# Patient Record
Sex: MALE | Race: WHITE | NOT HISPANIC OR LATINO | Employment: OTHER | URBAN - METROPOLITAN AREA
[De-identification: names, ages, dates, MRNs, and addresses within clinical notes are randomized per-mention and may not be internally consistent; named-entity substitution may affect disease eponyms.]

---

## 2024-09-16 ENCOUNTER — APPOINTMENT (EMERGENCY)
Dept: CT IMAGING | Facility: HOSPITAL | Age: 84
DRG: 308 | End: 2024-09-16
Payer: COMMERCIAL

## 2024-09-16 ENCOUNTER — HOSPITAL ENCOUNTER (INPATIENT)
Facility: HOSPITAL | Age: 84
LOS: 4 days | Discharge: NURSING, REHAB OR LONG-TERM CARE | DRG: 308 | End: 2024-09-21
Attending: EMERGENCY MEDICINE | Admitting: INTERNAL MEDICINE
Payer: COMMERCIAL

## 2024-09-16 DIAGNOSIS — J18.9 PNEUMONIA: ICD-10-CM

## 2024-09-16 DIAGNOSIS — K59.00 CONSTIPATION: Primary | ICD-10-CM

## 2024-09-16 DIAGNOSIS — R77.8 ELEVATED TOTAL PROTEIN: ICD-10-CM

## 2024-09-16 DIAGNOSIS — I50.9 CONGESTIVE HEART FAILURE, UNSPECIFIED HF CHRONICITY, UNSPECIFIED HEART FAILURE TYPE (HCC): ICD-10-CM

## 2024-09-16 DIAGNOSIS — I48.91 ATRIAL FIBRILLATION WITH RVR (HCC): ICD-10-CM

## 2024-09-16 PROBLEM — U07.1 COVID-19: Status: ACTIVE | Noted: 2024-09-16

## 2024-09-16 PROBLEM — I10 HYPERTENSION: Status: ACTIVE | Noted: 2024-09-16

## 2024-09-16 LAB
2HR DELTA HS TROPONIN: 1 NG/L
4HR DELTA HS TROPONIN: 1 NG/L
ALBUMIN SERPL BCG-MCNC: 3 G/DL (ref 3.5–5)
ALP SERPL-CCNC: 97 U/L (ref 34–104)
ALT SERPL W P-5'-P-CCNC: 32 U/L (ref 7–52)
ANION GAP SERPL CALCULATED.3IONS-SCNC: 5 MMOL/L (ref 4–13)
ANISOCYTOSIS BLD QL SMEAR: PRESENT
APAP SERPL-MCNC: <10 UG/ML (ref 10–20)
AST SERPL W P-5'-P-CCNC: 34 U/L (ref 13–39)
BASOPHILS # BLD MANUAL: 0.05 THOUSAND/UL (ref 0–0.1)
BASOPHILS NFR MAR MANUAL: 1 % (ref 0–1)
BILIRUB SERPL-MCNC: 0.82 MG/DL (ref 0.2–1)
BNP SERPL-MCNC: 797 PG/ML (ref 0–100)
BUN SERPL-MCNC: 24 MG/DL (ref 5–25)
CALCIUM ALBUM COR SERPL-MCNC: 9.2 MG/DL (ref 8.3–10.1)
CALCIUM SERPL-MCNC: 8.4 MG/DL (ref 8.4–10.2)
CARDIAC TROPONIN I PNL SERPL HS: 24 NG/L
CARDIAC TROPONIN I PNL SERPL HS: 25 NG/L
CARDIAC TROPONIN I PNL SERPL HS: 25 NG/L
CHLORIDE SERPL-SCNC: 96 MMOL/L (ref 96–108)
CK SERPL-CCNC: 40 U/L (ref 39–308)
CO2 SERPL-SCNC: 30 MMOL/L (ref 21–32)
CREAT SERPL-MCNC: 1.02 MG/DL (ref 0.6–1.3)
D DIMER PPP FEU-MCNC: 2.09 UG/ML FEU
EOSINOPHIL # BLD MANUAL: 0.25 THOUSAND/UL (ref 0–0.4)
EOSINOPHIL NFR BLD MANUAL: 5 % (ref 0–6)
ERYTHROCYTE [DISTWIDTH] IN BLOOD BY AUTOMATED COUNT: 18.4 % (ref 11.6–15.1)
ETHANOL SERPL-MCNC: <10 MG/DL
FLUAV RNA RESP QL NAA+PROBE: NEGATIVE
FLUBV RNA RESP QL NAA+PROBE: NEGATIVE
GFR SERPL CREATININE-BSD FRML MDRD: 67 ML/MIN/1.73SQ M
GLUCOSE SERPL-MCNC: 106 MG/DL (ref 65–140)
HCT VFR BLD AUTO: 36.7 % (ref 36.5–49.3)
HGB BLD-MCNC: 11.9 G/DL (ref 12–17)
LACTATE SERPL-SCNC: 1.8 MMOL/L (ref 0.5–2)
LIPASE SERPL-CCNC: 66 U/L (ref 11–82)
LYMPHOCYTES # BLD AUTO: 0.66 THOUSAND/UL (ref 0.6–4.47)
LYMPHOCYTES # BLD AUTO: 13 % (ref 14–44)
MACROCYTES BLD QL AUTO: PRESENT
MAGNESIUM SERPL-MCNC: 2.3 MG/DL (ref 1.9–2.7)
MCH RBC QN AUTO: 33.7 PG (ref 26.8–34.3)
MCHC RBC AUTO-ENTMCNC: 32.4 G/DL (ref 31.4–37.4)
MCV RBC AUTO: 104 FL (ref 82–98)
MONOCYTES # BLD AUTO: 0.51 THOUSAND/UL (ref 0–1.22)
MONOCYTES NFR BLD: 10 % (ref 4–12)
MYELOCYTE ABSOLUTE CT: 0.05 THOUSAND/UL (ref 0–0.1)
MYELOCYTES NFR BLD MANUAL: 1 % (ref 0–1)
NEUTROPHILS # BLD MANUAL: 3.56 THOUSAND/UL (ref 1.85–7.62)
NEUTS BAND NFR BLD MANUAL: 1 % (ref 0–8)
NEUTS SEG NFR BLD AUTO: 69 % (ref 43–75)
PLATELET # BLD AUTO: 160 THOUSANDS/UL (ref 149–390)
PLATELET BLD QL SMEAR: ADEQUATE
PMV BLD AUTO: 11.1 FL (ref 8.9–12.7)
POLYCHROMASIA BLD QL SMEAR: PRESENT
POTASSIUM SERPL-SCNC: 3.7 MMOL/L (ref 3.5–5.3)
PROCALCITONIN SERPL-MCNC: 0.09 NG/ML
PROT SERPL-MCNC: 9.8 G/DL (ref 6.4–8.4)
RBC # BLD AUTO: 3.53 MILLION/UL (ref 3.88–5.62)
RBC MORPH BLD: PRESENT
RSV RNA RESP QL NAA+PROBE: NEGATIVE
SALICYLATES SERPL-MCNC: <5 MG/DL (ref 3–20)
SARS-COV-2 RNA RESP QL NAA+PROBE: POSITIVE
SODIUM SERPL-SCNC: 131 MMOL/L (ref 135–147)
T4 FREE SERPL-MCNC: 1.17 NG/DL (ref 0.61–1.12)
TSH SERPL DL<=0.05 MIU/L-ACNC: 12.13 UIU/ML (ref 0.45–4.5)
WBC # BLD AUTO: 5.08 THOUSAND/UL (ref 4.31–10.16)

## 2024-09-16 PROCEDURE — 0241U HB NFCT DS VIR RESP RNA 4 TRGT: CPT | Performed by: INTERNAL MEDICINE

## 2024-09-16 PROCEDURE — 83605 ASSAY OF LACTIC ACID: CPT

## 2024-09-16 PROCEDURE — 74177 CT ABD & PELVIS W/CONTRAST: CPT

## 2024-09-16 PROCEDURE — 85027 COMPLETE CBC AUTOMATED: CPT

## 2024-09-16 PROCEDURE — 80053 COMPREHEN METABOLIC PANEL: CPT

## 2024-09-16 PROCEDURE — 93005 ELECTROCARDIOGRAM TRACING: CPT

## 2024-09-16 PROCEDURE — 80179 DRUG ASSAY SALICYLATE: CPT

## 2024-09-16 PROCEDURE — 82077 ASSAY SPEC XCP UR&BREATH IA: CPT

## 2024-09-16 PROCEDURE — 85379 FIBRIN DEGRADATION QUANT: CPT

## 2024-09-16 PROCEDURE — 82272 OCCULT BLD FECES 1-3 TESTS: CPT

## 2024-09-16 PROCEDURE — 84439 ASSAY OF FREE THYROXINE: CPT

## 2024-09-16 PROCEDURE — 71275 CT ANGIOGRAPHY CHEST: CPT

## 2024-09-16 PROCEDURE — 96367 TX/PROPH/DG ADDL SEQ IV INF: CPT

## 2024-09-16 PROCEDURE — 84484 ASSAY OF TROPONIN QUANT: CPT

## 2024-09-16 PROCEDURE — 84443 ASSAY THYROID STIM HORMONE: CPT

## 2024-09-16 PROCEDURE — 96365 THER/PROPH/DIAG IV INF INIT: CPT

## 2024-09-16 PROCEDURE — 83690 ASSAY OF LIPASE: CPT

## 2024-09-16 PROCEDURE — 96376 TX/PRO/DX INJ SAME DRUG ADON: CPT

## 2024-09-16 PROCEDURE — 84145 PROCALCITONIN (PCT): CPT | Performed by: INTERNAL MEDICINE

## 2024-09-16 PROCEDURE — 99285 EMERGENCY DEPT VISIT HI MDM: CPT

## 2024-09-16 PROCEDURE — 85007 BL SMEAR W/DIFF WBC COUNT: CPT

## 2024-09-16 PROCEDURE — 80143 DRUG ASSAY ACETAMINOPHEN: CPT

## 2024-09-16 PROCEDURE — 36415 COLL VENOUS BLD VENIPUNCTURE: CPT

## 2024-09-16 PROCEDURE — 82550 ASSAY OF CK (CPK): CPT

## 2024-09-16 PROCEDURE — 99284 EMERGENCY DEPT VISIT MOD MDM: CPT

## 2024-09-16 PROCEDURE — 83880 ASSAY OF NATRIURETIC PEPTIDE: CPT

## 2024-09-16 PROCEDURE — 83735 ASSAY OF MAGNESIUM: CPT

## 2024-09-16 RX ORDER — QUETIAPINE FUMARATE 100 MG/1
100 TABLET, FILM COATED ORAL
Status: DISCONTINUED | OUTPATIENT
Start: 2024-09-16 | End: 2024-09-17

## 2024-09-16 RX ORDER — DOXYCYCLINE 100 MG/1
1 CAPSULE ORAL EVERY 12 HOURS
COMMUNITY
Start: 2024-09-10 | End: 2024-09-21

## 2024-09-16 RX ORDER — POTASSIUM CHLORIDE 1500 MG/1
20 TABLET, EXTENDED RELEASE ORAL ONCE
Status: DISCONTINUED | OUTPATIENT
Start: 2024-09-16 | End: 2024-09-21 | Stop reason: HOSPADM

## 2024-09-16 RX ORDER — DOXEPIN HYDROCHLORIDE 25 MG/1
50 CAPSULE ORAL
Status: DISCONTINUED | OUTPATIENT
Start: 2024-09-16 | End: 2024-09-21 | Stop reason: HOSPADM

## 2024-09-16 RX ORDER — DOXYCYCLINE 100 MG/1
100 CAPSULE ORAL EVERY 12 HOURS SCHEDULED
Status: COMPLETED | OUTPATIENT
Start: 2024-09-16 | End: 2024-09-17

## 2024-09-16 RX ORDER — SPIRONOLACTONE 25 MG/1
25 TABLET ORAL DAILY
Status: DISCONTINUED | OUTPATIENT
Start: 2024-09-17 | End: 2024-09-19

## 2024-09-16 RX ORDER — LORAZEPAM 0.5 MG/1
0.5 TABLET ORAL 2 TIMES DAILY PRN
Status: ON HOLD | COMMUNITY
Start: 2024-08-24 | End: 2024-09-16 | Stop reason: ALTCHOICE

## 2024-09-16 RX ORDER — MULTIVITAMIN
1 TABLET ORAL DAILY
COMMUNITY

## 2024-09-16 RX ORDER — FUROSEMIDE 40 MG
40 TABLET ORAL 2 TIMES DAILY
Status: ON HOLD | COMMUNITY
Start: 2024-08-19 | End: 2024-09-19

## 2024-09-16 RX ORDER — DOXEPIN HYDROCHLORIDE 50 MG/1
50 CAPSULE ORAL
COMMUNITY
Start: 2024-09-10 | End: 2024-09-21

## 2024-09-16 RX ORDER — ASPIRIN 81 MG/1
81 TABLET, CHEWABLE ORAL DAILY
Status: DISCONTINUED | OUTPATIENT
Start: 2024-09-17 | End: 2024-09-21 | Stop reason: HOSPADM

## 2024-09-16 RX ORDER — MAGNESIUM OXIDE/MAG AA CHELATE 300 MG
CAPSULE ORAL
COMMUNITY

## 2024-09-16 RX ORDER — ACETAMINOPHEN 10 MG/ML
1000 INJECTION, SOLUTION INTRAVENOUS ONCE
Status: COMPLETED | OUTPATIENT
Start: 2024-09-16 | End: 2024-09-16

## 2024-09-16 RX ORDER — FUROSEMIDE 40 MG
40 TABLET ORAL
Status: DISCONTINUED | OUTPATIENT
Start: 2024-09-17 | End: 2024-09-18

## 2024-09-16 RX ORDER — QUETIAPINE FUMARATE 50 MG/1
100 TABLET, FILM COATED ORAL
COMMUNITY
Start: 2024-09-10

## 2024-09-16 RX ORDER — LANOLIN ALCOHOL/MO/W.PET/CERES
6 CREAM (GRAM) TOPICAL
Status: DISCONTINUED | OUTPATIENT
Start: 2024-09-16 | End: 2024-09-17

## 2024-09-16 RX ORDER — METOPROLOL SUCCINATE 25 MG/1
25 TABLET, EXTENDED RELEASE ORAL EVERY 24 HOURS
Status: ON HOLD | COMMUNITY
Start: 2024-09-10 | End: 2024-09-19

## 2024-09-16 RX ORDER — SPIRONOLACTONE 25 MG/1
25 TABLET ORAL DAILY
COMMUNITY
End: 2024-09-21

## 2024-09-16 RX ORDER — POLYETHYLENE GLYCOL 3350 17 G/17G
17 POWDER, FOR SOLUTION ORAL DAILY
Status: DISCONTINUED | OUTPATIENT
Start: 2024-09-17 | End: 2024-09-21 | Stop reason: HOSPADM

## 2024-09-16 RX ORDER — METOPROLOL SUCCINATE 25 MG/1
25 TABLET, EXTENDED RELEASE ORAL DAILY
Status: DISCONTINUED | OUTPATIENT
Start: 2024-09-17 | End: 2024-09-17

## 2024-09-16 RX ORDER — POLYETHYLENE GLYCOL 3350 17 G/17G
17 POWDER, FOR SOLUTION ORAL DAILY
COMMUNITY

## 2024-09-16 RX ORDER — CEFTRIAXONE 1 G/50ML
1000 INJECTION, SOLUTION INTRAVENOUS ONCE
Status: COMPLETED | OUTPATIENT
Start: 2024-09-16 | End: 2024-09-16

## 2024-09-16 RX ORDER — HEPARIN SODIUM 5000 [USP'U]/ML
5000 INJECTION, SOLUTION INTRAVENOUS; SUBCUTANEOUS EVERY 8 HOURS SCHEDULED
Status: DISCONTINUED | OUTPATIENT
Start: 2024-09-17 | End: 2024-09-17

## 2024-09-16 RX ORDER — ASPIRIN 81 MG/1
81 TABLET, CHEWABLE ORAL DAILY
COMMUNITY

## 2024-09-16 RX ORDER — METOPROLOL TARTRATE 1 MG/ML
5 INJECTION, SOLUTION INTRAVENOUS ONCE
Status: DISCONTINUED | OUTPATIENT
Start: 2024-09-16 | End: 2024-09-16

## 2024-09-16 RX ORDER — LANOLIN ALCOHOL/MO/W.PET/CERES
400 CREAM (GRAM) TOPICAL DAILY
Status: DISCONTINUED | OUTPATIENT
Start: 2024-09-17 | End: 2024-09-21 | Stop reason: HOSPADM

## 2024-09-16 RX ORDER — ACETAMINOPHEN 325 MG/1
650 TABLET ORAL EVERY 6 HOURS PRN
Status: DISCONTINUED | OUTPATIENT
Start: 2024-09-16 | End: 2024-09-21 | Stop reason: HOSPADM

## 2024-09-16 RX ORDER — DILTIAZEM HYDROCHLORIDE 5 MG/ML
15 INJECTION INTRAVENOUS ONCE
Status: COMPLETED | OUTPATIENT
Start: 2024-09-16 | End: 2024-09-16

## 2024-09-16 RX ADMIN — DILTIAZEM HYDROCHLORIDE 5 MG/HR: 5 INJECTION INTRAVENOUS at 20:49

## 2024-09-16 RX ADMIN — SODIUM CHLORIDE 1000 ML: 0.9 INJECTION, SOLUTION INTRAVENOUS at 17:11

## 2024-09-16 RX ADMIN — ACETAMINOPHEN 1000 MG: 10 INJECTION INTRAVENOUS at 17:43

## 2024-09-16 RX ADMIN — CEFTRIAXONE 1000 MG: 1 INJECTION, SOLUTION INTRAVENOUS at 20:11

## 2024-09-16 RX ADMIN — Medication 6 MG: at 23:55

## 2024-09-16 RX ADMIN — DOXEPIN HYDROCHLORIDE 50 MG: 50 CAPSULE ORAL at 23:55

## 2024-09-16 RX ADMIN — DOXYCYCLINE 100 MG: 100 CAPSULE ORAL at 23:55

## 2024-09-16 RX ADMIN — SODIUM CHLORIDE 250 ML: 0.9 INJECTION, SOLUTION INTRAVENOUS at 17:17

## 2024-09-16 RX ADMIN — IOHEXOL 100 ML: 350 INJECTION, SOLUTION INTRAVENOUS at 17:52

## 2024-09-16 RX ADMIN — DILTIAZEM HYDROCHLORIDE 15 MG: 5 INJECTION, SOLUTION INTRAVENOUS at 20:44

## 2024-09-16 RX ADMIN — QUETIAPINE FUMARATE 100 MG: 25 TABLET ORAL at 23:55

## 2024-09-16 NOTE — ED PROVIDER NOTES
1. Constipation    2. Pneumonia    3. Atrial fibrillation with RVR (HCC)      ED Disposition       ED Disposition   Admit    Condition   Stable    Date/Time   Mon Sep 16, 2024 10:12 PM    Comment   Case was discussed with Jaki Harding and the patient's admission status was agreed to be Admission Status: observation status to the service of Dr. Araujo .               Assessment & Plan       Medical Decision Making  Patient is awake, alert and oriented to person, place, time and situation. No lateralizing motor or sensory deficits. Patient presented to the hospital due to his constipation and rectal pain. Last BM 9/12. CT abd demonstrating Fecal distention of the rectum suggestive of fecal impaction, diffuse fecal retention (per impression). Soap suds enema performed in the ER with small hard BM. No blood noted. Disimpaction attempted by provider without success. Patient also with known history of afib, and while in the ER went into Afib with RVR. Unable to be given IV lopressor due to soft Bps. Cardizem bolus + drip with improvement. Ddimer elevated. PE negative. All other findings as noted below. Patient to be admitted to [facility] service. Patient agreeable.        Amount and/or Complexity of Data Reviewed  Labs: ordered.  Radiology: ordered.    Risk  Prescription drug management.  Decision regarding hospitalization.                     Medications   potassium chloride (Klor-Con M20) CR tablet 20 mEq (20 mEq Oral Not Given 9/16/24 2304)   sodium chloride 0.9 % bolus 250 mL (0 mL Intravenous Stopped 9/16/24 1806)   acetaminophen (Ofirmev) injection 1,000 mg (0 mg Intravenous Stopped 9/16/24 1806)   iohexol (OMNIPAQUE) 350 MG/ML injection (MULTI-DOSE) 100 mL (100 mL Intravenous Given 9/16/24 1752)   cefTRIAXone (ROCEPHIN) IVPB (premix in dextrose) 1,000 mg 50 mL (0 mg Intravenous Stopped 9/16/24 2044)   diltiazem (CARDIZEM) injection 15 mg (15 mg Intravenous Given 9/16/24 2044)       History of Present Illness       Patient  is a 83-year-old male who was brought in by ambulance from [facility], with past medical history significant for A-fib, CHF, hypertension, depression and COVID, presenting to the emergency department for complaint of constipation and rectal pain that has been ongoing since 9/12.  History was gathered by speaking to RN at [facility], Chantale Winn, as well as transfer report and patient. Patient reports that he has history of constipation and according to the EMS transfer of care form, patient has not had a bowel movement since 9/12/2024.  Patient was given MOM, enema and suppository with no results according to nursing staff. Patient denies abdominal pain, chest pain, back pain, nausea, vomiting.  Does report streaks of blood in his stool a week ago, which were dark red, however states he is unsure because he flushed too fast.      Constipation  Associated symptoms: no abdominal pain, no back pain, no diarrhea, no dysuria, no fever, no nausea and no vomiting        Review of Systems   Constitutional:  Negative for chills and fever.   HENT:  Negative for sore throat.    Eyes:  Negative for pain and visual disturbance.   Respiratory:  Negative for cough and shortness of breath.    Cardiovascular:  Negative for chest pain and palpitations.   Gastrointestinal:  Positive for blood in stool and constipation. Negative for abdominal distention, abdominal pain, diarrhea, nausea and vomiting.   Genitourinary:  Negative for difficulty urinating, dysuria, hematuria and urgency.   Musculoskeletal:  Negative for arthralgias, back pain, myalgias, neck pain and neck stiffness.   Skin:  Negative for color change and rash.   Neurological:  Negative for dizziness, seizures, syncope, weakness and headaches.   All other systems reviewed and are negative.          Objective     ED Triage Vitals   Temperature Pulse Blood Pressure Respirations SpO2 Patient Position - Orthostatic VS   09/16/24 1638 09/16/24 1636 09/16/24 1636  09/16/24 1636 09/16/24 1638 09/16/24 1726   97.8 °F (36.6 °C) 70 103/76 18 100 % Lying      Temp Source Heart Rate Source BP Location FiO2 (%) Pain Score    09/16/24 1638 09/16/24 1636 09/16/24 1726 -- 09/16/24 1725    Oral Monitor Left arm  7        Physical Exam  Vitals and nursing note reviewed. Exam conducted with a chaperone present.   Constitutional:       General: He is in acute distress (painful distress 2/2 rectal pain).      Appearance: Normal appearance. He is well-developed.   HENT:      Head: Normocephalic and atraumatic.   Eyes:      Conjunctiva/sclera: Conjunctivae normal.   Cardiovascular:      Rate and Rhythm: Tachycardia present. Rhythm irregular.   Pulmonary:      Effort: Pulmonary effort is normal. No respiratory distress.      Breath sounds: Normal breath sounds. No wheezing.   Abdominal:      General: Abdomen is flat. There is no distension.      Palpations: Abdomen is soft.      Tenderness: There is no abdominal tenderness.   Genitourinary:     Rectum: Guaiac result negative. External hemorrhoid present.      Comments: Small external hemorrhoid visualized at the anal verge. No signs of thrombosis, bleeding or excoriation. No fissures or masses palpated. Normal sphincter tone. GUAIAC RESULT NEGATIVE. Chaperone present, RN  Musculoskeletal:         General: No swelling.      Cervical back: Neck supple.      Right lower leg: No edema.      Left lower leg: No edema.   Skin:     General: Skin is warm and dry.      Capillary Refill: Capillary refill takes less than 2 seconds.   Neurological:      Mental Status: He is alert and oriented to person, place, and time. Mental status is at baseline.      GCS: GCS eye subscore is 4. GCS verbal subscore is 5. GCS motor subscore is 6.      Sensory: Sensation is intact.      Motor: Motor function is intact.   Psychiatric:         Mood and Affect: Mood normal.         Labs Reviewed   COVID19, INFLUENZA A/B, RSV PCR, [facility] - Abnormal       Result Value     SARS-CoV-2 Positive (*)     INFLUENZA A PCR Negative      INFLUENZA B PCR Negative      RSV PCR Negative      Narrative:     This test has been performed using the CoV-2/Flu/RSV plus assay on the [employer] platform. This test has been validated by the  and verified by the performing laboratory.     This test is designed to amplify and detect the following: nucleocapsid (N), envelope (E), and RNA-dependent RNA polymerase (RdRP) genes of the SARS-CoV-2 genome; matrix (M), basic polymerase (PB2), and acidic protein (PA) segments of the influenza A genome; matrix (M) and non-structural protein (NS) segments of the influenza B genome, and the nucleocapsid genes of RSV A and RSV B.     Positive results are indicative of the presence of Flu A, Flu B, RSV, and/or SARS-CoV-2 RNA. Positive results for SARS-CoV-2 or suspected novel influenza should be reported to state, local, or federal health departments according to local reporting requirements.      All results should be assessed in conjunction with clinical presentation and other laboratory markers for clinical management.     FOR PEDIATRIC PATIENTS - copy/paste COVID Guidelines URL to browser: [email or web address]      CBC AND DIFFERENTIAL - Abnormal    WBC 5.08      RBC 3.53 (*)     Hemoglobin 11.9 (*)     Hematocrit 36.7       (*)     MCH 33.7      MCHC 32.4      RDW 18.4 (*)     MPV 11.1      Platelets 160      Narrative:     This is an appended report.  These results have been appended to a previously verified report.   COMPREHENSIVE METABOLIC PANEL - Abnormal    Sodium 131 (*)     Potassium 3.7      Chloride 96      CO2 30      ANION GAP 5      BUN 24      Creatinine 1.02      Glucose 106      Calcium 8.4      Corrected Calcium 9.2      AST 34      ALT 32      Alkaline Phosphatase 97      Total Protein 9.8 (*)     Albumin 3.0 (*)     Total Bilirubin 0.82      eGFR 67      Narrative:      "National Kidney Disease Foundation guidelines for Chronic Kidney Disease (CKD):     Stage 1 with normal or high GFR (GFR > 90 mL/min/1.73 square meters)    Stage 2 Mild CKD (GFR = 60-89 mL/min/1.73 square meters)    Stage 3A Moderate CKD (GFR = 45-59 mL/min/1.73 square meters)    Stage 3B Moderate CKD (GFR = 30-44 mL/min/1.73 square meters)    Stage 4 Severe CKD (GFR = 15-29 mL/min/1.73 square meters)    Stage 5 End Stage CKD (GFR <15 mL/min/1.73 square meters)  Note: GFR calculation is accurate only with a steady state creatinine   TSH, 3RD GENERATION WITH FREE T4 REFLEX - Abnormal    TSH 3RD GENERATON 12.135 (*)    B-TYPE NATRIURETIC PEPTIDE (BNP) - Abnormal     (*)    D-DIMER, QUANTITATIVE - Abnormal    D-Dimer, Quant 2.09 (*)     Narrative:     In the evaluation for possible pulmonary embolism, in the appropriate (Well's Score of 4 or less) patient, the age adjusted d-dimer cutoff for this patient can be calculated as:    Age x 0.01 (in ug/mL) for Age-adjusted D-dimer exclusion threshold for a patient over 50 years.   UA W REFLEX TO MICROSCOPIC WITH REFLEX TO CULTURE - Abnormal    Color, UA Yellow      Clarity, UA Clear      Specific Gravity, UA 1.015      pH, UA 6.5      Leukocytes, UA Negative      Nitrite, UA Negative      Protein, UA 30 (1+) (*)     Glucose, UA 1000 (1%) (*)     Ketones, UA Negative      Urobilinogen, UA <2.0      Bilirubin, UA Negative      Occult Blood, UA Small (*)    ACETAMINOPHEN LEVEL - Abnormal    Acetaminophen Level <10 (*)     Narrative:     verified by repeat analysis.   T4, FREE - Abnormal    Free T4 1.17 (*)     Narrative:     \"Therapeutic range for patients medicated with thyroid disorders: 0.61-1.24 ng/dL.\"   CBC AND DIFFERENTIAL - Abnormal    WBC 5.81      RBC 3.19 (*)     Hemoglobin 10.9 (*)     Hematocrit 32.8 (*)      (*)     MCH 34.2      MCHC 33.2      RDW 18.2 (*)     MPV 11.0      Platelets 129 (*)     nRBC 0      Segmented % 75      Immature Grans % 1   "    Lymphocytes % 13 (*)     Monocytes % 10      Eosinophils Relative 1      Basophils Relative 0      Absolute Neutrophils 4.32      Absolute Immature Grans 0.07      Absolute Lymphocytes 0.74      Absolute Monocytes 0.59      Eosinophils Absolute 0.07      Basophils Absolute 0.02     COMPREHENSIVE METABOLIC PANEL - Abnormal    Sodium 131 (*)     Potassium 3.5      Chloride 96      CO2 29      ANION GAP 6      BUN 22      Creatinine 0.92      Glucose 97      Calcium 7.6 (*)     Corrected Calcium 8.8      AST 27      ALT 27      Alkaline Phosphatase 83      Total Protein 8.7 (*)     Albumin 2.5 (*)     Total Bilirubin 0.84      eGFR 76      Narrative:     National Kidney Disease Foundation guidelines for Chronic Kidney Disease (CKD):     Stage 1 with normal or high GFR (GFR > 90 mL/min/1.73 square meters)    Stage 2 Mild CKD (GFR = 60-89 mL/min/1.73 square meters)    Stage 3A Moderate CKD (GFR = 45-59 mL/min/1.73 square meters)    Stage 3B Moderate CKD (GFR = 30-44 mL/min/1.73 square meters)    Stage 4 Severe CKD (GFR = 15-29 mL/min/1.73 square meters)    Stage 5 End Stage CKD (GFR <15 mL/min/1.73 square meters)  Note: GFR calculation is accurate only with a steady state creatinine   URINE MICROSCOPIC - Abnormal    RBC, UA 1-2      WBC, UA None Seen      Epithelial Cells None Seen      Bacteria, UA None Seen      Hyaline Casts, UA 0-1 (*)    MANUAL DIFFERENTIAL(PHLEBS DO NOT ORDER) - Abnormal    Segmented % 69      Bands % 1      Lymphocytes % 13 (*)     Monocytes % 10      Eosinophils % 5      Basophils % 1      Myelocytes % 1      Absolute Neutrophils 3.56      Absolute Lymphocytes 0.66      Absolute Monocytes 0.51      Absolute Eosinophils 0.25      Absolute Basophils 0.05      Absolute Myelocytes 0.05      Total Counted        RBC Morphology Present      Platelet Estimate Adequate      Anisocytosis Present      Macrocytes Present      Polychromasia Present     LIPASE - Normal    Lipase 66     MAGNESIUM - Normal     Magnesium 2.3     LACTIC ACID, PLASMA (W/REFLEX IF RESULT > 2.0) - Normal    LACTIC ACID 1.8      Narrative:     Result may be elevated if tourniquet was used during collection.   HS TROPONIN I 0HR - Normal    hs TnI 0hr 24     CK - Normal    Total CK 40     MEDICAL ALCOHOL - Normal    Ethanol Lvl <10     SALICYLATE LEVEL - Normal    Salicylate Lvl <5      Narrative:     verified by repeat analysis.   HS TROPONIN I 2HR - Normal    hs TnI 2hr 25      Delta 2hr hsTnI 1     HS TROPONIN I 4HR - Normal    hs TnI 4hr 25      Delta 4hr hsTnI 1     PROCALCITONIN TEST - Normal    Procalcitonin 0.09     MAGNESIUM - Normal    Magnesium 2.2     MRSA CULTURE   RBC MORPHOLOGY REFLEX TEST   PROTEIN ELECTROPHORESIS, URINE   COMA PANEL    Narrative:     The following orders were created for panel order Coma panel.  Procedure                               Abnormality         Status                     ---------                               -----------         ------                     Ethanol[[id number]]                      Normal              Final result               Salicylate level[[id number]]             Normal              Final result               Acetaminophen level-If c...[[id number]]  Abnormal            Final result                 Please view results for these tests on the individual orders.      VAS VENOUS DUPLEX - LOWER LIMB BILATERAL   Final Interpretation by Reyna Garcia MD (09/17 1351)      PE Study with CT Abdomen and Pelvis with contrast   Final Interpretation by Lucila Story MD (09/16 1925)   No pulmonary embolism identified.   Emphysema.   Superimposed bilateral densities, as above, possibly related to reported COVID but nonspecific. Of note, radiographic findings may lag behind clinical improvement.   Fecal distention of the rectum suggestive of fecal impaction. Diffuse fecal retention.   Age indeterminate mild T7 compression deformity. Correlate to exclude focal tenderness.   Chronic findings, as per  the body of the report.                        Workstation performed: [id number]             ECG 12 Lead Documentation Only    Date/Time: 9/16/2024 5:08 PM    Performed by: Lori Lopez PA-C  Authorized by: Lori Lopez PA-C    Indications / Diagnosis:  Tachycardia  ECG reviewed by me, the ED Provider: yes (leeanne)    Interpretation:     Interpretation: abnormal    Rate:     ECG rate:  131    ECG rate assessment: tachycardic    Rhythm:     Rhythm: atrial fibrillation      Rhythm comment:  Afib w rvr         Lori Lopez PA-C  09/17/24 2047       Lori Lopez PA-C  09/17/24 2049

## 2024-09-17 ENCOUNTER — APPOINTMENT (OUTPATIENT)
Dept: NON INVASIVE DIAGNOSTICS | Facility: HOSPITAL | Age: 84
DRG: 308 | End: 2024-09-17
Payer: COMMERCIAL

## 2024-09-17 LAB
ALBUMIN SERPL BCG-MCNC: 2.5 G/DL (ref 3.5–5)
ALP SERPL-CCNC: 83 U/L (ref 34–104)
ALT SERPL W P-5'-P-CCNC: 27 U/L (ref 7–52)
ANION GAP SERPL CALCULATED.3IONS-SCNC: 6 MMOL/L (ref 4–13)
AST SERPL W P-5'-P-CCNC: 27 U/L (ref 13–39)
ATRIAL RATE: 136 BPM
ATRIAL RATE: 153 BPM
BACTERIA UR QL AUTO: ABNORMAL /HPF
BASOPHILS # BLD AUTO: 0.02 THOUSANDS/ΜL (ref 0–0.1)
BASOPHILS NFR BLD AUTO: 0 % (ref 0–1)
BILIRUB SERPL-MCNC: 0.84 MG/DL (ref 0.2–1)
BILIRUB UR QL STRIP: NEGATIVE
BUN SERPL-MCNC: 22 MG/DL (ref 5–25)
CALCIUM ALBUM COR SERPL-MCNC: 8.8 MG/DL (ref 8.3–10.1)
CALCIUM SERPL-MCNC: 7.6 MG/DL (ref 8.4–10.2)
CHLORIDE SERPL-SCNC: 96 MMOL/L (ref 96–108)
CLARITY UR: CLEAR
CO2 SERPL-SCNC: 29 MMOL/L (ref 21–32)
COLOR UR: YELLOW
CREAT SERPL-MCNC: 0.92 MG/DL (ref 0.6–1.3)
EOSINOPHIL # BLD AUTO: 0.07 THOUSAND/ΜL (ref 0–0.61)
EOSINOPHIL NFR BLD AUTO: 1 % (ref 0–6)
ERYTHROCYTE [DISTWIDTH] IN BLOOD BY AUTOMATED COUNT: 18.2 % (ref 11.6–15.1)
GFR SERPL CREATININE-BSD FRML MDRD: 76 ML/MIN/1.73SQ M
GLUCOSE SERPL-MCNC: 97 MG/DL (ref 65–140)
GLUCOSE UR STRIP-MCNC: ABNORMAL MG/DL
HCT VFR BLD AUTO: 32.8 % (ref 36.5–49.3)
HGB BLD-MCNC: 10.9 G/DL (ref 12–17)
HGB UR QL STRIP.AUTO: ABNORMAL
HYALINE CASTS #/AREA URNS LPF: ABNORMAL /LPF
IMM GRANULOCYTES # BLD AUTO: 0.07 THOUSAND/UL (ref 0–0.2)
IMM GRANULOCYTES NFR BLD AUTO: 1 % (ref 0–2)
KETONES UR STRIP-MCNC: NEGATIVE MG/DL
LEUKOCYTE ESTERASE UR QL STRIP: NEGATIVE
LYMPHOCYTES # BLD AUTO: 0.74 THOUSANDS/ΜL (ref 0.6–4.47)
LYMPHOCYTES NFR BLD AUTO: 13 % (ref 14–44)
MAGNESIUM SERPL-MCNC: 2.2 MG/DL (ref 1.9–2.7)
MCH RBC QN AUTO: 34.2 PG (ref 26.8–34.3)
MCHC RBC AUTO-ENTMCNC: 33.2 G/DL (ref 31.4–37.4)
MCV RBC AUTO: 103 FL (ref 82–98)
MONOCYTES # BLD AUTO: 0.59 THOUSAND/ΜL (ref 0.17–1.22)
MONOCYTES NFR BLD AUTO: 10 % (ref 4–12)
NEUTROPHILS # BLD AUTO: 4.32 THOUSANDS/ΜL (ref 1.85–7.62)
NEUTS SEG NFR BLD AUTO: 75 % (ref 43–75)
NITRITE UR QL STRIP: NEGATIVE
NON-SQ EPI CELLS URNS QL MICRO: ABNORMAL /HPF
NRBC BLD AUTO-RTO: 0 /100 WBCS
PH UR STRIP.AUTO: 6.5 [PH]
PLATELET # BLD AUTO: 129 THOUSANDS/UL (ref 149–390)
PMV BLD AUTO: 11 FL (ref 8.9–12.7)
POTASSIUM SERPL-SCNC: 3.5 MMOL/L (ref 3.5–5.3)
PROT SERPL-MCNC: 8.7 G/DL (ref 6.4–8.4)
PROT UR STRIP-MCNC: ABNORMAL MG/DL
QRS AXIS: 100 DEGREES
QRS AXIS: 98 DEGREES
QRSD INTERVAL: 108 MS
QRSD INTERVAL: 110 MS
QT INTERVAL: 358 MS
QT INTERVAL: 394 MS
QTC INTERVAL: 492 MS
QTC INTERVAL: 528 MS
RBC # BLD AUTO: 3.19 MILLION/UL (ref 3.88–5.62)
RBC #/AREA URNS AUTO: ABNORMAL /HPF
SODIUM SERPL-SCNC: 131 MMOL/L (ref 135–147)
SP GR UR STRIP.AUTO: 1.01 (ref 1–1.03)
T WAVE AXIS: 253 DEGREES
T WAVE AXIS: 255 DEGREES
UROBILINOGEN UR STRIP-ACNC: <2 MG/DL
VENTRICULAR RATE: 131 BPM
VENTRICULAR RATE: 94 BPM
WBC # BLD AUTO: 5.81 THOUSAND/UL (ref 4.31–10.16)
WBC #/AREA URNS AUTO: ABNORMAL /HPF

## 2024-09-17 PROCEDURE — 93010 ELECTROCARDIOGRAM REPORT: CPT | Performed by: INTERNAL MEDICINE

## 2024-09-17 PROCEDURE — NC001 PR NO CHARGE: Performed by: INTERNAL MEDICINE

## 2024-09-17 PROCEDURE — 86335 IMMUNFIX E-PHORSIS/URINE/CSF: CPT | Performed by: INTERNAL MEDICINE

## 2024-09-17 PROCEDURE — 83735 ASSAY OF MAGNESIUM: CPT | Performed by: INTERNAL MEDICINE

## 2024-09-17 PROCEDURE — 87081 CULTURE SCREEN ONLY: CPT | Performed by: INTERNAL MEDICINE

## 2024-09-17 PROCEDURE — 99222 1ST HOSP IP/OBS MODERATE 55: CPT | Performed by: INTERNAL MEDICINE

## 2024-09-17 PROCEDURE — 81001 URINALYSIS AUTO W/SCOPE: CPT | Performed by: INTERNAL MEDICINE

## 2024-09-17 PROCEDURE — 93970 EXTREMITY STUDY: CPT | Performed by: INTERNAL MEDICINE

## 2024-09-17 PROCEDURE — 99223 1ST HOSP IP/OBS HIGH 75: CPT | Performed by: INTERNAL MEDICINE

## 2024-09-17 PROCEDURE — 80053 COMPREHEN METABOLIC PANEL: CPT | Performed by: INTERNAL MEDICINE

## 2024-09-17 PROCEDURE — 84166 PROTEIN E-PHORESIS/URINE/CSF: CPT | Performed by: INTERNAL MEDICINE

## 2024-09-17 PROCEDURE — 93970 EXTREMITY STUDY: CPT

## 2024-09-17 PROCEDURE — 85025 COMPLETE CBC W/AUTO DIFF WBC: CPT | Performed by: INTERNAL MEDICINE

## 2024-09-17 RX ORDER — SENNOSIDES 8.6 MG
2 TABLET ORAL
Status: DISCONTINUED | OUTPATIENT
Start: 2024-09-17 | End: 2024-09-21 | Stop reason: HOSPADM

## 2024-09-17 RX ORDER — FLUTICASONE PROPIONATE 50 MCG
1 SPRAY, SUSPENSION (ML) NASAL DAILY
Status: DISCONTINUED | OUTPATIENT
Start: 2024-09-17 | End: 2024-09-21 | Stop reason: HOSPADM

## 2024-09-17 RX ORDER — LIDOCAINE HYDROCHLORIDE 20 MG/ML
1 JELLY TOPICAL ONCE
Status: COMPLETED | OUTPATIENT
Start: 2024-09-17 | End: 2024-09-17

## 2024-09-17 RX ORDER — DOCUSATE SODIUM 100 MG/1
100 CAPSULE, LIQUID FILLED ORAL 2 TIMES DAILY
Status: DISCONTINUED | OUTPATIENT
Start: 2024-09-17 | End: 2024-09-21 | Stop reason: HOSPADM

## 2024-09-17 RX ORDER — METOPROLOL SUCCINATE 25 MG/1
25 TABLET, EXTENDED RELEASE ORAL ONCE
Status: DISCONTINUED | OUTPATIENT
Start: 2024-09-17 | End: 2024-09-17

## 2024-09-17 RX ORDER — ENOXAPARIN SODIUM 100 MG/ML
1 INJECTION SUBCUTANEOUS ONCE
Status: COMPLETED | OUTPATIENT
Start: 2024-09-17 | End: 2024-09-17

## 2024-09-17 RX ORDER — QUETIAPINE FUMARATE 100 MG/1
100 TABLET, FILM COATED ORAL
Status: DISCONTINUED | OUTPATIENT
Start: 2024-09-17 | End: 2024-09-21 | Stop reason: HOSPADM

## 2024-09-17 RX ORDER — METOPROLOL SUCCINATE 25 MG/1
25 TABLET, EXTENDED RELEASE ORAL DAILY
Status: DISCONTINUED | OUTPATIENT
Start: 2024-09-17 | End: 2024-09-17

## 2024-09-17 RX ORDER — LANOLIN ALCOHOL/MO/W.PET/CERES
6 CREAM (GRAM) TOPICAL
Status: DISCONTINUED | OUTPATIENT
Start: 2024-09-17 | End: 2024-09-21 | Stop reason: HOSPADM

## 2024-09-17 RX ORDER — METOPROLOL SUCCINATE 25 MG/1
25 TABLET, EXTENDED RELEASE ORAL ONCE
Status: COMPLETED | OUTPATIENT
Start: 2024-09-17 | End: 2024-09-17

## 2024-09-17 RX ORDER — METOPROLOL SUCCINATE 25 MG/1
25 TABLET, EXTENDED RELEASE ORAL 2 TIMES DAILY
Status: DISCONTINUED | OUTPATIENT
Start: 2024-09-17 | End: 2024-09-18

## 2024-09-17 RX ORDER — METOPROLOL TARTRATE 1 MG/ML
2.5 INJECTION, SOLUTION INTRAVENOUS EVERY 6 HOURS PRN
Status: DISCONTINUED | OUTPATIENT
Start: 2024-09-17 | End: 2024-09-19

## 2024-09-17 RX ORDER — MAGNESIUM CARB/ALUMINUM HYDROX 105-160MG
296 TABLET,CHEWABLE ORAL ONCE
Status: COMPLETED | OUTPATIENT
Start: 2024-09-17 | End: 2024-09-17

## 2024-09-17 RX ADMIN — METOPROLOL SUCCINATE 25 MG: 25 TABLET, EXTENDED RELEASE ORAL at 09:52

## 2024-09-17 RX ADMIN — MELATONIN 6 MG: at 20:38

## 2024-09-17 RX ADMIN — DOXEPIN HYDROCHLORIDE 50 MG: 50 CAPSULE ORAL at 20:38

## 2024-09-17 RX ADMIN — EMPAGLIFLOZIN 10 MG: 10 TABLET, FILM COATED ORAL at 08:19

## 2024-09-17 RX ADMIN — MAGNESIUM CITRATE 296 ML: 1.75 LIQUID ORAL at 12:39

## 2024-09-17 RX ADMIN — FUROSEMIDE 40 MG: 40 TABLET ORAL at 16:00

## 2024-09-17 RX ADMIN — APIXABAN 5 MG: 5 TABLET, FILM COATED ORAL at 20:38

## 2024-09-17 RX ADMIN — QUETIAPINE FUMARATE 100 MG: 100 TABLET ORAL at 20:38

## 2024-09-17 RX ADMIN — ACETAMINOPHEN 650 MG: 325 TABLET ORAL at 11:15

## 2024-09-17 RX ADMIN — POLYETHYLENE GLYCOL 3350 17 G: 17 POWDER, FOR SOLUTION ORAL at 08:21

## 2024-09-17 RX ADMIN — LIDOCAINE HYDROCHLORIDE 1 APPLICATION: 20 JELLY TOPICAL at 14:33

## 2024-09-17 RX ADMIN — Medication 400 MG: at 08:21

## 2024-09-17 RX ADMIN — DOXYCYCLINE 100 MG: 100 CAPSULE ORAL at 20:38

## 2024-09-17 RX ADMIN — METOPROLOL SUCCINATE 25 MG: 25 TABLET, EXTENDED RELEASE ORAL at 17:06

## 2024-09-17 RX ADMIN — METOPROLOL TARTRATE 2.5 MG: 5 INJECTION INTRAVENOUS at 16:00

## 2024-09-17 RX ADMIN — ASPIRIN 81 MG CHEWABLE TABLET 81 MG: 81 TABLET CHEWABLE at 08:20

## 2024-09-17 RX ADMIN — METOPROLOL SUCCINATE 25 MG: 25 TABLET, EXTENDED RELEASE ORAL at 11:15

## 2024-09-17 RX ADMIN — FLUTICASONE PROPIONATE 1 SPRAY: 50 SPRAY, METERED NASAL at 08:45

## 2024-09-17 RX ADMIN — SENNOSIDES 17.2 MG: 8.6 TABLET, FILM COATED ORAL at 20:38

## 2024-09-17 RX ADMIN — DOXYCYCLINE 100 MG: 100 CAPSULE ORAL at 08:21

## 2024-09-17 RX ADMIN — DOCUSATE SODIUM 100 MG: 100 CAPSULE, LIQUID FILLED ORAL at 17:06

## 2024-09-17 RX ADMIN — DOCUSATE SODIUM 100 MG: 100 CAPSULE, LIQUID FILLED ORAL at 08:19

## 2024-09-17 RX ADMIN — ENOXAPARIN SODIUM 70 MG: 100 INJECTION SUBCUTANEOUS at 00:49

## 2024-09-17 RX ADMIN — MULTIPLE VITAMINS W/ MINERALS TAB 1 TABLET: TAB ORAL at 08:21

## 2024-09-17 NOTE — ASSESSMENT & PLAN NOTE
Wt Readings from Last 3 Encounters:   09/16/24 71.4 kg (157 lb 6.5 oz)     Appears euvolemic on exam     Recent covid infection. Suspect elevation due to covid and not due to fluid overload. No signs of fluid overload on CT chest.   Home regimen:   Lasix   I/O

## 2024-09-17 NOTE — ED NOTES
Pt feels relief after soap suds enema. Very small amount of stool noted     Karen Hernandez RN  09/16/24 2047

## 2024-09-17 NOTE — PROGRESS NOTES
Progress Note - Hospitalist   Name: Noah Mendez 83 y.o. male I MRN: [id number]  Unit/Bed#: -01 SDU I Date of Admission: 9/16/2024   Date of Service: 9/17/2024 I Hospital Day: 0    Assessment & Plan  Atrial fibrillation with RVR (HCC)  Known history of afib. While in the ER went into Afib with RVR.  S/p cardizem drip  Started on toprol XL 25 mg BID  Cardiology consulted  Chads vasc- 4, not on AC  COVID-19    Diagnosed late August/early September.   Treated with IV remdesivir at [place] early September.   CT Chest:   No pulmonary embolism identified.  Emphysema.  Superimposed bilateral densities, as above, possibly related to reported COVID but nonspecific. Of note, radiographic findings may lag behind clinical improvement.  Labs   BNP: 797  Ddimer: 2.09  On RA. Not meeting SIRS. Denies SOB.   Procal pending.   ER ordered one time dose IV ceftriaxone. Hold further dose of IV abx. Do not suspect acute infection at this time.   Had been on doxycyline at facility. Continue abx till completion.   Constipation  Patient presented to the hospital due to his constipation. Last BM 9/12   Soap suds enema performed in the ER with small hard BM. No blood noted. Disimpaction attempted by provider without success.   GI consulted    CHF (congestive heart failure) ([facility])  Wt Readings from Last 3 Encounters:   09/16/24 71.4 kg (157 lb 6.5 oz)     Appears euvolemic on exam     Recent covid infection. Suspect elevation due to covid and not due to fluid overload. No signs of fluid overload on CT chest.   Home regimen:   Lasix   I/O    VTE Pharmacologic Prophylaxis: VTE Score: 3 Moderate Risk (Score 3-4) - Pharmacological DVT Prophylaxis Ordered: Will place anticoagulation for A-fib with RVR.    Mobility:   Basic Mobility Inpatient Raw Score: 18  JH-HLM Goal: 6: Walk 10 steps or more  JH-HLM Achieved: 6: Walk 10 steps or more  JH-HLM Goal achieved. Continue to encourage appropriate mobility.    Patient Centered Rounds: I  performed bedside rounds with nursing staff today.   Discussions with Specialists or Other Care Team Provider: Cardiology, GI    Education and Discussions with Family / Patient:  will call daughter.     Current Length of Stay: 0 day(s)  Current Patient Status: Inpatient   Certification Statement: The patient will continue to require additional inpatient hospital stay due to constipation  Discharge Plan: Anticipate discharge in 24-48 hrs to rehab facility.    Code Status: Level 3 - DNAR and DNI    Subjective   Complains of abdominal discomfort.  Denies nausea or vomiting.  No blood in the stool    Objective     Vitals:   Temp (24hrs), Av.7 °F (36.5 °C), Min:97.5 °F (36.4 °C), Max:98.1 °F (36.7 °C)    Temp:  [97.5 °F (36.4 °C)-98.1 °F (36.7 °C)] 98.1 °F (36.7 °C)  HR:  [] 103  Resp:  [13-20] 18  BP: ()/(54-87) 109/75  SpO2:  [95 %-100 %] 99 %  Body mass index is 21.95 kg/m².     Input and Output Summary (last 24 hours):     Intake/Output Summary (Last 24 hours) at 2024 1413  Last data filed at 2024 1300  Gross per 24 hour   Intake 659.42 ml   Output 350 ml   Net 309.42 ml       Physical Exam  Vitals and nursing note reviewed.   Constitutional:       Appearance: Normal appearance.   HENT:      Head: Normocephalic and atraumatic.      Nose: Nose normal.      Mouth/Throat:      Mouth: Mucous membranes are moist.      Pharynx: Oropharynx is clear.   Eyes:      Extraocular Movements: Extraocular movements intact.      Conjunctiva/sclera: Conjunctivae normal.      Pupils: Pupils are equal, round, and reactive to light.   Cardiovascular:      Rate and Rhythm: Tachycardia present. Rhythm irregular.      Pulses: Normal pulses.      Heart sounds: Normal heart sounds.   Pulmonary:      Effort: Pulmonary effort is normal.      Breath sounds: Normal breath sounds.   Abdominal:      General: Bowel sounds are normal.      Palpations: Abdomen is soft.      Tenderness: There is abdominal tenderness.    Musculoskeletal:         General: Normal range of motion.      Cervical back: Normal range of motion and neck supple.   Skin:     General: Skin is warm and dry.      Capillary Refill: Capillary refill takes less than 2 seconds.   Neurological:      General: No focal deficit present.      Mental Status: He is alert and oriented to person, place, and time.          Lines/Drains:  Lines/Drains/Airways       Active Status       None                            Lab Results: I have reviewed the following results: CBC/BMP:   .     09/16/24  1708 09/17/24  0402   WBC 5.08 5.81   HGB 11.9* 10.9*   HCT 36.7 32.8*    129*   BANDSPCT 1  --    SODIUM 131* 131*   K 3.7 3.5   CL 96 96   CO2 30 29   BUN 24 22   CREATININE 1.02 0.92   GLUC 106 97   MG 2.3 2.2       Results from last 7 days   Lab Units 09/17/24  0402 09/16/24  1708   WBC Thousand/uL 5.81 5.08   HEMOGLOBIN g/dL 10.9* 11.9*   HEMATOCRIT % 32.8* 36.7   PLATELETS Thousands/uL 129* 160   BANDS PCT %  --  1   SEGS PCT % 75  --    LYMPHO PCT % 13* 13*   MONO PCT % 10 10   EOS PCT % 1 5     Results from last 7 days   Lab Units 09/17/24  0402   SODIUM mmol/L 131*   POTASSIUM mmol/L 3.5   CHLORIDE mmol/L 96   CO2 mmol/L 29   BUN mg/dL 22   CREATININE mg/dL 0.92   ANION GAP mmol/L 6   CALCIUM mg/dL 7.6*   ALBUMIN g/dL 2.5*   TOTAL BILIRUBIN mg/dL 0.84   ALK PHOS U/L 83   ALT U/L 27   AST U/L 27   GLUCOSE RANDOM mg/dL 97                 Results from last 7 days   Lab Units 09/16/24  1719 09/16/24  1708   LACTIC ACID mmol/L  --  1.8   PROCALCITONIN ng/ml 0.09  --        Recent Cultures (last 7 days):         Imaging Review: Reviewed radiology reports from this admission including: CT abdomen/pelvis.  Other Studies: No additional pertinent studies reviewed.    Last 24 Hours Medication List:     Current Facility-Administered Medications:     acetaminophen (TYLENOL) tablet 650 mg, Q6H PRN    aspirin chewable tablet 81 mg, Daily    diltiazem (CARDIZEM) 125 mg in sodium  chloride 0.9 % 125 mL infusion, Titrated, Last Rate: Stopped (09/17/24 0141)    docusate sodium (COLACE) capsule 100 mg, BID    doxepin (SINEquan) capsule 50 mg, HS    doxycycline hyclate (VIBRAMYCIN) capsule 100 mg, Q12H ANGEL    Empagliflozin (JARDIANCE) tablet 10 mg, QAM    fluticasone (FLONASE) 50 mcg/act nasal spray 1 spray, Daily    furosemide (LASIX) tablet 40 mg, BID (diuretic)    lidocaine (URO-JET) 2 % urethral/mucosal gel 1 Application, Once    magnesium Oxide (MAG-OX) tablet 400 mg, Daily    melatonin tablet 6 mg, HS    metoprolol succinate (TOPROL-XL) 24 hr tablet 25 mg, BID    multivitamin-minerals (CENTRUM) tablet 1 tablet, Daily    polyethylene glycol (MIRALAX) packet 17 g, Daily    potassium chloride (Klor-Con M20) CR tablet 20 mEq, Once    QUEtiapine (SEROquel) tablet 100 mg, HS    senna (SENOKOT) tablet 17.2 mg, HS    spironolactone (ALDACTONE) tablet 25 mg, Daily    Administrative Statements   Today, Patient Was Seen By: Cande Richards MD  I have spent a total time of 45 minutes in caring for this patient on the day of the visit/encounter including Diagnostic results, Patient and family education, Reviewing / ordering tests, medicine, procedures  , and Communicating with other healthcare professionals .    **Please Note: This note may have been constructed using a voice recognition system.**

## 2024-09-17 NOTE — H&P
H&P - Hospitalist   Name: Noah Mendez 83 y.o. male I MRN: [id number]  Unit/Bed#: -01 SDU I Date of Admission: 9/16/2024   Date of Service: 9/17/2024 I Hospital Day: 0     Assessment & Plan  Atrial fibrillation with RVR (HCC)  Known history of afib. While in the ER went into Afib with RVR. Unable to be given IV lopressor due to soft Bps.   ER ordered cardizem bolus + drip with improvement.  Home regimen   Metoprolol succinate 25 mg daily  Anticoagulation: None   Spoke to daughter. Recent diagnosis of afib one month ago at [facility]. She is not sure why he was not placed on anticoagulation. Denies GI bleed or frequent fall history.   Ddimer elevated. PE negative. At this give one time dose 1mg/kg Lovenox injection.   Duplex study ordered to rule out DVT   Cards to decide if anticoagulation warranted, pro/cons.   Plan:   Order potassium 20 meq. Goal potassium 4.0   Continue to monitor magnesium   Wean off cardizem drip as able.   Telemetry   Cardiology consult   COVID-19    Diagnosed late August/early September.   Treated with IV remdesivir at [facility] early September.   CT Chest:   No pulmonary embolism identified.  Emphysema.  Superimposed bilateral densities, as above, possibly related to reported COVID but nonspecific. Of note, radiographic findings may lag behind clinical improvement.  Labs   BNP: 797  Ddimer: 2.09  On RA. Not meeting SIRS. Denies SOB.   Procal pending.   ER ordered one time dose IV ceftriaxone. Hold further dose of IV abx. Do not suspect acute infection at this time.   Had been on doxycyline at facility. Continue abx till completion.   Constipation  Patient presented to the hospital due to his constipation. Last BM 9/12   Soap suds enema performed in the ER with small hard BM. No blood noted. Disimpaction attempted by provider without success.   CT abdomen pelvis   Fecal distention of the rectum suggestive of fecal impaction. Diffuse fecal retention.   Start bowel regimen   Colace,  miralax, senna   Enema prn   If unsuccessful consult GI   CHF (congestive heart failure) ([facility])  Wt Readings from Last 3 Encounters:   09/16/24 71.4 kg (157 lb 6.5 oz)     Appears euvolemic on exam     Recent covid infection. Suspect elevation due to covid and not due to fluid overload. No signs of fluid overload on CT chest.   Home regimen:   Lasix   I/O    VTE Pharmacologic Prophylaxis: VTE Score: 3 Moderate Risk (Score 3-4) - Pharmacological DVT Prophylaxis Ordered: enoxaparin (Lovenox).  Code Status: Level 3 - DNAR and DNI   Discussion with family: Updated  (daughter) via phone.    Anticipated Length of Stay: Patient will be admitted on an observation basis with an anticipated length of stay of less than 2 midnights secondary to atrial fib with RVR, constipation.    History of Present Illness   Chief Complaint: Constipation     Noah Mendez is a 83 y.o. male with a PMH of CHF, afib, depression, cognitive decline who presents to facility due to constipation.  Per patient last bowel movement 9/12.  Mild discomfort.  Denies nausea or vomiting.  Stool softeners attempted at facility without success.  In the ER received enema with small output.  Mild relief to patient.  Stool tested for blood.  Negative..     While in the ER patient noted to go into A-fib with RVR.  Required initiation of Cardizem drip.     Called and spoke to patient's daughter for more information.  Initially admitted to [facility] August 12 for shortness of breath.  Diagnosed with CHF and atrial fibrillation.  During admission also showed signs of cognitive decline and agitation.  Started on multiple new medications.  No anticoagulation was started per daughter.  Patient was discharged back to home with family but this was short-lived due to behavioral issues prompting patient to be brought back to the hospital.  Patient was then diagnosed with COVID-19 home of August/early September.  Completed remdesivir.  He was then  "discharged home again but was having sleeping/agitation issues once again thus was brought back to [place].  Patient was then admitted for placement.  Discharged to facility on 9/12.  Per daughter appears to be doing well at the facility.  Behaviors have improved.     Review of Systems   Constitutional:  Negative for fatigue and fever.   HENT:  Negative for sore throat.    Respiratory:  Negative for cough, chest tightness and shortness of breath.    Cardiovascular:  Negative for chest pain.   Gastrointestinal:  Positive for constipation. Negative for abdominal distention, abdominal pain, diarrhea, nausea and vomiting.   Genitourinary:  Negative for difficulty urinating.   Musculoskeletal:  Negative for arthralgias.   Neurological:  Negative for weakness and headaches.   Psychiatric/Behavioral:  Negative for agitation and behavioral problems.    All other systems reviewed and are negative.      I have reviewed the patient's PMH, PSH, Social History, Family History, Meds, and Allergies  Social History:  Marital Status:    Occupation: retired  Patient Pre-hospital Living Situation: Long Term Care Facility  Patient Pre-hospital Level of Mobility: walks  Patient Pre-hospital Diet Restrictions: Cardiac    Objective     Vitals:   Blood Pressure: 92/54 (09/17/24 0141)  Pulse: 81 (09/17/24 0141)  Temperature: 97.7 °F (36.5 °C) (09/16/24 2339)  Temp Source: Oral (09/16/24 2339)  Respirations: 13 (09/17/24 0141)  Height: 5' 11\" (180.3 cm) (09/16/24 2339)  Weight - Scale: 71.4 kg (157 lb 6.5 oz) (09/16/24 2339)  SpO2: 98 % (09/17/24 0141)    Physical Exam  Constitutional:       Appearance: Normal appearance. He is normal weight.   HENT:      Nose: Nose normal.      Mouth/Throat:      Mouth: Mucous membranes are moist.   Eyes:      Extraocular Movements: Extraocular movements intact.      Pupils: Pupils are equal, round, and reactive to light.   Cardiovascular:      Rate and Rhythm: Tachycardia present. Rhythm " "irregular.   Pulmonary:      Effort: Pulmonary effort is normal.      Breath sounds: Normal breath sounds.   Abdominal:      General: There is no distension.      Tenderness: There is no abdominal tenderness.   Musculoskeletal:         General: Normal range of motion.      Cervical back: Normal range of motion.      Right lower leg: No edema.      Left lower leg: No edema.   Skin:     General: Skin is warm.   Neurological:      General: No focal deficit present.      Mental Status: He is oriented to person, place, and time. Mental status is at baseline.   Psychiatric:         Mood and Affect: Mood normal.         Behavior: Behavior normal.         Thought Content: Thought content normal.         Lines/Drains:  Lines/Drains/Airways       Active Status       None                        Additional Data:   Lab Results: I have reviewed the following results: CBC/BMP:   .     09/16/24  1708   WBC 5.08   HGB 11.9*   HCT 36.7      BANDSPCT 1   SODIUM 131*   K 3.7   CL 96   CO2 30   BUN 24   CREATININE 1.02   GLUC 106   MG 2.3      Results from last 7 days   Lab Units 09/16/24  1708   WBC Thousand/uL 5.08   HEMOGLOBIN g/dL 11.9*   HEMATOCRIT % 36.7   PLATELETS Thousands/uL 160   BANDS PCT % 1   LYMPHO PCT % 13*   MONO PCT % 10   EOS PCT % 5     Results from last 7 days   Lab Units 09/16/24  1708   SODIUM mmol/L 131*   POTASSIUM mmol/L 3.7   CHLORIDE mmol/L 96   CO2 mmol/L 30   BUN mg/dL 24   CREATININE mg/dL 1.02   ANION GAP mmol/L 5   CALCIUM mg/dL 8.4   ALBUMIN g/dL 3.0*   TOTAL BILIRUBIN mg/dL 0.82   ALK PHOS U/L 97   ALT U/L 32   AST U/L 34   GLUCOSE RANDOM mg/dL 106             No results found for: \"HGBA1C\"  Results from last 7 days   Lab Units 09/16/24  1719 09/16/24  1708   LACTIC ACID mmol/L  --  1.8   PROCALCITONIN ng/ml 0.09  --        Imaging Review: Reviewed radiology reports from this admission including: CT chest and CT abdomen/pelvis.  Other Studies: EKG was reviewed.     Administrative Statements   I " have spent a total time of 70 minutes in caring for this patient on the day of the visit/encounter including Risks and benefits of tx options, Counseling / Coordination of care, Documenting in the medical record, Reviewing / ordering tests, medicine, procedures  , and Obtaining or reviewing history  .    ** Please Note: This note has been constructed using a voice recognition system. **

## 2024-09-17 NOTE — ASSESSMENT & PLAN NOTE
Patient presented to the hospital due to his constipation. Last BM 9/12   Soap suds enema performed in the ER with small hard BM. No blood noted. Disimpaction attempted by provider without success.   GI consulted

## 2024-09-17 NOTE — UTILIZATION REVIEW
Initial Clinical Review    Admission: Date/Time/Statement: 9/16/24 2214 observation and CHANGED 9/17/24 1412 INPATIENT RE: PATIENT NEEDS > 2 MIDNIGHT STAY DUE TO CONTINUED MONITORING OF ATRIAL FIB S/P CARDIZEM DRIP AND CONSTIPATION WITH BOWEL REGIMEN AND NEED OF GI CONSULT.   Admission Orders (From admission, onward)       Ordered        09/17/24 1412  INPATIENT ADMISSION  Once            09/16/24 2214  Place in Observation  Once                          Orders Placed This Encounter   Procedures    INPATIENT ADMISSION     Standing Status:   Standing     Number of Occurrences:   1     Order Specific Question:   Level of Care     Answer:   Med Surg [16]     Comments:   with tele     Order Specific Question:   Estimated length of stay     Answer:   More than 2 Midnights     Order Specific Question:   Certification     Answer:   I certify that inpatient services are medically necessary for this patient for a duration of greater than two midnights. See H&P and MD Progress Notes for additional information about the patient's course of treatment.     ED Arrival Information       Expected   -    Arrival   9/16/2024 16:34    Acuity   Urgent              Means of arrival   Ambulance    Escorted by   [employer] ([place])    Service   Hospitalist    Admission type   Emergency              Arrival complaint   constipation             Chief Complaint   Patient presents with    Constipation     Pt to er via ems from [facility] with reports of constipation and rectal pain since 9/12. Denies any abdominal pain. Currently recovering from covid, on last day of isolation.        Initial Presentation: 83 y.o. male  to ED via EMS from nursing facility.    Admitted to observation AND CONVERTED TO INPATIENT with Dx: atrial fib with rvr/COVID 19/Constipation/CHF.  Presented to ED with constipation and rectal pain starting 5 days prior to arrival.  Has been given MOM, enema and suppository without result,  last BM 9/12/. PMHx:  CHF, afib  diagnosed 1 month ago, depression, cognitive decline, COVID about late August . On exam: tachycardia, rhythm irregular.  TSH 12.135.  .  Na 131. D dimer 2.09   Imaging shows Emphysema, Superimposed bacterial densities.  Fecal impaction and fecal retention.  Compression fracture.  ED treatment: IVF bolus of 1, 250 ml.  Metoprolol, Diltiazem bolus and Drip for rate control.  Given ceftriaxone.  Enema with small result.    Plan includes telemetry.  Wean Cardizem gtt as able.  Consult cardiology.  Continue home metoprolol succinate and Lasix.   Hold antibiotics.  Continue home doxy.  Start bowel regimen:  Colace, Miralax, senna.  Enema as needed, if not results then GI consult     Anticipated Length of Stay/Certification Statement:  Patient will be admitted on an observation basis with an anticipated length of stay of less than 2 midnights secondary to atrial fib with RVR, constipation.       Date: 9/17/24   Day 2 CHANGED TO INPATIENT : Current Patient Status: Inpatient   Certification Statement: The patient will continue to require additional inpatient hospital stay due to constipation  9/17/24:  INPATIENT:    Has abdominal discomfort. Received Toprol-XL 25 mg earlier this AM. HR improved slightly from 140->125.    On exam tachycardia.  Rhythm irregular.   Abdominal tenderness.   Na 131.   H&H 10.9/32.8.  Disimpaction attempted by provider without success.   Consult GI.    Continue telemetry, Toprol Xl.      Per Cardiology 9/17/24:  atrial fib with RVR/CAD/Cardiomyopathy, EF 35%/Chronic CHF/s/p PPI/Recent COVID/hyperlipidemia/hyponatremia/Acquired qt syndrome.  Plan:  discussed anticoagulation with patient and family.   CVA ppx outweigh potential risk of bleeding of falls.  Continue rate control and additional Toprol Xl given.   Recommend avoiding further QT prolonging medications. Monitor on telemetry. Keep K above 4 and Mg above 2     9/17/24 per GI Constipation and abnormal ct of abdomen - constipation is way  beyond the help of enema, as his entire colon is full of solid stool. Recommend magnesium citrate and an aggressive bowel regimen as an outpatient.     Patient has crossed 3 midnights and requires ongoing care    9/18/2024 .  Patient presents with  feeling better after having BM. Heart rates currently mid 90s per Miguel   On exam Rhythm irregular.    Abnormal labs or imaging:  no labs.   Diagnosis/Plan    atrial fib with RVR/COVID 19/CHF/Constipation resolved.  Plan is rate control  Continue Toprol Xl, increase as BP allows.  Possible JAZMYN/Cardioversion tomorrow.   Switch to po Lasix.  Continue aldactone - has not received due to BP holds       ED Triage Vitals   Temperature Pulse Respirations Blood Pressure SpO2 Pain Score   09/16/24 1638 09/16/24 1636 09/16/24 1636 09/16/24 1636 09/16/24 1638 09/16/24 1725   97.8 °F (36.6 °C) 70 18 103/76 100 % 7     Weight (last 2 days)       Date/Time Weight    09/16/24 2339 71.4 (157.41)    09/16/24 1636 68.5 (151)            Vital Signs (last 3 days)       Date/Time Temp Pulse Resp BP MAP (mmHg) SpO2 O2 Device Patient Position - Orthostatic VS Rinard Coma Scale Score Pain    09/18/24 1110 -- -- -- -- -- -- -- -- -- No Pain    09/18/24 1017 -- -- -- -- -- -- -- -- -- No Pain    09/18/24 07:44:17 97.9 °F (36.6 °C) 78 -- 100/68 79 96 % -- -- -- --    09/18/24 0734 -- -- -- -- -- -- None (Room air) -- -- No Pain    09/17/24 21:09:25 97.7 °F (36.5 °C) 107 20 104/77 86 96 % None (Room air) Lying 15 No Pain    09/17/24 1800 97.4 °F (36.3 °C) 125 20 110/77 88 93 % -- -- -- --    09/17/24 1706 -- -- -- 102/78 -- -- -- -- -- --    09/17/24 1600 -- -- -- -- -- -- -- -- 15 --    09/17/24 1459 97.7 °F (36.5 °C) 112 19 118/82 94 100 % -- -- -- --    09/17/24 1300 -- 103 18 109/75 88 99 % -- -- -- --    09/17/24 1200 -- -- -- -- -- -- -- -- 15 --    09/17/24 1115 98.1 °F (36.7 °C) 129 20 115/82 93 99 % -- Lying -- Med Not Given for Pain - for MAR use only    09/17/24 0952 -- -- -- 131/87 --  -- -- -- -- --    09/17/24 0800 -- -- -- -- -- -- -- -- 15 No Pain    09/17/24 0733 97.6 °F (36.4 °C) 99 13 103/79 88 99 % -- Lying -- --    09/17/24 0401 97.5 °F (36.4 °C) 85 19 95/60 72 98 % None (Room air) Lying -- No Pain    09/17/24 0400 -- -- -- -- -- -- -- -- 15 --    09/17/24 0141 -- 81 13 92/54 67 98 % -- -- -- --    09/17/24 0000 -- 90 15 114/74 88 97 % -- -- 15 --    09/16/24 2343 -- 100 14 -- -- 98 % -- -- -- --    09/16/24 2339 97.7 °F (36.5 °C) 102 19 100/68 79 97 % None (Room air) -- -- No Pain    09/16/24 2200 -- 94 18 113/69 83 95 % None (Room air) Lying -- --    09/16/24 2100 -- 97 15 102/65 78 96 % None (Room air) Lying -- --    09/16/24 2049 -- 110 -- 108/77 -- -- -- -- -- --    09/16/24 2000 -- 130 18 98/69 79 98 % None (Room air) Lying -- --    09/16/24 1930 -- 134 20 110/74 79 98 % None (Room air) Lying -- --    09/16/24 1800 -- 121 20 102/70 82 96 % None (Room air) Lying -- --    09/16/24 1726 -- 112 18 109/67 83 -- -- Lying -- 7    09/16/24 1725 -- -- -- -- -- -- -- -- -- 7    09/16/24 1638 97.8 °F (36.6 °C) -- -- -- -- 100 % None (Room air) -- -- --    09/16/24 1636 -- 70 18 103/76 -- -- -- -- -- --            Pertinent Labs/Diagnostic Test Results:   Radiology:   VAS VENOUS DUPLEX - LOWER LIMB BILATERAL   Final Interpretation by Reyna Garcia MD (09/17 6123)   RIGHT LOWER LIMB:  No evidence of acute or chronic deep vein thrombosis.  No evidence of superficial thrombophlebitis noted.  Doppler evaluation shows a normal response to augmentation maneuvers..  Popliteal, posterior tibial and anterior tibial arterial Doppler waveform's are  biphasic.     LEFT LOWER LIMB:  No evidence of acute or chronic deep vein thrombosis.  No evidence of superficial thrombophlebitis noted.  Doppler evaluation shows a normal response to augmentation maneuvers.  Popliteal, posterior tibial and anterior tibial arterial Doppler waveform's are  biphasic.   PE Study with CT Abdomen and Pelvis with contrast   Final  Interpretation by Lucila Story MD (09/16 1925)   No pulmonary embolism identified.   Emphysema.   Superimposed bilateral densities, as above, possibly related to reported COVID but nonspecific. Of note, radiographic findings may lag behind clinical improvement.   Fecal distention of the rectum suggestive of fecal impaction. Diffuse fecal retention.   Age indeterminate mild T7 compression deformity. Correlate to exclude focal tenderness.   Chronic findings, as per the body of the report.                        Workstation performed: [id number]           Cardiology:  ECG 12 lead    by Jani, Ris Results In (09/18 1050)      ECG 12 lead   Final Result by Guanako Rojo MD (09/17 0716)   Atrial fibrillation with intermittent ventricular pacing   Rightward axis   Nonspecific ST and T wave abnormality   Prolonged QT   Abnormal ECG   When compared with ECG of 16-SEP-2024 17:08, (unconfirmed)   No significant change was found   Confirmed by Guanako Rojo ([id number]) on 9/17/2024 7:16:25 AM      ECG 12 lead   Final Result by Guanako Rojo MD (09/17 0721)   Atrial fibrillation with rapid ventricular response   Rightward axis   Nonspecific ST and T wave abnormality   Abnormal ECG   No previous ECGs available   Confirmed by Guanako Rojo ([id number]) on 9/17/2024 7:21:07 AM        Results from last 7 days   Lab Units 09/16/24  2304   SARS-COV-2  Positive*     Results from last 7 days   Lab Units 09/17/24  0402 09/16/24  1708   WBC Thousand/uL 5.81 5.08   HEMOGLOBIN g/dL 10.9* 11.9*   HEMATOCRIT % 32.8* 36.7   PLATELETS Thousands/uL 129* 160   TOTAL NEUT ABS Thousands/µL 4.32  --    BANDS PCT %  --  1     Results from last 7 days   Lab Units 09/17/24  0402 09/16/24  1708   SODIUM mmol/L 131* 131*   POTASSIUM mmol/L 3.5 3.7   CHLORIDE mmol/L 96 96   CO2 mmol/L 29 30   ANION GAP mmol/L 6 5   BUN mg/dL 22 24   CREATININE mg/dL 0.92 1.02   EGFR ml/min/1.73sq m 76 67   CALCIUM mg/dL 7.6* 8.4   MAGNESIUM mg/dL 2.2 2.3     Results  from last 7 days   Lab Units 09/17/24  0402 09/16/24  1708   AST U/L 27 34   ALT U/L 27 32   ALK PHOS U/L 83 97   TOTAL PROTEIN g/dL 8.7* 9.8*   ALBUMIN g/dL 2.5* 3.0*   TOTAL BILIRUBIN mg/dL 0.84 0.82     Results from last 7 days   Lab Units 09/17/24  0402 09/16/24  1708   GLUCOSE RANDOM mg/dL 97 106     Results from last 7 days   Lab Units 09/16/24  1708   CK TOTAL U/L 40     Results from last 7 days   Lab Units 09/16/24  2126 09/16/24 1957 09/16/24  1708   HS TNI 0HR ng/L  --   --  24   HS TNI 2HR ng/L  --  25  --    HSTNI D2 ng/L  --  1  --    HS TNI 4HR ng/L 25  --   --    HSTNI D4 ng/L 1  --   --      Results from last 7 days   Lab Units 09/16/24  1709   D-DIMER QUANTITATIVE ug/ml FEU 2.09*     Results from last 7 days   Lab Units 09/16/24  1708   TSH 3RD GENERATON uIU/mL 12.135*     Results from last 7 days   Lab Units 09/16/24  1719   PROCALCITONIN ng/ml 0.09     Results from last 7 days   Lab Units 09/16/24  1708   LACTIC ACID mmol/L 1.8     Results from last 7 days   Lab Units 09/16/24  1708   BNP pg/mL 797*     Results from last 7 days   Lab Units 09/16/24  1708   LIPASE u/L 66     Results from last 7 days   Lab Units 09/16/24  2304   INFLUENZA A PCR  Negative   INFLUENZA B PCR  Negative   RSV PCR  Negative     Results from last 7 days   Lab Units 09/16/24  1719   ETHANOL LVL mg/dL <10   ACETAMINOPHEN LVL ug/mL <10*   SALICYLATE LVL mg/dL <5       ED Treatment-Medication Administration from 09/16/2024 1634 to 09/16/2024 2328         Date/Time Order Dose Route Action     09/16/2024 1711 sodium chloride 0.9 % bolus 1,000 mL 1,000 mL Intravenous New Bag     09/16/2024 1717 sodium chloride 0.9 % bolus 250 mL 250 mL Intravenous New Bag     09/16/2024 1743 acetaminophen (Ofirmev) injection 1,000 mg 1,000 mg Intravenous New Bag     09/16/2024 2011 cefTRIAXone (ROCEPHIN) IVPB (premix in dextrose) 1,000 mg 50 mL 1,000 mg Intravenous New Bag     09/16/2024 2044 diltiazem (CARDIZEM) injection 15 mg 15 mg  Intravenous Given     09/16/2024 2049 diltiazem (CARDIZEM) 125 mg in sodium chloride 0.9 % 125 mL infusion 5 mg/hr Intravenous New Bag            Past Medical History:   Diagnosis Date    Atrial fibrillation (HCC)     CHF (congestive heart failure) (HCC)     Depression     Hypertension     Low blood pressure      Present on Admission:  **None**      Admitting Diagnosis: Pneumonia [J18.9]  Constipation [K59.00]  Atrial fibrillation with RVR (HCC) [I48.91]  Age/Sex: 83 y.o. male  Admission Orders:  Scheduled Medications:  aspirin, 81 mg, Oral, Daily  docusate sodium, 100 mg, Oral, BID  doxepin, 50 mg, Oral, HS  doxycycline hyclate, 100 mg, Oral, Q12H ANGEL  Empagliflozin, 10 mg, Oral, QAM  fluticasone, 1 spray, Each Nare, Daily  furosemide, 40 mg, Oral, BID (diuretic)  magnesium Oxide, 400 mg, Oral, Daily  melatonin, 6 mg, Oral, HS  metoprolol succinate, 25 mg, Oral, Daily - increased to 2 times daily on 9/17  multivitamin-minerals, 1 tablet, Oral, Daily  polyethylene glycol, 17 g, Oral, Daily  potassium chloride, 20 mEq, Oral, Once  QUEtiapine, 100 mg, Oral, HS  senna, 2 tablet, Oral, HS  spironolactone, 25 mg, Oral, Daily    magnesium citrate (CITROMA) oral solution 296 mL  Dose: 296 mL  Freq: Once Route: PO  Start: 09/17/24 1230 End: 09/17/24 1239  metoprolol succinate (TOPROL-XL) 24 hr tablet 25 mg  Dose: 25 mg  Freq: Once Route: PO  Start: 09/17/24 1115 End: 09/17/24 1115    furosemide (LASIX) tablet 40 mg  Dose: 40 mg  Freq: Daily Route: PO  Start: 09/18/24 0915  metoprolol succinate (TOPROL-XL) 24 hr tablet 50 mg  Dose: 50 mg  Freq: 2 times daily Route: PO  Start: 09/18/24 0900       Continuous IV Infusions:  diltiazem, 1-15 mg/hr, Intravenous, Titrated - dc 9/17 1555      PRN Meds:  acetaminophen, 650 mg, Oral, Q6H PRN x 1 9/17  metoprolol, 2.5 mg, Intravenous, Q6H PRN x 1 9/17    Telemetry  Soap suds enema as needed    IP CONSULT TO CARDIOLOGY  IP CONSULT TO GASTROENTEROLOGY    Network Utilization Review  Department  ATTENTION: Please call with any questions or concerns to [phone] and carefully listen to the prompts so that you are directed to the right person. All voicemails are confidential.   For Discharge needs, contact Care Management DC Support Team at [phone] opt. [phone]  Send all requests for admission clinical reviews, approved or denied determinations and any other requests to dedicated fax number below belonging to the campus where the patient is receiving treatment. List of dedicated fax numbers for the Facilities:  FACILITY NAME UR FAX NUMBER   ADMISSION DENIALS (Administrative/Medical Necessity) [phone]   DISCHARGE SUPPORT TEAM (NETWORK) [phone]   PARENT CHILD HEALTH (Maternity/NICU/Pediatrics) [phone]   [facility] [phone]   [facility] [phone]   [facility] [phone]   [facility] [phone]   [facility] [phone]   [facility] [phone]   [facility] [phone]   [facility] [phone]   [facility] [phone]   [facility] [phone]   [facility] [phone]   [facility] [phone]

## 2024-09-17 NOTE — PROGRESS NOTES
"Progress Note -     Name: Noah Mendez 83 y.o. male I MRN: [id number]  Unit/Bed#: -01 SDU I Date of Admission: 9/16/2024   Date of Service: 9/17/2024 I Hospital Day: 0     Intended introductory visit with pt \"Lux\" who is resting comfortably at this time. Interfaith blessing offered outside of pt room. Available to follow upon request.     Thank you!        09/17/24 1100   Clinical Encounter Type   Visited With Patient not available   Routine Visit Introduction   Crisis Visit Critical Care       "

## 2024-09-17 NOTE — PHYSICAL THERAPY NOTE
Physical Therapy Cancellation Note       09/17/24 1210   PT Last Visit   PT Visit Date 09/17/24   Note Type   Note type Cancelled Session   Cancel Reasons Medical status   Additional Comments Awaiting read of venous duplex. Patient also with a-fib. Will hold.     Sarah Sharp

## 2024-09-17 NOTE — ASSESSMENT & PLAN NOTE
Known history of afib. While in the ER went into Afib with RVR.  S/p cardizem drip  Started on toprol XL 25 mg BID  Cardiology consulted  Chads vasc- 4, not on AC

## 2024-09-17 NOTE — ASSESSMENT & PLAN NOTE
Known history of afib. While in the ER went into Afib with RVR. Unable to be given IV lopressor due to soft Bps.   ER ordered cardizem bolus + drip with improvement.  Home regimen   Metoprolol succinate 25 mg daily  Anticoagulation: None   Spoke to daughter. Recent diagnosis of afib one month ago at [place]. She is not sure why he was not placed on anticoagulation. Denies GI bleed or frequent fall history.   Ddimer elevated. PE negative. At this give one time dose 1mg/kg Lovenox injection.   Duplex study ordered to rule out DVT   Cards to decide if anticoagulation warranted, pro/cons.   Plan:   Order potassium 20 meq. Goal potassium 4.0   Continue to monitor magnesium   Wean off cardizem drip as able.   Telemetry   Cardiology consult

## 2024-09-17 NOTE — CONSULTS
Consultation -  [facility]  Noah eMndez 83 y.o. male MRN: [id number]  Unit/Bed#: -01 SDU Encounter: [id number]        Inpatient consult to gastroenterology  Consult performed by: Mercedes Michel PA-C  Consult ordered by: Cande Richards MD          ASSESSMENT and PLAN:      83-year-old male with history of atrial fibrillation, recent COVID-19 infection treated with IV remdesivir at [facility], [facility] admitted with atrial fibrillation with RVR and constipation.    1) Fecal impaction  2) Constipation  Patient reports history of intermittent constipation at home treated with MiraLAX. He presents with complaints of worsening constipation and abdominal pain. We personally reviewed CT images which show marked distention of the rectum with stool measuring up to 7.5 cm with diffuse fecal retention in the colon and minimal perirectal fat haziness suggestive of mild stercoral proctitis. No evidence of bowel obstruction or perforation.    -Patient given enema with minimal stool output  -We ordered magnesium citrate to be given today  -Continue MiraLAX, senna, Colace twice daily  -Monitor and replete electrolytes  -Continue cardiac diet as tolerated    3) Atrial fibrillation with RVR  Cardiology consulted, [id number] score is at least 3    -Cardiology having risk-benefit discussion with patient's daughter regarding anticoagulation  -Appreciate cardiology input    Patient was seen and examined by Dr. Dhaliwal. All garcia medical decisions were made by Dr. Dhaliwal. Thank you for allowing us to participate in the care of this present patient. We will follow-up with you closely.      Reason for Consult / Principal Problem: Constipation    HPI: 83-year-old male with history of atrial fibrillation, recent COVID-19 infection treated with IV remdesivir at [facility]. He is currently admitted with atrial fibrillation with RVR as well as constipation. Patient states he moves his bowels  Discussed patient's prescriptions with patient. Discussed follow up with PCP. No further questions at this time. Assisted out in wheelchair at discharge.         Kee Santillan RN  07/20/23 8364 every other day or every third day at home. He takes MiraLAX as needed for constipation. He denies any black or bloody stools. He has lower abdominal pain at present time. He has poor appetite. No nausea or vomiting.    REVIEW OF SYSTEMS:    CONSTITUTIONAL: Denies any fever, chills. Poor appetite, and no recent weight loss.  HEENT: No earache or tinnitus. Denies hearing loss or visual disturbances.  CARDIOVASCULAR: No chest pain or palpitations.   RESPIRATORY: Denies any cough, hemoptysis, shortness of breath or dyspnea on exertion.  GASTROINTESTINAL: As noted in the History of Present Illness.   GENITOURINARY: No problems with urination. Denies any hematuria or dysuria.  NEUROLOGIC: No dizziness or vertigo, denies headaches.   MUSCULOSKELETAL: Denies any muscle or joint pain.   SKIN: Denies skin rashes or itching.   ENDOCRINE: Denies excessive thirst. Denies intolerance to heat or cold.  PSYCHOSOCIAL: Denies depression or anxiety. Denies any recent memory loss.       Historical Information   Past Medical History:   Diagnosis Date    Atrial fibrillation (HCC)     CHF (congestive heart failure) (HCC)     Depression     Hypertension     Low blood pressure      History reviewed. No pertinent surgical history.  Social History   Social History     Substance and Sexual Activity   Alcohol Use Never     Social History     Substance and Sexual Activity   Drug Use Never     Social History     Tobacco Use   Smoking Status Former    Current packs/day: 0.00    Types: Cigarettes    Quit date: 2024    Years since quittin.0   Smokeless Tobacco Never     History reviewed. No pertinent family history.    Meds/Allergies       Medications Prior to Admission:     doxepin (SINEquan) 50 mg capsule    doxycycline hyclate (VIBRAMYCIN) 100 mg capsule    Empagliflozin (Jardiance) 10 MG TABS tablet    furosemide (LASIX) 40 mg tablet    Magnesium 300 MG CAPS    magnesium hydroxide (MILK OF MAGNESIA) 400 mg/5 mL oral suspension     "melatonin 1 mg    metoprolol succinate (TOPROL-XL) 25 mg 24 hr tablet    Multiple Vitamin (multivitamin) tablet    polyethylene glycol (MIRALAX) 17 g packet    QUEtiapine (SEROquel) 50 mg tablet    spironolactone (ALDACTONE) 25 mg tablet    aspirin (Aspirin 81) 81 mg chewable tablet    Current Facility-Administered Medications:     acetaminophen (TYLENOL) tablet 650 mg, Q6H PRN    aspirin chewable tablet 81 mg, Daily    diltiazem (CARDIZEM) 125 mg in sodium chloride 0.9 % 125 mL infusion, Titrated, Last Rate: Stopped (09/17/24 0141)    docusate sodium (COLACE) capsule 100 mg, BID    doxepin (SINEquan) capsule 50 mg, HS    doxycycline hyclate (VIBRAMYCIN) capsule 100 mg, Q12H ANGEL    Empagliflozin (JARDIANCE) tablet 10 mg, QAM    fluticasone (FLONASE) 50 mcg/act nasal spray 1 spray, Daily    furosemide (LASIX) tablet 40 mg, BID (diuretic)    magnesium Oxide (MAG-OX) tablet 400 mg, Daily    melatonin tablet 6 mg, HS    metoprolol succinate (TOPROL-XL) 24 hr tablet 25 mg, Daily    metoprolol succinate (TOPROL-XL) 24 hr tablet 25 mg, Once    multivitamin-minerals (CENTRUM) tablet 1 tablet, Daily    polyethylene glycol (MIRALAX) packet 17 g, Daily    potassium chloride (Klor-Con M20) CR tablet 20 mEq, Once    QUEtiapine (SEROquel) tablet 100 mg, HS    senna (SENOKOT) tablet 17.2 mg, HS    spironolactone (ALDACTONE) tablet 25 mg, Daily    No Known Allergies        Objective     Blood pressure 131/87, pulse 99, temperature 97.6 °F (36.4 °C), temperature source Oral, resp. rate 13, height 5' 11\" (1.803 m), weight 71.4 kg (157 lb 6.5 oz), SpO2 99%.      Intake/Output Summary (Last 24 hours) at 9/17/2024 1112  Last data filed at 9/17/2024 0326  Gross per 24 hour   Intake 419.42 ml   Output 350 ml   Net 69.42 ml         PHYSICAL EXAM:      General Appearance:   Alert, cooperative, no distress, appears stated age    HEENT:   Normocephalic, atraumatic, anicteric.     Neck:  Supple, symmetrical, trachea midline, no adenopathy "   Lungs:   Clear to auscultation bilaterally   Heart::   Irregularly irregular   Abdomen:   Soft, non tender, non-distended; normal bowel sounds   Genitalia:   Deferred    Rectal:   Deferred    Extremities:  No cyanosis, clubbing or edema    Pulses:  2+ and symmetric all extremities    Skin:  No jaundice   Lymph nodes:  No palpable cervical lymphadenopathy        Lab Results:   Results from last 7 days   Lab Units 09/17/24  0402   WBC Thousand/uL 5.81   HEMOGLOBIN g/dL 10.9*   HEMATOCRIT % 32.8*   PLATELETS Thousands/uL 129*   SEGS PCT % 75   LYMPHO PCT % 13*   MONO PCT % 10   EOS PCT % 1     Results from last 7 days   Lab Units 09/17/24  0402   POTASSIUM mmol/L 3.5   CHLORIDE mmol/L 96   CO2 mmol/L 29   BUN mg/dL 22   CREATININE mg/dL 0.92   CALCIUM mg/dL 7.6*   ALK PHOS U/L 83   ALT U/L 27   AST U/L 27         Results from last 7 days   Lab Units 09/16/24  1708   LIPASE u/L 66       Imaging Studies: I have personally reviewed pertinent imaging studies.    PE Study with CT Abdomen and Pelvis with contrast    Result Date: 9/16/2024  Impression: No pulmonary embolism identified. Emphysema. Superimposed bilateral densities, as above, possibly related to reported COVID but nonspecific. Of note, radiographic findings may lag behind clinical improvement. Fecal distention of the rectum suggestive of fecal impaction. Diffuse fecal retention. Age indeterminate mild T7 compression deformity. Correlate to exclude focal tenderness. Chronic findings, as per the body of the report. Workstation performed: [id number]

## 2024-09-17 NOTE — PLAN OF CARE
Problem: Potential for Falls  Goal: Patient will remain free of falls  Description: INTERVENTIONS:  - Educate patient/family on patient safety including physical limitations  - Instruct patient to call for assistance with activity   - Consult OT/PT to assist with strengthening/mobility   - Keep Call bell within reach  - Keep bed low and locked with side rails adjusted as appropriate  - Keep care items and personal belongings within reach  - Initiate and maintain comfort rounds  - Make Fall Risk Sign visible to staff  - Offer Toileting every 2 Hours, in advance of need  - Initiate/Maintain bedalarm  - Apply yellow socks and bracelet for high fall risk patients  - Consider moving patient to room near nurses station  Outcome: Progressing     Problem: DISCHARGE PLANNING  Goal: Discharge to home or other facility with appropriate resources  Description: INTERVENTIONS:  - Identify barriers to discharge w/patient and caregiver  - Arrange for needed discharge resources and transportation as appropriate  - Identify discharge learning needs (meds, wound care, etc.)  - Arrange for interpretive services to assist at discharge as needed  - Refer to Case Management Department for coordinating discharge planning if the patient needs post-hospital services based on physician/advanced practitioner order or complex needs related to functional status, cognitive ability, or social support system  Outcome: Progressing     Problem: CARDIOVASCULAR - ADULT  Goal: Maintains optimal cardiac output and hemodynamic stability  Description: INTERVENTIONS:  - Monitor I/O, vital signs and rhythm  - Monitor for S/S and trends of decreased cardiac output  - Administer and titrate ordered vasoactive medications to optimize hemodynamic stability  - Assess quality of pulses, skin color and temperature  - Assess for signs of decreased coronary artery perfusion  - Instruct patient to report change in severity of symptoms  Outcome: Progressing  Goal:  Absence of cardiac dysrhythmias or at baseline rhythm  Description: INTERVENTIONS:  - Continuous cardiac monitoring, vital signs, obtain 12 lead EKG if ordered  - Administer antiarrhythmic and heart rate control medications as ordered  - Monitor electrolytes and administer replacement therapy as ordered  Outcome: Progressing     Problem: RESPIRATORY - ADULT  Goal: Achieves optimal ventilation and oxygenation  Description: INTERVENTIONS:  - Assess for changes in respiratory status  - Assess for changes in mentation and behavior  - Position to facilitate oxygenation and minimize respiratory effort  - Oxygen administered by appropriate delivery if ordered  - Initiate smoking cessation education as indicated  - Encourage broncho-pulmonary hygiene including cough, deep breathe, Incentive Spirometry  - Assess the need for suctioning and aspirate as needed  - Assess and instruct to report SOB or any respiratory difficulty  - Respiratory Therapy support as indicated  Outcome: Progressing     Problem: MUSCULOSKELETAL - ADULT  Goal: Maintain or return mobility to safest level of function  Description: INTERVENTIONS:  - Assess patient's ability to carry out ADLs; assess patient's baseline for ADL function and identify physical deficits which impact ability to perform ADLs (bathing, care of mouth/teeth, toileting, grooming, dressing, etc.)  - Assess/evaluate cause of self-care deficits   - Assess range of motion  - Assess patient's mobility  - Assess patient's need for assistive devices and provide as appropriate  - Encourage maximum independence but intervene and supervise when necessary  - Involve family in performance of ADLs  - Assess for home care needs following discharge   - Consider OT consult to assist with ADL evaluation and planning for discharge  - Provide patient education as appropriate  Outcome: Progressing  Goal: Maintain proper alignment of affected body part  Description: INTERVENTIONS:  - Support, maintain  and protect limb and body alignment  - Provide patient/ family with appropriate education  Outcome: Progressing

## 2024-09-17 NOTE — ASSESSMENT & PLAN NOTE
Diagnosed late August/early September.   Treated with IV remdesivir at [place] early September.   CT Chest:   No pulmonary embolism identified.  Emphysema.  Superimposed bilateral densities, as above, possibly related to reported COVID but nonspecific. Of note, radiographic findings may lag behind clinical improvement.  Labs   BNP: 797  Ddimer: 2.09  On RA. Not meeting SIRS. Denies SOB.   Procal pending.   ER ordered one time dose IV ceftriaxone. Hold further dose of IV abx. Do not suspect acute infection at this time.   Had been on doxycyline at facility. Continue abx till completion.

## 2024-09-17 NOTE — CONSULTS
Consult - Cardiology   Noah Mendez 83 y.o. male MRN: [id number]  Unit/Bed#: -01 SDU Encounter: [id number]    Reason For Consult: AF with RVR  Outpatient Cardiologist: Dr Gonzales            ASSESSMENT:  Atrial fibrillation with RVR  Newly discovered during admission in August 2020 for [facility].  Home Rx Toprol-XL 25 mg daily.  Not on oral anticoagulation prior to this admission. On ASA 81 mg daily only.  [id number] score is at least 3 (age+1, chf, cad)  CAD s/p CABG x2 and PCI in 2015 at [facility]  No chest pain or discomfort since admission.  Troponin: 24/25/25  EKG: A-fib with intermittent ventricular pacing, rightward axis, nonspecific ST and T wave abnormality, prolonged QT  Cardiomyopathy, EF 35%  Newly discovered during admission in August 2024 at [facility].  Inpatient TTE showed EF of 35% (not available for review today).   Was discharged on PO Lasix 40 mg twice daily, Aldactone 25 mg and Jardiance 10 mg daily.  Chronic HFrEF    CT chest: No PE, emphysema, bilateral densities possibly COVID-related but nonspecific.  S/P PPM in situ  Recent COVID infection   Previously admitted for COVID August/September 2024.    COVID-positive this admission 9/16/2024.  Hyperlipidemia   Hyponatremia   Acquired QT syndrome    PLAN/ DISCUSSION:     Risk versus benefits of anticoagulation were discussed with the patient and with his daughter Xuan on the phone today.  At this point benefits of CVA prophylaxis outweigh the potential risks of bleeding or falls. Patient fairly stable on his feet however placed on fall risk due to more recent episodes of agitation since August.  Due to hypotension, rate control strategy may be limited. Received Toprol-XL 25 mg earlier this AM. HR improved slightly from 140->125. Will order additional Toprol-XL 25 mg daily with modified BP parameters this morning. BP seems reasonable this AM in the 130s.  Discussed with attending later today if he has a  "candidate for amiodarone or antiarrhythmic therapy given we will now switch him to anticoagulant. See attestation for additional details.  His QT was prolonged on his recent EKG, likely due to sedatives given for agitation.  Recommend avoiding further QT prolonging medications. Monitor on telemetry. Keep K above 4 and Mg above 2.  Treatment of his hyponatremia as per primary team.  Consider outpatient sleep study given daughter's concern with \"fractured\" sleep. Would be a risk factor for AF recurrence.         History of Present Illness:  Noah Mendez is a 83 y.o. male who follows with [place] cardiology with PMHx as noted above who presents with noncardiac concerns with increased abdominal distention, pain secondary to constipation.  Notably, patient was in A-fib with RVR with heart rates in the 140s.  Was initiated on IV Cardizem drip with heart rates down to the 90s.  Was discontinued overnight due to SBP readings in the mid 90s.  He denies any cardiac symptoms to include chest pain, discomfort or palpitations.  He denies CHF symptoms of increased leg swelling, weight gain, orthopnea or PND.  Cardiology asked for recommendations regarding A-fib management in the setting of hypotension as well as discussion regarding anticoagulation which patient was not on prior to this admission.  Patient's history was discussed in detail with his daughter Xuan who lives across the street from him. Lives alone prior to developing medical issues in August but very stable on his feet and independent of his ADLs. He was admitted for increasing shortness of breath secondary to CHF from newly diagnosed A-fib with RVR.  He was found with a EF of 35% at that time per her account. He was placed on GDMT to include Toprol-XL, Lasix, Aldactone and Jardiance.  Per daughter's account was not placed on anticoagulation at this point.  Approximately 1 week later was admitted back to [facility] with complaint of persistent " shortness of breath.  He was found at the time to have COVID-19 infection and started on Remdesivir. He had some hyponatremia and increased agitation at home particularly during sleep hours just prior to his re-admission and during his second hospitalization. He was placed on multiple medications for this reason.  Follows with Dr. Gonzales with [facility].        Past Medical History:        Past Medical History:   Diagnosis Date    Atrial fibrillation (HCC)     CHF (congestive heart failure) (HCC)     Depression     Hypertension     Low blood pressure     History reviewed. No pertinent surgical history.     Allergy:        No Known Allergies    Medications:       Prior to Admission medications    Medication Sig Start Date End Date Taking? Authorizing Provider   doxepin (SINEquan) 50 mg capsule Take 50 mg by mouth daily at bedtime 9/10/24  Yes Historical Provider, MD   doxycycline hyclate (VIBRAMYCIN) 100 mg capsule Take 1 capsule by mouth Every 12 hours 9/10/24  Yes Historical Provider, MD   Empagliflozin (Jardiance) 10 MG TABS tablet Take 10 mg by mouth every morning   Yes Historical Provider, MD   furosemide (LASIX) 40 mg tablet Take 40 mg by mouth 2 (two) times a day 8/19/24  Yes Historical Provider, MD   Magnesium 300 MG CAPS Take by mouth   Yes Historical Provider, MD   magnesium hydroxide (MILK OF MAGNESIA) 400 mg/5 mL oral suspension Take by mouth daily as needed for constipation   Yes Historical Provider, MD   melatonin 1 mg Take 10 mg by mouth daily at bedtime   Yes Historical Provider, MD   metoprolol succinate (TOPROL-XL) 25 mg 24 hr tablet Take 25 mg by mouth every 24 hours 9/10/24  Yes Historical Provider, MD   Multiple Vitamin (multivitamin) tablet Take 1 tablet by mouth daily   Yes Historical Provider, MD   polyethylene glycol (MIRALAX) 17 g packet Take 17 g by mouth daily   Yes Historical Provider, MD   QUEtiapine (SEROquel) 50 mg tablet Take 100 mg by mouth daily at bedtime 9/10/24  Yes  Historical Provider, MD   spironolactone (ALDACTONE) 25 mg tablet Take 25 mg by mouth daily   Yes Historical Provider, MD   aspirin (Aspirin 81) 81 mg chewable tablet Chew 81 mg daily    Historical Provider, MD       Family History:     History reviewed. No pertinent family history.     Social History:       Social History     Socioeconomic History    Marital status:      Spouse name: None    Number of children: None    Years of education: None    Highest education level: None   Occupational History    None   Tobacco Use    Smoking status: Former     Current packs/day: 0.00     Types: Cigarettes     Quit date: 2024     Years since quittin.0    Smokeless tobacco: Never   Substance and Sexual Activity    Alcohol use: Never    Drug use: Never    Sexual activity: None   Other Topics Concern    None   Social History Narrative    None     Social Determinants of Health     Financial Resource Strain: Not on file   Food Insecurity: No Food Insecurity (2024)    Hunger Vital Sign     Worried About Running Out of Food in the Last Year: Never true     Ran Out of Food in the Last Year: Never true   Transportation Needs: No Transportation Needs (2024)    PRAPARE - Transportation     Lack of Transportation (Medical): No     Lack of Transportation (Non-Medical): No   Physical Activity: Not on file   Stress: Not on file   Social Connections: Not on file   Intimate Partner Violence: Not on file   Housing Stability: Low Risk  (2024)    Housing Stability Vital Sign     Unable to Pay for Housing in the Last Year: No     Number of Times Moved in the Last Year: 0     Homeless in the Last Year: No       Weights/BMI:    Wt Readings from Last 3 Encounters:   24 71.4 kg (157 lb 6.5 oz)   , Body mass index is 21.95 kg/m².      ROS:  14 point ROS negative except as outlined above  Remainder review of systems is negative    Exam:  General: Alert, oriented and in no acute distress, cooperative  Head:  Normocephalic, atraumatic.  Eyes: PERRLA. No icterus. Normal Conjunctiva.   Oropharynx: Moist and normal-appearing mucosa  Neck: Supple, symmetrical, trachea midline, JVD not appreciated.   Heart: Irregularly irregular, no murmur, rub or gallop, S1 & S2 normal   Lungs: Normal air entry, lungs clear to auscultation and no rales, rhonchi or wheezing   Abdomen: Flat, normal findings: bowel sounds normal and soft, non-tender  Lower Limbs:  No pitting edema, 2+ peripheral pulses, capillary refill within normal limits  Musculoskeletal: ROM grossly normal           Mastoid Interpolation Flap Text: A decision was made to reconstruct the defect utilizing an interpolation axial flap and a staged reconstruction.  A telfa template was made of the defect.  This telfa template was then used to outline the mastoid interpolation flap.  The donor area for the pedicle flap was then injected with anesthesia.  The flap was excised through the skin and subcutaneous tissue down to the layer of the underlying musculature.  The pedicle flap was carefully excised within this deep plane to maintain its blood supply.  The edges of the donor site were undermined.   The donor site was closed in a primary fashion.  The pedicle was then rotated into position and sutured.  Once the tube was sutured into place, adequate blood supply was confirmed with blanching and refill.  The pedicle was then wrapped with xeroform gauze and dressed appropriately with a telfa and gauze bandage to ensure continued blood supply and protect the attached pedicle.

## 2024-09-17 NOTE — ASSESSMENT & PLAN NOTE
Diagnosed late August/early September.   Treated with IV remdesivir at [facility] early September.   CT Chest:   No pulmonary embolism identified.  Emphysema.  Superimposed bilateral densities, as above, possibly related to reported COVID but nonspecific. Of note, radiographic findings may lag behind clinical improvement.  Labs   BNP: 797  Ddimer: 2.09  On RA. Not meeting SIRS. Denies SOB.   Procal pending.   ER ordered one time dose IV ceftriaxone. Hold further dose of IV abx. Do not suspect acute infection at this time.   Had been on doxycyline at facility. Continue abx till completion.

## 2024-09-17 NOTE — CASE MANAGEMENT
Case Management Assessment & Discharge Planning Note    Patient name Noah Mendez  Location  SDU/-01 S* MRN [id number]  : 1940 Date 2024       Current Admission Date: 2024  Current Admission Diagnosis:Atrial fibrillation with RVR (HCC)   Patient Active Problem List    Diagnosis Date Noted Date Diagnosed    COVID-19 2024     Atrial fibrillation with RVR (HCC) 2024     Constipation 2024     CHF (congestive heart failure) (HCC) 2024     Hypertension 2024       LOS (days): 0  Geometric Mean LOS (GMLOS) (days):   Days to GMLOS:     OBJECTIVE:          Current admission status: Observation       Preferred Pharmacy:   [facility]/pharmacy #[id number] - [place] - [address]  [address]  [place] [address]  Phone: [phone] Fax: [phone]    Primary Care Provider: Shira Floyd DO    Primary Insurance: BLUE CROSS [employer] REP  Secondary Insurance:     ASSESSMENT:  Active Health Care Proxies       Xuan Prakash [facility] Care Agent - Child   Primary Phone: [phone] (Mobile)                 Advance Directives  Does patient have a Health Care POA?: Yes  Does patient have Advance Directives?: Yes  Advance Directives: Living will, Power of  for health care  Primary Contact: Xuan Prakash: dtr: [phone]    Readmission Root Cause  30 Day Readmission: No    Patient Information  Admitted from:: Facility ([facility])  Mental Status: Alert  During Assessment patient was accompanied by: Not accompanied during assessment  Assessment information provided by:: Daughter  Primary Caregiver: Other (Comment)  Caregiver's Name:: [employer] for STR  Support Systems: Self, Children, Family members  County of Residence: [place]  What [facility] do you live in?: [place]  Home entry access options. Select all that apply.: No steps to enter home  Type of Current Residence: Facility  Upon entering residence, is there a bedroom on the main floor (no  further steps)?: Yes  Upon entering residence, is there a bathroom on the main floor (no further steps)?: Yes  Living Arrangements: Other (Comment) (currently at [facility])  Is patient a ?: No    Activities of Daily Living Prior to Admission  Does patient currently own DME?: Yes  What DME does the patient currently own?: Walker  Does patient have a history of Outpatient Therapy (PT/OT)?: No  Does the patient have a history of [facility]?: [facility]  Does patient have a history of HHC?: Yes ([facility])  Does patient currently have HHC?: No    Patient Information Continued  Income Source: Pension/senior living  Does patient have prescription coverage?: Yes  Does patient have a history of substance abuse?: No  Does patient have a history of Mental Health Diagnosis?: No    Means of Transportation  [facility]      Social Determinants of Health (SDOH)      Flowsheet Row Most Recent Value   Housing Stability    In the last 12 months, was there a time when you were not able to pay the mortgage or rent on time? N   In the past 12 months, how many times have you moved where you were living? 0   At any time in the past 12 months, were you homeless or living in a shelter (including now)? N   Transportation Needs    In the past 12 months, has lack of transportation kept you from medical appointments or from getting medications? no   In the past 12 months, has lack of transportation kept you from meetings, work, or from getting things needed for daily living? No   Food Insecurity    Within the past 12 months, you worried that your food would run out before you got the money to buy more. Never true   Within the past 12 months, the food you bought just didn't last and you didn't have money to get more. Never true   Utilities    In the past 12 months has the electric, gas, oil, or water company threatened to shut off services in your home? No            DISCHARGE  DETAILS:    Discharge planning discussed with:: Pt's dtr: Xuan  Freedom of Choice: Yes  Comments - Freedom of Choice: return to [facility] for STR  CM contacted family/caregiver?: Yes    Contacts  Patient Contacts: Xuan Prakash: sae  Relationship to Patient:: Family  Contact Method: Phone  Phone Number: [phone]  Reason/Outcome: Emergency Contact, Continuity of Care, Discharge Planning    Requested Home Health Care         Is the patient interested in HHC at discharge?: No    DME Referral Provided  Referral made for DME?: No    Additional Comments: Call placed to Pt's dtr(Xuan). discussed role of case management. Pt's dtr reports Pt recently went to [facility] for STR. Pt was living home alone and using walker prior to hospitalizations and rehab. Pt's dtr reports hx of [facility] and no hx of SNF. Pt's dtr reports she is POA and Pt has living will. Pt's dtr reports plan to return to [facility] for STR upon discharge. Referral sent to [facility] via [facility]. CM to follow.  Per [facility] Management, Pt is min assist.

## 2024-09-17 NOTE — PLAN OF CARE
Problem: DISCHARGE PLANNING  Goal: Discharge to home or other facility with appropriate resources  Description: INTERVENTIONS:  - Identify barriers to discharge w/patient and caregiver  - Arrange for needed discharge resources and transportation as appropriate  - Identify discharge learning needs (meds, wound care, etc.)  - Arrange for interpretive services to assist at discharge as needed  - Refer to Case Management Department for coordinating discharge planning if the patient needs post-hospital services based on physician/advanced practitioner order or complex needs related to functional status, cognitive ability, or social support system  Outcome: Progressing     Problem: RESPIRATORY - ADULT  Goal: Achieves optimal ventilation and oxygenation  Description: INTERVENTIONS:  - Assess for changes in respiratory status  - Assess for changes in mentation and behavior  - Position to facilitate oxygenation and minimize respiratory effort  - Oxygen administered by appropriate delivery if ordered  - Initiate smoking cessation education as indicated  - Encourage broncho-pulmonary hygiene including cough, deep breathe, Incentive Spirometry  - Assess the need for suctioning and aspirate as needed  - Assess and instruct to report SOB or any respiratory difficulty  - Respiratory Therapy support as indicated  Outcome: Progressing     Problem: MUSCULOSKELETAL - ADULT  Goal: Maintain or return mobility to safest level of function  Description: INTERVENTIONS:  - Assess patient's ability to carry out ADLs; assess patient's baseline for ADL function and identify physical deficits which impact ability to perform ADLs (bathing, care of mouth/teeth, toileting, grooming, dressing, etc.)  - Assess/evaluate cause of self-care deficits   - Assess range of motion  - Assess patient's mobility  - Assess patient's need for assistive devices and provide as appropriate  - Encourage maximum independence but intervene and supervise when  necessary  - Involve family in performance of ADLs  - Assess for home care needs following discharge   - Consider OT consult to assist with ADL evaluation and planning for discharge  - Provide patient education as appropriate  Outcome: Progressing  Goal: Maintain proper alignment of affected body part  Description: INTERVENTIONS:  - Support, maintain and protect limb and body alignment  - Provide patient/ family with appropriate education  Outcome: Progressing

## 2024-09-17 NOTE — PROGRESS NOTES
Patient:  ARMEN ISRAEL    MRN:  [id number]    Aidin Request ID:  [id number]    Level of care reserved:  Skilled Nursing Facility    Partner Reserved:  [facility], [place] [address] [phone]    Clinical needs requested:    Geography searched:  10 miles around [id number]    Start of Service:    Request sent:  8:36am EDT on 9/17/2024 by Marita Doe    Partner reserved:  10:00am EDT on 9/17/2024 by Marita Doe    Choice list shared:

## 2024-09-17 NOTE — OCCUPATIONAL THERAPY NOTE
09/17/24 1212   Note Type   Note type Cancelled Session   Cancel Reasons Medical status   Additional Comments OT order received and reviewed: Awaiting read of venous duplex, + a-fib. Will hold OT at this time till medically appropriate.             Occupational Therapy         Patient Name: Noah Mendez  Today's Date: 9/17/2024

## 2024-09-17 NOTE — ASSESSMENT & PLAN NOTE
Patient presented to the hospital due to his constipation. Last BM 9/12   Soap suds enema performed in the ER with small hard BM. No blood noted. Disimpaction attempted by provider without success.   CT abdomen pelvis   Fecal distention of the rectum suggestive of fecal impaction. Diffuse fecal retention.   Start bowel regimen   Colace, miralax, senna   Enema prn   If unsuccessful consult GI

## 2024-09-18 PROBLEM — K59.00 CONSTIPATION: Status: RESOLVED | Noted: 2024-09-16 | Resolved: 2024-09-18

## 2024-09-18 LAB — MRSA NOSE QL CULT: NORMAL

## 2024-09-18 PROCEDURE — 97163 PT EVAL HIGH COMPLEX 45 MIN: CPT

## 2024-09-18 PROCEDURE — 99232 SBSQ HOSP IP/OBS MODERATE 35: CPT | Performed by: INTERNAL MEDICINE

## 2024-09-18 PROCEDURE — 93005 ELECTROCARDIOGRAM TRACING: CPT

## 2024-09-18 PROCEDURE — 97167 OT EVAL HIGH COMPLEX 60 MIN: CPT

## 2024-09-18 PROCEDURE — 83521 IG LIGHT CHAINS FREE EACH: CPT | Performed by: INTERNAL MEDICINE

## 2024-09-18 PROCEDURE — 97535 SELF CARE MNGMENT TRAINING: CPT

## 2024-09-18 RX ORDER — METOPROLOL SUCCINATE 50 MG/1
50 TABLET, EXTENDED RELEASE ORAL 2 TIMES DAILY
Status: DISCONTINUED | OUTPATIENT
Start: 2024-09-18 | End: 2024-09-21 | Stop reason: HOSPADM

## 2024-09-18 RX ORDER — FUROSEMIDE 40 MG
40 TABLET ORAL DAILY
Status: DISCONTINUED | OUTPATIENT
Start: 2024-09-18 | End: 2024-09-19

## 2024-09-18 RX ADMIN — METOPROLOL SUCCINATE 50 MG: 50 TABLET, EXTENDED RELEASE ORAL at 09:23

## 2024-09-18 RX ADMIN — MELATONIN 6 MG: at 20:21

## 2024-09-18 RX ADMIN — EMPAGLIFLOZIN 10 MG: 10 TABLET, FILM COATED ORAL at 09:23

## 2024-09-18 RX ADMIN — MULTIPLE VITAMINS W/ MINERALS TAB 1 TABLET: TAB ORAL at 09:23

## 2024-09-18 RX ADMIN — APIXABAN 5 MG: 5 TABLET, FILM COATED ORAL at 09:23

## 2024-09-18 RX ADMIN — APIXABAN 5 MG: 5 TABLET, FILM COATED ORAL at 17:54

## 2024-09-18 RX ADMIN — SENNOSIDES 17.2 MG: 8.6 TABLET, FILM COATED ORAL at 20:20

## 2024-09-18 RX ADMIN — FUROSEMIDE 40 MG: 40 TABLET ORAL at 09:23

## 2024-09-18 RX ADMIN — ASPIRIN 81 MG CHEWABLE TABLET 81 MG: 81 TABLET CHEWABLE at 09:23

## 2024-09-18 RX ADMIN — Medication 400 MG: at 09:23

## 2024-09-18 RX ADMIN — QUETIAPINE FUMARATE 100 MG: 100 TABLET ORAL at 20:21

## 2024-09-18 RX ADMIN — DOXEPIN HYDROCHLORIDE 50 MG: 50 CAPSULE ORAL at 20:20

## 2024-09-18 NOTE — ASSESSMENT & PLAN NOTE
Known history of afib. While in the ER went into Afib with RVR.  S/p cardizem drip  Started on toprol XL 25 mg BID  Cardiology consulted  Chads vasc- 4, not on AC  DisCussed with patient and daughter, agreeable for anticoagulation, discharging on Eliquis

## 2024-09-18 NOTE — ASSESSMENT & PLAN NOTE
Diagnosed late August/early September.   Treated with IV remdesivir at [facility] early September.   CT Chest:   No pulmonary embolism identified.  Emphysema.  Superimposed bilateral densities, as above, possibly related to reported COVID but nonspecific. Of note, radiographic findings may lag behind clinical improvement.  Labs   BNP: 797  Ddimer: 2.09  On RA. Not meeting SIRS. Denies SOB.   Procal pending.   ER ordered one time dose IV ceftriaxone. Hold further dose of IV abx. Do not suspect acute infection at this time.   Had been on doxycyline at facility. Continue abx till completion.

## 2024-09-18 NOTE — PLAN OF CARE
Problem: DISCHARGE PLANNING  Goal: Discharge to home or other facility with appropriate resources  Description: INTERVENTIONS:  - Identify barriers to discharge w/patient and caregiver  - Arrange for needed discharge resources and transportation as appropriate  - Identify discharge learning needs (meds, wound care, etc.)  - Arrange for interpretive services to assist at discharge as needed  - Refer to Case Management Department for coordinating discharge planning if the patient needs post-hospital services based on physician/advanced practitioner order or complex needs related to functional status, cognitive ability, or social support system  Outcome: Progressing     Problem: RESPIRATORY - ADULT  Goal: Achieves optimal ventilation and oxygenation  Description: INTERVENTIONS:  - Assess for changes in respiratory status  - Assess for changes in mentation and behavior  - Position to facilitate oxygenation and minimize respiratory effort  - Oxygen administered by appropriate delivery if ordered  - Initiate smoking cessation education as indicated  - Encourage broncho-pulmonary hygiene including cough, deep breathe, Incentive Spirometry  - Assess the need for suctioning and aspirate as needed  - Assess and instruct to report SOB or any respiratory difficulty  - Respiratory Therapy support as indicated  Outcome: Progressing

## 2024-09-18 NOTE — PLAN OF CARE
Problem: OCCUPATIONAL THERAPY ADULT  Goal: Performs self-care activities at highest level of function for planned discharge setting.  See evaluation for individualized goals.  Description: Treatment Interventions: ADL retraining, Functional transfer training, UE strengthening/ROM, Endurance training, Cognitive reorientation, Patient/family training, Equipment evaluation/education, Compensatory technique education, Continued evaluation, Activityengagement, Energy conservation          See flowsheet documentation for full assessment, interventions and recommendations.   Note: Limitation: Decreased ADL status, Decreased UE strength, Decreased Safe judgement during ADL, Decreased cognition, Decreased endurance, Decreased self-care trans, Decreased high-level ADLs  Prognosis: Good  Assessment: Pt is a 83 y.o. male seen for OT evaluation s/p admission to [facility] on 9/16/2024 due to constipation, PNA. Diagnosed with Atrial fibrillation with RVR ([facility]). Personal and env factors supporting pt at time of IE include (I) PLOF, supportive family, and FFSU.  PMH: A-fib, CHF, hypertension, depression and COVID . Pt was living alone in a ranch home at Indep level for all I/ADLs, Indep ambulation + prior to last hospitalization. Pt admitted from [facility] where he was receiving inpt rehab.  Pt presents today with decreased act tom, generalized weakness, balance deficits and deconditioning.  Upon initial evaluation, pt appears to be performing below baseline functional status. Personal and env factors inhibiting engagement in occupations include advanced age, difficulty completing ADLs, difficulty completing IADLs, and living alone .   Due to pt's current functional limitations and medical complications pt is functioning below baseline. Patient would benefit from OT services within the acute care setting to maximize level of functional independence in the following areas self-care transfers, functional mobility,  and ADLs. From OT standpoint, recommendation at time of D/C would be Level II.     Rehab Resource Intensity Level, OT: II (Moderate Resource Intensity)

## 2024-09-18 NOTE — NURSING NOTE
Pt was oob walking around and had a large bowel movement afterwards pt states to feel more relief.

## 2024-09-18 NOTE — CASE MANAGEMENT
Case Management Discharge Planning Note    Patient name Noah Fernandez /-01 MRN [id number]  : 1940 Date 2024       Current Admission Date: 2024  Current Admission Diagnosis:Atrial fibrillation with RVR (HCC)   Patient Active Problem List    Diagnosis Date Noted Date Diagnosed    COVID-19 2024     Atrial fibrillation with RVR (HCC) 2024     CHF (congestive heart failure) (HCC) 2024     Hypertension 2024       LOS (days): 1  Geometric Mean LOS (GMLOS) (days): 3.5  Days to GMLOS:2.5     OBJECTIVE:  Risk of Unplanned Readmission Score: 15.56         Current admission status: Inpatient   Preferred Pharmacy:   [facility]/pharmacy #[id number] - [place] - [address]  [address]  [place] [address]  Phone: [phone] Fax: [phone]    Primary Care Provider: Shira Floyd DO    Primary Insurance: BLUE CROSS [employer] REP  Secondary Insurance:     DISCHARGE DETAILS:    From [place] Support; insurance authorization was obtained for the pt to return to [facility].    set up w/c van transport for 11 am on  to [facility].   Spoke with the pt's daughter Xuan to update her on the pt's insurance approval and transport time.   Pt's daughter voiced understanding and agreement with d/c plan.   Updated Dr Richards via Secure Chat.

## 2024-09-18 NOTE — CASE MANAGEMENT
[place] [facility] received request for authorization from Care Manager.  Authorization request submitted for: [facility]  Facility Name: [facility] NPI: [id number]    Facility MD: Dr. Bailey NPI: [id number]  Authorization initiated by contacting insurance: [place] [employer]  Via: Phone # [phone] opt 4 opt 2  Clinicals submitted verbally to Nurse Justo     Per Justo, he is going to process case and call back in 30 minutes with determination.     Care Manager notified: Sigrid DORANTES     Updates to authorization status will be noted in chart. Please reach out to CM for updates on any clinical information.

## 2024-09-18 NOTE — ASSESSMENT & PLAN NOTE
Wt Readings from Last 3 Encounters:   09/16/24 71.4 kg (157 lb 6.5 oz)     Appears euvolemic on exam     Home regimen:   Lasix   Spironolactone  Will change parameters for lasix and decrease dosing  Will need follow up appt with outpatient cardiology sooner  I/O

## 2024-09-18 NOTE — NURSING NOTE
Per pt and PCT, pt thought there was nasal spray on his tray table and went to spray it up his nose- pt squirted hand  up his nose instead. Pt instructed to swish and spit water as well as cough. Provider aware.

## 2024-09-18 NOTE — PROGRESS NOTES
Progress Note - Hospitalist   Name: Noah Mendez 83 y.o. male I MRN: [id number]  Unit/Bed#: -01 I Date of Admission: 9/16/2024   Date of Service: 9/18/2024 I Hospital Day: 1    Assessment & Plan  Atrial fibrillation with RVR (HCC)  Known history of afib. While in the ER went into Afib with RVR.  S/p cardizem drip  Started on toprol XL 25 mg BID  Cardiology consulted  Chads vasc- 4, not on AC  DisCussed with patient and daughter, agreeable for anticoagulation, discharging on Eliquis  COVID-19    Diagnosed late August/early September.   Treated with IV remdesivir at [facility] early September.   CT Chest:   No pulmonary embolism identified.  Emphysema.  Superimposed bilateral densities, as above, possibly related to reported COVID but nonspecific. Of note, radiographic findings may lag behind clinical improvement.  Labs   BNP: 797  Ddimer: 2.09  On RA. Not meeting SIRS. Denies SOB.   Procal pending.   ER ordered one time dose IV ceftriaxone. Hold further dose of IV abx. Do not suspect acute infection at this time.   Had been on doxycyline at facility. Continue abx till completion.   Constipation (Resolved: 9/18/2024)  Resolved    CHF (congestive heart failure) ([facility])  Wt Readings from Last 3 Encounters:   09/16/24 71.4 kg (157 lb 6.5 oz)     Appears euvolemic on exam     Home regimen:   Lasix   Spironolactone  Will change parameters for lasix and decrease dosing  Will need follow up appt with outpatient cardiology sooner  I/O    VTE Pharmacologic Prophylaxis: VTE Score: 3 Moderate Risk (Score 3-4) - Pharmacological DVT Prophylaxis Ordered: apixaban (Eliquis).    Mobility:   Basic Mobility Inpatient Raw Score: 17  JH-HLM Goal: 5: Stand one or more mins  JH-HLM Achieved: 7: Walk 25 feet or more  JH-HLM Goal achieved. Continue to encourage appropriate mobility.    Patient Centered Rounds: I performed bedside rounds with nursing staff today.   Discussions with Specialists or Other Care Team Provider:  cardiology    Education and Discussions with Family / Patient:  Will call daughter.     Current Length of Stay: 1 day(s)  Current Patient Status: Inpatient   Certification Statement: The patient will continue to require additional inpatient hospital stay due to pending placement, cardiology clearance  Discharge Plan:  Today versus tomorrow    Code Status: Level 3 - DNAR and DNI    Subjective   Patient stated he feels much better after bowel movement.  Denies chest pain, shortness of breath    Objective     Vitals:   Temp (24hrs), Av.7 °F (36.5 °C), Min:97.4 °F (36.3 °C), Max:97.9 °F (36.6 °C)    Temp:  [97.4 °F (36.3 °C)-97.9 °F (36.6 °C)] 97.9 °F (36.6 °C)  HR:  [] 78  Resp:  [19-20] 20  BP: (100-118)/(68-82) 100/68  SpO2:  [93 %-100 %] 96 %  Body mass index is 21.95 kg/m².     Input and Output Summary (last 24 hours):     Intake/Output Summary (Last 24 hours) at 2024 1318  Last data filed at 2024 0901  Gross per 24 hour   Intake 350 ml   Output 1475 ml   Net -1125 ml       Physical Exam  Vitals reviewed.   HENT:      Head: Normocephalic.      Mouth/Throat:      Mouth: Mucous membranes are moist.      Pharynx: Oropharynx is clear.   Eyes:      Conjunctiva/sclera: Conjunctivae normal.   Cardiovascular:      Rate and Rhythm: Normal rate. Rhythm irregular.   Pulmonary:      Effort: Pulmonary effort is normal.      Breath sounds: Normal breath sounds.   Abdominal:      General: Bowel sounds are normal.      Palpations: Abdomen is soft.   Skin:     General: Skin is warm and dry.      Capillary Refill: Capillary refill takes 2 to 3 seconds.   Neurological:      Mental Status: He is alert. Mental status is at baseline.          Lines/Drains:  Lines/Drains/Airways       Active Status       None                      Telemetry:  Telemetry Orders (From admission, onward)               24 Hour Telemetry Monitoring  Continuous x 24 Hours (Telem)        Question:  Reason for 24 Hour Telemetry  Answer:   Arrhythmias requiring acute medical intervention / PPM or ICD malfunction                     Telemetry Reviewed:  was not able to RVW this morning, ordered today to RVW HR  Indication for Continued Telemetry Use: Arrthymias requiring medical therapy               Lab Results: I have reviewed the following results: CBC/BMP: No new results in last 24 hours.    Results from last 7 days   Lab Units 09/17/24  0402 09/16/24  1708   WBC Thousand/uL 5.81 5.08   HEMOGLOBIN g/dL 10.9* 11.9*   HEMATOCRIT % 32.8* 36.7   PLATELETS Thousands/uL 129* 160   BANDS PCT %  --  1   SEGS PCT % 75  --    LYMPHO PCT % 13* 13*   MONO PCT % 10 10   EOS PCT % 1 5     Results from last 7 days   Lab Units 09/17/24  0402   SODIUM mmol/L 131*   POTASSIUM mmol/L 3.5   CHLORIDE mmol/L 96   CO2 mmol/L 29   BUN mg/dL 22   CREATININE mg/dL 0.92   ANION GAP mmol/L 6   CALCIUM mg/dL 7.6*   ALBUMIN g/dL 2.5*   TOTAL BILIRUBIN mg/dL 0.84   ALK PHOS U/L 83   ALT U/L 27   AST U/L 27   GLUCOSE RANDOM mg/dL 97                 Results from last 7 days   Lab Units 09/16/24  1719 09/16/24  1708   LACTIC ACID mmol/L  --  1.8   PROCALCITONIN ng/ml 0.09  --        Recent Cultures (last 7 days):         Imaging Review: No pertinent imaging studies reviewed.  Other Studies: No additional pertinent studies reviewed.    Last 24 Hours Medication List:     Current Facility-Administered Medications:     acetaminophen (TYLENOL) tablet 650 mg, Q6H PRN    apixaban (ELIQUIS) tablet 5 mg, BID    aspirin chewable tablet 81 mg, Daily    docusate sodium (COLACE) capsule 100 mg, BID    doxepin (SINEquan) capsule 50 mg, HS    Empagliflozin (JARDIANCE) tablet 10 mg, QAM    fluticasone (FLONASE) 50 mcg/act nasal spray 1 spray, Daily    furosemide (LASIX) tablet 40 mg, Daily    magnesium Oxide (MAG-OX) tablet 400 mg, Daily    melatonin tablet 6 mg, HS    metoprolol (LOPRESSOR) injection 2.5 mg, Q6H PRN    metoprolol succinate (TOPROL-XL) 24 hr tablet 50 mg, BID     multivitamin-minerals (CENTRUM) tablet 1 tablet, Daily    polyethylene glycol (MIRALAX) packet 17 g, Daily    potassium chloride (Klor-Con M20) CR tablet 20 mEq, Once    QUEtiapine (SEROquel) tablet 100 mg, HS    senna (SENOKOT) tablet 17.2 mg, HS    spironolactone (ALDACTONE) tablet 25 mg, Daily    Administrative Statements   Today, Patient Was Seen By: Cande Richards MD  I have spent a total time of 45 minutes in caring for this patient on the day of the visit/encounter including Patient and family education, Counseling / Coordination of care, Documenting in the medical record, and Communicating with other healthcare professionals .    **Please Note: This note may have been constructed using a voice recognition system.**

## 2024-09-18 NOTE — PROGRESS NOTES
Cardiology Progress Note   Noah Mendez 83 y.o. male MRN: [id number]    Unit/Bed#: -01 Encounter: [id number]      ASSESSMENT:  Atrial fibrillation with RVR  Newly discovered during admission in August 2020 for [facility].  Home Rx Toprol-XL 25 mg daily.  Not on oral anticoagulation prior to this admission. On ASA 81 mg daily only.  [id number] score is at least 3 (age+1, chf, cad)  Toprol XL increased to 50mg BID  Started on Eliquis 5mg BID for CVA prophylaxis  CAD s/p CABG x2 and PCI in 2015 at [facility]  No chest pain or discomfort since admission.  Troponin: 24/25/25  EKG: A-fib with intermittent ventricular pacing, rightward axis, nonspecific ST and T wave abnormality, prolonged QT  Cardiomyopathy, EF 35%  Newly discovered during admission in August 2024 at [facility].  Inpatient TTE showed EF of 35% (not available for review today).   Presumed tachycardia mediated in the setting of A-fib with RVR and CHF  Was discharged on PO Lasix 40 mg twice daily, Aldactone 25 mg and Jardiance 10 mg daily.  Chronic HFrEF    CT chest: No PE, emphysema, bilateral densities possibly COVID-related but nonspecific.  S/P PPM in situ  Recent COVID infection   Previously admitted for COVID August/September 2024.    COVID-positive this admission 9/16/2024.  Hyperlipidemia   Hyponatremia   Acquired QT syndrome    Plan:  After discussion, Noah was agreeable to start AC for CVA prophylaxis  He was originally started on IV Cardizem drip for RVR but developed hypotension and was stopped overnight. Since then his SBP has been reasonable in the 100-130 range. We increased his home Toprol-XL from 25 mg daily to 50 twice daily. Heart rates currently mid 90s per Miguel. He has not on telemetry at the time of this evaluation and recommending resuming this.  Prefer rate control strategy as he was just started on anticoagulation today. He seems to be tolerating Toprol-XL well without further  "hypotension. Will plan to increase this as long as BP allows. As a secondary agent, we can consider adding digoxin.  If he does not respond well to rate control strategy, we can consider JAZMYN/cardioversion tomorrow or Friday.  Will switch PO Lasix to 40 mg daily with modified BP parameters. He will perhaps need less diuretics with better rate control. Remains on Aldactone however has not been able to receive this due to low BP readings.  Follow-up daily BMP, standing weights. I/O monitoring during his admission.    Subjective:   Patient seen and examined. Overnight events reviewed. Patient denies any acute complaints at this time.     Objective:     Vitals: Blood pressure 100/68, pulse 78, temperature 97.9 °F (36.6 °C), resp. rate 20, height 5' 11\" (1.803 m), weight 71.4 kg (157 lb 6.5 oz), SpO2 96%., Body mass index is 21.95 kg/m².,   Orthostatic Blood Pressures      Flowsheet Row Most Recent Value   Blood Pressure 100/68 filed at 09/18/2024 0744   Patient Position - Orthostatic VS Lying filed at 09/17/2024 2109              Intake/Output Summary (Last 24 hours) at 9/18/2024 1112  Last data filed at 9/18/2024 0901  Gross per 24 hour   Intake 590 ml   Output 1475 ml   Net -885 ml         Physical Exam:    GEN: Noah Mendez appears well, alert and oriented x 3, pleasant and cooperative   HEENT: Sclera anicteric, conjunctivae pink, mucous membranes moist. Oropharynx clear.   NECK: supple, no significant JVD, Trachea midline, no thyromegaly.   HEART: regular rhythm, normal S1 and S2, no murmurs, clicks, gallops or rubs   LUNGS: clear to auscultation bilaterally; no wheezes, rales, or rhonchi. No increased work of breathing or signs of respiratory distress.   ABDOMEN: Soft, nontender, nondistended  EXTREMITIES: Skin warm and well perfused, no clubbing, cyanosis, or edema.  NEURO: No focal findings. Normal speech. Mood and affect normal.   SKIN: Normal without suspicious lesions on exposed " skin.      Medications:      Current Facility-Administered Medications:     acetaminophen (TYLENOL) tablet 650 mg, 650 mg, Oral, Q6H PRN, Cande Richards MD, 650 mg at 09/17/24 1115    apixaban (ELIQUIS) tablet 5 mg, 5 mg, Oral, BID, Cande Richards MD, 5 mg at 09/18/24 0923    aspirin chewable tablet 81 mg, 81 mg, Oral, Daily, Cande Richards MD, 81 mg at 09/18/24 0923    docusate sodium (COLACE) capsule 100 mg, 100 mg, Oral, BID, Cande Richards MD, 100 mg at 09/17/24 1706    doxepin (SINEquan) capsule 50 mg, 50 mg, Oral, HS, Cande Richards MD, 50 mg at 09/17/24 2038    Empagliflozin (JARDIANCE) tablet 10 mg, 10 mg, Oral, QAM, Cande Richards MD, 10 mg at 09/18/24 0923    fluticasone (FLONASE) 50 mcg/act nasal spray 1 spray, 1 spray, Each Nare, Daily, Cande Richards MD, 1 spray at 09/17/24 0845    furosemide (LASIX) tablet 40 mg, 40 mg, Oral, Daily, Cande Richards MD, 40 mg at 09/18/24 0923    magnesium Oxide (MAG-OX) tablet 400 mg, 400 mg, Oral, Daily, Cande Richards MD, 400 mg at 09/18/24 0923    melatonin tablet 6 mg, 6 mg, Oral, HS, Cande Richards MD, 6 mg at 09/17/24 2038    metoprolol (LOPRESSOR) injection 2.5 mg, 2.5 mg, Intravenous, Q6H PRN, Cande Richards MD, 2.5 mg at 09/17/24 1600    metoprolol succinate (TOPROL-XL) 24 hr tablet 50 mg, 50 mg, Oral, BID, Guanako Rojo MD, 50 mg at 09/18/24 0923    multivitamin-minerals (CENTRUM) tablet 1 tablet, 1 tablet, Oral, Daily, Cande Richards MD, 1 tablet at 09/18/24 0923    polyethylene glycol (MIRALAX) packet 17 g, 17 g, Oral, Daily, Cande Richards MD, 17 g at 09/17/24 0821    potassium chloride (Klor-Con M20) CR tablet 20 mEq, 20 mEq, Oral, Once, Cande Richards MD    QUEtiapine (SEROquel) tablet 100 mg, 100 mg, Oral, HS, Cadne Richards MD, 100 mg at 09/17/24 2038    senna (SENOKOT) tablet 17.2 mg, 2 tablet, Oral, HS, Cande Richards MD, 17.2 mg at 09/17/24 2038    spironolactone (ALDACTONE) tablet 25 mg, 25 mg, Oral, Daily, Cande  MD Maurice     Labs & Results:    Results from last 7 days   Lab Units 09/16/24  1708   CK TOTAL U/L 40     Results from last 7 days   Lab Units 09/17/24  0402 09/16/24  1708   WBC Thousand/uL 5.81 5.08   HEMOGLOBIN g/dL 10.9* 11.9*   HEMATOCRIT % 32.8* 36.7   PLATELETS Thousands/uL 129* 160         Results from last 7 days   Lab Units 09/17/24  0402 09/16/24  1708   POTASSIUM mmol/L 3.5 3.7   CHLORIDE mmol/L 96 96   CO2 mmol/L 29 30   BUN mg/dL 22 24   CREATININE mg/dL 0.92 1.02   CALCIUM mg/dL 7.6* 8.4   ALK PHOS U/L 83 97   ALT U/L 27 32   AST U/L 27 34         Results from last 7 days   Lab Units 09/17/24  0402 09/16/24  1708   MAGNESIUM mg/dL 2.2 2.3

## 2024-09-18 NOTE — OCCUPATIONAL THERAPY NOTE
"    Occupational Therapy Evaluation     Patient Name: Noah Mendez  Today's Date: 9/18/2024  Problem List  Principal Problem:    Atrial fibrillation with RVR (HCC)  Active Problems:    COVID-19    Constipation    CHF (congestive heart failure) (HCC)    Past Medical History  Past Medical History:   Diagnosis Date    Atrial fibrillation (HCC)     CHF (congestive heart failure) (HCC)     Depression     Hypertension     Low blood pressure         09/18/24 1017   OT Last Visit   OT Visit Date 09/18/24   Note Type   Note type Evaluation  (+treat)   Pain Assessment   Pain Assessment Tool 0-10   Pain Score No Pain   Restrictions/Precautions   Weight Bearing Precautions Per Order No   Other Precautions Fall Risk   Home Living   Type of Home House  (admitted from [facility] rehab)   Home Layout One level;Able to live on main level with bedroom/bathroom;Performs ADLs on one level;Other (Comment)  (+basement)   Bathroom Shower/Tub Tub/shower unit   Bathroom Equipment Shower chair   Bathroom Accessibility Accessible   Prior Function   Level of [place] Independent with ADLs;Independent with functional mobility;Independent with IADLS  (prior to last hospital admission)   Lives With (S)  Alone   Receives Help From Family   IADLs Independent with driving;Independent with meal prep;Independent with medication management   Vocational Retired   Lifestyle   Autonomy lived alone in a ranch +basement, Indep I/ADLs, Indep ambulation. Admitted from inpt rehab   Reciprocal Relationships supportive daughter   Intrinsic Gratification \"just being\"   General   Family/Caregiver Present No   ADL   Eating Assistance 7  Independent   Grooming Assistance 7  Independent   UB Bathing Assistance 5  Supervision/Setup   UB Bathing Deficit Setup   LB Bathing Assistance 3  Moderate Assistance   UB Dressing Assistance 5  Supervision/Setup   UB Dressing Deficit Setup  (gown management)   LB Dressing Assistance 3  Moderate Assistance   LB Dressing Deficit " Setup;Steadying;Requires assistive device for steadying;Thread LLE into underwear;Thread RLE into underwear   Toileting Assistance  2  Maximal Assistance   Toileting Deficit Perineal hygiene;Bedside commode   Bed Mobility   Supine to Sit 5  Supervision   Additional items HOB elevated   Sit to Supine 5  Supervision   Additional items HOB elevated   Transfers   Sit to Stand 5  Supervision   Stand to Sit 5  Supervision   Stand pivot 5  Supervision   Additional items Assist x 1   Toilet transfer 4  Minimal assistance   Additional items Assist x 1;Verbal cues;Commode   RUE Assessment   RUE Assessment WFL   LUE Assessment   LUE Assessment WFL   Cognition   Overall Cognitive Status Impaired   Arousal/Participation Alert;Cooperative   Attention Within functional limits   Orientation Level Oriented to person;Disoriented to place;Disoriented to time;Disoriented to situation   Following Commands Follows one step commands without difficulty   Assessment   Limitation Decreased ADL status;Decreased UE strength;Decreased Safe judgement during ADL;Decreased cognition;Decreased endurance;Decreased self-care trans;Decreased high-level ADLs   Prognosis Good   Assessment Pt is a 83 y.o. male seen for OT evaluation s/p admission to [facility] on 9/16/2024 due to constipation, PNA. Diagnosed with Atrial fibrillation with RVR ([facility]). Personal and env factors supporting pt at time of IE include (I) PLOF, supportive family, and FFSU.  PMH: A-fib, CHF, hypertension, depression and COVID . Pt was living alone in a ranch home at Indep level for all I/ADLs, Indep ambulation + prior to last hospitalization. Pt admitted from [facility] where he was receiving inpt rehab.  Pt presents today with decreased act tom, generalized weakness, balance deficits and deconditioning.  Upon initial evaluation, pt appears to be performing below baseline functional status. Personal and env factors inhibiting engagement in occupations include  advanced age, difficulty completing ADLs, difficulty completing IADLs, and living alone .   Due to pt's current functional limitations and medical complications pt is functioning below baseline. Patient would benefit from OT services within the acute care setting to maximize level of functional independence in the following areas self-care transfers, functional mobility, and ADLs. From OT standpoint, recommendation at time of D/C would be Level II.   Goals   Patient Goals stop going to the bathroom   LT Time Frame 10-14   Plan   Treatment Interventions ADL retraining;Functional transfer training;UE strengthening/ROM;Endurance training;Cognitive reorientation;Patient/family training;Equipment evaluation/education;Compensatory technique education;Continued evaluation;Activityengagement;Energy conservation   Goal Expiration Date 10/02/24   OT Treatment Day 0   OT Frequency 2-3x/wk   Discharge Recommendation   Rehab Resource Intensity Level, OT II (Moderate Resource Intensity)   AM-PAC Daily Activity Inpatient   Lower Body Dressing 3   Bathing 3   Toileting 2   Upper Body Dressing 3   Grooming 4   Eating 4   Daily Activity Raw Score 19   Daily Activity Standardized Score (Calc for Raw Score >=11) 40.22   Additional Treatment Session   Start Time 1032   End Time 1043   Treatment Assessment Pt seen today for OT follow up session with focus on self help skills. Pt was living alone in a ranch home +basement at Indep level for all I/ADls, Indep ambulation and + prior to recent hospitalization at which time he was d/c to [facility] for inpt rehab. Pt presnets today with decreased act tom, generalized weakness, balance deficits and deconditioning affecting his Indep and safety with all functional tasks. Pt was able to complete supine <>sit at Sup level with elevated HOB. Sit<>stand and SPT completed at [facility] with RW and VC. UB gown management performed at S/U level, LB dressing completed with ModA in sitting/standing. Pt  required Mod/MaxA for martin-hygiene after toileting utilizing BSC. Pt remains below baseline functioning and cont to benefit from OT at hospital setting with recommended d/c to level II.   End of Consult   Education Provided Yes   Patient Position at End of Consult Supine;Bed/Chair alarm activated;All needs within reach   Nurse Communication Nurse aware of consult     GOALS:   Goals established in order to promote pt's established goal of stop having  bowel movements     -Patient will be Mod I with LB dressing with use of AE and AD as needed in order to increase (I) with ADLs    -Patient will be Mod I with LB bathing with use of AE and AD as needed in order to increase (I) with ADLs    -Patient will complete toileting w/ Mod I w/ G hygiene/thoroughness in order to reduce caregiver burden    -Patient will perform functional transfers with Mod I to/from all surfaces using DME as needed in order to increase (I) with functional tasks    -Patient will be Mod I with functional mobility to/from bathroom for increased independence with toileting tasks    -Patient will tolerate therapeutic activities for greater than 30 min, in order to increase tolerance for functional activities.     -Patient will increase OOB/sitting tolerance to 2-4 hours per day to increase participation in self-care and leisure tasks with no s/s of exertion.     -Patient will participate in 10m UE therex to increase overall stamina/activity tolerance for purposeful tasks    -Patient will independently integrate one pacing strategy into morning ADLs.    -Patient will demonstrate standing for 5-7 min in order to increase active participation in functional activities    -Patient will be mod I with light meal prep activities in order for safe return to PLOF    -Patient will be mod I with medication management in order to facilitate safe techniques/management at home upon d/c

## 2024-09-18 NOTE — CASE MANAGEMENT
[employer] [place] has received APPROVED authorization.  Insurance: [place] [id number]  Called in by Rep: Justo MORA# [phone]   Authorization received for: SNF  Facility: [employer]  Authorization #: [id number]  Start of Care: 9/19  Next Review Date: 9/23  Continued Stay Care Coordinator: not provided   Submit next review to: Phone # [phone]    Care Manager notified: Sigrid DORANTES     Please reach out to CM for updates on any clinical information.

## 2024-09-18 NOTE — PHYSICAL THERAPY NOTE
"                                                                                  PHYSICAL THERAPY EVAL  Physical Therapy Evaluation    Performed at least 2 patient identifiers during session:  Patient Active Problem List   Diagnosis    COVID-19    Atrial fibrillation with RVR (HCC)    Constipation    CHF (congestive heart failure) (HCC)    Hypertension       Past Medical History:   Diagnosis Date    Atrial fibrillation (HCC)     CHF (congestive heart failure) (HCC)     Depression     Hypertension     Low blood pressure        History reviewed. No pertinent surgical history.       09/18/24 1110   PT Last Visit   PT Visit Date 09/18/24   Note Type   Note type Evaluation   Pain Assessment   Pain Assessment Tool 0-10   Pain Score No Pain   Restrictions/Precautions   Weight Bearing Precautions Per Order No   Other Precautions Fall Risk;Bed Alarm;Chair Alarm;Cognitive   Home Living   Type of Home House   Home Layout One level;Able to live on main level with bedroom/bathroom;Performs ADLs on one level   Bathroom Shower/Tub Tub/shower unit   Bathroom Equipment Shower chair   Bathroom Accessibility Accessible   Additional Comments Has been at [facility] for rehab since admission at [facility]   Prior Function   Level of [place] Independent with ADLs;Independent with functional mobility;Independent with IADLS   Lives With (S)  Alone   Receives Help From Family   IADLs Independent with driving;Independent with meal prep;Independent with medication management   Vocational Retired   General   Additional Pertinent History Recent COVID 19 infection   Family/Caregiver Present No   Cognition   Overall Cognitive Status Impaired   Arousal/Participation Lethargic   Attention Within functional limits   Orientation Level Oriented to person;Disoriented to place;Disoriented to time;Disoriented to situation   Following Commands Follows one step commands without difficulty   Subjective   Subjective \"What do you want me to do?\"   RLE Assessment "   RLE Assessment WFL   LLE Assessment   LLE Assessment WFL   Bed Mobility   Supine to Sit 5  Supervision   Additional items HOB elevated;Bedrails   Transfers   Sit to Stand 4  Minimal assistance   Additional items Assist x 1;Armrests;Verbal cues   Stand to Sit 4  Minimal assistance   Additional items Assist x 1;Armrests;Verbal cues   Additional Comments Min A for CG   Ambulation/Elevation   Gait pattern Forward Flexion;Decreased foot clearance   Gait Assistance 4  Minimal assist   Additional items Assist x 1;Verbal cues;Tactile cues   Assistive Device Rolling walker   Distance 25ft   Ambulation/Elevation Additional Comments Very impulsive behavior. Lifting RW off of ground. Fast paced gait at times-unsteady.   Balance   Static Sitting Normal   Dynamic Sitting Good   Static Standing Fair +   Dynamic Standing Fair   Ambulatory Fair   Endurance Deficit   Endurance Deficit Yes   Endurance Deficit Description Easily fatigued   Activity Tolerance   Activity Tolerance Patient limited by fatigue   Nurse Made Aware Lilli ALSTON   Assessment   Prognosis Good   Problem List Decreased strength;Decreased endurance;Impaired balance;Decreased mobility;Decreased cognition;Decreased safety awareness;Impaired judgement   Assessment Patient is an 82y/o with a-fib with rvr, recent COVID 19 infection, constipation and CHF. Patient comes from [facility] where he has been for rehab. Prior to this he was living alone and was independent. Current medical status includes decreased cognition, impulsivity, agitation, bed/chair alarm, fall risk, decreased strength, balance, endurance and mobility. Patient mildly agitated during session. Needed encouragement throughout. He required assistance of 1 for transfers and amb. Patient was very impulsive with the RW and needed verbal cues throughout for safety. Also extremely fatigued after a short amb distance and fell asleep immediately after getting into chair. Patient is deconditioned and is not at his  functional baseline. Recommending level 2 resources. Moderate intensity. The patient's AM-PAC Basic Mobility Inpatient Short Form Raw Score is 17. A Raw score of less than or equal to 17 suggests the patient may benefit from discharge to post-acute rehabilitation services. Please also refer to the recommendation of the Physical Therapist for safe discharge planning.   Barriers to Discharge Decreased caregiver support  (Lives alone)   Goals   Patient Goals To get some rest   STG Expiration Date 10/02/24   Short Term Goal #1 1. perform supine<>sit with HOB flat without use of bedrails ind 2. perform sit<>stand transfers mod I 3. Ambulate 300ft with a RW mod I level   PT Treatment Day 0   Plan   Treatment/Interventions Functional transfer training;LE strengthening/ROM;Therapeutic exercise;Endurance training;Cognitive reorientation;Patient/family training;Equipment eval/education;Bed mobility;Gait training;Spoke to nursing   PT Frequency 3-5x/wk   Discharge Recommendation   Rehab Resource Intensity Level, PT II (Moderate Resource Intensity)   AM-PAC Basic Mobility Inpatient   Turning in Flat Bed Without Bedrails 3   Lying on Back to Sitting on Edge of Flat Bed Without Bedrails 3   Moving Bed to Chair 3   Standing Up From Chair Using Arms 3   Walk in Room 3   Climb 3-5 Stairs With Railing 2   Basic Mobility Inpatient Raw Score 17   Basic Mobility Standardized Score 39.67   [facility] Highest Level Of Mobility   -HLM Goal 5: Stand one or more mins   -HLM Achieved 7: Walk 25 feet or more   End of Consult   Patient Position at End of Consult Bedside chair;Bed/Chair alarm activated;All needs within reach     Sarah Sharp, PT             Patient Name: Noah Mendez  Today's Date: 9/18/2024

## 2024-09-18 NOTE — CASE MANAGEMENT
Case Management Discharge Planning Note    Patient name Noah Fernandez /-01 MRN [id number]  : 1940 Date 2024       Current Admission Date: 2024  Current Admission Diagnosis:Atrial fibrillation with RVR (HCC)   Patient Active Problem List    Diagnosis Date Noted Date Diagnosed    COVID-19 2024     Atrial fibrillation with RVR (HCC) 2024     Constipation 2024     CHF (congestive heart failure) (HCC) 2024     Hypertension 2024       LOS (days): 1  Geometric Mean LOS (GMLOS) (days): 3.5  Days to GMLOS:2.5     OBJECTIVE:  Risk of Unplanned Readmission Score: 15.56         Current admission status: Inpatient   Preferred Pharmacy:   [facility]/pharmacy #[id number] - [place] - [address]  [address]  [place] [address]  Phone: [phone] Fax: [phone]    Primary Care Provider: Shira Floyd DO    Primary Insurance: [employer]  Secondary Insurance:     DISCHARGE DETAILS:    Per attending, pt stable for discharge at this time.   CM updated Carie from [facility] who provided the NPI and accepting doctors information for insurance authorization.    CM requested that DC support start the insurance authorization process via [employer]. Waiting on determination.     Carie from [facility] sent response that they are at capacity for admissions today but are able to take the pt for admission on  in AM.   Updated the pt's provider.

## 2024-09-18 NOTE — PLAN OF CARE
Problem: PHYSICAL THERAPY ADULT  Goal: Performs mobility at highest level of function for planned discharge setting.  See evaluation for individualized goals.  Description: Treatment/Interventions: Functional transfer training, LE strengthening/ROM, Therapeutic exercise, Endurance training, Cognitive reorientation, Patient/family training, Equipment eval/education, Bed mobility, Gait training, Spoke to nursing          See flowsheet documentation for full assessment, interventions and recommendations.  Note: Prognosis: Good  Problem List: Decreased strength, Decreased endurance, Impaired balance, Decreased mobility, Decreased cognition, Decreased safety awareness, Impaired judgement  Assessment: Patient is an 84y/o with a-fib with rvr, recent COVID 19 infection, constipation and CHF. Patient comes from [facility] where he has been for rehab. Prior to this he was living alone and was independent. Current medical status includes decreased cognition, impulsivity, agitation, bed/chair alarm, fall risk, decreased strength, balance, endurance and mobility. Patient mildly agitated during session. Needed encouragement throughout. He required assistance of 1 for transfers and amb. Patient was very impulsive with the RW and needed verbal cues throughout for safety. Also extremely fatigued after a short amb distance and fell asleep immediately after getting into chair. Patient is deconditioned and is not at his functional baseline. Recommending level 2 resources. Moderate intensity. The patient's AM-PAC Basic Mobility Inpatient Short Form Raw Score is 17. A Raw score of less than or equal to 17 suggests the patient may benefit from discharge to post-acute rehabilitation services. Please also refer to the recommendation of the Physical Therapist for safe discharge planning.  Barriers to Discharge: Decreased caregiver support (Lives alone)     Rehab Resource Intensity Level, PT: II (Moderate Resource Intensity)    See flowsheet  documentation for full assessment.

## 2024-09-19 PROBLEM — I42.9 CARDIOMYOPATHY (HCC): Status: ACTIVE | Noted: 2024-09-19

## 2024-09-19 PROBLEM — I25.810 CORONARY ARTERY DISEASE INVOLVING CORONARY BYPASS GRAFT: Status: ACTIVE | Noted: 2024-09-19

## 2024-09-19 LAB
ALBUMIN UR ELPH-MCNC: 13.6 %
ALPHA1 GLOB MFR UR ELPH: 6.8 %
ALPHA2 GLOB MFR UR ELPH: 11.9 %
ANION GAP SERPL CALCULATED.3IONS-SCNC: 6 MMOL/L (ref 4–13)
ATRIAL RATE: 79 BPM
B-GLOBULIN MFR UR ELPH: 8.6 %
BUN SERPL-MCNC: 25 MG/DL (ref 5–25)
CALCIUM SERPL-MCNC: 7.8 MG/DL (ref 8.4–10.2)
CHLORIDE SERPL-SCNC: 98 MMOL/L (ref 96–108)
CO2 SERPL-SCNC: 27 MMOL/L (ref 21–32)
CREAT SERPL-MCNC: 1 MG/DL (ref 0.6–1.3)
GAMMA GLOB MFR UR ELPH: 59.1 %
GFR SERPL CREATININE-BSD FRML MDRD: 69 ML/MIN/1.73SQ M
GLUCOSE SERPL-MCNC: 107 MG/DL (ref 65–140)
HCT VFR BLD AUTO: 33 % (ref 36.5–49.3)
HGB BLD-MCNC: 10.6 G/DL (ref 12–17)
IGA SERPL-MCNC: 41 MG/DL (ref 66–433)
IGG SERPL-MCNC: 5148 MG/DL (ref 635–1741)
IGM SERPL-MCNC: 43 MG/DL (ref 45–281)
INTERPRETATION UR IFE-IMP: NORMAL
KAPPA LC FREE SER-MCNC: 206.5 MG/L (ref 3.3–19.4)
KAPPA LC FREE/LAMBDA FREE SER: 27.53 {RATIO} (ref 0.26–1.65)
LAMBDA LC FREE SERPL-MCNC: 7.5 MG/L (ref 5.7–26.3)
M PROTEIN MFR UR ELPH: 16.3 MG/DL
M PROTEIN UR-MCNC: 21.4 %
POTASSIUM SERPL-SCNC: 3.6 MMOL/L (ref 3.5–5.3)
PROT PATTERN UR ELPH-IMP: NORMAL
PROT UR-MCNC: 76 MG/DL
QRS AXIS: 80 DEGREES
QRSD INTERVAL: 114 MS
QT INTERVAL: 420 MS
QTC INTERVAL: 481 MS
SODIUM SERPL-SCNC: 131 MMOL/L (ref 135–147)
T WAVE AXIS: 237 DEGREES
VENTRICULAR RATE: 79 BPM

## 2024-09-19 PROCEDURE — 80048 BASIC METABOLIC PNL TOTAL CA: CPT | Performed by: INTERNAL MEDICINE

## 2024-09-19 PROCEDURE — 99239 HOSP IP/OBS DSCHRG MGMT >30: CPT | Performed by: INTERNAL MEDICINE

## 2024-09-19 PROCEDURE — B24BZZ4 ULTRASONOGRAPHY OF HEART WITH AORTA, TRANSESOPHAGEAL: ICD-10-PCS | Performed by: INTERNAL MEDICINE

## 2024-09-19 PROCEDURE — 86334 IMMUNOFIX E-PHORESIS SERUM: CPT | Performed by: STUDENT IN AN ORGANIZED HEALTH CARE EDUCATION/TRAINING PROGRAM

## 2024-09-19 PROCEDURE — 93010 ELECTROCARDIOGRAM REPORT: CPT | Performed by: INTERNAL MEDICINE

## 2024-09-19 PROCEDURE — 99232 SBSQ HOSP IP/OBS MODERATE 35: CPT | Performed by: INTERNAL MEDICINE

## 2024-09-19 PROCEDURE — 84165 PROTEIN E-PHORESIS SERUM: CPT | Performed by: STUDENT IN AN ORGANIZED HEALTH CARE EDUCATION/TRAINING PROGRAM

## 2024-09-19 PROCEDURE — 85018 HEMOGLOBIN: CPT | Performed by: INTERNAL MEDICINE

## 2024-09-19 PROCEDURE — 85014 HEMATOCRIT: CPT | Performed by: INTERNAL MEDICINE

## 2024-09-19 PROCEDURE — 84166 PROTEIN E-PHORESIS/URINE/CSF: CPT | Performed by: STUDENT IN AN ORGANIZED HEALTH CARE EDUCATION/TRAINING PROGRAM

## 2024-09-19 PROCEDURE — 82784 ASSAY IGA/IGD/IGG/IGM EACH: CPT | Performed by: STUDENT IN AN ORGANIZED HEALTH CARE EDUCATION/TRAINING PROGRAM

## 2024-09-19 PROCEDURE — 86335 IMMUNFIX E-PHORSIS/URINE/CSF: CPT | Performed by: STUDENT IN AN ORGANIZED HEALTH CARE EDUCATION/TRAINING PROGRAM

## 2024-09-19 RX ORDER — FUROSEMIDE 20 MG
20 TABLET ORAL 2 TIMES DAILY
Start: 2024-09-19 | End: 2024-10-19

## 2024-09-19 RX ORDER — NICOTINE 21 MG/24HR
21 PATCH, TRANSDERMAL 24 HOURS TRANSDERMAL DAILY
Status: COMPLETED | OUTPATIENT
Start: 2024-09-19 | End: 2024-09-20

## 2024-09-19 RX ORDER — DOCUSATE SODIUM 100 MG/1
100 CAPSULE, LIQUID FILLED ORAL 2 TIMES DAILY
Start: 2024-09-19

## 2024-09-19 RX ORDER — SENNOSIDES 8.6 MG
17.2 TABLET ORAL
Start: 2024-09-19

## 2024-09-19 RX ORDER — FUROSEMIDE 20 MG
20 TABLET ORAL
Status: DISCONTINUED | OUTPATIENT
Start: 2024-09-19 | End: 2024-09-21 | Stop reason: HOSPADM

## 2024-09-19 RX ORDER — DIGOXIN 125 MCG
125 TABLET ORAL DAILY
Status: DISCONTINUED | OUTPATIENT
Start: 2024-09-19 | End: 2024-09-19

## 2024-09-19 RX ORDER — POTASSIUM CHLORIDE 1500 MG/1
20 TABLET, EXTENDED RELEASE ORAL ONCE
Qty: 1 TABLET | Refills: 0 | Status: SHIPPED | OUTPATIENT
Start: 2024-09-19 | End: 2024-09-19

## 2024-09-19 RX ORDER — FLUTICASONE PROPIONATE 50 MCG
1 SPRAY, SUSPENSION (ML) NASAL DAILY
Start: 2024-09-20

## 2024-09-19 RX ORDER — METOPROLOL SUCCINATE 25 MG/1
50 TABLET, EXTENDED RELEASE ORAL EVERY 24 HOURS
Start: 2024-09-19

## 2024-09-19 RX ADMIN — ASPIRIN 81 MG CHEWABLE TABLET 81 MG: 81 TABLET CHEWABLE at 09:18

## 2024-09-19 RX ADMIN — QUETIAPINE FUMARATE 100 MG: 100 TABLET ORAL at 20:41

## 2024-09-19 RX ADMIN — DOCUSATE SODIUM 100 MG: 100 CAPSULE, LIQUID FILLED ORAL at 18:10

## 2024-09-19 RX ADMIN — SENNOSIDES 17.2 MG: 8.6 TABLET, FILM COATED ORAL at 20:41

## 2024-09-19 RX ADMIN — DOCUSATE SODIUM 100 MG: 100 CAPSULE, LIQUID FILLED ORAL at 09:18

## 2024-09-19 RX ADMIN — APIXABAN 5 MG: 5 TABLET, FILM COATED ORAL at 09:19

## 2024-09-19 RX ADMIN — Medication 400 MG: at 09:17

## 2024-09-19 RX ADMIN — DOXEPIN HYDROCHLORIDE 50 MG: 50 CAPSULE ORAL at 20:41

## 2024-09-19 RX ADMIN — EMPAGLIFLOZIN 10 MG: 10 TABLET, FILM COATED ORAL at 09:18

## 2024-09-19 RX ADMIN — MELATONIN 6 MG: at 20:41

## 2024-09-19 RX ADMIN — MULTIPLE VITAMINS W/ MINERALS TAB 1 TABLET: TAB ORAL at 09:18

## 2024-09-19 RX ADMIN — APIXABAN 5 MG: 5 TABLET, FILM COATED ORAL at 18:09

## 2024-09-19 RX ADMIN — METOPROLOL SUCCINATE 50 MG: 50 TABLET, EXTENDED RELEASE ORAL at 18:10

## 2024-09-19 RX ADMIN — NICOTINE 21 MG: 21 PATCH, EXTENDED RELEASE TRANSDERMAL at 10:34

## 2024-09-19 RX ADMIN — METOPROLOL SUCCINATE 50 MG: 50 TABLET, EXTENDED RELEASE ORAL at 09:18

## 2024-09-19 RX ADMIN — DIGOXIN 125 MCG: 125 TABLET ORAL at 12:57

## 2024-09-19 NOTE — ASSESSMENT & PLAN NOTE
Known history of afib. While in the ER went into Afib with RVR.  Discharging on Toprol 50 mg twice daily  [id number], discussed with daughter and patient, agreeable for Eliquis.  Understand the risks and benefits of full dose Eliquis

## 2024-09-19 NOTE — ASSESSMENT & PLAN NOTE
Wt Readings from Last 3 Encounters:   09/16/24 71.4 kg (157 lb 6.5 oz)     Appears euvolemic on exam     Home regimen:   Lasix   Spironolactone  Will change parameters for lasix and decrease dosing

## 2024-09-19 NOTE — PLAN OF CARE
Problem: Potential for Falls  Goal: Patient will remain free of falls  Description: INTERVENTIONS:  - Educate patient/family on patient safety including physical limitations  - Instruct patient to call for assistance with activity   - Consult OT/PT to assist with strengthening/mobility   - Keep Call bell within reach  - Keep bed low and locked with side rails adjusted as appropriate  - Keep care items and personal belongings within reach  - Initiate and maintain comfort rounds  - Make Fall Risk Sign visible to staff  Problem: CARDIOVASCULAR - ADULT  Goal: Maintains optimal cardiac output and hemodynamic stability  Description: INTERVENTIONS:  - Monitor I/O, vital signs and rhythm  - Monitor for S/S and trends of decreased cardiac output  - Administer and titrate ordered vasoactive medications to optimize hemodynamic stability  - Assess quality of pulses, skin color and temperature  - Assess for signs of decreased coronary artery perfusion  - Instruct patient to report change in severity of symptoms  Outcome: Progressing     Problem: DISCHARGE PLANNING  Goal: Discharge to home or other facility with appropriate resources  Description: INTERVENTIONS:  - Identify barriers to discharge w/patient and caregiver  - Arrange for needed discharge resources and transportation as appropriate  - Identify discharge learning needs (meds, wound care, etc.)  - Arrange for interpretive services to assist at discharge as needed  - Refer to Case Management Department for coordinating discharge planning if the patient needs post-hospital services based on physician/advanced practitioner order or complex needs related to functional status, cognitive ability, or social support system  Outcome: Progressing     - Apply yellow socks and bracelet for high fall risk patients  - Consider moving patient to room near nurses station  Outcome: Progressing

## 2024-09-19 NOTE — ASSESSMENT & PLAN NOTE
Diagnosed late August/early September.   Treated with IV remdesivir at [place] early September.   CT Chest:   No pulmonary embolism identified.  Emphysema.  Superimposed bilateral densities, as above, possibly related to reported COVID but nonspecific. Of note, radiographic findings may lag behind clinical improvement.  Labs   BNP: 797  Ddimer: 2.09  On RA. Not meeting SIRS. Denies SOB.   Procal pending.   ER ordered one time dose IV ceftriaxone. Hold further dose of IV abx. Do not suspect acute infection at this time.   Had been on doxycyline at facility. Continue abx till completion.

## 2024-09-19 NOTE — DISCHARGE SUMMARY
Discharge Summary - Hospitalist   Name: Noah Mendez 83 y.o. male I MRN: [id number]  Unit/Bed#: -01 I Date of Admission: 9/16/2024   Date of Service: 9/19/2024 I Hospital Day: 2     Assessment & Plan  Atrial fibrillation with RVR (HCC)  Known history of afib. While in the ER went into Afib with RVR.  Discharging on Toprol 50 mg twice daily  [id number] 4, discussed with daughter and patient, agreeable for Eliquis.  Understand the risks and benefits of full dose Eliquis  COVID-19    Diagnosed late August/early September.   Treated with IV remdesivir at [place] early September.   CT Chest:   No pulmonary embolism identified.  Emphysema.  Superimposed bilateral densities, as above, possibly related to reported COVID but nonspecific. Of note, radiographic findings may lag behind clinical improvement.  Labs   BNP: 797  Ddimer: 2.09  On RA. Not meeting SIRS. Denies SOB.   Procal pending.   ER ordered one time dose IV ceftriaxone. Hold further dose of IV abx. Do not suspect acute infection at this time.   Had been on doxycyline at facility. Continue abx till completion.   CHF (congestive heart failure) (HCC)  Wt Readings from Last 3 Encounters:   09/16/24 71.4 kg (157 lb 6.5 oz)     Appears euvolemic on exam     DC spironolactone  Continue Jardiance and Lasix  Changed Lasix dosing to 20 mg twice daily  Need blood pressure checked at least twice daily  Follow-up with outpatient cardiology     Medical Problems       Resolved Problems  Never Reviewed            Resolved    Constipation 9/18/2024     Resolved by  Cande Richards MD        Discharging Physician / Practitioner: Cande Richards MD  PCP: Shira Floyd DO  Admission Date:   Admission Orders (From admission, onward)       Ordered        09/17/24 1412  INPATIENT ADMISSION  Once            09/16/24 2214  Place in Observation  Once                          Discharge Date: 09/19/24    Consultations During Hospital  Stay:  Cardiology  GI    Procedures Performed:   Manual stool disimpaction    Significant Findings / Test Results:   A-fib with RVR on presentation  PE Study with CT Abdomen and Pelvis with contrast    Result Date: 9/16/2024  Impression: No pulmonary embolism identified. Emphysema. Superimposed bilateral densities, as above, possibly related to reported COVID but nonspecific. Of note, radiographic findings may lag behind clinical improvement. Fecal distention of the rectum suggestive of fecal impaction. Diffuse fecal retention. Age indeterminate mild T7 compression deformity. Correlate to exclude focal tenderness. Chronic findings, as per the body of the report.       Complications: None    Reason for Admission:   Chief Complaint   Patient presents with    Constipation     Pt to er via ems from [place] with reports of constipation and rectal pain since 9/12. Denies any abdominal pain. Currently recovering from covid, on last day of isolation.         Hospital Course:   Noah Mendez is a 83 y.o. male patient past medical history of CHF, A-fib, depression, cognitive decline who originally presented to the hospital on 9/16/2024 due to constipation.  Patient was noted to have A-fib with RVR in ER, initiated on Cardizem drip.  GI was consulted for constipation, with laxatives, patient had bowel movement.  Cardiology consulted for A-fib with RVR, Toprol adjustment, rates are under control.  Discussed with cardiology will need outpatient follow-up to discuss cardioversion.    Patient will need blood pressure monitoring every day      Please see above list of diagnoses and related plan for additional information.     Condition at Discharge: stable    Discharge Day Visit / Exam:   Subjective: Emotional, denies complaints stated does not want daughter to be updated  Vitals: Blood Pressure: 95/66 (09/19/24 0905)  Pulse: 83 (09/18/24 2239)  Temperature: 97.5 °F (36.4 °C) (09/19/24 0802)  Temp Source: Oral (09/19/24  "0802)  Respirations: (!) 24 (09/19/24 0802)  Height: 5' 11\" (180.3 cm) (09/16/24 2339)  Weight - Scale: 71.4 kg (157 lb 6.5 oz) (09/16/24 2339)  SpO2: 92 % (09/19/24 0802)  Exam:   Physical Exam  Vitals and nursing note reviewed.   Constitutional:       Appearance: Normal appearance.   HENT:      Head: Normocephalic and atraumatic.      Nose: Nose normal.      Mouth/Throat:      Mouth: Mucous membranes are moist.      Pharynx: Oropharynx is clear.   Eyes:      Extraocular Movements: Extraocular movements intact.      Conjunctiva/sclera: Conjunctivae normal.      Pupils: Pupils are equal, round, and reactive to light.   Cardiovascular:      Rate and Rhythm: Normal rate. Rhythm irregular.      Pulses: Normal pulses.      Heart sounds: Normal heart sounds.   Pulmonary:      Effort: Pulmonary effort is normal.      Breath sounds: Normal breath sounds.   Abdominal:      General: Bowel sounds are normal.      Palpations: Abdomen is soft.   Musculoskeletal:         General: Normal range of motion.      Cervical back: Normal range of motion and neck supple.   Skin:     General: Skin is warm and dry.      Capillary Refill: Capillary refill takes less than 2 seconds.   Neurological:      General: No focal deficit present.      Mental Status: He is alert. Mental status is at baseline.          Discussion with Family: Patient declined call to . ,  Daughter aware from call last night that patient will be discharged today    Discharge instructions/Information to patient and family:   See after visit summary for information provided to patient and family.      Provisions for Follow-Up Care:  See after visit summary for information related to follow-up care and any pertinent home health orders.      Mobility at time of Discharge:   Basic Mobility Inpatient Raw Score: 17  JH-HLM Goal: 5: Stand one or more mins  JH-HLM Achieved: 4: Move to chair/commode  HLM Goal achieved. Continue to encourage appropriate mobility.   "   Disposition:   Other Skilled Nursing Facility at request    Planned Readmission: None    Discharge Medications:  See after visit summary for reconciled discharge medications provided to patient and/or family.      Administrative Statements   Discharge Statement:  I have spent a total time of 33 minutes in caring for this patient on the day of the visit/encounter. >30 minutes of time was spent on: Diagnostic results, Patient and family education, Counseling / Coordination of care, Documenting in the medical record, Reviewing / ordering tests, medicine, procedures  , and Communicating with other healthcare professionals .    **Please Note: This note may have been constructed using a voice recognition system**

## 2024-09-19 NOTE — ASSESSMENT & PLAN NOTE
Wt Readings from Last 3 Encounters:   09/16/24 71.4 kg (157 lb 6.5 oz)     Appears euvolemic on exam     DC spironolactone  Continue Jardiance and Lasix  Changed Lasix dosing to 20 mg twice daily  Need blood pressure checked at least twice daily  Follow-up with outpatient cardiology

## 2024-09-19 NOTE — PROGRESS NOTES
Cardiology Progress Note   Noah Mendez 83 y.o. male MRN: [id number]    Unit/Bed#: -01 Encounter: [id number]      Assessment:  Atrial fibrillation with RVR  Newly discovered during admission in August 2020 for [facility].  Home Rx Toprol-XL 25 mg daily.  Not on oral anticoagulation prior to this admission. On ASA 81 mg daily only.  [id number] score is at least 3 (age+1, chf, cad)  Toprol XL increased to 50mg BID  Started on Eliquis 5mg BID for CVA prophylaxis  CAD s/p CABG x2 and PCI in 2015 at [facility]  No chest pain or discomfort since admission.  Troponin: 24/25/25  EKG: A-fib with intermittent ventricular pacing, rightward axis, nonspecific ST and T wave abnormality, prolonged QT  Cardiomyopathy, EF 35%  Newly discovered during admission in August 2024 at [facility].  Inpatient TTE showed EF of 35% (not available for review today).   Presumed tachycardia mediated in the setting of A-fib with RVR and CHF  Was discharged on PO Lasix 40 mg twice daily, Aldactone 25 mg and Jardiance 10 mg daily.  Chronic HFrEF    CT chest: No PE, emphysema, bilateral densities possibly COVID-related but nonspecific.  S/P PPM in situ  Recent COVID infection   Previously admitted for COVID August/September 2024.    COVID-positive this admission 9/16/2024.  Hyperlipidemia   Hyponatremia   Acquired QT syndrome    Plan:  Noah remains asymptomatic from a CV standpoint. He is still maintaining atrial fibrillation on telemetry. HRs 80-90s this morning. He did work with PT with HRs up to the 140-150s and have stayed elevated in the 110 range. He was set for placement to acute rehab but now delayed due to persistent HR elevation.   His BP remains too low to receive his home Rx, therefore will discontinue aldactone and space PO lasix to 20mg BID. He should continue Toprol XL at current dose 50mg BID with modified hold parameters for SBP <90. Add PO digoxin 125mcg QD for added BP control.   Will  "leave him NPO at midnight in case he requires JAZMYN/CV tomorrow. Will evaluate him in the AM and make decision based on HR trends overnight.     Subjective:   Patient seen and examined. Overnight events reviewed. Patient denies any acute complaints at this time.     Objective:     Vitals: Blood pressure 95/66, pulse 98, temperature 97.5 °F (36.4 °C), temperature source Oral, resp. rate (!) 24, height 5' 11\" (1.803 m), weight 71.4 kg (157 lb 6.5 oz), SpO2 92%., Body mass index is 21.95 kg/m².,   Orthostatic Blood Pressures      Flowsheet Row Most Recent Value   Blood Pressure 95/66 filed at 09/19/2024 0905   Patient Position - Orthostatic VS Sitting filed at 09/19/2024 0802              Intake/Output Summary (Last 24 hours) at 9/19/2024 1319  Last data filed at 9/19/2024 0830  Gross per 24 hour   Intake 822 ml   Output 775 ml   Net 47 ml         Physical Exam:    GEN: oNah Mendez appears well, alert and oriented x 3, pleasant and cooperative   HEENT: Sclera anicteric, conjunctivae pink, mucous membranes moist. Oropharynx clear.   NECK: supple, no significant JVD, Trachea midline, no thyromegaly.   HEART: regular rhythm, normal S1 and S2, no murmurs, clicks, gallops or rubs   LUNGS: clear to auscultation bilaterally; no wheezes, rales, or rhonchi. No increased work of breathing or signs of respiratory distress.   ABDOMEN: Soft, nontender, nondistended  EXTREMITIES: Skin warm and well perfused, no clubbing, cyanosis, or edema.  NEURO: No focal findings. Normal speech. Mood and affect normal.   SKIN: Normal without suspicious lesions on exposed skin.      Medications:      Current Facility-Administered Medications:     acetaminophen (TYLENOL) tablet 650 mg, 650 mg, Oral, Q6H PRN, Cande Richards MD, 650 mg at 09/17/24 1115    apixaban (ELIQUIS) tablet 5 mg, 5 mg, Oral, BID, Cande Richards MD, 5 mg at 09/19/24 0919    aspirin chewable tablet 81 mg, 81 mg, Oral, Daily, Cande Richards MD, 81 mg at 09/19/24 0918    " digoxin (LANOXIN) tablet 125 mcg, 125 mcg, Oral, Daily, Cande Richards MD, 125 mcg at 09/19/24 1257    docusate sodium (COLACE) capsule 100 mg, 100 mg, Oral, BID, Cande Richards MD, 100 mg at 09/19/24 0918    doxepin (SINEquan) capsule 50 mg, 50 mg, Oral, HS, Cande Richards MD, 50 mg at 09/18/24 2020    Empagliflozin (JARDIANCE) tablet 10 mg, 10 mg, Oral, QAM, Cande Richards MD, 10 mg at 09/19/24 0918    fluticasone (FLONASE) 50 mcg/act nasal spray 1 spray, 1 spray, Each Nare, Daily, Cande Richards MD, 1 spray at 09/17/24 0845    furosemide (LASIX) tablet 20 mg, 20 mg, Oral, BID (diuretic), Cande Richards MD    magnesium Oxide (MAG-OX) tablet 400 mg, 400 mg, Oral, Daily, Cande Richards MD, 400 mg at 09/19/24 0917    melatonin tablet 6 mg, 6 mg, Oral, HS, Cande Richards MD, 6 mg at 09/18/24 2021    metoprolol succinate (TOPROL-XL) 24 hr tablet 50 mg, 50 mg, Oral, BID, Guanako Rojo MD, 50 mg at 09/19/24 0918    multivitamin-minerals (CENTRUM) tablet 1 tablet, 1 tablet, Oral, Daily, Cande Richards MD, 1 tablet at 09/19/24 0918    nicotine (NICODERM CQ) 21 mg/24 hr TD 24 hr patch 21 mg, 21 mg, Transdermal, Daily, Cande Richards MD, 21 mg at 09/19/24 1034    polyethylene glycol (MIRALAX) packet 17 g, 17 g, Oral, Daily, Cande Richards MD, 17 g at 09/17/24 0821    potassium chloride (Klor-Con M20) CR tablet 20 mEq, 20 mEq, Oral, Once, Cande Richards MD    QUEtiapine (SEROquel) tablet 100 mg, 100 mg, Oral, HS, Cande Richards MD, 100 mg at 09/18/24 2021    senna (SENOKOT) tablet 17.2 mg, 2 tablet, Oral, HS, Cande Richards MD, 17.2 mg at 09/18/24 2020     Labs & Results:    Results from last 7 days   Lab Units 09/16/24  1708   CK TOTAL U/L 40     Results from last 7 days   Lab Units 09/19/24  0401 09/17/24  0402 09/16/24  1708   WBC Thousand/uL  --  5.81 5.08   HEMOGLOBIN g/dL 10.6* 10.9* 11.9*   HEMATOCRIT % 33.0* 32.8* 36.7   PLATELETS Thousands/uL  --  129* 160         Results from last 7  days   Lab Units 09/19/24  0401 09/17/24  0402 09/16/24  1708   POTASSIUM mmol/L 3.6 3.5 3.7   CHLORIDE mmol/L 98 96 96   CO2 mmol/L 27 29 30   BUN mg/dL 25 22 24   CREATININE mg/dL 1.00 0.92 1.02   CALCIUM mg/dL 7.8* 7.6* 8.4   ALK PHOS U/L  --  83 97   ALT U/L  --  27 32   AST U/L  --  27 34         Results from last 7 days   Lab Units 09/17/24  0402 09/16/24  1708   MAGNESIUM mg/dL 2.2 2.3

## 2024-09-19 NOTE — PROGRESS NOTES
Progress Note - Hospitalist   Name: Noah Mendez 83 y.o. male I MRN: [id number]  Unit/Bed#: -01 I Date of Admission: 9/16/2024   Date of Service: 9/19/2024 I Hospital Day: 2    Assessment & Plan  Atrial fibrillation with RVR (HCC)  Known history of afib. While in the ER went into Afib with RVR.  S/p cardizem drip  Started on toprol XL 25 mg BID  Cardiology consulted  Chads vasc- 4, not on AC  DisCussed with patient and daughter, agreeable for anticoagulation, discharging on Eliquis  COVID-19    Diagnosed late August/early September.   Treated with IV remdesivir at [place] early September.   CT Chest:   No pulmonary embolism identified.  Emphysema.  Superimposed bilateral densities, as above, possibly related to reported COVID but nonspecific. Of note, radiographic findings may lag behind clinical improvement.  Labs   BNP: 797  Ddimer: 2.09  On RA. Not meeting SIRS. Denies SOB.   Procal pending.   ER ordered one time dose IV ceftriaxone. Hold further dose of IV abx. Do not suspect acute infection at this time.   Had been on doxycyline at facility. Continue abx till completion.   CHF (congestive heart failure) (HCC)  Wt Readings from Last 3 Encounters:   09/16/24 71.4 kg (157 lb 6.5 oz)     Appears euvolemic on exam     Home regimen:   Lasix   Spironolactone  Will change parameters for lasix and decrease dosing  Coronary artery disease involving coronary bypass graft  On aspirin, metoprolol  Cardiomyopathy (HCC)    VTE  Prophylaxis:   Pharmacologic: in place  Mechanical VTE Prophylaxis in Place: Yes    Patient Centered Rounds: I have performed bedside rounds with nursing staff today.    Discussions with Specialists or Other Care Team Provider: case management    Education and Discussions with Family / Patient: pt    Mobility:   Basic Mobility Inpatient Raw Score: 17  JH-HLM Goal: 5: Stand one or more mins  JH-HLM Achieved: 7: Walk 25 feet or more  JH-HLM Goal achieved. Continue to encourage  appropriate mobility.    Total Time Spent on Date of Encounter in care of patient: 45 mins. This time was spent on one or more of the following: performing physical exam; counseling and coordination of care; obtaining or reviewing history; documenting in the medical record; reviewing/ordering tests, medications or procedures; communicating with other healthcare professionals and discussing with patient's family/caregivers.      Current Length of Stay: 4 day(s)    Current Patient Status: Inpatient        Code Status: Prior    Discharge Plan: Pt will require continued inpatient hospitalization.    Subjective:   Cough+, no SOB, fever    Patient is seen and examined at bedside.  All other ROS are negative.    Objective:     Vitals:   No data recorded.       Body mass index is 21.95 kg/m².     Input and Output Summary (last 24 hours):     No intake or output data in the 24 hours ending 09/23/24 1001    Physical Exam:       GEN: No acute distress, comfortable  HEEENT: No JVD, PERRLA, no scleral icterus  RESP: Lungs clear to auscultation bilaterally  CV: RRR, +s1/s2   ABD: SOFT NON TENDER, POSITIVE BOWEL SOUNDS, NO DISTENTION  PSYCH: CALM  NEURO: Ment[place] baseline, NO FOCAL DEFICITS  SKIN: NO RASH  EXTREM: NO EDEMA    Additional Data:     Labs:    Results from last 7 days   Lab Units 09/21/24  0404 09/17/24  0402 09/16/24  1708   WBC Thousand/uL 4.84   < > 5.08   HEMOGLOBIN g/dL 11.8*   < > 11.9*   HEMATOCRIT % 36.9   < > 36.7   PLATELETS Thousands/uL 147*   < > 160   BANDS PCT %  --   --  1   SEGS PCT % 67   < >  --    LYMPHO PCT % 20   < > 13*   MONO PCT % 8   < > 10   EOS PCT % 3   < > 5    < > = values in this interval not displayed.     Results from last 7 days   Lab Units 09/21/24  0404 09/19/24  0401 09/17/24  0402   SODIUM mmol/L 129*   < > 131*   POTASSIUM mmol/L 4.1   < > 3.5   CHLORIDE mmol/L 98   < > 96   CO2 mmol/L 26   < > 29   BUN mg/dL 23   < > 22   CREATININE mg/dL 0.99   < > 0.92   ANION GAP mmol/L 5   <  > 6   CALCIUM mg/dL 8.3*   < > 7.6*   ALBUMIN g/dL  --   --  2.5*   TOTAL BILIRUBIN mg/dL  --   --  0.84   ALK PHOS U/L  --   --  83   ALT U/L  --   --  27   AST U/L  --   --  27   GLUCOSE RANDOM mg/dL 99   < > 97    < > = values in this interval not displayed.                 Results from last 7 days   Lab Units 09/16/24  1719 09/16/24  1708   LACTIC ACID mmol/L  --  1.8   PROCALCITONIN ng/ml 0.09  --        Lines/Drains:  Invasive Devices       None                   Telemetry:        * I Have Reviewed All Lab Data Listed Above.           Imaging:     No results found for this or any previous visit.    No results found for this or any previous visit.      *I have reviewed all imaging reports listed above      Recent Cultures (last 7 days):           Last 24 Hours Medication List:        Today, Patient Was Seen By: Cande Richards MD    ** Please Note: Dictation voice to text software may have been used in the creation of this document. **

## 2024-09-19 NOTE — PLAN OF CARE
Problem: Potential for Falls  Goal: Patient will remain free of falls  Description: INTERVENTIONS:  - Educate patient/family on patient safety including physical limitations  - Instruct patient to call for assistance with activity   - Consult OT/PT to assist with strengthening/mobility   - Keep Call bell within reach  - Keep bed low and locked with side rails adjusted as appropriate  - Keep care items and personal belongings within reach  - Initiate and maintain comfort rounds  - Make Fall Risk Sign visible to staff  - Offer Toileting every 2 Hours, in advance of need  - Initiate/Maintain shift alarm  - Obtain necessary fall risk management equipment: socks   - Apply yellow socks and bracelet for high fall risk patients  - Consider moving patient to room near nurses station  Outcome: Progressing     Problem: DISCHARGE PLANNING  Goal: Discharge to home or other facility with appropriate resources  Description: INTERVENTIONS:  - Identify barriers to discharge w/patient and caregiver  - Arrange for needed discharge resources and transportation as appropriate  - Identify discharge learning needs (meds, wound care, etc.)  - Arrange for interpretive services to assist at discharge as needed  - Refer to Case Management Department for coordinating discharge planning if the patient needs post-hospital services based on physician/advanced practitioner order or complex needs related to functional status, cognitive ability, or social support system  Outcome: Progressing     Problem: CARDIOVASCULAR - ADULT  Goal: Maintains optimal cardiac output and hemodynamic stability  Description: INTERVENTIONS:  - Monitor I/O, vital signs and rhythm  - Monitor for S/S and trends of decreased cardiac output  - Administer and titrate ordered vasoactive medications to optimize hemodynamic stability  - Assess quality of pulses, skin color and temperature  - Assess for signs of decreased coronary artery perfusion  - Instruct patient to report  change in severity of symptoms  Outcome: Progressing  Goal: Absence of cardiac dysrhythmias or at baseline rhythm  Description: INTERVENTIONS:  - Continuous cardiac monitoring, vital signs, obtain 12 lead EKG if ordered  - Administer antiarrhythmic and heart rate control medications as ordered  - Monitor electrolytes and administer replacement therapy as ordered  Outcome: Progressing     Problem: RESPIRATORY - ADULT  Goal: Achieves optimal ventilation and oxygenation  Description: INTERVENTIONS:  - Assess for changes in respiratory status  - Assess for changes in mentation and behavior  - Position to facilitate oxygenation and minimize respiratory effort  - Oxygen administered by appropriate delivery if ordered  - Initiate smoking cessation education as indicated  - Encourage broncho-pulmonary hygiene including cough, deep breathe, Incentive Spirometry  - Assess the need for suctioning and aspirate as needed  - Assess and instruct to report SOB or any respiratory difficulty  - Respiratory Therapy support as indicated  Outcome: Progressing     Problem: MUSCULOSKELETAL - ADULT  Goal: Maintain or return mobility to safest level of function  Description: INTERVENTIONS:  - Assess patient's ability to carry out ADLs; assess patient's baseline for ADL function and identify physical deficits which impact ability to perform ADLs (bathing, care of mouth/teeth, toileting, grooming, dressing, etc.)  - Assess/evaluate cause of self-care deficits   - Assess range of motion  - Assess patient's mobility  - Assess patient's need for assistive devices and provide as appropriate  - Encourage maximum independence but intervene and supervise when necessary  - Involve family in performance of ADLs  - Assess for home care needs following discharge   - Consider OT consult to assist with ADL evaluation and planning for discharge  - Provide patient education as appropriate  Outcome: Progressing  Goal: Maintain proper alignment of affected  body part  Description: INTERVENTIONS:  - Support, maintain and protect limb and body alignment  - Provide patient/ family with appropriate education  Outcome: Progressing     Problem: Prexisting or High Potential for Compromised Skin Integrity  Goal: Skin integrity is maintained or improved  Description: INTERVENTIONS:  - Identify patients at risk for skin breakdown  - Assess and monitor skin integrity  - Assess and monitor nutrition and hydration status  - Monitor labs   - Assess for incontinence   - Turn and reposition patient  - Assist with mobility/ambulation  - Relieve pressure over bony prominences  - Avoid friction and shearing  - Provide appropriate hygiene as needed including keeping skin clean and dry  - Evaluate need for skin moisturizer/barrier cream  - Collaborate with interdisciplinary team   - Patient/family teaching  - Consider wound care consult   Outcome: Progressing     Problem: Nutrition/Hydration-ADULT  Goal: Nutrient/Hydration intake appropriate for improving, restoring or maintaining nutritional needs  Description: Monitor and assess patient's nutrition/hydration status for malnutrition. Collaborate with interdisciplinary team and initiate plan and interventions as ordered.  Monitor patient's weight and dietary intake as ordered or per policy. Utilize nutrition screening tool and intervene as necessary. Determine patient's food preferences and provide high-protein, high-caloric foods as appropriate.     INTERVENTIONS:  - Monitor oral intake, urinary output, labs, and treatment plans  - Assess nutrition and hydration status and recommend course of action  - Evaluate amount of meals eaten  - Assist patient with eating if necessary   - Allow adequate time for meals  - Recommend/ encourage appropriate diets, oral nutritional supplements, and vitamin/mineral supplements  - Order, calculate, and assess calorie counts as needed  - Recommend, monitor, and adjust tube feedings and TPN/PPN based on  assessed needs  - Assess need for intravenous fluids  - Provide specific nutrition/hydration education as appropriate  - Include patient/family/caregiver in decisions related to nutrition  Outcome: Progressing

## 2024-09-19 NOTE — ASSESSMENT & PLAN NOTE
Diagnosed late August/early September.   Treated with IV remdesivir at [facility] early September.   CT Chest:   No pulmonary embolism identified.  Emphysema.  Superimposed bilateral densities, as above, possibly related to reported COVID but nonspecific. Of note, radiographic findings may lag behind clinical improvement.  Labs   BNP: 797  Ddimer: 2.09  On RA. Not meeting SIRS. Denies SOB.   Procal pending.   ER ordered one time dose IV ceftriaxone. Hold further dose of IV abx. Do not suspect acute infection at this time.   Had been on doxycyline at facility. Continue abx till completion.

## 2024-09-20 ENCOUNTER — ANESTHESIA EVENT (INPATIENT)
Dept: NON INVASIVE DIAGNOSTICS | Facility: HOSPITAL | Age: 84
DRG: 308 | End: 2024-09-20
Payer: COMMERCIAL

## 2024-09-20 PROBLEM — R77.8 ELEVATED TOTAL PROTEIN: Status: ACTIVE | Noted: 2024-09-20

## 2024-09-20 LAB
ANION GAP SERPL CALCULATED.3IONS-SCNC: 5 MMOL/L (ref 4–13)
BASOPHILS # BLD AUTO: 0.03 THOUSANDS/ΜL (ref 0–0.1)
BASOPHILS NFR BLD AUTO: 1 % (ref 0–1)
BUN SERPL-MCNC: 25 MG/DL (ref 5–25)
CALCIUM SERPL-MCNC: 7.7 MG/DL (ref 8.4–10.2)
CHLORIDE SERPL-SCNC: 100 MMOL/L (ref 96–108)
CO2 SERPL-SCNC: 26 MMOL/L (ref 21–32)
CREAT SERPL-MCNC: 0.87 MG/DL (ref 0.6–1.3)
EOSINOPHIL # BLD AUTO: 0.18 THOUSAND/ΜL (ref 0–0.61)
EOSINOPHIL NFR BLD AUTO: 4 % (ref 0–6)
ERYTHROCYTE [DISTWIDTH] IN BLOOD BY AUTOMATED COUNT: 17.9 % (ref 11.6–15.1)
GFR SERPL CREATININE-BSD FRML MDRD: 79 ML/MIN/1.73SQ M
GLUCOSE SERPL-MCNC: 103 MG/DL (ref 65–140)
HCT VFR BLD AUTO: 33.1 % (ref 36.5–49.3)
HGB BLD-MCNC: 10.8 G/DL (ref 12–17)
IMM GRANULOCYTES # BLD AUTO: 0.04 THOUSAND/UL (ref 0–0.2)
IMM GRANULOCYTES NFR BLD AUTO: 1 % (ref 0–2)
LYMPHOCYTES # BLD AUTO: 0.91 THOUSANDS/ΜL (ref 0.6–4.47)
LYMPHOCYTES NFR BLD AUTO: 20 % (ref 14–44)
MCH RBC QN AUTO: 34 PG (ref 26.8–34.3)
MCHC RBC AUTO-ENTMCNC: 32.6 G/DL (ref 31.4–37.4)
MCV RBC AUTO: 104 FL (ref 82–98)
MONOCYTES # BLD AUTO: 0.39 THOUSAND/ΜL (ref 0.17–1.22)
MONOCYTES NFR BLD AUTO: 9 % (ref 4–12)
NEUTROPHILS # BLD AUTO: 2.94 THOUSANDS/ΜL (ref 1.85–7.62)
NEUTS SEG NFR BLD AUTO: 65 % (ref 43–75)
NRBC BLD AUTO-RTO: 0 /100 WBCS
PLATELET # BLD AUTO: 143 THOUSANDS/UL (ref 149–390)
PMV BLD AUTO: 10.8 FL (ref 8.9–12.7)
POTASSIUM SERPL-SCNC: 4.2 MMOL/L (ref 3.5–5.3)
QRS AXIS: 82 DEGREES
QRSD INTERVAL: 112 MS
QT INTERVAL: 368 MS
QTC INTERVAL: 455 MS
RBC # BLD AUTO: 3.18 MILLION/UL (ref 3.88–5.62)
SL CV LV EF: 30
SODIUM SERPL-SCNC: 131 MMOL/L (ref 135–147)
T WAVE AXIS: 246 DEGREES
VENTRICULAR RATE: 92 BPM
WBC # BLD AUTO: 4.49 THOUSAND/UL (ref 4.31–10.16)

## 2024-09-20 PROCEDURE — 99232 SBSQ HOSP IP/OBS MODERATE 35: CPT | Performed by: INTERNAL MEDICINE

## 2024-09-20 PROCEDURE — 92960 CARDIOVERSION ELECTRIC EXT: CPT

## 2024-09-20 PROCEDURE — 93312 ECHO TRANSESOPHAGEAL: CPT

## 2024-09-20 PROCEDURE — 93010 ELECTROCARDIOGRAM REPORT: CPT | Performed by: INTERNAL MEDICINE

## 2024-09-20 PROCEDURE — 93312 ECHO TRANSESOPHAGEAL: CPT | Performed by: INTERNAL MEDICINE

## 2024-09-20 PROCEDURE — 80048 BASIC METABOLIC PNL TOTAL CA: CPT | Performed by: INTERNAL MEDICINE

## 2024-09-20 PROCEDURE — 93005 ELECTROCARDIOGRAM TRACING: CPT

## 2024-09-20 PROCEDURE — 92960 CARDIOVERSION ELECTRIC EXT: CPT | Performed by: INTERNAL MEDICINE

## 2024-09-20 PROCEDURE — 85025 COMPLETE CBC W/AUTO DIFF WBC: CPT | Performed by: INTERNAL MEDICINE

## 2024-09-20 RX ORDER — SODIUM CHLORIDE, SODIUM LACTATE, POTASSIUM CHLORIDE, CALCIUM CHLORIDE 600; 310; 30; 20 MG/100ML; MG/100ML; MG/100ML; MG/100ML
INJECTION, SOLUTION INTRAVENOUS CONTINUOUS PRN
Status: DISCONTINUED | OUTPATIENT
Start: 2024-09-20 | End: 2024-09-20

## 2024-09-20 RX ORDER — AMIODARONE HYDROCHLORIDE 200 MG/1
200 TABLET ORAL
Status: DISCONTINUED | OUTPATIENT
Start: 2024-09-21 | End: 2024-09-20

## 2024-09-20 RX ORDER — AMIODARONE HYDROCHLORIDE 200 MG/1
200 TABLET ORAL
Status: DISCONTINUED | OUTPATIENT
Start: 2024-10-05 | End: 2024-09-21 | Stop reason: HOSPADM

## 2024-09-20 RX ORDER — AMIODARONE HYDROCHLORIDE 200 MG/1
200 TABLET ORAL
Status: DISCONTINUED | OUTPATIENT
Start: 2024-09-21 | End: 2024-09-21 | Stop reason: HOSPADM

## 2024-09-20 RX ORDER — AMIODARONE HYDROCHLORIDE 200 MG/1
200 TABLET ORAL 2 TIMES DAILY WITH MEALS
Status: DISCONTINUED | OUTPATIENT
Start: 2024-09-23 | End: 2024-09-20

## 2024-09-20 RX ORDER — AMIODARONE HYDROCHLORIDE 200 MG/1
200 TABLET ORAL
Status: DISCONTINUED | OUTPATIENT
Start: 2024-09-20 | End: 2024-09-20

## 2024-09-20 RX ORDER — AMIODARONE HYDROCHLORIDE 200 MG/1
200 TABLET ORAL
Status: DISCONTINUED | OUTPATIENT
Start: 2024-09-27 | End: 2024-09-20

## 2024-09-20 RX ORDER — LIDOCAINE HYDROCHLORIDE 20 MG/ML
INJECTION, SOLUTION EPIDURAL; INFILTRATION; INTRACAUDAL; PERINEURAL AS NEEDED
Status: DISCONTINUED | OUTPATIENT
Start: 2024-09-20 | End: 2024-09-20

## 2024-09-20 RX ORDER — AMIODARONE HYDROCHLORIDE 200 MG/1
200 TABLET ORAL 2 TIMES DAILY WITH MEALS
Status: DISCONTINUED | OUTPATIENT
Start: 2024-09-28 | End: 2024-09-21 | Stop reason: HOSPADM

## 2024-09-20 RX ORDER — PROPOFOL 10 MG/ML
INJECTION, EMULSION INTRAVENOUS AS NEEDED
Status: DISCONTINUED | OUTPATIENT
Start: 2024-09-20 | End: 2024-09-20

## 2024-09-20 RX ADMIN — MULTIPLE VITAMINS W/ MINERALS TAB 1 TABLET: TAB ORAL at 08:50

## 2024-09-20 RX ADMIN — Medication 400 MG: at 08:54

## 2024-09-20 RX ADMIN — METOPROLOL SUCCINATE 50 MG: 50 TABLET, EXTENDED RELEASE ORAL at 08:49

## 2024-09-20 RX ADMIN — ASPIRIN 81 MG CHEWABLE TABLET 81 MG: 81 TABLET CHEWABLE at 08:47

## 2024-09-20 RX ADMIN — POLYETHYLENE GLYCOL 3350 17 G: 17 POWDER, FOR SOLUTION ORAL at 08:50

## 2024-09-20 RX ADMIN — QUETIAPINE FUMARATE 100 MG: 100 TABLET ORAL at 20:01

## 2024-09-20 RX ADMIN — PROPOFOL 40 MG: 10 INJECTION, EMULSION INTRAVENOUS at 13:14

## 2024-09-20 RX ADMIN — FUROSEMIDE 20 MG: 20 TABLET ORAL at 17:04

## 2024-09-20 RX ADMIN — FUROSEMIDE 20 MG: 20 TABLET ORAL at 08:50

## 2024-09-20 RX ADMIN — LIDOCAINE HYDROCHLORIDE 50 MG: 20 INJECTION, SOLUTION EPIDURAL; INFILTRATION; INTRACAUDAL; PERINEURAL at 13:08

## 2024-09-20 RX ADMIN — DOXEPIN HYDROCHLORIDE 50 MG: 50 CAPSULE ORAL at 20:12

## 2024-09-20 RX ADMIN — DEXTROSE 150 MG: 50 INJECTION, SOLUTION INTRAVENOUS at 15:05

## 2024-09-20 RX ADMIN — SODIUM CHLORIDE, SODIUM LACTATE, POTASSIUM CHLORIDE, AND CALCIUM CHLORIDE: .6; .31; .03; .02 INJECTION, SOLUTION INTRAVENOUS at 12:52

## 2024-09-20 RX ADMIN — APIXABAN 5 MG: 5 TABLET, FILM COATED ORAL at 08:50

## 2024-09-20 RX ADMIN — EMPAGLIFLOZIN 10 MG: 10 TABLET, FILM COATED ORAL at 08:50

## 2024-09-20 RX ADMIN — APIXABAN 5 MG: 5 TABLET, FILM COATED ORAL at 17:04

## 2024-09-20 RX ADMIN — DOCUSATE SODIUM 100 MG: 100 CAPSULE, LIQUID FILLED ORAL at 08:50

## 2024-09-20 RX ADMIN — PROPOFOL 40 MG: 10 INJECTION, EMULSION INTRAVENOUS at 13:26

## 2024-09-20 RX ADMIN — PROPOFOL 40 MG: 10 INJECTION, EMULSION INTRAVENOUS at 13:21

## 2024-09-20 RX ADMIN — PROPOFOL 80 MG: 10 INJECTION, EMULSION INTRAVENOUS at 13:08

## 2024-09-20 RX ADMIN — METOPROLOL SUCCINATE 50 MG: 50 TABLET, EXTENDED RELEASE ORAL at 17:04

## 2024-09-20 RX ADMIN — MELATONIN 6 MG: at 20:01

## 2024-09-20 RX ADMIN — AMIODARONE HYDROCHLORIDE 1 MG/MIN: 50 INJECTION, SOLUTION INTRAVENOUS at 15:50

## 2024-09-20 RX ADMIN — DOCUSATE SODIUM 100 MG: 100 CAPSULE, LIQUID FILLED ORAL at 17:04

## 2024-09-20 NOTE — PROGRESS NOTES
Progress Note - Hospitalist   Name: Noah Mendez 83 y.o. male I MRN: [id number]  Unit/Bed#: -01 I Date of Admission: 9/16/2024   Date of Service: 9/20/2024 I Hospital Day: 3     Assessment & Plan  Atrial fibrillation with RVR (HCC)  Known history of afib. While in the ER went into Afib with RVR.  S/p cardizem drip  Started on toprol XL 25 mg BID  Cardiology consulted  Chads vasc- 4, not on AC  DisCussed with patient and daughter, agreeable for anticoagulation, discharging on Eliquis  COVID-19    Diagnosed late August/early September.   Treated with IV remdesivir at [facility] early September.   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 Lasix changed to 20 twice daily, Jardiance  Elevated total protein  Elevated kappa, kappa lambda free chain ratio  Discussed with hematology mandi, will need outpatient referral  VTE  Prophylaxis:   Pharmacologic: in place  Mechanical VTE Prophylaxis in Place: Yes    Patient Centered Rounds: I have performed bedside rounds with nursing staff today.    Discussions with Specialists or Other Care Team Provider: case management    Education and Discussions with Family / Patient: daughter    Mobility:   Basic Mobility Inpatient Raw Score: 17  JH-HLM Goal: 5: Stand one or more mins  JH-HLM Achieved: 7: Walk 25 feet or more  JH-HLM Goal achieved. Continue to encourage appropriate mobility.    Total Time Spent on Date of Encounter in care of patient: 45 mins. This time was spent on one or more of the following: performing physical exam; counseling and coordination of  care; obtaining or reviewing history; documenting in the medical record; reviewing/ordering tests, medications or procedures; communicating with other healthcare professionals and discussing with patient's family/caregivers.      Current Length of Stay: 3 day(s)    Current Patient Status: Inpatient        Code Status: Level 3 - DNAR and DNI    Discharge Plan: Pt will require continued inpatient hospitalization.    Subjective:   Denies complaints    Patient is seen and examined at bedside.  All other ROS are negative.    Objective:     Vitals:   Temp (24hrs), Av.5 °F (36.4 °C), Min:96.8 °F (36 °C), Max:98.1 °F (36.7 °C)    Temp:  [96.8 °F (36 °C)-98.1 °F (36.7 °C)] 96.8 °F (36 °C)  HR:  [] 60  Resp:  [10-20] 16  BP: ()/(52-83) 98/66  SpO2:  [88 %-100 %] 100 %  Body mass index is 21.95 kg/m².     Input and Output Summary (last 24 hours):       Intake/Output Summary (Last 24 hours) at 2024 1515  Last data filed at 2024 1334  Gross per 24 hour   Intake 100 ml   Output 500 ml   Net -400 ml       Physical Exam:       GEN: No acute distress, comfortable  HEEENT: No JVD, PERRLA, no scleral icterus  RESP: Lungs clear to auscultation bilaterally  CV: RRR, +s1/s2   ABD: SOFT NON TENDER, POSITIVE BOWEL SOUNDS, NO DISTENTION  PSYCH: CALM  NEURO: Mentation baseline, NO FOCAL DEFICITS  SKIN: NO RASH  EXTREM: NO EDEMA    Additional Data:     Labs:    Results from last 7 days   Lab Units 24  0330 24  0402 24  1708   WBC Thousand/uL 4.49   < > 5.08   HEMOGLOBIN g/dL 10.8*   < > 11.9*   HEMATOCRIT % 33.1*   < > 36.7   PLATELETS Thousands/uL 143*   < > 160   BANDS PCT %  --   --  1   SEGS PCT % 65   < >  --    LYMPHO PCT % 20   < > 13*   MONO PCT % 9   < > 10   EOS PCT % 4   < > 5    < > = values in this interval not displayed.     Results from last 7 days   Lab Units 24  0330 24  0401 24  0402   SODIUM mmol/L 131*   < > 131*   POTASSIUM mmol/L 4.2   < > 3.5   CHLORIDE  mmol/L 100   < > 96   CO2 mmol/L 26   < > 29   BUN mg/dL 25   < > 22   CREATININE mg/dL 0.87   < > 0.92   ANION GAP mmol/L 5   < > 6   CALCIUM mg/dL 7.7*   < > 7.6*   ALBUMIN g/dL  --   --  2.5*   TOTAL BILIRUBIN mg/dL  --   --  0.84   ALK PHOS U/L  --   --  83   ALT U/L  --   --  27   AST U/L  --   --  27   GLUCOSE RANDOM mg/dL 103   < > 97    < > = values in this interval not displayed.                 Results from last 7 days   Lab Units 09/16/24  1719 09/16/24  1708   LACTIC ACID mmol/L  --  1.8   PROCALCITONIN ng/ml 0.09  --        Lines/Drains:  Invasive Devices       Peripheral Intravenous Line  Duration             Peripheral IV 09/16/24 Distal;Right;Upper;Ventral (anterior) Arm 3 days    Peripheral IV 09/20/24 Left;Ventral (anterior) Forearm <1 day                    Telemetry:   Telemetry Orders (From admission, onward)               24 Hour Telemetry Monitoring  Continuous x 24 Hours (Telem)        Question:  Reason for 24 Hour Telemetry  Answer:  Arrhythmias requiring acute medical intervention / PPM or ICD malfunction                        * I Have Reviewed All Lab Data Listed Above.           Imaging:     No results found for this or any previous visit.    No results found for this or any previous visit.      *I have reviewed all imaging reports listed above      Recent Cultures (last 7 days):           Last 24 Hours Medication List:   Current Facility-Administered Medications   Medication Dose Route Frequency Provider Last Rate    acetaminophen  650 mg Oral Q6H PRN Cande Richards MD      amiodarone (CORDARONE) 900 mg in dextrose 5 % 500 mL infusion  1 mg/min Intravenous Continuous Jhony Pierre PA-C      Followed by    amiodarone (CORDARONE) 900 mg in dextrose 5 % 500 mL infusion  0.5 mg/min Intravenous Continuous Jhony Pierre PA-C      amiodarone 150 mg in dextrose 5 % 100 mL IV bolus  150 mg Intravenous Once Jhony Pierre PA-C      [START ON 9/21/2024] amiodarone  200 mg Oral Daily With  Breakfast Jhony Pierre PA-C      apixaban  5 mg Oral BID Cande Richards MD      aspirin  81 mg Oral Daily Cande Richards MD      docusate sodium  100 mg Oral BID Cande Richards MD      doxepin  50 mg Oral HS Cande Richards MD      Empagliflozin  10 mg Oral QAM Cande Richards MD      fluticasone  1 spray Each Nare Daily Cande Richards MD      furosemide  20 mg Oral BID (diuretic) Cande Richards MD      magnesium Oxide  400 mg Oral Daily Cande Richards MD      melatonin  6 mg Oral HS Cande Richards MD      metoprolol succinate  50 mg Oral BID Guanako Rojo MD      multivitamin-minerals  1 tablet Oral Daily Cande Richards MD      polyethylene glycol  17 g Oral Daily Cande Richards MD      potassium chloride  20 mEq Oral Once Cande Richards MD      QUEtiapine  100 mg Oral HS Cande Richards MD      senna  2 tablet Oral HS Cande Richards MD          Today, Patient Was Seen By: Cande Richards MD    ** Please Note: Dictation voice to text software may have been used in the creation of this document. **

## 2024-09-20 NOTE — ANESTHESIA POSTPROCEDURE EVALUATION
Post-Op Assessment Note    CV Status:  Stable  Pain Score: 0    Pain management: adequate       Mental Status:  Alert and awake   Hydration Status:  Euvolemic   PONV Controlled:  Controlled   Airway Patency:  Patent     Post Op Vitals Reviewed: Yes    No anethesia notable event occurred.    Staff: Anesthesiologist, CRNA               BP   89/52   Temp      Pulse  60   Resp   16   SpO2   99

## 2024-09-20 NOTE — PLAN OF CARE
Problem: Potential for Falls  Goal: Patient will remain free of falls  Description: INTERVENTIONS:  - Educate patient/family on patient safety including physical limitations  - Instruct patient to call for assistance with activity   - Consult OT/PT to assist with strengthening/mobility   - Keep Call bell within reach  - Keep bed low and locked with side rails adjusted as appropriate  - Keep care items and personal belongings within reach  - Initiate and maintain comfort rounds  - Make Fall Risk Sign visible to staff  - Offer Toileting every x Hours, in advance of need  - Initiate/Maintain xalarm  - Obtain necessary fall risk management equipment: xxx  - Apply yellow socks and bracelet for high fall risk patients  - Consider moving patient to room near nurses station  Outcome: Progressing     Problem: DISCHARGE PLANNING  Goal: Discharge to home or other facility with appropriate resources  Description: INTERVENTIONS:  - Identify barriers to discharge w/patient and caregiver  - Arrange for needed discharge resources and transportation as appropriate  - Identify discharge learning needs (meds, wound care, etc.)  - Arrange for interpretive services to assist at discharge as needed  - Refer to Case Management Department for coordinating discharge planning if the patient needs post-hospital services based on physician/advanced practitioner order or complex needs related to functional status, cognitive ability, or social support system  Outcome: Progressing     Problem: CARDIOVASCULAR - ADULT  Goal: Maintains optimal cardiac output and hemodynamic stability  Description: INTERVENTIONS:  - Monitor I/O, vital signs and rhythm  - Monitor for S/S and trends of decreased cardiac output  - Administer and titrate ordered vasoactive medications to optimize hemodynamic stability  - Assess quality of pulses, skin color and temperature  - Assess for signs of decreased coronary artery perfusion  - Instruct patient to report change  in severity of symptoms  Outcome: Progressing  Goal: Absence of cardiac dysrhythmias or at baseline rhythm  Description: INTERVENTIONS:  - Continuous cardiac monitoring, vital signs, obtain 12 lead EKG if ordered  - Administer antiarrhythmic and heart rate control medications as ordered  - Monitor electrolytes and administer replacement therapy as ordered  Outcome: Progressing     Problem: RESPIRATORY - ADULT  Goal: Achieves optimal ventilation and oxygenation  Description: INTERVENTIONS:  - Assess for changes in respiratory status  - Assess for changes in mentation and behavior  - Position to facilitate oxygenation and minimize respiratory effort  - Oxygen administered by appropriate delivery if ordered  - Initiate smoking cessation education as indicated  - Encourage broncho-pulmonary hygiene including cough, deep breathe, Incentive Spirometry  - Assess the need for suctioning and aspirate as needed  - Assess and instruct to report SOB or any respiratory difficulty  - Respiratory Therapy support as indicated  Outcome: Progressing     Problem: MUSCULOSKELETAL - ADULT  Goal: Maintain or return mobility to safest level of function  Description: INTERVENTIONS:  - Assess patient's ability to carry out ADLs; assess patient's baseline for ADL function and identify physical deficits which impact ability to perform ADLs (bathing, care of mouth/teeth, toileting, grooming, dressing, etc.)  - Assess/evaluate cause of self-care deficits   - Assess range of motion  - Assess patient's mobility  - Assess patient's need for assistive devices and provide as appropriate  - Encourage maximum independence but intervene and supervise when necessary  - Involve family in performance of ADLs  - Assess for home care needs following discharge   - Consider OT consult to assist with ADL evaluation and planning for discharge  - Provide patient education as appropriate  Outcome: Progressing  Goal: Maintain proper alignment of affected body  part  Description: INTERVENTIONS:  - Support, maintain and protect limb and body alignment  - Provide patient/ family with appropriate education  Outcome: Progressing     Problem: Prexisting or High Potential for Compromised Skin Integrity  Goal: Skin integrity is maintained or improved  Description: INTERVENTIONS:  - Identify patients at risk for skin breakdown  - Assess and monitor skin integrity  - Assess and monitor nutrition and hydration status  - Monitor labs   - Assess for incontinence   - Turn and reposition patient  - Assist with mobility/ambulation  - Relieve pressure over bony prominences  - Avoid friction and shearing  - Provide appropriate hygiene as needed including keeping skin clean and dry  - Evaluate need for skin moisturizer/barrier cream  - Collaborate with interdisciplinary team   - Patient/family teaching  - Consider wound care consult   Outcome: Progressing     Problem: Nutrition/Hydration-ADULT  Goal: Nutrient/Hydration intake appropriate for improving, restoring or maintaining nutritional needs  Description: Monitor and assess patient's nutrition/hydration status for malnutrition. Collaborate with interdisciplinary team and initiate plan and interventions as ordered.  Monitor patient's weight and dietary intake as ordered or per policy. Utilize nutrition screening tool and intervene as necessary. Determine patient's food preferences and provide high-protein, high-caloric foods as appropriate.     INTERVENTIONS:  - Monitor oral intake, urinary output, labs, and treatment plans  - Assess nutrition and hydration status and recommend course of action  - Evaluate amount of meals eaten  - Assist patient with eating if necessary   - Allow adequate time for meals  - Recommend/ encourage appropriate diets, oral nutritional supplements, and vitamin/mineral supplements  - Order, calculate, and assess calorie counts as needed  - Recommend, monitor, and adjust tube feedings and TPN/PPN based on assessed  needs  - Assess need for intravenous fluids  - Provide specific nutrition/hydration education as appropriate  - Include patient/family/caregiver in decisions related to nutrition  Outcome: Progressing

## 2024-09-20 NOTE — ANESTHESIA PREPROCEDURE EVALUATION
Procedure:  CARDIOVERSION    Relevant Problems   CARDIO   (+) Atrial fibrillation with RVR ([facility])   (+) CHF (congestive heart failure) ([facility])   (+) Coronary artery disease involving coronary bypass graft   (+) Hypertension        Physical Exam    Airway    Mallampati score: I  TM Distance: >3 FB  Neck ROM: full     Dental   No notable dental hx     Cardiovascular  Rhythm: regular, Rate: normal    Pulmonary   Breath sounds clear to auscultation    Other Findings  Intercisor Distance > 3cm          Anesthesia Plan  ASA Score- 3     Anesthesia Type- IV sedation with anesthesia with ASA Monitors.         Additional Monitors:     Airway Plan:     Comment: NPO appropriate. Discussed benefits/risks of monitored anesthetic care which involves providing a dynamic level of mild to deep sedation. Complications include awareness and/or airway obstruction/aspiration which may necessitate conversion to general anesthesia. All questions answered. Patient understands and wishes to proceed. .       Plan Factors-Exercise tolerance (METS): >4 METS.    Chart reviewed. EKG reviewed.  Existing labs reviewed.                   Induction-     Postoperative Plan- Plan for postoperative opioid use.         Informed Consent- Anesthetic plan and risks discussed with patient.  I personally reviewed this patient with the CRNA. Discussed and agreed on the Anesthesia Plan with the CRNA..

## 2024-09-20 NOTE — PLAN OF CARE
Problem: Potential for Falls  Goal: Patient will remain free of falls  Description: INTERVENTIONS:  - Educate patient/family on patient safety including physical limitations  - Instruct patient to call for assistance with activity   - Consult OT/PT to assist with strengthening/mobility   - Keep Call bell within reach  - Keep bed low and locked with side rails adjusted as appropriate  - Keep care items and personal belongings within reach  - Initiate and maintain comfort rounds  - Make Fall Risk Sign visible to staff  - Offer Toileting every 2 Hours, in advance of need  - Initiate/Maintain bed/chair alarm  - Obtain necessary fall risk management equipment: yellow socks/bracelet  - Apply yellow socks and bracelet for high fall risk patients  - Consider moving patient to room near nurses station  Outcome: Progressing

## 2024-09-20 NOTE — ASSESSMENT & PLAN NOTE
Elevated kappa, kappa lambda free chain ratio  Discussed with hematology mandi, will need outpatient referral

## 2024-09-20 NOTE — PROGRESS NOTES
"Cardiology Progress Note   Noah Mendez 83 y.o. male MRN: [id number]    Unit/Bed#: -01 Encounter: [id number]      Assessment:  Atrial fibrillation with RVR  Newly discovered during admission in August 2020 for [facility].  Home Rx Toprol-XL 25 mg daily.  Not on oral anticoagulation prior to this admission. On ASA 81 mg daily only.  [id number] score is at least 3 (age+1, chf, cad)  Toprol XL increased to 50mg BID  Started on Eliquis 5mg BID for CVA prophylaxis  CAD s/p CABG x2 and PCI in 2015 at [facility]  No chest pain or discomfort since admission.  Troponin: 24/25/25  EKG: A-fib with intermittent ventricular pacing, rightward axis, nonspecific ST and T wave abnormality, prolonged QT  Cardiomyopathy, EF 35%  Newly discovered during admission in August 2024 at [facility].  Inpatient TTE showed EF of 35% (not available for review today).   Presumed tachycardia mediated in the setting of A-fib with RVR and CHF  Was discharged on PO Lasix 40 mg twice daily, Aldactone 25 mg and Jardiance 10 mg daily.  Lasix adjusted to 20mg BID and Aldactone discontinued due to low BP  Chronic HFrEF    CT chest: No PE, emphysema, bilateral densities possibly COVID-related but nonspecific.  S/P PPM in situ  Recent COVID infection   Previously admitted for COVID August/September 2024.    COVID-positive this admission 9/16/2024.  Hyperlipidemia   Hyponatremia     Plan:   JAZMYN/CV today  Continue Toprol-XL 50 mg twice daily  Euvolemic on adjusted Lasix 20 mg twice daily and Jardiance 10 mg daily  Aldactone discontinued due to not meeting BP parameters over the last 72 hours  Continue Eliquis 5 mg twice daily for CVA prophylaxis    Subjective:   Patient seen and examined. Overnight events reviewed. Patient denies any acute complaints at this time.     Objective:     Vitals: Blood pressure 112/76, pulse (!) 107, temperature 97.6 °F (36.4 °C), resp. rate 20, height 5' 11\" (1.803 m), weight 71.4 kg (157 " lb 6.5 oz), SpO2 94%., Body mass index is 21.95 kg/m².,   Orthostatic Blood Pressures      Flowsheet Row Most Recent Value   Blood Pressure 112/76 filed at 09/20/2024 0849   Patient Position - Orthostatic VS Sitting filed at 09/19/2024 0802              Intake/Output Summary (Last 24 hours) at 9/20/2024 1000  Last data filed at 9/20/2024 0608  Gross per 24 hour   Intake 240 ml   Output 500 ml   Net -260 ml         Physical Exam:    GEN: Noah Mendez appears well, alert and oriented x 3, pleasant and cooperative   HEENT: Sclera anicteric, conjunctivae pink, mucous membranes moist. Oropharynx clear.   NECK: supple, no significant JVD, Trachea midline, no thyromegaly.   HEART: regular rhythm, normal S1 and S2, no murmurs, clicks, gallops or rubs   LUNGS: clear to auscultation bilaterally; no wheezes, rales, or rhonchi. No increased work of breathing or signs of respiratory distress.   ABDOMEN: Soft, nontender, nondistended  EXTREMITIES: Skin warm and well perfused, no clubbing, cyanosis, or edema.  NEURO: No focal findings. Normal speech. Mood and affect normal.   SKIN: Normal without suspicious lesions on exposed skin.      Medications:      Current Facility-Administered Medications:     acetaminophen (TYLENOL) tablet 650 mg, 650 mg, Oral, Q6H PRN, Cande Richards MD, 650 mg at 09/17/24 1115    apixaban (ELIQUIS) tablet 5 mg, 5 mg, Oral, BID, Cande Richards MD, 5 mg at 09/20/24 0850    aspirin chewable tablet 81 mg, 81 mg, Oral, Daily, Cande Richards MD, 81 mg at 09/20/24 0847    docusate sodium (COLACE) capsule 100 mg, 100 mg, Oral, BID, Cande Richards MD, 100 mg at 09/20/24 0850    doxepin (SINEquan) capsule 50 mg, 50 mg, Oral, HS, Cande Richards MD, 50 mg at 09/19/24 2041    Empagliflozin (JARDIANCE) tablet 10 mg, 10 mg, Oral, QAM, Cande Richards MD, 10 mg at 09/20/24 0850    fluticasone (FLONASE) 50 mcg/act nasal spray 1 spray, 1 spray, Each Nare, Daily, Cande Richards MD, 1 spray at 09/17/24 0845     furosemide (LASIX) tablet 20 mg, 20 mg, Oral, BID (diuretic), Cande Richards MD, 20 mg at 09/20/24 0850    magnesium Oxide (MAG-OX) tablet 400 mg, 400 mg, Oral, Daily, Cande Richards MD, 400 mg at 09/20/24 0854    melatonin tablet 6 mg, 6 mg, Oral, HS, Cande Richards MD, 6 mg at 09/19/24 2041    metoprolol succinate (TOPROL-XL) 24 hr tablet 50 mg, 50 mg, Oral, BID, Guanako Rojo MD, 50 mg at 09/20/24 0849    multivitamin-minerals (CENTRUM) tablet 1 tablet, 1 tablet, Oral, Daily, Cande Richards MD, 1 tablet at 09/20/24 0850    nicotine (NICODERM CQ) 21 mg/24 hr TD 24 hr patch 21 mg, 21 mg, Transdermal, Daily, Cande Richards MD, 21 mg at 09/19/24 1034    polyethylene glycol (MIRALAX) packet 17 g, 17 g, Oral, Daily, Cande Richards MD, 17 g at 09/20/24 0850    potassium chloride (Klor-Con M20) CR tablet 20 mEq, 20 mEq, Oral, Once, Cande Richards MD    QUEtiapine (SEROquel) tablet 100 mg, 100 mg, Oral, HS, Cande Richards MD, 100 mg at 09/19/24 2041    senna (SENOKOT) tablet 17.2 mg, 2 tablet, Oral, HS, Cande Richards MD, 17.2 mg at 09/19/24 2041     Labs & Results:    Results from last 7 days   Lab Units 09/16/24  1708   CK TOTAL U/L 40     Results from last 7 days   Lab Units 09/20/24  0330 09/19/24  0401 09/17/24  0402 09/16/24  1708   WBC Thousand/uL 4.49  --  5.81 5.08   HEMOGLOBIN g/dL 10.8* 10.6* 10.9* 11.9*   HEMATOCRIT % 33.1* 33.0* 32.8* 36.7   PLATELETS Thousands/uL 143*  --  129* 160         Results from last 7 days   Lab Units 09/20/24  0330 09/19/24  0401 09/17/24  0402 09/16/24  1708   POTASSIUM mmol/L 4.2 3.6 3.5 3.7   CHLORIDE mmol/L 100 98 96 96   CO2 mmol/L 26 27 29 30   BUN mg/dL 25 25 22 24   CREATININE mg/dL 0.87 1.00 0.92 1.02   CALCIUM mg/dL 7.7* 7.8* 7.6* 8.4   ALK PHOS U/L  --   --  83 97   ALT U/L  --   --  27 32   AST U/L  --   --  27 34         Results from last 7 days   Lab Units 09/17/24  0402 09/16/24  1708   MAGNESIUM mg/dL 2.2 2.3

## 2024-09-20 NOTE — ASSESSMENT & PLAN NOTE
Diagnosed late August/early September.   Treated with IV remdesivir at [place] early September.   Stable on room air

## 2024-09-20 NOTE — ANESTHESIA PREPROCEDURE EVALUATION
Procedure:  JAZMYN    Relevant Problems   CARDIO   (+) Atrial fibrillation with RVR ([facility])   (+) CHF (congestive heart failure) ([facility])   (+) Coronary artery disease involving coronary bypass graft   (+) Hypertension        Physical Exam    Airway    Mallampati score: I  TM Distance: >3 FB  Neck ROM: full     Dental   No notable dental hx     Cardiovascular  Rhythm: regular, Rate: normal    Pulmonary   Breath sounds clear to auscultation    Other Findings  Intercisor Distance > 3cm          Anesthesia Plan  ASA Score- 3     Anesthesia Type- IV sedation with anesthesia with ASA Monitors.         Additional Monitors:     Airway Plan:     Comment: NPO appropriate. Discussed benefits/risks of monitored anesthetic care which involves providing a dynamic level of mild to deep sedation. Complications include awareness and/or airway obstruction/aspiration which may necessitate conversion to general anesthesia. All questions answered. Patient understands and wishes to proceed. .       Plan Factors-Exercise tolerance (METS): >4 METS.    Chart reviewed. EKG reviewed.  Existing labs reviewed.                   Induction-     Postoperative Plan- Plan for postoperative opioid use.         Informed Consent- Anesthetic plan and risks discussed with patient.  I personally reviewed this patient with the CRNA. Discussed and agreed on the Anesthesia Plan with the CRNA..

## 2024-09-20 NOTE — ANESTHESIA PREPROCEDURE EVALUATION
Procedure:  CARDIOVERSION    Relevant Problems   CARDIO   (+) Atrial fibrillation with RVR ([facility])   (+) CHF (congestive heart failure) ([facility])   (+) Coronary artery disease involving coronary bypass graft   (+) Hypertension        Physical Exam    Airway    Mallampati score: I  TM Distance: >3 FB  Neck ROM: full     Dental   No notable dental hx     Cardiovascular  Rhythm: regular, Rate: normal    Pulmonary   Breath sounds clear to auscultation    Other Findings  Intercisor Distance > 3cm          Anesthesia Plan  ASA Score- 3     Anesthesia Type- IV sedation with anesthesia with ASA Monitors.         Additional Monitors:     Airway Plan:     Comment: NPO appropriate. Discussed benefits/risks of monitored anesthetic care which involves providing a dynamic level of mild to deep sedation. Complications include awareness and/or airway obstruction/aspiration which may necessitate conversion to general anesthesia. All questions answered. Patient understands and wishes to proceed. .       Plan Factors-Exercise tolerance (METS): >4 METS.    Chart reviewed. EKG reviewed.  Existing labs reviewed.                   Induction-     Postoperative Plan- Plan for postoperative opioid use.         Informed Consent- Anesthetic plan and risks discussed with patient.  I personally reviewed this patient with the CRNA. Discussed and agreed on the Anesthesia Plan with the CRNA..

## 2024-09-20 NOTE — OCCUPATIONAL THERAPY NOTE
Occupational Therapy Cancellation    Patient Name: Noah Mendez  Today's Date: 9/20/2024 09/20/24 0920   OT Last Visit   OT Visit Date 09/20/24   Note Type   Note type Cancelled Session   Cancel Reasons Medical status   Additional Comments Pt on OT caseload. Pt was scheduled for D/C yesterday, but per cardiology note, with ambulation HR increased into 100s and remained there. HR remains in the 100s at rest this morning. Will continue to follow.     Brunilda Kruger MS, OTR/L

## 2024-09-21 VITALS
HEART RATE: 60 BPM | BODY MASS INDEX: 22.04 KG/M2 | HEIGHT: 71 IN | DIASTOLIC BLOOD PRESSURE: 73 MMHG | WEIGHT: 157.41 LBS | RESPIRATION RATE: 16 BRPM | SYSTOLIC BLOOD PRESSURE: 115 MMHG | OXYGEN SATURATION: 100 % | TEMPERATURE: 97.4 F

## 2024-09-21 LAB
ALBUMIN SERPL ELPH-MCNC: 2.81 G/DL (ref 3.2–5.1)
ALBUMIN SERPL ELPH-MCNC: 32.3 % (ref 48–70)
ALPHA1 GLOB SERPL ELPH-MCNC: 0.3 G/DL (ref 0.15–0.47)
ALPHA1 GLOB SERPL ELPH-MCNC: 3.5 % (ref 1.8–7)
ALPHA2 GLOB SERPL ELPH-MCNC: 0.57 G/DL (ref 0.42–1.04)
ALPHA2 GLOB SERPL ELPH-MCNC: 6.6 % (ref 5.9–14.9)
ANION GAP SERPL CALCULATED.3IONS-SCNC: 5 MMOL/L (ref 4–13)
BASOPHILS # BLD AUTO: 0.04 THOUSANDS/ΜL (ref 0–0.1)
BASOPHILS NFR BLD AUTO: 1 % (ref 0–1)
BETA GLOB ABNORMAL SERPL ELPH-MCNC: 0.3 G/DL (ref 0.31–0.57)
BETA1 GLOB SERPL ELPH-MCNC: 3.4 % (ref 4.7–7.7)
BETA2 GLOB SERPL ELPH-MCNC: 1.7 % (ref 3.1–7.9)
BETA2+GAMMA GLOB SERPL ELPH-MCNC: 0.15 G/DL (ref 0.2–0.58)
BUN SERPL-MCNC: 23 MG/DL (ref 5–25)
CALCIUM SERPL-MCNC: 8.3 MG/DL (ref 8.4–10.2)
CHLORIDE SERPL-SCNC: 98 MMOL/L (ref 96–108)
CO2 SERPL-SCNC: 26 MMOL/L (ref 21–32)
CREAT SERPL-MCNC: 0.99 MG/DL (ref 0.6–1.3)
EOSINOPHIL # BLD AUTO: 0.16 THOUSAND/ΜL (ref 0–0.61)
EOSINOPHIL NFR BLD AUTO: 3 % (ref 0–6)
ERYTHROCYTE [DISTWIDTH] IN BLOOD BY AUTOMATED COUNT: 18.3 % (ref 11.6–15.1)
GAMMA GLOB ABNORMAL SERPL ELPH-MCNC: 4.57 G/DL (ref 0.4–1.66)
GAMMA GLOB SERPL ELPH-MCNC: 52.5 % (ref 6.9–22.3)
GFR SERPL CREATININE-BSD FRML MDRD: 70 ML/MIN/1.73SQ M
GLUCOSE SERPL-MCNC: 99 MG/DL (ref 65–140)
HCT VFR BLD AUTO: 36.9 % (ref 36.5–49.3)
HGB BLD-MCNC: 11.8 G/DL (ref 12–17)
IGG/ALB SER: 0.48 {RATIO} (ref 1.1–1.8)
IMM GRANULOCYTES # BLD AUTO: 0.07 THOUSAND/UL (ref 0–0.2)
IMM GRANULOCYTES NFR BLD AUTO: 1 % (ref 0–2)
INTERPRETATION UR IFE-IMP: NORMAL
LYMPHOCYTES # BLD AUTO: 0.95 THOUSANDS/ΜL (ref 0.6–4.47)
LYMPHOCYTES NFR BLD AUTO: 20 % (ref 14–44)
M PROTEIN 1 MFR SERPL ELPH: 50.8 %
M PROTEIN 1 SERPL ELPH-MCNC: 4.42 G/DL
MCH RBC QN AUTO: 33.7 PG (ref 26.8–34.3)
MCHC RBC AUTO-ENTMCNC: 32 G/DL (ref 31.4–37.4)
MCV RBC AUTO: 105 FL (ref 82–98)
MONOCYTES # BLD AUTO: 0.38 THOUSAND/ΜL (ref 0.17–1.22)
MONOCYTES NFR BLD AUTO: 8 % (ref 4–12)
NEUTROPHILS # BLD AUTO: 3.24 THOUSANDS/ΜL (ref 1.85–7.62)
NEUTS SEG NFR BLD AUTO: 67 % (ref 43–75)
NRBC BLD AUTO-RTO: 0 /100 WBCS
PLATELET # BLD AUTO: 147 THOUSANDS/UL (ref 149–390)
PMV BLD AUTO: 11.2 FL (ref 8.9–12.7)
POTASSIUM SERPL-SCNC: 4.1 MMOL/L (ref 3.5–5.3)
PROT PATTERN SERPL ELPH-IMP: ABNORMAL
PROT SERPL-MCNC: 8.7 G/DL (ref 6.4–8.2)
RBC # BLD AUTO: 3.5 MILLION/UL (ref 3.88–5.62)
SODIUM SERPL-SCNC: 129 MMOL/L (ref 135–147)
WBC # BLD AUTO: 4.84 THOUSAND/UL (ref 4.31–10.16)

## 2024-09-21 PROCEDURE — 99232 SBSQ HOSP IP/OBS MODERATE 35: CPT | Performed by: INTERNAL MEDICINE

## 2024-09-21 PROCEDURE — 80048 BASIC METABOLIC PNL TOTAL CA: CPT | Performed by: INTERNAL MEDICINE

## 2024-09-21 PROCEDURE — 85025 COMPLETE CBC W/AUTO DIFF WBC: CPT | Performed by: INTERNAL MEDICINE

## 2024-09-21 PROCEDURE — 99239 HOSP IP/OBS DSCHRG MGMT >30: CPT | Performed by: INTERNAL MEDICINE

## 2024-09-21 PROCEDURE — 84165 PROTEIN E-PHORESIS SERUM: CPT | Performed by: STUDENT IN AN ORGANIZED HEALTH CARE EDUCATION/TRAINING PROGRAM

## 2024-09-21 RX ORDER — LACTULOSE 10 G/15ML
10 SOLUTION ORAL DAILY PRN
Status: COMPLETED | OUTPATIENT
Start: 2024-09-21 | End: 2024-09-21

## 2024-09-21 RX ORDER — AMIODARONE HYDROCHLORIDE 200 MG/1
TABLET ORAL
Qty: 65 TABLET | Refills: 0
Start: 2024-09-21 | End: 2024-11-04

## 2024-09-21 RX ORDER — LACTULOSE 10 G/15ML
10 SOLUTION ORAL ONCE
Status: COMPLETED | OUTPATIENT
Start: 2024-09-21 | End: 2024-09-21

## 2024-09-21 RX ADMIN — LACTULOSE 10 G: 20 SOLUTION ORAL at 08:13

## 2024-09-21 RX ADMIN — EMPAGLIFLOZIN 10 MG: 10 TABLET, FILM COATED ORAL at 08:09

## 2024-09-21 RX ADMIN — METOPROLOL SUCCINATE 50 MG: 50 TABLET, EXTENDED RELEASE ORAL at 08:11

## 2024-09-21 RX ADMIN — ASPIRIN 81 MG CHEWABLE TABLET 81 MG: 81 TABLET CHEWABLE at 08:09

## 2024-09-21 RX ADMIN — APIXABAN 5 MG: 5 TABLET, FILM COATED ORAL at 08:09

## 2024-09-21 RX ADMIN — Medication 400 MG: at 08:09

## 2024-09-21 RX ADMIN — FUROSEMIDE 20 MG: 20 TABLET ORAL at 08:11

## 2024-09-21 RX ADMIN — MULTIPLE VITAMINS W/ MINERALS TAB 1 TABLET: TAB ORAL at 08:09

## 2024-09-21 RX ADMIN — DOCUSATE SODIUM 100 MG: 100 CAPSULE, LIQUID FILLED ORAL at 08:09

## 2024-09-21 RX ADMIN — AMIODARONE HYDROCHLORIDE 200 MG: 200 TABLET ORAL at 08:09

## 2024-09-21 RX ADMIN — POLYETHYLENE GLYCOL 3350 17 G: 17 POWDER, FOR SOLUTION ORAL at 08:08

## 2024-09-21 RX ADMIN — AMIODARONE HYDROCHLORIDE 200 MG: 200 TABLET ORAL at 11:44

## 2024-09-21 RX ADMIN — LACTULOSE 10 G: 20 SOLUTION ORAL at 10:34

## 2024-09-21 NOTE — ASSESSMENT & PLAN NOTE
Ejection fraction noted to be 30% on echocardiogram, newly diagnosed  Possibly related to new onset atrial fibrillation and tachycardia mediated cardiomyopathy  Status post JAZMYN and cardioversion for management of atrial fibrillation  Will require repeat echocardiogram at a later date in the outpatient setting to evaluate for recovery of ejection fraction  GDMT includes beta-blocker, Jardiance, maintenance diuretic

## 2024-09-21 NOTE — PLAN OF CARE
Problem: Potential for Falls  Goal: Patient will remain free of falls  Description: INTERVENTIONS:  - Educate patient/family on patient safety including physical limitations  - Instruct patient to call for assistance with activity   - Consult OT/PT to assist with strengthening/mobility   - Keep Call bell within reach  - Keep bed low and locked with side rails adjusted as appropriate  - Keep care items and personal belongings within reach  - Initiate and maintain comfort rounds  - Make Fall Risk Sign visible to staff  - Offer Toileting every 2 Hours, in advance of need  - Initiate/Maintain 2 alarm  - Obtain necessary fall risk management equipment: 2  - Apply yellow socks and bracelet for high fall risk patients  - Consider moving patient to room near nurses station  Outcome: Progressing     Problem: DISCHARGE PLANNING  Goal: Discharge to home or other facility with appropriate resources  Description: INTERVENTIONS:  - Identify barriers to discharge w/patient and caregiver  - Arrange for needed discharge resources and transportation as appropriate  - Identify discharge learning needs (meds, wound care, etc.)  - Arrange for interpretive services to assist at discharge as needed  - Refer to Case Management Department for coordinating discharge planning if the patient needs post-hospital services based on physician/advanced practitioner order or complex needs related to functional status, cognitive ability, or social support system  Outcome: Progressing     Problem: CARDIOVASCULAR - ADULT  Goal: Maintains optimal cardiac output and hemodynamic stability  Description: INTERVENTIONS:  - Monitor I/O, vital signs and rhythm  - Monitor for S/S and trends of decreased cardiac output  - Administer and titrate ordered vasoactive medications to optimize hemodynamic stability  - Assess quality of pulses, skin color and temperature  - Assess for signs of decreased coronary artery perfusion  - Instruct patient to report change in  severity of symptoms  Outcome: Progressing  Goal: Absence of cardiac dysrhythmias or at baseline rhythm  Description: INTERVENTIONS:  - Continuous cardiac monitoring, vital signs, obtain 12 lead EKG if ordered  - Administer antiarrhythmic and heart rate control medications as ordered  - Monitor electrolytes and administer replacement therapy as ordered  Outcome: Progressing     Problem: RESPIRATORY - ADULT  Goal: Achieves optimal ventilation and oxygenation  Description: INTERVENTIONS:  - Assess for changes in respiratory status  - Assess for changes in mentation and behavior  - Position to facilitate oxygenation and minimize respiratory effort  - Oxygen administered by appropriate delivery if ordered  - Initiate smoking cessation education as indicated  - Encourage broncho-pulmonary hygiene including cough, deep breathe, Incentive Spirometry  - Assess the need for suctioning and aspirate as needed  - Assess and instruct to report SOB or any respiratory difficulty  - Respiratory Therapy support as indicated  Outcome: Progressing

## 2024-09-21 NOTE — ASSESSMENT & PLAN NOTE
Wt Readings from Last 3 Encounters:   09/16/24 71.4 kg (157 lb 6.5 oz)     With new cardiomyopathy, possibly tachycardia mediated  Continued on low-dose Lasix with no evidence of volume overload currently  Continued on Jardiance and Aldactone has been discontinued  Strict I's and O's and daily weights while in the hospital  Currently examines euvolemic

## 2024-09-21 NOTE — ASSESSMENT & PLAN NOTE
Diagnosed late August/early September.   Treated with IV remdesivir at [place] early September.   Stable on room air

## 2024-09-21 NOTE — ASSESSMENT & PLAN NOTE
Status post CABG x 2 and PCI 2015 at [place]  Currently chest pain-free and doing well  Continued on beta-blocker and aspirin

## 2024-09-21 NOTE — CASE MANAGEMENT
Case Management Discharge Planning Note    Patient name Noah Fernandez /-01 MRN [id number]  : 1940 Date 2024       Current Admission Date: 2024  Current Admission Diagnosis:Atrial fibrillation with RVR (HCC)   Patient Active Problem List    Diagnosis Date Noted Date Diagnosed    Elevated total protein 2024     Coronary artery disease involving coronary bypass graft 2024     Cardiomyopathy (HCC) 2024     COVID-19 2024     Atrial fibrillation with RVR (HCC) 2024     CHF (congestive heart failure) (HCC) 2024     Hypertension 2024       LOS (days): 4  Geometric Mean LOS (GMLOS) (days): 3.5  Days to GMLOS:-0.4     OBJECTIVE:  Risk of Unplanned Readmission Score: 17.13         Current admission status: Inpatient   Preferred Pharmacy:   [id number] - [place] - [address]  [address]  [place] [address]  Phone: [phone] Fax: [phone]    Primary Care Provider: Shira Floyd DO    Primary Insurance: [employer]  Secondary Insurance:     DISCHARGE DETAILS:     Spoke with Dr Richards, pt was on a 1:1 at 6:30pm for 3 hours after he came out of anesthesia. Pt has been off of 1:1 since then with no issues or concerns.  CM updated Carie from [facility] regarding this and waiting for their determination of acceptance today.

## 2024-09-21 NOTE — DISCHARGE SUMMARY
Discharge Summary - Hospitalist   Name: Noah Mendez 83 y.o. male I MRN: [id number]  Unit/Bed#: -01 I Date of Admission: 9/16/2024   Date of Service: 9/21/2024 I Hospital Day: 4     Assessment & Plan  Atrial fibrillation with RVR (HCC)  Known history of afib. While in the ER went into Afib with RVR.  S/p cardizem drip  Started on toprol XL 25 mg BID  Cardiology consulted  Chads vasc- 4, not on AC  DisCussed with patient and daughter, agreeable for anticoagulation, discharging on Eliquis  COVID-19    Diagnosed late August/early September.   Treated with IV remdesivir at [place] early September.   Stable on room air  CHF (congestive heart failure) (HCC)  Wt Readings from Last 3 Encounters:   09/16/24 71.4 kg (157 lb 6.5 oz)     Appears euvolemic on exam     Home regimen:   Lasix   Spironolactone  Will change parameters for lasix and decrease dosing  Will need follow up appt with outpatient cardiology sooner  I/O  Coronary artery disease involving coronary bypass graft  Continue aspirin, metoprolol  Cardiomyopathy (HCC)  With a EF of 35%  On p.o. Lasix changed to 20 twice daily, Jardiance  Elevated total protein  Elevated kappa, kappa lambda free chain ratio  Discussed with hematology mandi, will need outpatient referral     Medical Problems       Resolved Problems  Never Reviewed            Resolved    Constipation 9/18/2024     Resolved by  Cande Richards MD        Discharging Physician / Practitioner: Cande Richards MD  PCP: Shira Floyd DO  Admission Date:   Admission Orders (From admission, onward)       Ordered        09/17/24 1412  INPATIENT ADMISSION  Once            09/16/24 2214  Place in Observation  Once                          Discharge Date: 09/21/24    Consultations During Hospital Stay:  Cardiology  GI    Test Results Pending at Discharge (will require follow up):   Immunofixation     Outpatient Tests Requested:  BM biopsy    Complications:  A fib with rVR with  "cardioversion    Reason for Admission:   Chief Complaint   Patient presents with    Constipation     Pt to er via ems from [facility] with reports of constipation and rectal pain since 9/12. Denies any abdominal pain. Currently recovering from covid, on last day of isolation.         Hospital Course:   Noah Mendez is  83 y.o. male patient past medical history of CHF, A-fib, depression, cognitive decline who originally presented to the hospital on 9/16/2024 due to constipation.  Patient was noted to have A-fib with RVR in ER, initiated on Cardizem drip.  GI was consulted for constipation, with laxatives, patient had bowel movement.  Cardiology consulted for A-fib with RVR, Toprol adjustment, had cardioversion, started on AMiodarone   Patient will need F/u with heme- onc        Please see above list of diagnoses and related plan for additional information.             Please see above list of diagnoses and related plan for additional information.     Condition at Discharge: stable    Discharge Day Visit / Exam:   Subjective:  no complaints  Vitals: Blood Pressure: 120/76 (09/21/24 0748)  Pulse: 61 (09/21/24 0637)  Temperature: (!) 97.4 °F (36.3 °C) (09/21/24 0748)  Temp Source: Oral (09/21/24 0637)  Respirations: 16 (09/21/24 0637)  Height: 5' 11\" (180.3 cm) (09/16/24 2339)  Weight - Scale: 71.4 kg (157 lb 6.5 oz) (09/16/24 2339)  SpO2: (!) 63 % (09/21/24 0748)  Exam:   Physical Exam  Vitals and nursing note reviewed.   Constitutional:       Appearance: Normal appearance.   HENT:      Head: Normocephalic and atraumatic.      Nose: Nose normal.      Mouth/Throat:      Mouth: Mucous membranes are moist.      Pharynx: Oropharynx is clear.   Eyes:      Extraocular Movements: Extraocular movements intact.      Conjunctiva/sclera: Conjunctivae normal.      Pupils: Pupils are equal, round, and reactive to light.   Cardiovascular:      Rate and Rhythm: Normal rate and regular rhythm.      Pulses: Normal pulses.      Heart " sounds: Normal heart sounds.   Pulmonary:      Effort: Pulmonary effort is normal.      Breath sounds: Normal breath sounds.   Abdominal:      General: Bowel sounds are normal.      Palpations: Abdomen is soft.   Musculoskeletal:         General: Normal range of motion.      Cervical back: Normal range of motion and neck supple.   Skin:     General: Skin is warm and dry.      Capillary Refill: Capillary refill takes less than 2 seconds.   Neurological:      General: No focal deficit present.      Mental Status: He is alert and oriented to person, place, and time.          Discussion with Family: Updated  (daughter) at bedside.    Discharge instructions/Information to patient and family:   See after visit summary for information provided to patient and family.      Provisions for Follow-Up Care:  See after visit summary for information related to follow-up care and any pertinent home health orders.      Mobility at time of Discharge:   Basic Mobility Inpatient Raw Score: 17  JH-HLM Goal: 5: Stand one or more mins  JH-HLM Achieved: 3: Sit at edge of bed  HLM Goal NOT achieved. Continue to encourage mobility in post discharge setting.     Disposition:   Other Skilled Nursing Facility at      Planned Readmission: none    Discharge Medications:  See after visit summary for reconciled discharge medications provided to patient and/or family.      Administrative Statements   Discharge Statement:  I have spent a total time of 33 minutes in caring for this patient on the day of the visit/encounter. >30 minutes of time was spent on: Diagnostic results, Counseling / Coordination of care, Documenting in the medical record, Reviewing / ordering tests, medicine, procedures  , and Communicating with other healthcare professionals .    **Please Note: This note may have been constructed using a voice recognition system**

## 2024-09-21 NOTE — ASSESSMENT & PLAN NOTE
Status post JAZMYN and cardioversion yesterday and currently maintaining sinus rhythm  Possible etiology of reduced ejection fraction, will need repeat echocardiogram at a later date to assess for recovery of ejection fraction  Started on p.o. amiodarone today after IV amiodarone  Continued on beta-blocker for rate control  Continue on Eliquis for anticoagulation  Has a permanent pacemaker

## 2024-09-21 NOTE — PROGRESS NOTES
Cardiology Progress Note - Noah Mendez 83 y.o. male MRN: [id number]    Unit/Bed#: -01 Encounter: [id number]        Assessment & Plan  Atrial fibrillation with RVR (HCC)  Status post JAZMYN and cardioversion yesterday and currently maintaining sinus rhythm  Possible etiology of reduced ejection fraction, will need repeat echocardiogram at a later date to assess for recovery of ejection fraction  Started on p.o. 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  Continued on low-dose Lasix with no evidence of volume overload currently  Continued on Jardiance and Aldactone has been discontinued  Strict I's and O's and daily weights while in the hospital  Currently examines euvolemic  Coronary artery disease involving coronary bypass graft  Status post CABG x 2 and PCI 2015 at [place]  Currently chest pain-free and doing well  Continued on beta-blocker and aspirin  Cardiomyopathy (HCC)  Ejection fraction noted to be 30% on echocardiogram, newly diagnosed  Possibly related to new onset atrial fibrillation and tachycardia mediated cardiomyopathy  Status post JAZMYN and cardioversion for management of atrial fibrillation  Will require repeat echocardiogram at a later date in the outpatient setting to evaluate for recovery of ejection fraction  GDMT includes beta-blocker, Jardiance, maintenance diuretic    Subjective:   Patient seen and examined.  Status post JAZMYN and cardioversion overnight.  ; pertinent negatives - chest pain, chest pressure/discomfort, dyspnea, irregular heart beat, lower extremity edema, and palpitations.    Objective:  "    Vitals: Blood pressure 120/76, pulse 61, temperature (!) 97.4 °F (36.3 °C), resp. rate 16, height 5' 11\" (1.803 m), weight 71.4 kg (157 lb 6.5 oz), SpO2 (!) 63%., Body mass index is 21.95 kg/m².,   Orthostatic Blood Pressures      Flowsheet Row Most Recent Value   Blood Pressure 120/76 filed at 2024 0748   Patient Position - Orthostatic VS Lying filed at 2024 0637              Intake/Output Summary (Last 24 hours) at 2024 0952  Last data filed at 2024 0841  Gross per 24 hour   Intake 1460 ml   Output 800 ml   Net 660 ml       TELE: No significant arrhythmias seen.  Remained in sinus rhythm overnight.    Physical Exam:    GEN: Noah Menedz appears well, alert and oriented x 3, pleasant and cooperative   HEENT: pupils equal, round, and reactive to light; extraocular muscles intact  NECK: supple, no carotid bruits   HEART: regular rhythm, normal S1 and S2, + systolic murmur, no clicks, gallops or rubs   LUNGS: clear to auscultation bilaterally; no wheezes, rales, or rhonchi   ABDOMEN: normal bowel sounds, soft, no tenderness, no distention  EXTREMITIES: peripheral pulses normal; no clubbing, cyanosis, or edema  NEURO: no focal findings   SKIN: normal without suspicious lesions on exposed skin    Medications:      Current Facility-Administered Medications:     acetaminophen (TYLENOL) tablet 650 mg, 650 mg, Oral, Q6H PRN, Cande Richards MD, 650 mg at 24 1115    [] amiodarone (CORDARONE) 900 mg in dextrose 5 % 500 mL infusion, 1 mg/min, Intravenous, Continuous, Last Rate: 33.3 mL/hr at 24 1550, 1 mg/min at 24 1550 **FOLLOWED BY** amiodarone (CORDARONE) 900 mg in dextrose 5 % 500 mL infusion, 0.5 mg/min, Intravenous, Continuous, Jhony Pierre PA-C, Last Rate: 16.7 mL/hr at 24 2156, 0.5 mg/min at 24    amiodarone tablet 200 mg, 200 mg, Oral, TID With Meals, 200 mg at 24 0809 **FOLLOWED BY** [START ON 2024] amiodarone tablet 200 mg, 200 mg, " Oral, BID With Meals **FOLLOWED BY** [START ON 10/5/2024] amiodarone tablet 200 mg, 200 mg, Oral, Daily With Breakfast, Jhony Pierre PA-C    apixaban (ELIQUIS) tablet 5 mg, 5 mg, Oral, BID, Cande Richards MD, 5 mg at 09/21/24 0809    aspirin chewable tablet 81 mg, 81 mg, Oral, Daily, Cande Richards MD, 81 mg at 09/21/24 0809    docusate sodium (COLACE) capsule 100 mg, 100 mg, Oral, BID, Cande Richards MD, 100 mg at 09/21/24 0809    doxepin (SINEquan) capsule 50 mg, 50 mg, Oral, HS, Cande Richards MD, 50 mg at 09/20/24 2012    Empagliflozin (JARDIANCE) tablet 10 mg, 10 mg, Oral, QAM, Cande Richards MD, 10 mg at 09/21/24 0809    fluticasone (FLONASE) 50 mcg/act nasal spray 1 spray, 1 spray, Each Nare, Daily, Cande Richards MD, 1 spray at 09/17/24 0845    furosemide (LASIX) tablet 20 mg, 20 mg, Oral, BID (diuretic), Cande Richards MD, 20 mg at 09/21/24 0811    lactulose (CHRONULAC) oral solution 10 g, 10 g, Oral, Once, Cande Richards MD    magnesium Oxide (MAG-OX) tablet 400 mg, 400 mg, Oral, Daily, Cande Richards MD, 400 mg at 09/21/24 0809    melatonin tablet 6 mg, 6 mg, Oral, HS, Cande Richards MD, 6 mg at 09/20/24 2001    metoprolol succinate (TOPROL-XL) 24 hr tablet 50 mg, 50 mg, Oral, BID, Guanako Rojo MD, 50 mg at 09/21/24 0811    multivitamin-minerals (CENTRUM) tablet 1 tablet, 1 tablet, Oral, Daily, Cande Richards MD, 1 tablet at 09/21/24 0809    polyethylene glycol (MIRALAX) packet 17 g, 17 g, Oral, Daily, Cande Richards MD, 17 g at 09/21/24 0808    potassium chloride (Klor-Con M20) CR tablet 20 mEq, 20 mEq, Oral, Once, Cande Richards MD    QUEtiapine (SEROquel) tablet 100 mg, 100 mg, Oral, HS, Cande Richards MD, 100 mg at 09/20/24 2001    senna (SENOKOT) tablet 17.2 mg, 2 tablet, Oral, HS, Cande Richards MD, 17.2 mg at 09/19/24 2041     Labs & Results:    Results from last 7 days   Lab Units 09/16/24  1708   CK TOTAL U/L 40     Results from last 7 days   Lab Units  09/21/24  0404 09/20/24  0330 09/19/24 0401 09/17/24  0402   WBC Thousand/uL 4.84 4.49  --  5.81   HEMOGLOBIN g/dL 11.8* 10.8* 10.6* 10.9*   HEMATOCRIT % 36.9 33.1* 33.0* 32.8*   PLATELETS Thousands/uL 147* 143*  --  129*         Results from last 7 days   Lab Units 09/21/24  0404 09/20/24  0330 09/19/24 0401 09/17/24  0402 09/16/24  1708   POTASSIUM mmol/L 4.1 4.2 3.6 3.5 3.7   CHLORIDE mmol/L 98 100 98 96 96   CO2 mmol/L 26 26 27 29 30   BUN mg/dL 23 25 25 22 24   CREATININE mg/dL 0.99 0.87 1.00 0.92 1.02   CALCIUM mg/dL 8.3* 7.7* 7.8* 7.6* 8.4   ALK PHOS U/L  --   --   --  83 97   ALT U/L  --   --   --  27 32   AST U/L  --   --   --  27 34         Results from last 7 days   Lab Units 09/17/24  0402 09/16/24  1708   MAGNESIUM mg/dL 2.2 2.3       Echo: personally reviewed -ejection fraction 30 to 35% with severe global hypokinesis, reduced RV function, biatrial enlargement, mild to moderate mitral regurgitation, moderate tricuspid regurgitation    EKG personally reviewed by Kofi Mccray MD.

## 2024-09-21 NOTE — PLAN OF CARE
Problem: Potential for Falls  Goal: Patient will remain free of falls  Description: INTERVENTIONS:  - Educate patient/family on patient safety including physical limitations  - Instruct patient to call for assistance with activity   - Consult OT/PT to assist with strengthening/mobility   - Keep Call bell within reach  - Keep bed low and locked with side rails adjusted as appropriate  - Keep care items and personal belongings within reach  - Initiate and maintain comfort rounds  - Make Fall Risk Sign visible to staff  - Offer Toileting every 2 Hours, in advance of need  - Initiate/Maintain 2 alarm  - Obtain necessary fall risk management equipment: 2  - Apply yellow socks and bracelet for high fall risk patients  - Consider moving patient to room near nurses station  Outcome: Progressing     Problem: CARDIOVASCULAR - ADULT  Goal: Absence of cardiac dysrhythmias or at baseline rhythm  Description: INTERVENTIONS:  - Continuous cardiac monitoring, vital signs, obtain 12 lead EKG if ordered  - Administer antiarrhythmic and heart rate control medications as ordered  - Monitor electrolytes and administer replacement therapy as ordered  Outcome: Progressing

## 2024-09-21 NOTE — ASSESSMENT & PLAN NOTE
Recent infection, possible etiology for new onset atrial fibrillation  Management as per primary team

## 2024-09-23 NOTE — UTILIZATION REVIEW
NOTIFICATION OF ADMISSION DISCHARGE   This is a Notification of Discharge from [facility]. Please be advised that this patient has been discharge from our facility. Below you will find the admission and discharge date and time including the patient’s disposition.   UTILIZATION REVIEW CONTACT:  Estee Birmingham  Utilization   Network Utilization Review Department  Phone: [phone] x carefully listen to the prompts. All voicemails are confidential.  Email: [email or web address]     ADMISSION INFORMATION  PRESENTATION DATE: 9/16/2024  4:37 PM  OBERVATION ADMISSION DATE: 09/16/2024 2214  INPATIENT ADMISSION DATE: 9/17/24  2:12 PM   DISCHARGE DATE: 9/21/2024  3:29 PM   [facility]    Network Utilization Review Department  ATTENTION: Please call with any questions or concerns to [phone] and carefully listen to the prompts so that you are directed to the right person. All voicemails are confidential.   For Discharge needs, contact Care Management DC Support Team at [phone] opt. 2  Send all requests for admission clinical reviews, approved or denied determinations and any other requests to dedicated fax number below belonging to the campus where the patient is receiving treatment. List of dedicated fax numbers for the Facilities:  FACILITY NAME UR FAX NUMBER   ADMISSION DENIALS (Administrative/Medical Necessity) [phone]   DISCHARGE SUPPORT TEAM (NETWORK) [phone]   PARENT CHILD HEALTH (Maternity/NICU/Pediatrics) [phone]   [facility] [phone]   [facility] [phone]   [facility] [phone]   [facility] [phone]   [facility] [phone]   [facility] [phone]   [facility] [phone]   [facility]  [place] [phone]   [facility] [phone]   [facility] [phone]   [facility] [phone]   [facility] [phone]

## 2024-09-26 ENCOUNTER — OFFICE VISIT (OUTPATIENT)
Dept: HEMATOLOGY ONCOLOGY | Facility: CLINIC | Age: 84
End: 2024-09-26
Payer: COMMERCIAL

## 2024-09-26 ENCOUNTER — TELEPHONE (OUTPATIENT)
Age: 84
End: 2024-09-26

## 2024-09-26 VITALS
HEIGHT: 71 IN | SYSTOLIC BLOOD PRESSURE: 116 MMHG | BODY MASS INDEX: 22.82 KG/M2 | RESPIRATION RATE: 18 BRPM | HEART RATE: 64 BPM | OXYGEN SATURATION: 91 % | WEIGHT: 163 LBS | DIASTOLIC BLOOD PRESSURE: 64 MMHG | TEMPERATURE: 96.9 F

## 2024-09-26 DIAGNOSIS — D47.2 IGG MONOCLONAL GAMMOPATHY: ICD-10-CM

## 2024-09-26 DIAGNOSIS — R77.8 ELEVATED TOTAL PROTEIN: ICD-10-CM

## 2024-09-26 DIAGNOSIS — D53.9 MACROCYTIC ANEMIA: ICD-10-CM

## 2024-09-26 PROCEDURE — 99205 OFFICE O/P NEW HI 60 MIN: CPT | Performed by: PHYSICIAN ASSISTANT

## 2024-09-26 NOTE — PROGRESS NOTES
[address]  [facility] HEMATOLOGY ONCOLOGY SPECIALISTS [place]  [address]  [facility] [address]  Hematology Ambulatory Consult  Noah Mendez, 1940, [id number]  9/26/2024      Assessment and Plan   1. Elevated total protein; 2. IgG monoclonal gammopathy; 3. Macrocytic anemia  9/2024 Hemoglobin 10.8, , platelet count 143, creatinine 0.99/eGFR 70, calcium 8.3  sIFE: IgG kappa, M spike = 4.4  uIFE: IgG Kappa  K/L= 27, IgG=5148, Kappa=206    This is an 83-year-old male with past medical history of the above found recently in September 2024 due to hospitalization for cardiac abnormalities-atrial fibrillation with RVR.    Patient had workup during the hospitalization which included some baseline protein levels once found to have an elevated total protein.  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    I had an extensive discussion with the patient today.  I introduced the concept of multiple myeloma explaining that it is an abnormality within the bone marrow specifically of plasma cells that over produce proteins.  Too many proteins can slow down the function of the blood cause side effects which include renal dysfunction, lytic lesions of the bone as well as an anemia.  Typically multiple myeloma is a normocytic anemia so subsequent serum evaluation will include vitamin deficiencies that can cause macrocytic anemia as the patient is presenting.  In some situations multiple myeloma can be macrocytic.    Patient was advised the following tests are needed prior to discussion regarding formal diagnosis and potential treatment:  -Bone marrow biopsy  -CT myeloma scan  -Further laboratory investigation.     I advised the patient the diagnosis is made on bone marrow biopsy.  At his next appointment he should bring family members in order to have a second  pair of years but also to help with the emotionality of a new cancer diagnosis.      Workup below includes repeat assessment of myeloma parameters including a beta-2 microglobulin which has not been drawn yet as well as a serum viscosity level with an IgG level greater than 5000.    Macrocytic anemia workup included with B12, folate, TSH.      Patient is presently residing in a short-term nursing facility posthospitalization for cardiac dysfunction.  Patient is planned to be discharged home.  Patient has a good support system with her daughter who lives directly across the street.  Patient was independently living before hospitalization in September.    I have advised the patient to follow-up with dentist as it has been several years and the patient does have history of a broken tooth.  If patient's diagnosis, dental health will need to be optimized in order to receive bone stabilizing treatment.    I remain available if there is any questions or concerns up until the time of diagnosis.    - Iron Panel (Includes Ferritin, Iron Sat%, Iron, and TIBC); Future  - Vitamin B12; Future  - Folate; Future  - Comprehensive metabolic panel; Future  - Protein electrophoresis, urine; Future  - CBC and differential; Future  - Comprehensive metabolic panel; Future  - Protein electrophoresis, serum; Future  - TSH, 3rd generation with Free T4 reflex; Future  - Vitamin B12  - Folate  - Comprehensive metabolic panel  - Protein electrophoresis, urine  - CBC and differential  - Comprehensive metabolic panel  - Protein electrophoresis, serum  - TSH, 3rd generation with Free T4 reflex  - CT low dose whole body myeloma scan; Future  - Ambulatory Referral to Interventional Radiology; Future      The patient is scheduled for follow-up in approximately 5 weeks with Dr. Flores; Pt lives in [place], [place].  For coordination of care patient prefers to see primary oncologist in [facility].     Patient voiced agreement and  understanding to the above.   Patient knows to call the Hematology/Oncology office with any questions and concerns regarding the above.    I have spent a total time of 58 minutes in caring for this patient on the day of the visit/encounter including Diagnostic results, Prognosis, Risks and benefits of tx options, Instructions for management, Patient and family education, Risk factor reductions, Impressions, Counseling / Coordination of care, Documenting in the medical record, Reviewing / ordering tests, medicine, procedures  , and Obtaining or reviewing history  .    Barrier(s) to care: none.  Presently in SNF    Ally Corea PA-C  Medical Oncology/Hematology  [facility]    Subjective     Chief Complaint   Patient presents with    Consult     Referring provider    Cande Richards MD  [address] [place], [place] [id number]    History of present illness:   This is an 83-year-old male with past medical history of CHF, atrial fibrillation on anticoagulation, hypertension, CAD, cardiomyopathy who is recently found to have monoclonal gammopathy and elevated total serum protein.    9/16/2024 patient presented to the emergency room secondary to constipation.  Patient was found to have atrial fibrillation.  Patient underwent cardioversion.  During admission patient was found to have an elevated serum protein.  See laboratory investigation below:  Admission CBC: WBC 5, hemoglobin 11.9,, , platelet count 160, total protein 9.3, albumin 3.0, corrected calcium 9.2, EGFR 67, creatinine 102  9/19 through 9/21/24: curbside hem/onc work up  Hemoglobin 10.8, , platelet count 143, creatinine 0.99/eGFR 70, calcium 8.3  sIFE: IgG kappa, M spike = 4.4  uIFE: IgG Kappa  K/L= 27, IgG=5148, Kappa=206  PE study with CT abdomen and pelvis with contrast: Multiple level degenerative changes of the spine with sternotomy sutures and mild compression fracture of T7-age-indeterminate.  Fecal impaction  noted.    24: Patient presents to the hematology clinic unsure why he is in this office today.  Discussed recent hospitalization current disposition at a skilled nursing facility.  Patient notes that he is not as strong as he used to be but was living independently at home and his goals of care is to get back there.  Patient notes that his independence is very important to him.  Patient understood the importance of follow-up and the concern for bone marrow related cancer.  Brief discussion regarding treatment of this type of cancer-oral/subcu injection considering advanced age.  Age-related factors for attenuated therapy if necessary.    Also discussed that some patients are diagnosed in the smoldering state especially if secondary cause of anemia is found.    Review of Systems   Constitutional:  Positive for activity change (decreased stamina - likes to garden) and fatigue.   HENT:  Positive for dental problem (previous hx of broke tooth during dental procedure.  no regular dental care.).    All other systems reviewed and are negative.      Past Medical History:   Diagnosis Date    Atrial fibrillation (HCC)     CHF (congestive heart failure) (HCC)     Depression     Hypertension     Low blood pressure      No past surgical history on file.  No family history on file.  Social History     Socioeconomic History    Marital status:      Spouse name: Not on file    Number of children: Not on file    Years of education: Not on file    Highest education level: Not on file   Occupational History    Not on file   Tobacco Use    Smoking status: Former     Current packs/day: 0.00     Types: Cigarettes     Quit date: 2024     Years since quittin.0    Smokeless tobacco: Never   Substance and Sexual Activity    Alcohol use: Never    Drug use: Never    Sexual activity: Not on file   Other Topics Concern    Not on file   Social History Narrative    Not on file     Social Determinants of Health     Financial  Resource Strain: Not on file   Food Insecurity: No Food Insecurity (9/17/2024)    Hunger Vital Sign     Worried About Running Out of Food in the Last Year: Never true     Ran Out of Food in the Last Year: Never true   Transportation Needs: No Transportation Needs (9/17/2024)    PRAPARE - Transportation     Lack of Transportation (Medical): No     Lack of Transportation (Non-Medical): No   Physical Activity: Not on file   Stress: Not on file   Social Connections: Not on file   Intimate Partner Violence: Not on file   Housing Stability: Low Risk  (9/17/2024)    Housing Stability Vital Sign     Unable to Pay for Housing in the Last Year: No     Number of Times Moved in the Last Year: 0     Homeless in the Last Year: No         Current Outpatient Medications:     amiodarone 200 mg tablet, Take 1 tablet (200 mg total) by mouth 3 (three) times a day with meals for 7 days, THEN 1 tablet (200 mg total) 2 (two) times a day with meals for 7 days, THEN 1 tablet (200 mg total) daily with breakfast., Disp: 65 tablet, Rfl: 0    apixaban (ELIQUIS) 5 mg, Take 1 tablet (5 mg total) by mouth 2 (two) times a day, Disp: , Rfl:     aspirin (Aspirin 81) 81 mg chewable tablet, Chew 81 mg daily, Disp: , Rfl:     docusate sodium (COLACE) 100 mg capsule, Take 1 capsule (100 mg total) by mouth 2 (two) times a day, Disp: , Rfl:     Empagliflozin (Jardiance) 10 MG TABS tablet, Take 10 mg by mouth every morning, Disp: , Rfl:     fluticasone (FLONASE) 50 mcg/act nasal spray, 1 spray into each nostril daily, Disp: , Rfl:     furosemide (LASIX) 20 mg tablet, Take 1 tablet (20 mg total) by mouth 2 (two) times a day, Disp: , Rfl:     Magnesium 300 MG CAPS, Take by mouth, Disp: , Rfl:     melatonin 1 mg, Take 10 mg by mouth daily at bedtime, Disp: , Rfl:     metoprolol succinate (TOPROL-XL) 25 mg 24 hr tablet, Take 2 tablets (50 mg total) by mouth every 24 hours, Disp: , Rfl:     Multiple Vitamin (multivitamin) tablet, Take 1 tablet by mouth daily,  "Disp: , Rfl:     polyethylene glycol (MIRALAX) 17 g packet, Take 17 g by mouth daily, Disp: , Rfl:     QUEtiapine (SEROquel) 50 mg tablet, Take 100 mg by mouth daily at bedtime, Disp: , Rfl:     senna (SENOKOT) 8.6 mg, Take 2 tablets (17.2 mg total) by mouth daily at bedtime as needed for constipation, Disp: , Rfl:     potassium chloride (Klor-Con M20) 20 mEq tablet, Take 1 tablet (20 mEq total) by mouth once for 1 dose, Disp: 1 tablet, Rfl: 0  No Known Allergies    Objective   /64 (BP Location: Left arm)   Pulse 64   Temp (!) 96.9 °F (36.1 °C) (Tympanic)   Resp 18   Ht 5' 11\" (1.803 m)   Wt 73.9 kg (163 lb)   SpO2 91%   BMI 22.73 kg/m²   Physical Exam  Constitutional:       General: He is not in acute distress.     Appearance: He is well-developed.   HENT:      Head: Normocephalic and atraumatic.      Right Ear: External ear normal.      Left Ear: External ear normal.      Nose: Nose normal.   Eyes:      General: No scleral icterus.     Conjunctiva/sclera: Conjunctivae normal.   Cardiovascular:      Rate and Rhythm: Normal rate.   Pulmonary:      Effort: No respiratory distress.   Abdominal:      General: There is no distension.      Palpations: Abdomen is soft.   Skin:     Findings: No rash (on exposed skin.).   Neurological:      Mental Status: He is alert and oriented to person, place, and time.   Psychiatric:         Thought Content: Thought content normal.         Result Review  Labs:  Lab Results   Component Value Date    SPEP See Comment 09/19/2024    UPEP See Comment 09/17/2024     Lab Results   Component Value Date    WBC 4.84 09/21/2024    HGB 11.8 (L) 09/21/2024    HCT 36.9 09/21/2024     (H) 09/21/2024     (L) 09/21/2024     Lab Results   Component Value Date    SODIUM 129 (L) 09/21/2024    K 4.1 09/21/2024    CL 98 09/21/2024    CO2 26 09/21/2024    AGAP 5 09/21/2024    BUN 23 09/21/2024    CREATININE 0.99 09/21/2024    GLUC 99 09/21/2024    CALCIUM 8.3 (L) 09/21/2024    AST " 27 09/17/2024    ALT 27 09/17/2024    ALKPHOS 83 09/17/2024    TP 8.7 (H) 09/19/2024    TBILI 0.84 09/17/2024    EGFR 70 09/21/2024     JAZMYN    Left Ventricle: Left ventricular cavity size is dilated. Wall thickness   is normal. The left ventricular ejection fraction is 30-35%. Systolic   function is severely reduced. There is severe global hypokinesis.    Right Ventricle: Systolic function is reduced.    Left Atrium: The atrium is severely dilated. There is no thrombus.   There is moderate, continuous spontaneous echo contrast.    Right Atrium: The atrium is dilated.    Atrial Septum: No patent foramen ovale detected, confirmed at rest   using color doppler.    Left Atrial Appendage: There is no thrombus. There is moderate to   severe, continuous spontaneous echo contrast.    Aortic Valve: There is trace regurgitation.    Mitral Valve: There is mild to moderate regurgitation.    Tricuspid Valve: There is moderate regurgitation.  Cardioversion  Narrative: PRE-PROCEDURE RHYTHM:  Atrial fibrillation.    POST-PROCEDURE RHYTHM:  Atrial paced rhythm.    :  Guanako Rojo MD.       ANESTHESIA:  Propofol per the anesthesia department.    COMPLICATIONS:  None.     OPERATIVE TERM: JAZMYN guided DC cardioversion.    The nature of the procedure, risks and alternatives were discussed with   the patient who gave informed consent.  Patient was noted to be atrial   fibrillation and on appropriate anticoagulation.  A proper timeout was   obtained.     OPERATIVE TECHNIQUE:  The patient was sedated with propofol per the   anesthesia department.  When the patient was no longer responsive to quiet   voice, a transesophageal echocardiogram was first performed which did rule   out left atrial and left atrial appendage thrombus.  Then a DC   cardioversion was performed with 120 J biphasically and synchronously.    This converted the patient from atrial fibrillation to an atrial paced   rhythm..  The patient was then monitored until  fully alert and left the   procedure area in stable condition.  Impression:   Successful DC cardioversion - Coverting patient from atrial   fibrillation to an atrial paced rhythm after 1 attempt.      Please note:  This report has been generated by a voice recognition software system. Therefore there may be syntax, spelling, and/or grammatical errors. Please call if you have any questions.

## 2024-09-26 NOTE — TELEPHONE ENCOUNTER
Patient's daughter, Xuan, called with questions regarding today's visit. States she apologizes and had planned to schedule this visit after his hospital stay but wanted him to have a few days to recover at the rehab facility. States she was unaware of the appointment until she went to the rehab facility today for his care conference.     Reviewed Ally BUI's notes with her regarding plan for workup for possible multiple myeloma, follow up in 5 weeks, dates for bone marrow and CT scan, and labs ordered. She verbalized an understanding. While on the call she received a call from the patient and wanted to hang up to take his call. She will call back after she speaks with him with any additional questions.

## 2024-09-26 NOTE — PATIENT INSTRUCTIONS
[facility] Medical Oncology and Hematology Team  [facility] - [phone]    Your Team Member:  Advanced Practitioner:  Ally Corea PA-C    Please answer Private and Unavailable Calls - this may be your team(s) contacting you.  If you have medical questions/concerns/issues - contact us either by (1) My Chart (2) [facility]

## 2024-09-27 LAB
ATRIAL RATE: 64 BPM
P AXIS: 71 DEGREES
PR INTERVAL: 220 MS
QRS AXIS: 71 DEGREES
QRSD INTERVAL: 114 MS
QT INTERVAL: 460 MS
QTC INTERVAL: 474 MS
T WAVE AXIS: 198 DEGREES
VENTRICULAR RATE: 64 BPM

## 2024-09-27 PROCEDURE — 93010 ELECTROCARDIOGRAM REPORT: CPT | Performed by: INTERNAL MEDICINE

## 2024-10-01 RX ORDER — SODIUM CHLORIDE 9 MG/ML
75 INJECTION, SOLUTION INTRAVENOUS CONTINUOUS
Status: CANCELLED | OUTPATIENT
Start: 2024-10-01

## 2024-10-02 ENCOUNTER — TELEPHONE (OUTPATIENT)
Dept: RADIOLOGY | Facility: HOSPITAL | Age: 84
End: 2024-10-02

## 2024-10-03 ENCOUNTER — TELEPHONE (OUTPATIENT)
Dept: INTERVENTIONAL RADIOLOGY/VASCULAR | Facility: HOSPITAL | Age: 84
End: 2024-10-03

## 2024-10-03 ENCOUNTER — TELEPHONE (OUTPATIENT)
Age: 84
End: 2024-10-03

## 2024-10-03 NOTE — TELEPHONE ENCOUNTER
Patient's daughter,Xuan called regarding patient. She had a couple of concerns and will like RAMYA Canales to call her at her earliest convenience to discuss. She stated neither she nor her sister were able to accompany Dad for his last appointment due to the short notice, and Dad could not provide much information about the visit, as he has had a lot going on, and even ended up waiting for 2 hours after the visit before he could get picked up by the transportation that was arranged for him.    Regarding his biopsy that is scheduled for next week, she will like to know if it can be delayed for 1 week, as she was unaware of it and is already taking him for other previously scheduled appointments next week, and will not be able to take off work for the biopsy due to short notice.    She will also like to discuss the goal of the biopsy/possible treatment. If it will be beneficial for Dad to have the biopsy and how it will impact his care and disease. She stated they will like to avoid unnecessary procedures that may not have any benefits for Dad on the long run, based on his overall health condition.    She is requesting a call from RAMYA Canales to discuss these concerns.

## 2024-10-03 NOTE — TELEPHONE ENCOUNTER
Returned call to Xuan.  Reviewed Ally's recommendations to keep bx appt as scheduled to assist in diagnosis and treatment options.  Reviewed Ally will reach out tomorrow with additional information.  Xuan aware and appreciative.  No additional questions at this time.

## 2024-10-03 NOTE — TELEPHONE ENCOUNTER
Yes I can.  Please let her know I can call tomorrow, as I will have more time to have a more in depth conversation.  Recommend keep bx appt as this might be the major contributor to his health decline.

## 2024-10-04 ENCOUNTER — TELEPHONE (OUTPATIENT)
Dept: GYNECOLOGIC ONCOLOGY | Facility: CLINIC | Age: 84
End: 2024-10-04

## 2024-10-04 NOTE — TELEPHONE ENCOUNTER
Called and spoke with the patient's daughter Xuan today.  Reviewed the findings from the blood testing concerns for multiple myeloma the impact that a diagnosis and potential treatment would have on his quality of life considering the complaints that he has now.  I encouraged the family to move forward with diagnosis even attenuated chemotherapy may have a very positive effect on symptomatology.  Xuan was appreciative of the phone call.  Follow-up appointments were confirmed.  Bone marrow biopsy next week.

## 2024-10-05 ENCOUNTER — TELEPHONE (OUTPATIENT)
Dept: OTHER | Facility: OTHER | Age: 84
End: 2024-10-05

## 2024-10-05 ENCOUNTER — TELEPHONE (OUTPATIENT)
Dept: HEMATOLOGY ONCOLOGY | Facility: CLINIC | Age: 84
End: 2024-10-05

## 2024-10-05 NOTE — TELEPHONE ENCOUNTER
Called Nicole who was doing intake for home health and reviewing medication.    Patient is scheduled for a bone marrow biopsy on Wednesday, 10/9/2024 as there is concerns patient has multiple myeloma.  Recommendations were holding Jardiance given time point.  However there was no recommendation regarding apixaban.    Reviewed the chart.  Patient recently presented to the hospital with A-fib with RVR and recent cardioversion.  I did discuss that I would recommend speaking with cardiology regarding if it is okay to hold apixaban for 1 to 2 days prior to the procedure with restarting the night after the procedure.    I told him that I would also reach out to cardiology who they have an appointment with on 10/8/2024 this exact indication.  I also recommended that the daughter call the cardiology office on Monday as well.    Salud Glover MD, PhD

## 2024-10-05 NOTE — TELEPHONE ENCOUNTER
"Nicole ALSTON  from [facility] stated, \" I am calling because I have a medication question.\"     Paged on call VIA EPIC  "

## 2024-10-07 ENCOUNTER — TELEPHONE (OUTPATIENT)
Dept: HEMATOLOGY ONCOLOGY | Facility: CLINIC | Age: 84
End: 2024-10-07

## 2024-10-07 ENCOUNTER — TELEPHONE (OUTPATIENT)
Age: 84
End: 2024-10-07

## 2024-10-07 ENCOUNTER — TELEPHONE (OUTPATIENT)
Dept: OTHER | Facility: OTHER | Age: 84
End: 2024-10-07

## 2024-10-07 ENCOUNTER — NURSE TRIAGE (OUTPATIENT)
Age: 84
End: 2024-10-07

## 2024-10-07 NOTE — TELEPHONE ENCOUNTER
Regarding: Eliquis  ----- Message from Melinda KAMARA sent at 10/7/2024  9:23 AM EDT -----  Patient has a bone marrow biopsy scheduled for 10/9/24. He was instructed to stop taking his Jardiance before the procedure. The facility the patient is currently in questioned if he should also stop taking his Eliquis. Please call the patients daughter Xuan to follow up.

## 2024-10-07 NOTE — TELEPHONE ENCOUNTER
Per Jhony & Dr. Rojo, it would be best for Eliquis to be uninterrupted for 4 wks post cardioversion. Best if biopsy is not emergent to reschedule until after the 18th of October if Hem/Onc wants an Eliquis hold.  At this point pt's daughter will continue his Eliquis and speak to Hem/Onc re: keeping or rescheduling procedure.

## 2024-10-07 NOTE — TELEPHONE ENCOUNTER
Call out to IR department in regard to Eliquis hold staff stated that she will Epic Kristine ALSTON. No call documentation notified.

## 2024-10-07 NOTE — PROGRESS NOTES
Per IR Staff member MEGAN Rojo post review of patient bone marrow biopsy needed with patient taking Eliquis, no hold recommended. Ally Corea PA-C notified.

## 2024-10-07 NOTE — TELEPHONE ENCOUNTER
Pt daughter Xuan called in with a medication direction question. Pt has a biopsy on Wednesday and she was told there were certain medications that pt was to stop taking. Pt's [facility] nurse was concerned why one of the medication was not recommend to be stopped. Xuan was told that pt needed to stop the Jardiance but not the Eliquis which is what the [facility] nurse questioned. Xuan is also not sure if she needs to postpone the biopsy so that pt can be on Eliquis longer. Xuan also stated she reached out to cardiology today, she is waiting on their call but she hasn't heard back yet so she reached out here.     Xuan would like a call back to discuss to stop or continue the Eliquis. Also whether to proceed or postpone biopsy. Call back number provided is [phone]. Thank you!

## 2024-10-07 NOTE — TELEPHONE ENCOUNTER
No need to hold Anticoag for Bone marrow bx.  Pt will receive a phone call regarding pre-procedure information via IR.

## 2024-10-07 NOTE — TELEPHONE ENCOUNTER
Caller: Xuan Prakash    Provider: Jhony Pierre     Call back #: [phone]    Reason for call: Xuan called regarding the medication, Eliquis. She is unsure of when to give this medication to the patient. She was either going to give the medication to the patient now or at 12 am tonight.

## 2024-10-07 NOTE — TELEPHONE ENCOUNTER
Jhony, this is the note from the Hem/Onc physician last week    Reviewed the chart.  Patient recently presented to the hospital with A-fib with RVR and recent cardioversion.  I did discuss that I would recommend speaking with cardiology regarding if it is okay to hold apixaban for 1 to 2 days prior to the procedure with restarting the night after the procedure.     I told him that I would also reach out to cardiology who they have an appointment with on 10/8/2024 this exact indication.  I also recommended that the daughter call the cardiology office on Monday as well.     Salud Glover MD, PhD

## 2024-10-07 NOTE — TELEPHONE ENCOUNTER
Called daughter Xuan and advised that per Dr. Glover: Recommendation on holding Eliquis needs to come from Cardiology / ordering provider.     Xuan verbalized understanding and states she will call back if any changes/updates after speaking to Cardiology.

## 2024-10-08 ENCOUNTER — OFFICE VISIT (OUTPATIENT)
Dept: CARDIOLOGY CLINIC | Facility: CLINIC | Age: 84
End: 2024-10-08
Payer: COMMERCIAL

## 2024-10-08 ENCOUNTER — TELEPHONE (OUTPATIENT)
Age: 84
End: 2024-10-08

## 2024-10-08 VITALS
SYSTOLIC BLOOD PRESSURE: 118 MMHG | HEIGHT: 71 IN | DIASTOLIC BLOOD PRESSURE: 80 MMHG | BODY MASS INDEX: 23.21 KG/M2 | HEART RATE: 63 BPM | WEIGHT: 165.8 LBS

## 2024-10-08 DIAGNOSIS — I73.9 PERIPHERAL VASCULAR DISEASE, UNSPECIFIED (HCC): ICD-10-CM

## 2024-10-08 DIAGNOSIS — E87.1 HYPONATREMIA: ICD-10-CM

## 2024-10-08 DIAGNOSIS — I50.22 CHRONIC SYSTOLIC CONGESTIVE HEART FAILURE (HCC): ICD-10-CM

## 2024-10-08 DIAGNOSIS — I42.0 DILATED CARDIOMYOPATHY (HCC): Primary | ICD-10-CM

## 2024-10-08 PROCEDURE — 99214 OFFICE O/P EST MOD 30 MIN: CPT | Performed by: PHYSICIAN ASSISTANT

## 2024-10-08 RX ORDER — AMIODARONE HYDROCHLORIDE 200 MG/1
200 TABLET ORAL DAILY
Qty: 90 TABLET | Refills: 3 | Status: SHIPPED | OUTPATIENT
Start: 2024-10-08

## 2024-10-08 RX ORDER — LORAZEPAM 0.5 MG/1
0.5 TABLET ORAL
COMMUNITY
Start: 2024-10-01

## 2024-10-08 NOTE — PROGRESS NOTES
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   Repeat limited TTE in 90 days to reassess EF. If EF still down, plan for repeat ischemic evaluation.   Establish with EP device clinic  Check BMP; will place referral to nephrology for assistance regarding his chronic hyponatremia. Advised to maintain <1.5L fluid restriction daily in the meantime.  Obtain prior records; patients daughter instructed to bring in prior cardiology records.   Consult to sleep medicine due to sleep disturbance  RTO in 3-4 months or sooner if needed    Interval History/ HPI:   Noah Mendez is an 83-year-old male who presents for hospital follow-up visit.   He has known history of CAD s/p CABG x2 in 2015, SSS with PPM and followed with [facility].    He was admitted to [facility] last month with AF with RVR and EF reduction to 35%.  He was also found with acute COVID-19 infection during this time.  At discharge he was placed on goal-directed medical therapy with Toprol-XL, Aldactone, Jardiance and Lasix.  He was not placed on anticoagulation for unknown reason.  He had presented to [facility] with increasing abdominal distention and constipation. He was discovered with mild RVR during his admission to which cardiology was consulted. He was originally placed on IV Cardizem drip  but weaned to Toprol-XL. Risks versus benefits of anticoagulation were discussed and patient was agreeable to start initiated on Eliquis.  His heart rates remained elevated with borderline BP and underwent JAZMYN guided cardioversion with conversion to sinus rhythm. He was placed on IV/PO amiodarone load for rhythm prophylaxis.  Patient and family requested continued follow-up with [facility] cardiology group and arrange for appointment today. To be established with Dr. Rojo after this visit.     Vitals:  118/80  63  165lbs    Past Medical History:   Diagnosis Date    Atrial fibrillation (HCC)     CHF (congestive heart failure) (HCC)     Depression     Hypertension     Low blood pressure      Social History     Socioeconomic History    Marital status:      Spouse name: Not on file    Number of children: Not on file    Years of education: Not on file    Highest education level: Not on file   Occupational History    Not on file   Tobacco Use    Smoking status: Former     Current packs/day: 0.00     Types: Cigarettes     Quit date: 2024     Years since quittin.1    Smokeless tobacco: Never   Substance and Sexual Activity    Alcohol use: Never    Drug use: Never    Sexual activity: Not on file   Other Topics Concern    Not on file   Social History Narrative    Not on file     Social Determinants of Health     Financial Resource Strain: Not on file   Food Insecurity: No Food Insecurity (2024)    Hunger Vital Sign     Worried About Running Out of Food in the Last Year: Never true     Ran Out of Food in the Last Year: Never true   Transportation Needs: No Transportation Needs (2024)    PRAPARE - Transportation     Lack of Transportation (Medical): No     Lack of Transportation (Non-Medical): No   Physical Activity: Not on file   Stress: Not on file   Social Connections: Not on file   Intimate Partner Violence: Not on file   Housing Stability: Low Risk  (2024)    Housing Stability Vital Sign      Unable to Pay for Housing in the Last Year: No     Number of Times Moved in the Last Year: 0     Homeless in the Last Year: No      No family history on file.  No past surgical history on file.    Current Outpatient Medications:     amiodarone 200 mg tablet, Take 1 tablet (200 mg total) by mouth 3 (three) times a day with meals for 7 days, THEN 1 tablet (200 mg total) 2 (two) times a day with meals for 7 days, THEN 1 tablet (200 mg total) daily with breakfast., Disp: 65 tablet, Rfl: 0    apixaban (ELIQUIS) 5 mg, Take 1 tablet (5 mg total) by mouth 2 (two) times a day, Disp: , Rfl:     aspirin (Aspirin 81) 81 mg chewable tablet, Chew 81 mg daily, Disp: , Rfl:     docusate sodium (COLACE) 100 mg capsule, Take 1 capsule (100 mg total) by mouth 2 (two) times a day, Disp: , Rfl:     Empagliflozin (Jardiance) 10 MG TABS tablet, Take 10 mg by mouth every morning, Disp: , Rfl:     fluticasone (FLONASE) 50 mcg/act nasal spray, 1 spray into each nostril daily, Disp: , Rfl:     furosemide (LASIX) 20 mg tablet, Take 1 tablet (20 mg total) by mouth 2 (two) times a day, Disp: , Rfl:     Magnesium 300 MG CAPS, Take by mouth, Disp: , Rfl:     melatonin 1 mg, Take 10 mg by mouth daily at bedtime, Disp: , Rfl:     metoprolol succinate (TOPROL-XL) 25 mg 24 hr tablet, Take 2 tablets (50 mg total) by mouth every 24 hours, Disp: , Rfl:     Multiple Vitamin (multivitamin) tablet, Take 1 tablet by mouth daily, Disp: , Rfl:     polyethylene glycol (MIRALAX) 17 g packet, Take 17 g by mouth daily, Disp: , Rfl:     potassium chloride (Klor-Con M20) 20 mEq tablet, Take 1 tablet (20 mEq total) by mouth once for 1 dose, Disp: 1 tablet, Rfl: 0    QUEtiapine (SEROquel) 50 mg tablet, Take 100 mg by mouth daily at bedtime, Disp: , Rfl:     senna (SENOKOT) 8.6 mg, Take 2 tablets (17.2 mg total) by mouth daily at bedtime as needed for constipation, Disp: , Rfl:       Review of Systems:  Review of Systems   Constitutional:  Positive for fatigue.  Negative for appetite change, chills, diaphoresis and fever.   Respiratory:  Negative for cough, chest tightness and shortness of breath.    Cardiovascular:  Positive for leg swelling. Negative for chest pain and palpitations.   Gastrointestinal:  Negative for diarrhea, nausea and vomiting.   Endocrine: Negative for cold intolerance and heat intolerance.   Genitourinary:  Negative for difficulty urinating, dysuria and enuresis.   Musculoskeletal:  Negative for arthralgias, back pain and gait problem.   Allergic/Immunologic: Negative for environmental allergies and food allergies.   Neurological:  Negative for dizziness, facial asymmetry and headaches.   Hematological:  Negative for adenopathy. Does not bruise/bleed easily.   Psychiatric/Behavioral:  Negative for agitation, behavioral problems and confusion.          Physical Exam:  Physical Exam  Constitutional:       Appearance: He is well-developed.   HENT:      Right Ear: External ear normal.      Left Ear: External ear normal.   Eyes:      Extraocular Movements: EOM normal.      Pupils: Pupils are equal, round, and reactive to light.   Cardiovascular:      Rate and Rhythm: Normal rate and regular rhythm.      Heart sounds: Normal heart sounds. No murmur heard.     No friction rub. No gallop.   Pulmonary:      Effort: Pulmonary effort is normal.      Breath sounds: Normal breath sounds.   Abdominal:      Palpations: Abdomen is soft.   Musculoskeletal:         General: Normal range of motion.      Cervical back: Normal range of motion.      Right lower leg: Edema present.      Left lower leg: Edema present.      Comments: +1 ankle edema bilaterally   Skin:     General: Skin is warm and dry.   Neurological:      Mental Status: He is alert and oriented to person, place, and time.      Deep Tendon Reflexes: Reflexes are normal and symmetric.   Psychiatric:         Mood and Affect: Mood and affect normal.         Behavior: Behavior normal.         Thought Content:  Thought content normal.         Judgment: Judgment normal.         This note was completed in part utilizing M-Modal Fluency Direct Software.  Grammatical errors, random word insertions, spelling mistakes, and incomplete sentences can be an occasional consequence of this system secondary to software limitations, ambient noise, and hardware issues.  If you have any questions or concerns about the content, text, or information contained within the body of this dictation, please contact the provider for clarification.

## 2024-10-08 NOTE — TELEPHONE ENCOUNTER
Provider: Allyignacia Doddmadonna    Patient's daughter calling in inquiring if patient needs to hold his eliquis prior to his bone marrow biopsy scheduled for tomorrow morning. I made her aware that Ally documented that patient does not need to hold his anticoagulation prior.      She is also inquiring if [facility] appointment can be later in the morning or rescheduled to a different day so that they can have an appt later in the morning, secondary to patient not sleeping well and them having stressful mornings trying to get him ready and out the door for early appointments.  I forwarded her call to IR so that they can further discuss appointment options and ended the call on my end.

## 2024-10-16 ENCOUNTER — TELEPHONE (OUTPATIENT)
Dept: RADIOLOGY | Facility: HOSPITAL | Age: 84
End: 2024-10-16

## 2024-10-17 ENCOUNTER — HOSPITAL ENCOUNTER (OUTPATIENT)
Dept: NON INVASIVE DIAGNOSTICS | Facility: HOSPITAL | Age: 84
Discharge: HOME | End: 2024-10-17
Payer: COMMERCIAL

## 2024-10-17 ENCOUNTER — TELEPHONE (OUTPATIENT)
Dept: RADIOLOGY | Facility: HOSPITAL | Age: 84
End: 2024-10-17

## 2024-10-17 VITALS — WEIGHT: 165.79 LBS | HEIGHT: 71 IN | BODY MASS INDEX: 23.21 KG/M2

## 2024-10-17 DIAGNOSIS — I42.0 DILATED CARDIOMYOPATHY (HCC): ICD-10-CM

## 2024-10-17 PROCEDURE — 93308 TTE F-UP OR LMTD: CPT | Performed by: INTERNAL MEDICINE

## 2024-10-17 PROCEDURE — 93308 TTE F-UP OR LMTD: CPT

## 2024-10-18 ENCOUNTER — TELEPHONE (OUTPATIENT)
Dept: RADIOLOGY | Facility: HOSPITAL | Age: 84
End: 2024-10-18

## 2024-10-18 LAB
AORTIC ROOT: 3.8 CM
APICAL FOUR CHAMBER EJECTION FRACTION: 17 %
BSA FOR ECHO PROCEDURE: 1.95 M2
E WAVE DECELERATION TIME: 335 MS
FRACTIONAL SHORTENING: 15 (ref 28–44)
INTERVENTRICULAR SEPTUM IN DIASTOLE (PARASTERNAL SHORT AXIS VIEW): 1.3 CM
INTERVENTRICULAR SEPTUM: 1.3 CM (ref 0.6–1.1)
LA/AORTA RATIO 2D: 1.11
LAAS-AP2: 32.2 CM2
LAAS-AP4: 36.8 CM2
LEFT ATRIUM SIZE: 4.2 CM
LEFT ATRIUM VOLUME (MOD BIPLANE): 139 ML
LEFT ATRIUM VOLUME INDEX (MOD BIPLANE): 71.3 ML/M2
LEFT INTERNAL DIMENSION IN SYSTOLE: 4.6 CM (ref 2.1–4)
LEFT VENTRICLE DIASTOLIC VOLUME (MOD BIPLANE): 185 ML
LEFT VENTRICLE DIASTOLIC VOLUME INDEX (MOD BIPLANE): 94.9 ML/M2
LEFT VENTRICLE SYSTOLIC VOLUME (MOD BIPLANE): 123 ML
LEFT VENTRICLE SYSTOLIC VOLUME INDEX (MOD BIPLANE): 63.1 ML/M2
LEFT VENTRICULAR INTERNAL DIMENSION IN DIASTOLE: 5.4 CM (ref 3.5–6)
LEFT VENTRICULAR POSTERIOR WALL IN END DIASTOLE: 1.5 CM
LEFT VENTRICULAR STROKE VOLUME: 41 ML
LV EF: 34 %
LVSV (TEICH): 41 ML
MV E'TISSUE VEL-LAT: 7 CM/S
MV E'TISSUE VEL-SEP: 6 CM/S
MV PEAK A VEL: 0.79 M/S
MV STENOSIS PRESSURE HALF TIME: 75 MS
MV VALVE AREA P 1/2 METHOD: 2.93
RIGHT VENTRICLE ID DIMENSION: 4.3 CM
SL CV LV EF: 35
SL CV PED ECHO LEFT VENTRICLE DIASTOLIC VOLUME (MOD BIPLANE) 2D: 141 ML
SL CV PED ECHO LEFT VENTRICLE SYSTOLIC VOLUME (MOD BIPLANE) 2D: 100 ML

## 2024-10-23 ENCOUNTER — HOSPITAL ENCOUNTER (OUTPATIENT)
Dept: INTERVENTIONAL RADIOLOGY/VASCULAR | Facility: HOSPITAL | Age: 84
Discharge: HOME | End: 2024-10-23
Payer: COMMERCIAL

## 2024-10-23 VITALS
TEMPERATURE: 97 F | OXYGEN SATURATION: 90 % | RESPIRATION RATE: 16 BRPM | HEART RATE: 68 BPM | SYSTOLIC BLOOD PRESSURE: 91 MMHG | DIASTOLIC BLOOD PRESSURE: 56 MMHG

## 2024-10-23 DIAGNOSIS — R77.8 ELEVATED TOTAL PROTEIN: ICD-10-CM

## 2024-10-23 DIAGNOSIS — D53.9 MACROCYTIC ANEMIA: ICD-10-CM

## 2024-10-23 DIAGNOSIS — D47.2 IGG MONOCLONAL GAMMOPATHY: ICD-10-CM

## 2024-10-23 LAB
ERYTHROCYTE [DISTWIDTH] IN BLOOD BY AUTOMATED COUNT: 18.8 % (ref 11.6–15.1)
HCT VFR BLD AUTO: 34.2 % (ref 36.5–49.3)
HGB BLD-MCNC: 10.9 G/DL (ref 12–17)
MCH RBC QN AUTO: 33.7 PG (ref 26.8–34.3)
MCHC RBC AUTO-ENTMCNC: 31.9 G/DL (ref 31.4–37.4)
MCV RBC AUTO: 106 FL (ref 82–98)
PLATELET # BLD AUTO: 127 THOUSANDS/UL (ref 149–390)
PMV BLD AUTO: 10.6 FL (ref 8.9–12.7)
RBC # BLD AUTO: 3.23 MILLION/UL (ref 3.88–5.62)
WBC # BLD AUTO: 5.47 THOUSAND/UL (ref 4.31–10.16)

## 2024-10-23 PROCEDURE — 99152 MOD SED SAME PHYS/QHP 5/>YRS: CPT

## 2024-10-23 PROCEDURE — 88364 INSITU HYBRIDIZATION (FISH): CPT | Performed by: STUDENT IN AN ORGANIZED HEALTH CARE EDUCATION/TRAINING PROGRAM

## 2024-10-23 PROCEDURE — 38222 DX BONE MARROW BX & ASPIR: CPT | Performed by: RADIOLOGY

## 2024-10-23 PROCEDURE — 88360 TUMOR IMMUNOHISTOCHEM/MANUAL: CPT | Performed by: STUDENT IN AN ORGANIZED HEALTH CARE EDUCATION/TRAINING PROGRAM

## 2024-10-23 PROCEDURE — 99152 MOD SED SAME PHYS/QHP 5/>YRS: CPT | Performed by: RADIOLOGY

## 2024-10-23 PROCEDURE — 88374 M/PHMTRC ALYS ISHQUANT/SEMIQ: CPT | Performed by: PHYSICIAN ASSISTANT

## 2024-10-23 PROCEDURE — 85097 BONE MARROW INTERPRETATION: CPT | Performed by: STUDENT IN AN ORGANIZED HEALTH CARE EDUCATION/TRAINING PROGRAM

## 2024-10-23 PROCEDURE — 38220 DX BONE MARROW ASPIRATIONS: CPT

## 2024-10-23 PROCEDURE — 85007 BL SMEAR W/DIFF WBC COUNT: CPT | Performed by: STUDENT IN AN ORGANIZED HEALTH CARE EDUCATION/TRAINING PROGRAM

## 2024-10-23 PROCEDURE — 99153 MOD SED SAME PHYS/QHP EA: CPT

## 2024-10-23 PROCEDURE — 38222 DX BONE MARROW BX & ASPIR: CPT

## 2024-10-23 PROCEDURE — C1830 POWER BONE MARROW BX NEEDLE: HCPCS

## 2024-10-23 PROCEDURE — 88305 TISSUE EXAM BY PATHOLOGIST: CPT | Performed by: STUDENT IN AN ORGANIZED HEALTH CARE EDUCATION/TRAINING PROGRAM

## 2024-10-23 PROCEDURE — 77002 NEEDLE LOCALIZATION BY XRAY: CPT | Performed by: RADIOLOGY

## 2024-10-23 PROCEDURE — 81455 SO/HL 51/>GSAP DNA/DNA&RNA: CPT | Performed by: PHYSICIAN ASSISTANT

## 2024-10-23 PROCEDURE — 88185 FLOWCYTOMETRY/TC ADD-ON: CPT

## 2024-10-23 PROCEDURE — 85027 COMPLETE CBC AUTOMATED: CPT | Performed by: STUDENT IN AN ORGANIZED HEALTH CARE EDUCATION/TRAINING PROGRAM

## 2024-10-23 PROCEDURE — 88237 TISSUE CULTURE BONE MARROW: CPT | Performed by: PHYSICIAN ASSISTANT

## 2024-10-23 PROCEDURE — 88184 FLOWCYTOMETRY/ TC 1 MARKER: CPT | Performed by: PHYSICIAN ASSISTANT

## 2024-10-23 PROCEDURE — 88311 DECALCIFY TISSUE: CPT | Performed by: STUDENT IN AN ORGANIZED HEALTH CARE EDUCATION/TRAINING PROGRAM

## 2024-10-23 PROCEDURE — 88341 IMHCHEM/IMCYTCHM EA ADD ANTB: CPT | Performed by: STUDENT IN AN ORGANIZED HEALTH CARE EDUCATION/TRAINING PROGRAM

## 2024-10-23 PROCEDURE — 88313 SPECIAL STAINS GROUP 2: CPT | Performed by: STUDENT IN AN ORGANIZED HEALTH CARE EDUCATION/TRAINING PROGRAM

## 2024-10-23 PROCEDURE — 88189 FLOWCYTOMETRY/READ 16 & >: CPT

## 2024-10-23 PROCEDURE — 88264 CHROMOSOME ANALYSIS 20-25: CPT | Performed by: PHYSICIAN ASSISTANT

## 2024-10-23 PROCEDURE — 88365 INSITU HYBRIDIZATION (FISH): CPT | Performed by: STUDENT IN AN ORGANIZED HEALTH CARE EDUCATION/TRAINING PROGRAM

## 2024-10-23 PROCEDURE — 88342 IMHCHEM/IMCYTCHM 1ST ANTB: CPT | Performed by: STUDENT IN AN ORGANIZED HEALTH CARE EDUCATION/TRAINING PROGRAM

## 2024-10-23 RX ORDER — SODIUM CHLORIDE 9 MG/ML
75 INJECTION, SOLUTION INTRAVENOUS CONTINUOUS
Status: DISCONTINUED | OUTPATIENT
Start: 2024-10-23 | End: 2024-10-24 | Stop reason: HOSPADM

## 2024-10-23 RX ORDER — FENTANYL CITRATE 50 UG/ML
INJECTION, SOLUTION INTRAMUSCULAR; INTRAVENOUS AS NEEDED
Status: COMPLETED | OUTPATIENT
Start: 2024-10-23 | End: 2024-10-23

## 2024-10-23 RX ORDER — MIDAZOLAM HYDROCHLORIDE 2 MG/2ML
INJECTION, SOLUTION INTRAMUSCULAR; INTRAVENOUS AS NEEDED
Status: COMPLETED | OUTPATIENT
Start: 2024-10-23 | End: 2024-10-23

## 2024-10-23 RX ORDER — LIDOCAINE WITH 8.4% SOD BICARB 0.9%(10ML)
SYRINGE (ML) INJECTION AS NEEDED
Status: COMPLETED | OUTPATIENT
Start: 2024-10-23 | End: 2024-10-23

## 2024-10-23 RX ADMIN — SODIUM CHLORIDE 75 ML/HR: 0.9 INJECTION, SOLUTION INTRAVENOUS at 11:21

## 2024-10-23 RX ADMIN — FENTANYL CITRATE 25 MCG: 50 INJECTION, SOLUTION INTRAMUSCULAR; INTRAVENOUS at 12:26

## 2024-10-23 RX ADMIN — MIDAZOLAM 0.5 MG: 1 INJECTION INTRAMUSCULAR; INTRAVENOUS at 12:25

## 2024-10-23 RX ADMIN — MIDAZOLAM 0.5 MG: 1 INJECTION INTRAMUSCULAR; INTRAVENOUS at 12:17

## 2024-10-23 RX ADMIN — MIDAZOLAM 0.5 MG: 1 INJECTION INTRAMUSCULAR; INTRAVENOUS at 12:23

## 2024-10-23 RX ADMIN — FENTANYL CITRATE 25 MCG: 50 INJECTION, SOLUTION INTRAMUSCULAR; INTRAVENOUS at 12:23

## 2024-10-23 RX ADMIN — Medication 10 ML: at 12:24

## 2024-10-23 RX ADMIN — FENTANYL CITRATE 25 MCG: 50 INJECTION, SOLUTION INTRAMUSCULAR; INTRAVENOUS at 12:17

## 2024-10-23 NOTE — BRIEF OP NOTE (RAD/CATH)
IR BIOPSY BONE MARROW  Procedure Note    PATIENT NAME: Noah Mendez  : 1940  MRN: [id number]     Pre-op Diagnosis:   1. Elevated total protein    2. IgG monoclonal gammopathy    3. Macrocytic anemia      Post-op Diagnosis:   1. Elevated total protein    2. IgG monoclonal gammopathy    3. Macrocytic anemia        Surgeon:   Orion Wilder DO  Assistants:     No qualified resident was available.    Estimated Blood Loss: None  Findings: Right iliac crest targeted.    Specimens: BM aspirate / core bone.    Complications:  none    Anesthesia: conscious sedation and local    Orion Wilder DO     Date: 10/23/2024  Time: 12:27 PM

## 2024-10-23 NOTE — NURSING NOTE
Pt able to tolerate p.o. intake. Pt in no apparent distress. Pt reports no pain, bandage intact. AVS reviewed and pt and family member verbalized understanding. IV removed.

## 2024-10-23 NOTE — DISCHARGE INSTRUCTIONS
Moderate Sedation   WHAT YOU NEED TO KNOW:   Moderate sedation, or conscious sedation, is medicine used during procedures to help you feel relaxed and calm. You will be awake and able to follow directions without anxiety or pain. You will remember little to none of the procedure. You may feel tired, weak, or unsteady on your feet after you get sedation. You may also have trouble concentrating or short-term memory loss. These symptoms should go away in 24 hours or less.   DISCHARGE INSTRUCTIONS:   Call 911 or have someone else call for any of the following:   You have sudden trouble breathing.     You cannot be woken.  Seek care immediately if:   You have a severe headache or dizziness.     Your heart is beating faster than usual.  Contact your healthcare provider if:   You have a fever.     You have nausea or are vomiting for more than 8 hours after the procedure.      Your skin is itchy, swollen, or you have a rash.     You have questions or concerns about your condition or care.  Self-care:   Have someone stay with you for 24 hours. This person can drive you to errands and help you do things around the house. This person can also watch for problems.      Rest and do quiet activities for 24 hours. Do not exercise, ride a bike, or play sports. Stand up slowly to prevent dizziness and falls. Take short walks around the house with another person. Slowly return to your usual activities the next day.      Do not drive or use dangerous machines or tools for 24 hours. You may injure yourself or others. Examples include a lawnmower, saw, or drill. Do not return to work for 24 hours if you use dangerous machines or tools for work.      Do not make important decisions for 24 hours. For example, do not sign important papers or invest money.      Drink liquids as directed. Liquids help flush the sedation medicine out of your body. Ask how much liquid to drink each day and which liquids are best for you.      Eat small,  frequent meals to prevent nausea and vomiting. Start with clear liquids such as juice or broth. If you do not vomit after clear liquids, you can eat your usual foods.      Do not drink alcohol or take medicines that make you drowsy. This includes medicines that help you sleep and anxiety medicines. Ask your healthcare provider if it is safe for you to take pain medicine.  Follow up with your healthcare provider as directed: Write down your questions so you remember to ask them during your visits.   © 2017 [employer] Information is for End User's use only and may not be sold, redistributed or otherwise used for commercial purposes. All illustrations and images included in CareNotes® are the copyrighted property of [employer]. or [employer].  The above information is an  only. It is not intended as medical advice for individual conditions or treatments. Talk to your doctor, nurse or pharmacist before following any medical regimen to see if it is safe and effective for you.    Bone Marrow Biopsy     WHAT YOU NEED TO KNOW:   A bone marrow biopsy is a procedure to remove a small amount of bone marrow from your bone. Bone marrow is the soft tissue inside your bone that helps to make blood cells. The sample is tested for disease or infection.    DISCHARGE INSTRUCTIONS:     1. Limit your activities day of biopsy as directed by your doctor.    2. Use medication as ordered.    3. Return to your normal diet.Small sips of flat soda will help with nausea.    4. Remove band-aid or dressing 24 hours after procedure.    Contact Interventional Radiology at [phone] ([facility] PATIENTS: Contact Interventional Radiology at [phone]) ([place] PATIENTS: Contact Interventional Radiology at [phone]) if:    1. Difficulty breathing, nausea or vomiting.    2. Chills or fever above 101 F.    3. Pain at biopsy site not relieved by medication.    4. Develop any redness, swelling, heat,  unusual drainage, heavy bruising or bleeding from biopsy site.

## 2024-10-25 NOTE — PROGRESS NOTES
Spoke with the patient's medical power of , Xuan.  Discussed the importance of follow-up secondary to concern for multiple myeloma diagnosis.  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    Considering the patient's daughter's concerns, I do believe that she and her father would greatly benefit from an appointment to discuss treatments and anticipated improvements.  I believe the patient to been symptomatic long before development of atrial fibrillation, and may have a positive impact mentally with intervention of treatment.     Pt daughter xuan agreed to follow up Monday at 4PM.

## 2024-10-28 ENCOUNTER — OFFICE VISIT (OUTPATIENT)
Age: 84
End: 2024-10-28
Payer: COMMERCIAL

## 2024-10-28 VITALS
WEIGHT: 168 LBS | SYSTOLIC BLOOD PRESSURE: 114 MMHG | DIASTOLIC BLOOD PRESSURE: 68 MMHG | HEART RATE: 57 BPM | RESPIRATION RATE: 16 BRPM | TEMPERATURE: 98 F | HEIGHT: 71 IN | BODY MASS INDEX: 23.52 KG/M2 | OXYGEN SATURATION: 100 %

## 2024-10-28 DIAGNOSIS — D68.69 OTHER THROMBOPHILIA (HCC): ICD-10-CM

## 2024-10-28 DIAGNOSIS — C90.00 MULTIPLE MYELOMA NOT HAVING ACHIEVED REMISSION (HCC): Primary | ICD-10-CM

## 2024-10-28 LAB
ANISOCYTOSIS BLD QL SMEAR: PRESENT
BASOPHILS # BLD MANUAL: 0.05 THOUSAND/UL (ref 0–0.1)
BASOPHILS NFR MAR MANUAL: 1 % (ref 0–1)
EOSINOPHIL # BLD MANUAL: 0.05 THOUSAND/UL (ref 0–0.4)
EOSINOPHIL NFR BLD MANUAL: 1 % (ref 0–6)
LYMPHOCYTES # BLD AUTO: 1.2 THOUSAND/UL (ref 0.6–4.47)
LYMPHOCYTES # BLD AUTO: 20 % (ref 14–44)
MACROCYTES BLD QL AUTO: PRESENT
MONOCYTES # BLD AUTO: 0.66 THOUSAND/UL (ref 0–1.22)
MONOCYTES NFR BLD: 12 % (ref 4–12)
MYELOCYTE ABSOLUTE CT: 0.05 THOUSAND/UL (ref 0–0.1)
MYELOCYTES NFR BLD MANUAL: 1 % (ref 0–1)
NEUTROPHILS # BLD MANUAL: 3.45 THOUSAND/UL (ref 1.85–7.62)
NEUTS SEG NFR BLD AUTO: 63 % (ref 43–75)
PLATELET BLD QL SMEAR: ABNORMAL
POIKILOCYTOSIS BLD QL SMEAR: PRESENT
RBC MORPH BLD: PRESENT
TOTAL CELLS COUNTED SPEC: 100
VARIANT LYMPHS # BLD AUTO: 2 %

## 2024-10-28 PROCEDURE — 85060 BLOOD SMEAR INTERPRETATION: CPT | Performed by: STUDENT IN AN ORGANIZED HEALTH CARE EDUCATION/TRAINING PROGRAM

## 2024-10-28 PROCEDURE — 88360 TUMOR IMMUNOHISTOCHEM/MANUAL: CPT | Performed by: STUDENT IN AN ORGANIZED HEALTH CARE EDUCATION/TRAINING PROGRAM

## 2024-10-28 PROCEDURE — 88342 IMHCHEM/IMCYTCHM 1ST ANTB: CPT | Performed by: STUDENT IN AN ORGANIZED HEALTH CARE EDUCATION/TRAINING PROGRAM

## 2024-10-28 PROCEDURE — 88311 DECALCIFY TISSUE: CPT | Performed by: STUDENT IN AN ORGANIZED HEALTH CARE EDUCATION/TRAINING PROGRAM

## 2024-10-28 PROCEDURE — G2211 COMPLEX E/M VISIT ADD ON: HCPCS | Performed by: INTERNAL MEDICINE

## 2024-10-28 PROCEDURE — 88305 TISSUE EXAM BY PATHOLOGIST: CPT | Performed by: STUDENT IN AN ORGANIZED HEALTH CARE EDUCATION/TRAINING PROGRAM

## 2024-10-28 PROCEDURE — 99215 OFFICE O/P EST HI 40 MIN: CPT | Performed by: INTERNAL MEDICINE

## 2024-10-28 PROCEDURE — 88365 INSITU HYBRIDIZATION (FISH): CPT | Performed by: STUDENT IN AN ORGANIZED HEALTH CARE EDUCATION/TRAINING PROGRAM

## 2024-10-28 PROCEDURE — 88364 INSITU HYBRIDIZATION (FISH): CPT | Performed by: STUDENT IN AN ORGANIZED HEALTH CARE EDUCATION/TRAINING PROGRAM

## 2024-10-28 PROCEDURE — 88341 IMHCHEM/IMCYTCHM EA ADD ANTB: CPT | Performed by: STUDENT IN AN ORGANIZED HEALTH CARE EDUCATION/TRAINING PROGRAM

## 2024-10-28 PROCEDURE — 85097 BONE MARROW INTERPRETATION: CPT | Performed by: STUDENT IN AN ORGANIZED HEALTH CARE EDUCATION/TRAINING PROGRAM

## 2024-10-28 PROCEDURE — 88313 SPECIAL STAINS GROUP 2: CPT | Performed by: STUDENT IN AN ORGANIZED HEALTH CARE EDUCATION/TRAINING PROGRAM

## 2024-10-28 RX ORDER — ACYCLOVIR 400 MG/1
400 TABLET ORAL 2 TIMES DAILY
Qty: 60 TABLET | Refills: 3 | Status: SHIPPED | OUTPATIENT
Start: 2024-10-28 | End: 2025-02-25

## 2024-10-29 ENCOUNTER — TELEPHONE (OUTPATIENT)
Age: 84
End: 2024-10-29

## 2024-10-29 ENCOUNTER — TELEPHONE (OUTPATIENT)
Dept: HEMATOLOGY ONCOLOGY | Facility: CLINIC | Age: 84
End: 2024-10-29

## 2024-10-29 ENCOUNTER — TELEPHONE (OUTPATIENT)
Dept: OTHER | Facility: OTHER | Age: 84
End: 2024-10-29

## 2024-10-29 PROBLEM — C90.00 MULTIPLE MYELOMA NOT HAVING ACHIEVED REMISSION (HCC): Status: ACTIVE | Noted: 2024-10-29

## 2024-10-29 LAB — SCAN RESULT: NORMAL

## 2024-10-29 RX ORDER — ACETAMINOPHEN 325 MG/1
650 TABLET ORAL ONCE
OUTPATIENT
Start: 2024-11-20

## 2024-10-29 RX ORDER — DIPHENHYDRAMINE HCL 25 MG
25 TABLET ORAL ONCE
OUTPATIENT
Start: 2024-11-27

## 2024-10-29 RX ORDER — DEXAMETHASONE 4 MG/1
10 TABLET ORAL ONCE
OUTPATIENT
Start: 2024-11-27

## 2024-10-29 RX ORDER — DIPHENHYDRAMINE HCL 25 MG
25 TABLET ORAL ONCE
OUTPATIENT
Start: 2024-11-20

## 2024-10-29 RX ORDER — DEXAMETHASONE 4 MG/1
10 TABLET ORAL ONCE
OUTPATIENT
Start: 2024-11-20

## 2024-10-29 RX ORDER — SODIUM CHLORIDE 9 MG/ML
20 INJECTION, SOLUTION INTRAVENOUS ONCE
OUTPATIENT
Start: 2024-11-13

## 2024-10-29 RX ORDER — ACETAMINOPHEN 325 MG/1
650 TABLET ORAL ONCE
OUTPATIENT
Start: 2024-11-27

## 2024-10-29 RX ORDER — ACETAMINOPHEN 325 MG/1
650 TABLET ORAL ONCE
Status: CANCELLED | OUTPATIENT
Start: 2024-11-06

## 2024-10-29 RX ORDER — SODIUM CHLORIDE 9 MG/ML
20 INJECTION, SOLUTION INTRAVENOUS ONCE
Status: CANCELLED | OUTPATIENT
Start: 2024-11-06

## 2024-10-29 RX ORDER — ACETAMINOPHEN 325 MG/1
650 TABLET ORAL ONCE
OUTPATIENT
Start: 2024-11-13

## 2024-10-29 NOTE — PROGRESS NOTES
HEMATOLOGY / ONCOLOGY CLINIC FOLLOW UP NOTE    Primary Care Provider: Guanako Cortes MD  Referring Provider:    MRN: [id number]  : 1940    Reason for Encounter: follow up newly diagnosis multiple myeloma       Oncology History Overview Note   10/2024 - IgGK multiple myeloma with 90% plasma cells in the bone marrow, molecular studies pending     Multiple myeloma not having achieved remission (HCC)   10/29/2024 Initial Diagnosis    Multiple myeloma not having achieved remission (HCC)     10/29/2024 -  Chemotherapy    alteplase (CATHFLO), 2 mg, Intracatheter, Every 1 Minute as needed, 0 of 12 cycles  daratumumab-hyaluronidase (DARZALEX FASPRO), 1,800 mg, Subcutaneous daratumumab-hyaluronidase, Once, 0 of 12 cycles         Interval History: Patient presents for follow up of his bone marrow biopsy which revealed a diagnosis of IgG kappa multiple myeloma.  He had a bone marrow biopsy this month that revealed the diagnosis after having an M spike of 4.4 g/dL and a monoclonal protein positive for IgG kappa.  His other medical problems include A-fib with recent admission for rapid ventricular rate.  He is status post cardioversion and is now in sinus rhythm.  He is also on amiodarone.  His ejection fraction is 35%.  He also had a recent COVID infection.  He has had issues with fecal impaction and severe constipation.  He also has hypertension.  He is already received a flu vaccine for this season.  He is here today with his 2 daughters, 1 of which lives across the street from him.  He lives alone in a rancher style house.  A CT scan is pending for myeloma.  Bone marrow biopsy showed 90% plasma cells in the bone marrow.  Most recent kappa levels to a 6 and most recent IgG levels 5148.  Creatinine is 0.99, calcium 8.3, hemoglobin 10.9.  The patient denies any pain.  He denies any headaches or blurry vision.  He has mild dyspnea on exertion.  His family members help him with the chores around the house but he lives  independently otherwise.  He has had neuropathy for many years.  The right now it seems confined to his toes.  He denies any falls in the past 6 months.  He still smokes about 1 to 2 cigarettes a day and has smoked for 60 years.  He does not drink alcohol.  He does have a tooth that needs to be extracted.  He is retired and worked as a .  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         REVIEW OF SYSTEMS:  Please note that a 14-point review of systems was performed to include Constitutional, HEENT, Respiratory, CVS, GI, , Musculoskeletal, Integumentary, Neurologic, Rheumatologic, Endocrinologic, Psychiatric, Lymphatic, and Hematologic/Oncologic systems were reviewed and are negative unless otherwise stated in HPI. Positive and negative findings pertinent to this evaluation are incorporated into the history of present illness.      ECOG PS: 1    PROBLEM LIST:  Patient Active Problem List   Diagnosis    COVID-19    Atrial fibrillation with RVR (HCC)    CHF (congestive heart failure) (HCC)    Hypertension    Coronary artery disease involving coronary bypass graft    Cardiomyopathy (HCC)    Elevated total protein    Peripheral vascular disease, unspecified (HCC)    Other thrombophilia (HCC)    Multiple myeloma not having achieved remission (HCC)       Assessment / Plan: 83-year-old male with an IgG kappa multiple myeloma newly diagnosed.  The pathophysiology and natural history of multiple myeloma were discussed.  I will follow-up in the results of his CT scan to assess for lytic lesions.  He has mild anemia and an indication to treat that includes that and the greater than 60% plasma cells in the marrow.  Molecular studies are still pending on his bone marrow biopsy.  I think we will need to be cautious due to his age but he is a candidate for therapy.  I have recommended daratumumab given on a 28-day cycle.  We will initially start out with weekly dosing with 10 mg of dexamethasone as a premed only.   I am concerned that higher doses of dexamethasone at his age can lead to confusion or difficulty sleeping.  That has been a problem for him in the past.  I am also going to dose his Revlimid due to age.  He will take 15 mg 3 weeks on 1 week off on a 28-day cycle.  For now I decided to avoid Velcade based regimen because of potential for worsening neuropathy.  We will start working on drug authorization and enrollment in the rems program.  Risks and benefits of this treatment were discussed and consent was signed.  Prior to starting I recommend he get an RSV vaccine.  He just had a COVID infection and a flu shot.  He should also get a shingles vaccine.  I started him on acyclovir for 400 mg twice daily to help prevent shingles reactivation.  He is already on Eliquis and aspirin because of his A-fib for DVT prevention on Revlimid.  Once this regimen is started I will see him back about 2 weeks after he starts to check on tolerance.  We discussed that while myeloma is not curable I am hopeful that I can control it in the long-term for him.  I also reiterated that none of these treatments are traditional chemotherapy which was a big concern for him.  Because of his dental status we will only start a bone modifying agent if he has lytic bone lesions.  At some point we could also get a DEXA scan to assess his bone density to assess the need for a bone modifying agent from that standpoint.  All the patient's and daughter's questions were answered.  He knows to call me in the interim with any questions or concerns.       I spent 50 minutes on chart review, face to face counseling time, coordination of care and documentation.    Past Medical History:   has a past medical history of Anemia, Atrial fibrillation (HCC), CHF (congestive heart failure) (HCC), Coronary artery disease, Depression, Hyperlipidemia, Hypertension, Hyponatremia, Insomnia, Low blood pressure, and Thrombocytopenia (HCC).    PAST SURGICAL HISTORY:   has a  past surgical history that includes Cardiac surgery; Fracture surgery (Left); Cardiac pacemaker placement; Coronary artery bypass graft; and IR biopsy bone marrow (10/23/2024).    CURRENT MEDICATIONS  Current Outpatient Medications   Medication Sig Dispense Refill    acyclovir (ZOVIRAX) 400 MG tablet Take 1 tablet (400 mg total) by mouth 2 (two) times a day 60 tablet 3    amiodarone 200 mg tablet Take 1 tablet (200 mg total) by mouth 3 (three) times a day with meals for 7 days, THEN 1 tablet (200 mg total) 2 (two) times a day with meals for 7 days, THEN 1 tablet (200 mg total) daily with breakfast. 65 tablet 0    amiodarone 200 mg tablet Take 1 tablet (200 mg total) by mouth daily 90 tablet 3    apixaban (ELIQUIS) 5 mg Take 1 tablet (5 mg total) by mouth 2 (two) times a day      Ativan 0.5 MG tablet Take 0.5 mg by mouth      docusate sodium (COLACE) 100 mg capsule Take 1 capsule (100 mg total) by mouth 2 (two) times a day      Empagliflozin (Jardiance) 10 MG TABS tablet Take 10 mg by mouth every morning      fluticasone (FLONASE) 50 mcg/act nasal spray 1 spray into each nostril daily      furosemide (LASIX) 20 mg tablet Take 1 tablet (20 mg total) by mouth 2 (two) times a day      Magnesium 300 MG CAPS Take by mouth      melatonin 1 mg Take 10 mg by mouth daily at bedtime      metoprolol succinate (TOPROL-XL) 25 mg 24 hr tablet Take 2 tablets (50 mg total) by mouth every 24 hours      Multiple Vitamin (multivitamin) tablet Take 1 tablet by mouth daily      polyethylene glycol (MIRALAX) 17 g packet Take 17 g by mouth daily      QUEtiapine (SEROquel) 50 mg tablet Take 100 mg by mouth daily at bedtime      senna (SENOKOT) 8.6 mg Take 2 tablets (17.2 mg total) by mouth daily at bedtime as needed for constipation      potassium chloride (Klor-Con M20) 20 mEq tablet Take 1 tablet (20 mEq total) by mouth once for 1 dose (Patient not taking: Reported on 10/8/2024) 1 tablet 0     No current facility-administered medications  "for this visit.     @[employer]@    [place] HISTORY:   reports that he quit smoking about 8 weeks ago. His smoking use included cigarettes. He has never used smokeless tobacco. He reports that he does not drink alcohol and does not use drugs.     FAMILY HISTORY:  family history is not on file.     ALLERGIES:  has No Known Allergies.      Physical Exam:  Vital Signs:   Visit Vitals  /68 (BP Location: Left arm, Patient Position: Sitting, Cuff Size: Standard)   Pulse 57   Temp 98 °F (36.7 °C) (Temporal)   Resp 16   Ht 5' 11\" (1.803 m)   Wt 76.2 kg (168 lb)   SpO2 100%   BMI 23.43 kg/m²   Smoking Status Former   BSA 1.96 m²     Body mass index is 23.43 kg/m².  Body surface area is 1.96 meters squared.    GEN: Alert, awake oriented x3, in no acute distress  HEENT- No pallor, icterus, cyanosis, no oral mucosal lesions,poor dentition   LAD - no palpable cervical, clavicle, axillary, inguinal LAD  Heart- normal S1 S2, regular rate and rhythm, No murmur, rubs.   Lungs- clear breathing sound bilateral.   Abdomen- soft, Non tender, bowel sounds present  Extremities- No cyanosis, clubbing, edema  Neuro- mild decreased sensation on great toes b/l    Labs:  Lab Results   Component Value Date    WBC 5.47 10/23/2024    HGB 10.9 (L) 10/23/2024    HCT 34.2 (L) 10/23/2024     (H) 10/23/2024     (L) 10/23/2024     Lab Results   Component Value Date    SODIUM 129 (L) 09/21/2024    K 4.1 09/21/2024    CL 98 09/21/2024    CO2 26 09/21/2024    AGAP 5 09/21/2024    BUN 23 09/21/2024    CREATININE 0.99 09/21/2024    GLUC 99 09/21/2024    CALCIUM 8.3 (L) 09/21/2024    AST 27 09/17/2024    ALT 27 09/17/2024    ALKPHOS 83 09/17/2024    TP 8.7 (H) 09/19/2024    TBILI 0.84 09/17/2024    EGFR 70 09/21/2024       "

## 2024-10-29 NOTE — TELEPHONE ENCOUNTER
Daughter of patient calling because her father has an appointment on Thursday and she wanted to make sure his most recent Lab work is in his chart.  She states that he had it done by Visiting Nurses from [place] and she thinks it went to [facility] or [facility]. It was done within the last two weeks.  I gave her the fax number for the Cardiologist office and told her to call the visiting nurses group that came to the home and ask them to have the Lab where they took the blood fax the results to this patient's Cardiologist office.  She appreciated the help.

## 2024-10-30 DIAGNOSIS — I42.0 DILATED CARDIOMYOPATHY (HCC): Primary | ICD-10-CM

## 2024-10-30 DIAGNOSIS — I25.5 ISCHEMIC CARDIOMYOPATHY: ICD-10-CM

## 2024-10-31 ENCOUNTER — DOCUMENTATION (OUTPATIENT)
Age: 84
End: 2024-10-31

## 2024-10-31 ENCOUNTER — OFFICE VISIT (OUTPATIENT)
Dept: CARDIOLOGY CLINIC | Facility: CLINIC | Age: 84
End: 2024-10-31
Payer: COMMERCIAL

## 2024-10-31 ENCOUNTER — DOCUMENTATION (OUTPATIENT)
Dept: HEMATOLOGY ONCOLOGY | Facility: CLINIC | Age: 84
End: 2024-10-31

## 2024-10-31 ENCOUNTER — HOSPITAL ENCOUNTER (OUTPATIENT)
Dept: CT IMAGING | Facility: HOSPITAL | Age: 84
Discharge: HOME | End: 2024-10-31
Payer: COMMERCIAL

## 2024-10-31 VITALS
SYSTOLIC BLOOD PRESSURE: 112 MMHG | HEIGHT: 71 IN | BODY MASS INDEX: 22.4 KG/M2 | HEART RATE: 60 BPM | WEIGHT: 160 LBS | DIASTOLIC BLOOD PRESSURE: 70 MMHG

## 2024-10-31 DIAGNOSIS — I42.0 DILATED CARDIOMYOPATHY (HCC): ICD-10-CM

## 2024-10-31 DIAGNOSIS — D47.2 IGG MONOCLONAL GAMMOPATHY: ICD-10-CM

## 2024-10-31 DIAGNOSIS — D53.9 MACROCYTIC ANEMIA: ICD-10-CM

## 2024-10-31 DIAGNOSIS — I10 PRIMARY HYPERTENSION: ICD-10-CM

## 2024-10-31 DIAGNOSIS — R77.8 ELEVATED TOTAL PROTEIN: ICD-10-CM

## 2024-10-31 DIAGNOSIS — I48.0 PAROXYSMAL ATRIAL FIBRILLATION (HCC): Primary | ICD-10-CM

## 2024-10-31 DIAGNOSIS — I50.20 HFREF (HEART FAILURE WITH REDUCED EJECTION FRACTION) (HCC): ICD-10-CM

## 2024-10-31 DIAGNOSIS — I25.10 CORONARY ARTERY DISEASE INVOLVING NATIVE CORONARY ARTERY OF NATIVE HEART WITHOUT ANGINA PECTORIS: ICD-10-CM

## 2024-10-31 LAB — MISCELLANEOUS LAB TEST RESULT: NORMAL

## 2024-10-31 PROCEDURE — 99214 OFFICE O/P EST MOD 30 MIN: CPT | Performed by: INTERNAL MEDICINE

## 2024-10-31 PROCEDURE — 76497 UNLISTED CT PROCEDURE: CPT

## 2024-10-31 NOTE — PROGRESS NOTES
Applied for PT for Revlimid Assistance via LLS. As of 1:30 this application is still in the queue for LLS. Will update with verdict.       Paige Almanza MPH  Phone: [phone]  Email: [email or web address]

## 2024-10-31 NOTE — PROGRESS NOTES
Cardiology Follow Up    Noah Mendez  1940  [id number]  [facility]  [address]  [facility]  [facility] [id number] [phone]    1. Paroxysmal atrial fibrillation (HCC)        2. Dilated cardiomyopathy (HCC)        3. Coronary artery disease involving native coronary artery of native heart without angina pectoris        4. Primary hypertension        5. HFrEF (heart failure with reduced ejection fraction) (HCC)            Discussion/Summary:    Paroxysmal atrial fibrillation - Noah had new onset atrial fibrillation during a hospitalization in August at [place].  Then during another hospitalization at [facility] in September we had him go through a JAZMYN guided cardioversion which was successful.  He will continue on Toprol-XL, amiodarone and Eliquis.  He will be back to see us in 3 to 4 months.    Dilated cardiomyopathy and heart failure with reduced ejection fraction - In the setting of his first hospitalization in which she was in rapid atrial fibrillation and had COVID-19, his ejection fraction was found to be reduced to 35%.  I do feel likely this is a nonischemic drop either tachycardic mediated or associated with his viral infection.  However given his CAD history he requires an updated ischemic workup.  A stress nuclear study will be scheduled in the near future.  We will also follow his echocardiograms closely, checking 1 prior to our next visit.  He will continue on Toprol-XL, Jardiance and Lasix.  His spironolactone was stopped due to low blood pressures.  Low-sodium diet recommended.  We will continue to follow blood work closely, as this is followed by oncology given his multiple myeloma.    CAD - He carries a history of CABG x 2 and a left circumflex PCI in 2015.  He is without symptoms of angina.    History of sick sinus syndrome status post permanent pacemaker - He has not established with our  device clinic as of yet and we will arrange this.    Interval History:     Mr. Mendez comes in for follow-up given his cardiac history.  We met him during a hospitalization in September in which she presented with rapid atrial fibrillation.  He carries a history of coronary artery disease with prior CABG x 2 and subsequent PCI in 2015 and a history of a permanent pacemaker.    Noah was hospitalized at [place] in August of this year with new onset atrial fibrillation.  He was also found to have a drop in his ejection fraction down to 35%, previously normal.  In the setting of this he was found to have COVID-19 as well.  During that hospitalization he was placed on goal-directed medical therapy for his cardiomyopathy which included Toprol-XL, spironolactone, Jardiance and Lasix.  He was not started on anticoagulation and it was unclear why.  Dated September he came in with symptoms of abdominal distention and constipation.  With him persisting atrial fibrillation he was noted to become rapid.  He was placed on an IV Cardizem drip and we uptitrated over the node blockers.  He was also started on Eliquis.  With his heart rates not getting adequately controlled we had him go through a JAZMYN guided cardioversion which was successful.  We placed him on amiodarone as well.  He has remained in sinus or an atrial paced rhythm since.  His JAZMYN did show an ejection fraction around 35% with severe left atrial dilation.    Overall Noah feels better compared to his hospitalizations.  He is less short of breath and in general feels better.  This was confirmed by his daughter.  However he does cunningham with significant fatigue and he still has shortness of breath with exertion.  He denies chest pains or any symptoms of angina.  No palpitations or any symptoms of atrial fibrillation.  No lightheadedness or syncope.  He denies any other signs of volume overload.    Patient Active Problem List   Diagnosis    COVID-19    Paroxysmal  atrial fibrillation (HCC)    HFrEF (heart failure with reduced ejection fraction) (HCC)    Hypertension    Coronary artery disease involving native coronary artery of native heart without angina pectoris    Cardiomyopathy (HCC)    Elevated total protein    Peripheral vascular disease, unspecified (HCC)    Other thrombophilia (HCC)    Multiple myeloma not having achieved remission (HCC)     Past Medical History:   Diagnosis Date    Anemia     Atrial fibrillation (HCC)     CHF (congestive heart failure) (HCC)     Coronary artery disease     Depression     Hyperlipidemia     Hypertension     Hyponatremia     Insomnia     Low blood pressure     Thrombocytopenia (HCC)      Social History     Socioeconomic History    Marital status:      Spouse name: Not on file    Number of children: Not on file    Years of education: Not on file    Highest education level: Not on file   Occupational History    Not on file   Tobacco Use    Smoking status: Former     Current packs/day: 0.00     Types: Cigarettes     Quit date: 2024     Years since quittin.1    Smokeless tobacco: Never   Substance and Sexual Activity    Alcohol use: Never    Drug use: Never    Sexual activity: Not on file   Other Topics Concern    Not on file   Social History Narrative    Not on file     Social Determinants of Health     Financial Resource Strain: Not on file   Food Insecurity: No Food Insecurity (2024)    Nursing - Inadequate Food Risk Classification     Worried About Running Out of Food in the Last Year: Never true     Ran Out of Food in the Last Year: Never true     Ran Out of Food in the Last Year: Not on file   Transportation Needs: No Transportation Needs (2024)    PRAPARE - Transportation     Lack of Transportation (Medical): No     Lack of Transportation (Non-Medical): No   Physical Activity: Not on file   Stress: Not on file   Social Connections: Not on file   Intimate Partner Violence: Not on file   Housing Stability: Low  Risk  (9/17/2024)    Housing Stability Vital Sign     Unable to Pay for Housing in the Last Year: No     Number of Times Moved in the Last Year: 0     Homeless in the Last Year: No      No family history on file.  Past Surgical History:   Procedure Laterality Date    CARDIAC PACEMAKER PLACEMENT      CARDIAC SURGERY      CORONARY ARTERY BYPASS GRAFT      FRACTURE SURGERY Left     Wrist    IR BIOPSY BONE MARROW  10/23/2024       Current Outpatient Medications:     acyclovir (ZOVIRAX) 400 MG tablet, Take 1 tablet (400 mg total) by mouth 2 (two) times a day, Disp: 60 tablet, Rfl: 3    amiodarone 200 mg tablet, Take 1 tablet (200 mg total) by mouth 3 (three) times a day with meals for 7 days, THEN 1 tablet (200 mg total) 2 (two) times a day with meals for 7 days, THEN 1 tablet (200 mg total) daily with breakfast., Disp: 65 tablet, Rfl: 0    amiodarone 200 mg tablet, Take 1 tablet (200 mg total) by mouth daily, Disp: 90 tablet, Rfl: 3    apixaban (ELIQUIS) 5 mg, Take 1 tablet (5 mg total) by mouth 2 (two) times a day, Disp: , Rfl:     Ativan 0.5 MG tablet, Take 0.5 mg by mouth, Disp: , Rfl:     docusate sodium (COLACE) 100 mg capsule, Take 1 capsule (100 mg total) by mouth 2 (two) times a day, Disp: , Rfl:     Empagliflozin (Jardiance) 10 MG TABS tablet, Take 10 mg by mouth every morning, Disp: , Rfl:     fluticasone (FLONASE) 50 mcg/act nasal spray, 1 spray into each nostril daily, Disp: , Rfl:     furosemide (LASIX) 20 mg tablet, Take 1 tablet (20 mg total) by mouth 2 (two) times a day, Disp: , Rfl:     Magnesium 300 MG CAPS, Take by mouth, Disp: , Rfl:     melatonin 1 mg, Take 10 mg by mouth daily at bedtime, Disp: , Rfl:     metoprolol succinate (TOPROL-XL) 25 mg 24 hr tablet, Take 2 tablets (50 mg total) by mouth every 24 hours, Disp: , Rfl:     Multiple Vitamin (multivitamin) tablet, Take 1 tablet by mouth daily, Disp: , Rfl:     polyethylene glycol (MIRALAX) 17 g packet, Take 17 g by mouth daily, Disp: , Rfl:      potassium chloride (Klor-Con M20) 20 mEq tablet, Take 1 tablet (20 mEq total) by mouth once for 1 dose (Patient not taking: Reported on 10/8/2024), Disp: 1 tablet, Rfl: 0    QUEtiapine (SEROquel) 50 mg tablet, Take 100 mg by mouth daily at bedtime, Disp: , Rfl:     senna (SENOKOT) 8.6 mg, Take 2 tablets (17.2 mg total) by mouth daily at bedtime as needed for constipation, Disp: , Rfl:   No Known Allergies    Labs:  Lab Results   Component Value Date    K 4.1 09/21/2024    CL 98 09/21/2024    CO2 26 09/21/2024    BUN 23 09/21/2024    CREATININE 0.99 09/21/2024    CALCIUM 8.3 (L) 09/21/2024     Lab Results   Component Value Date    WBC 5.47 10/23/2024    HGB 10.9 (L) 10/23/2024    HCT 34.2 (L) 10/23/2024     (H) 10/23/2024     (L) 10/23/2024     Imaging:     JAZMYN (9/20/2024):    Left Ventricle: Left ventricular cavity size is dilated. Wall thickness is normal. The left ventricular ejection fraction is 30-35%. Systolic function is severely reduced. There is severe global hypokinesis.    Right Ventricle: Systolic function is reduced.    Left Atrium: The atrium is severely dilated. There is no thrombus. There is moderate, continuous spontaneous echo contrast.    Right Atrium: The atrium is dilated.    Atrial Septum: No patent foramen ovale detected, confirmed at rest using color doppler.    Left Atrial Appendage: There is no thrombus. There is moderate to severe, continuous spontaneous echo contrast.    Aortic Valve: There is trace regurgitation.    Mitral Valve: There is mild to moderate regurgitation.    Tricuspid Valve: There is moderate regurgitation.       Echo follow up/limited w/ contrast if indicated  Result Date: 10/18/2024    Left Ventricle: Left ventricular cavity size is mildly dilated. Wall thickness is mildly increased. The left ventricular ejection fraction is 35%. Systolic function is moderately reduced. Diastolic function is moderately abnormal, consistent with grade II (pseudonormal)  "relaxation.   The following segments are akinetic: basal inferior, basal inferolateral and mid inferolateral.   The following segments are hypokinetic: basal inferoseptal, mid inferoseptal, mid inferior, mid anterolateral, apical inferior and apical lateral.   All other segments are normal.   Left Atrium: The atrium is moderately dilated.   Right Atrium: The atrium is dilated.   Mitral Valve: There is moderate diffuse thickening. There is mild calcification with mild chordal involvement. The leaflets exhibit normal mobility. There is mild annular calcification. There is mild regurgitation.   Tricuspid Valve: There is moderate to severe regurgitation.   Pulmonic Valve: There is mild regurgitation.       Review of Systems:  Review of Systems   Constitutional:  Positive for fatigue.   HENT: Negative.     Eyes: Negative.    Respiratory:  Positive for shortness of breath.    Cardiovascular: Negative.    Gastrointestinal: Negative.    Musculoskeletal: Negative.    Skin: Negative.    Allergic/Immunologic: Negative.    Neurological: Negative.    Hematological: Negative.    Psychiatric/Behavioral: Negative.     All other systems reviewed and are negative.    Vitals:    10/31/24 0820   BP: 112/70   BP Location: Left arm   Patient Position: Sitting   Cuff Size: Standard   Pulse: 60   Weight: 72.6 kg (160 lb)   Height: 5' 11\" (1.803 m)    Physical Exam  Vitals and nursing note reviewed.   Constitutional:       Appearance: He is well-developed.   HENT:      Head: Normocephalic and atraumatic.   Eyes:      General: No scleral icterus.        Right eye: No discharge.         Left eye: No discharge.      Pupils: Pupils are equal, round, and reactive to light.   Neck:      Thyroid: No thyromegaly.      Vascular: No JVD.   Cardiovascular:      Rate and Rhythm: Normal rate and regular rhythm. No extrasystoles are present.     Pulses: Normal pulses. No decreased pulses.      Heart sounds: Normal heart sounds, S1 normal and S2 normal. " No murmur heard.     No friction rub. No gallop.   Pulmonary:      Effort: Pulmonary effort is normal. No respiratory distress.      Breath sounds: Decreased breath sounds present. No wheezing, rhonchi or rales.   Abdominal:      General: Bowel sounds are normal. There is no distension.      Palpations: Abdomen is soft.      Tenderness: There is no abdominal tenderness.   Musculoskeletal:         General: No tenderness or deformity. Normal range of motion.      Cervical back: Normal range of motion and neck supple.      Right lower leg: No edema.      Left lower leg: No edema.   Skin:     General: Skin is warm and dry.      Findings: No rash.   Neurological:      Mental Status: He is alert and oriented to person, place, and time.      Cranial Nerves: No cranial nerve deficit.   Psychiatric:         Thought Content: Thought content normal.         Judgment: Judgment normal.     Counseling / Coordination of Care  Total office time spent today 40 minutes.  Greater than 50% of total time was spent with the patient and / or family counseling and / or coordination of care.

## 2024-10-31 NOTE — PROGRESS NOTES
Received request from clinical for patient to start on Revlimid 15 MG. 3 weeks on 1 week off.     Auth has been submitted and has been approved  10/31/2024-12/31/2039- 21 for 28 days    BIN:       [id number]  PCN:      [id number]  ID:          [id number]  GRP:      [id number]

## 2024-11-01 ENCOUNTER — TELEPHONE (OUTPATIENT)
Dept: CARDIOLOGY CLINIC | Facility: CLINIC | Age: 84
End: 2024-11-01

## 2024-11-01 ENCOUNTER — TELEPHONE (OUTPATIENT)
Dept: HEMATOLOGY ONCOLOGY | Facility: CLINIC | Age: 84
End: 2024-11-01

## 2024-11-01 DIAGNOSIS — C90.00 MULTIPLE MYELOMA NOT HAVING ACHIEVED REMISSION (HCC): Primary | ICD-10-CM

## 2024-11-01 LAB
MISCELLANEOUS LAB TEST RESULT: NORMAL
SCAN RESULT: NORMAL

## 2024-11-01 NOTE — TELEPHONE ENCOUNTER
Called spoke to pts daughter Xuan, relayed message as given, she verbalized understanding. she is ok with a phone call to discuss things. She is taking her dad to his first infusion tx on Wed and states that if you feel it would affect that it any way please call her back prior to that. She will do the call alone and without her dad as he wouldn't understand what is being said

## 2024-11-01 NOTE — TELEPHONE ENCOUNTER
Called to confirm we were able to schedule 2 week follow up on 11/18/2024 @ 10am. Patient daughter Xuan confirms this date and time will work.

## 2024-11-01 NOTE — TELEPHONE ENCOUNTER
----- Message from Jhony Pierre PA-C sent at 10/30/2024  1:04 PM EDT -----  Please call Noah and let him know his heart function is still low without any improvement.  I have ordered a pharmacologic stress test to see if he has any underlying blockages that is causing this.  If he wants to discuss this further, I can call or happy to see him back in the office to discuss further. Thanks

## 2024-11-04 ENCOUNTER — TELEPHONE (OUTPATIENT)
Age: 84
End: 2024-11-04

## 2024-11-04 ENCOUNTER — DOCUMENTATION (OUTPATIENT)
Dept: HEMATOLOGY ONCOLOGY | Facility: CLINIC | Age: 84
End: 2024-11-04

## 2024-11-04 DIAGNOSIS — C90.00 MULTIPLE MYELOMA NOT HAVING ACHIEVED REMISSION (HCC): Primary | ICD-10-CM

## 2024-11-04 NOTE — TELEPHONE ENCOUNTER
Spoke with patient's daughter Xuan. Xuan states that the patient is scheduled for the RSV shot tonight and was wondering if he should get the shingles vaccine as well. I informed her that I will ask Dr. Mathis and call her back to let her know. Xuan was also curious about how long it will take the patient to feel better with treatment. I informed her that I will speak with Dr. Mathis and call her back. Xuan wrote down all labs that the patient needs drawn tomorrow. Xuan verbalized understanding and is in agreement with the plan.

## 2024-11-04 NOTE — PROGRESS NOTES
Faxed the fully executed dx code verification letter.      Paige Almanza MPH  Phone: [phone]  Email: [email or web address]

## 2024-11-05 ENCOUNTER — TELEPHONE (OUTPATIENT)
Dept: HEMATOLOGY ONCOLOGY | Facility: CLINIC | Age: 84
End: 2024-11-05

## 2024-11-05 ENCOUNTER — TELEPHONE (OUTPATIENT)
Dept: INFUSION CENTER | Facility: HOSPITAL | Age: 84
End: 2024-11-05

## 2024-11-05 LAB
FERRITIN SERPL-MCNC: 83 NG/ML (ref 30–400)
FOLATE SERPL-MCNC: >20 NG/ML
IRON SATN MFR SERPL: 33 % (ref 15–55)
IRON SERPL-MCNC: 78 UG/DL (ref 38–169)
MISCELLANEOUS LAB TEST RESULT: NORMAL
TIBC SERPL-MCNC: 237 UG/DL (ref 250–450)
UIBC SERPL-MCNC: 159 UG/DL (ref 111–343)
VIT B12 SERPL-MCNC: 492 PG/ML (ref 232–1245)

## 2024-11-05 RX ORDER — LENALIDOMIDE 15 MG/1
15 CAPSULE ORAL DAILY
Qty: 21 CAPSULE | Refills: 0 | Status: SHIPPED | OUTPATIENT
Start: 2024-11-05

## 2024-11-05 NOTE — TELEPHONE ENCOUNTER
Spoke with patient's daughter and informed her that Dr. Mathis recommends getting the shingles vaccine. I also informed her that Dr. Mathis cannot give a time frame for which the medications can make him feel better because everyone is different and medications can also have side effects. Patient's daughter verbalized understanding and is in agreement with the plan.

## 2024-11-05 NOTE — TELEPHONE ENCOUNTER
Pt scheduled for D1C1 chemo tomorrow. Called pt to review location of infusion center, policies/procedures, and expected appt length. No answer, LVM. Pt provided with phone number to call back if any new questions/concerns arise. Pt also reminded to get labs completed.

## 2024-11-06 ENCOUNTER — HOSPITAL ENCOUNTER (OUTPATIENT)
Dept: INFUSION CENTER | Facility: HOSPITAL | Age: 84
Discharge: HOME | DRG: 871 | End: 2024-11-06
Attending: INTERNAL MEDICINE
Payer: COMMERCIAL

## 2024-11-06 ENCOUNTER — APPOINTMENT (EMERGENCY)
Dept: RADIOLOGY | Facility: HOSPITAL | Age: 84
DRG: 871 | End: 2024-11-06
Payer: COMMERCIAL

## 2024-11-06 ENCOUNTER — HOSPITAL ENCOUNTER (INPATIENT)
Facility: HOSPITAL | Age: 84
LOS: 2 days | Discharge: HOME HEALTH | DRG: 871 | End: 2024-11-09
Attending: EMERGENCY MEDICINE | Admitting: INTERNAL MEDICINE
Payer: COMMERCIAL

## 2024-11-06 ENCOUNTER — TELEPHONE (OUTPATIENT)
Dept: OTHER | Facility: HOSPITAL | Age: 84
End: 2024-11-06

## 2024-11-06 ENCOUNTER — TELEPHONE (OUTPATIENT)
Age: 84
End: 2024-11-06

## 2024-11-06 ENCOUNTER — TELEPHONE (OUTPATIENT)
Dept: OTHER | Facility: OTHER | Age: 84
End: 2024-11-06

## 2024-11-06 VITALS
TEMPERATURE: 98.2 F | HEIGHT: 71 IN | DIASTOLIC BLOOD PRESSURE: 59 MMHG | SYSTOLIC BLOOD PRESSURE: 91 MMHG | OXYGEN SATURATION: 92 % | RESPIRATION RATE: 18 BRPM | WEIGHT: 164.9 LBS | BODY MASS INDEX: 23.09 KG/M2 | HEART RATE: 59 BPM

## 2024-11-06 DIAGNOSIS — T50.905A MEDICATION REACTION: Primary | ICD-10-CM

## 2024-11-06 DIAGNOSIS — R91.8 PULMONARY INFILTRATE: ICD-10-CM

## 2024-11-06 DIAGNOSIS — R09.02 HYPOXIA: ICD-10-CM

## 2024-11-06 DIAGNOSIS — D53.9 MACROCYTIC ANEMIA: ICD-10-CM

## 2024-11-06 DIAGNOSIS — D53.9 MACROCYTIC ANEMIA: Primary | ICD-10-CM

## 2024-11-06 DIAGNOSIS — R50.9 FEVER: ICD-10-CM

## 2024-11-06 DIAGNOSIS — C90.00 MULTIPLE MYELOMA NOT HAVING ACHIEVED REMISSION (HCC): Primary | ICD-10-CM

## 2024-11-06 LAB
2HR DELTA HS TROPONIN: 11 NG/L
ABO GROUP BLD: NORMAL
ALBUMIN SERPL BCG-MCNC: 3.2 G/DL (ref 3.5–5)
ALBUMIN SERPL-MCNC: 3 G/DL (ref 3.7–4.7)
ALP SERPL-CCNC: 91 IU/L (ref 44–121)
ALP SERPL-CCNC: 93 U/L (ref 34–104)
ALT SERPL W P-5'-P-CCNC: 29 U/L (ref 7–52)
ALT SERPL-CCNC: 21 IU/L (ref 0–44)
ANION GAP SERPL CALCULATED.3IONS-SCNC: 10 MMOL/L (ref 4–13)
ANISOCYTOSIS BLD QL SMEAR: PRESENT
APTT PPP: 27 SECONDS (ref 23–34)
AST SERPL W P-5'-P-CCNC: 42 U/L (ref 13–39)
AST SERPL-CCNC: 23 IU/L (ref 0–40)
BACTERIA UR QL AUTO: ABNORMAL /HPF
BASOPHILS # BLD AUTO: 0 X10E3/UL (ref 0–0.2)
BASOPHILS # BLD MANUAL: 0 THOUSAND/UL (ref 0–0.1)
BASOPHILS NFR BLD AUTO: 1 %
BASOPHILS NFR MAR MANUAL: 0 % (ref 0–1)
BILIRUB SERPL-MCNC: 0.5 MG/DL (ref 0–1.2)
BILIRUB SERPL-MCNC: 0.55 MG/DL (ref 0.2–1)
BILIRUB UR QL STRIP: NEGATIVE
BLD GP AB SCN SERPL QL: NEGATIVE
BUN SERPL-MCNC: 17 MG/DL (ref 8–27)
BUN SERPL-MCNC: 17 MG/DL (ref 8–27)
BUN SERPL-MCNC: 24 MG/DL (ref 5–25)
BUN/CREAT SERPL: 14 (ref 10–24)
BUN/CREAT SERPL: 14 (ref 10–24)
CALCIUM ALBUM COR SERPL-MCNC: 8.4 MG/DL (ref 8.3–10.1)
CALCIUM SERPL-MCNC: 7.8 MG/DL (ref 8.4–10.2)
CALCIUM SERPL-MCNC: 8.1 MG/DL (ref 8.6–10.2)
CALCIUM SERPL-MCNC: 8.1 MG/DL (ref 8.6–10.2)
CARDIAC TROPONIN I PNL SERPL HS: 13 NG/L
CARDIAC TROPONIN I PNL SERPL HS: 24 NG/L
CHLORIDE SERPL-SCNC: 96 MMOL/L (ref 96–106)
CHLORIDE SERPL-SCNC: 96 MMOL/L (ref 96–106)
CHLORIDE SERPL-SCNC: 98 MMOL/L (ref 96–108)
CLARITY UR: CLEAR
CO2 SERPL-SCNC: 22 MMOL/L (ref 21–32)
CO2 SERPL-SCNC: 24 MMOL/L (ref 20–29)
CO2 SERPL-SCNC: 25 MMOL/L (ref 20–29)
COLOR UR: YELLOW
CREAT SERPL-MCNC: 1.19 MG/DL (ref 0.76–1.27)
CREAT SERPL-MCNC: 1.2 MG/DL (ref 0.76–1.27)
CREAT SERPL-MCNC: 1.23 MG/DL (ref 0.6–1.3)
EGFR: 60 ML/MIN/1.73
EGFR: 61 ML/MIN/1.73
EOSINOPHIL # BLD AUTO: 0.1 X10E3/UL (ref 0–0.4)
EOSINOPHIL # BLD MANUAL: 0 THOUSAND/UL (ref 0–0.4)
EOSINOPHIL NFR BLD AUTO: 1 %
EOSINOPHIL NFR BLD MANUAL: 0 % (ref 0–6)
ERYTHROCYTE [DISTWIDTH] IN BLOOD BY AUTOMATED COUNT: 16.2 % (ref 11.6–15.4)
ERYTHROCYTE [DISTWIDTH] IN BLOOD BY AUTOMATED COUNT: 19.3 % (ref 11.6–15.1)
GFR SERPL CREATININE-BSD FRML MDRD: 53 ML/MIN/1.73SQ M
GLOBULIN SER-MCNC: 6.6 G/DL (ref 1.5–4.5)
GLUCOSE SERPL-MCNC: 128 MG/DL (ref 70–99)
GLUCOSE SERPL-MCNC: 132 MG/DL (ref 70–99)
GLUCOSE SERPL-MCNC: 155 MG/DL (ref 65–140)
GLUCOSE UR STRIP-MCNC: ABNORMAL MG/DL
HCT VFR BLD AUTO: 31.9 % (ref 37.5–51)
HCT VFR BLD AUTO: 35.4 % (ref 36.5–49.3)
HGB BLD-MCNC: 10.4 G/DL (ref 13–17.7)
HGB BLD-MCNC: 11.1 G/DL (ref 12–17)
HGB UR QL STRIP.AUTO: NEGATIVE
HYALINE CASTS #/AREA URNS LPF: ABNORMAL /LPF
IGA SERPL-MCNC: 29 MG/DL (ref 61–437)
IGG SERPL-MCNC: 6297 MG/DL (ref 603–1613)
IGM SERPL-MCNC: 31 MG/DL (ref 15–143)
IMM GRANULOCYTES # BLD: 0.1 X10E3/UL (ref 0–0.1)
IMM GRANULOCYTES NFR BLD: 3 %
INR PPP: 1.48 (ref 0.85–1.19)
KAPPA LC FREE SER-MCNC: 389.1 MG/L (ref 3.3–19.4)
KAPPA LC FREE/LAMBDA FREE SER: 62.76 {RATIO} (ref 0.26–1.65)
KETONES UR STRIP-MCNC: NEGATIVE MG/DL
LACTATE SERPL-SCNC: 2.2 MMOL/L (ref 0.5–2)
LACTATE SERPL-SCNC: 3.3 MMOL/L (ref 0.5–2)
LAMBDA LC FREE SERPL-MCNC: 6.2 MG/L (ref 5.7–26.3)
LEUKOCYTE ESTERASE UR QL STRIP: NEGATIVE
LYMPHOCYTES # BLD AUTO: 0.8 X10E3/UL (ref 0.7–3.1)
LYMPHOCYTES # BLD AUTO: 1.01 THOUSAND/UL (ref 0.6–4.47)
LYMPHOCYTES # BLD AUTO: 11 % (ref 14–44)
LYMPHOCYTES NFR BLD AUTO: 17 %
MACROCYTES BLD QL AUTO: PRESENT
MCH RBC QN AUTO: 33.1 PG (ref 26.6–33)
MCH RBC QN AUTO: 33.7 PG (ref 26.8–34.3)
MCHC RBC AUTO-ENTMCNC: 31.4 G/DL (ref 31.4–37.4)
MCHC RBC AUTO-ENTMCNC: 32.6 G/DL (ref 31.5–35.7)
MCV RBC AUTO: 102 FL (ref 79–97)
MCV RBC AUTO: 108 FL (ref 82–98)
MONOCYTES # BLD AUTO: 0.18 THOUSAND/UL (ref 0–1.22)
MONOCYTES # BLD AUTO: 0.4 X10E3/UL (ref 0.1–0.9)
MONOCYTES NFR BLD AUTO: 9 %
MONOCYTES NFR BLD: 2 % (ref 4–12)
MORPHOLOGY BLD-IMP: ABNORMAL
NEUTROPHILS # BLD AUTO: 3.3 X10E3/UL (ref 1.4–7)
NEUTROPHILS # BLD MANUAL: 7.98 THOUSAND/UL (ref 1.85–7.62)
NEUTROPHILS NFR BLD AUTO: 69 %
NEUTS BAND NFR BLD MANUAL: 1 % (ref 0–8)
NEUTS SEG NFR BLD AUTO: 86 % (ref 43–75)
NITRITE UR QL STRIP: NEGATIVE
NON-SQ EPI CELLS URNS QL MICRO: ABNORMAL /HPF
PH UR STRIP.AUTO: 6 [PH]
PLATELET # BLD AUTO: 147 X10E3/UL (ref 150–450)
PLATELET # BLD AUTO: 151 THOUSANDS/UL (ref 149–390)
PLATELET BLD QL SMEAR: ADEQUATE
PMV BLD AUTO: 10.8 FL (ref 8.9–12.7)
POLYCHROMASIA BLD QL SMEAR: PRESENT
POTASSIUM SERPL-SCNC: 4 MMOL/L (ref 3.5–5.2)
POTASSIUM SERPL-SCNC: 4 MMOL/L (ref 3.5–5.2)
POTASSIUM SERPL-SCNC: 4.3 MMOL/L (ref 3.5–5.3)
PROCALCITONIN SERPL-MCNC: 0.14 NG/ML
PROT SERPL-MCNC: 10.9 G/DL (ref 6.4–8.4)
PROT SERPL-MCNC: 9.6 G/DL (ref 6–8.5)
PROT UR STRIP-MCNC: ABNORMAL MG/DL
PROTHROMBIN TIME: 18.4 SECONDS (ref 12.3–15)
RBC # BLD AUTO: 3.14 X10E6/UL (ref 4.14–5.8)
RBC # BLD AUTO: 3.29 MILLION/UL (ref 3.88–5.62)
RBC #/AREA URNS AUTO: ABNORMAL /HPF
RBC MORPH BLD: PRESENT
RH BLD: POSITIVE
SL AMB T4, FREE (DIRECT): 1.49 NG/DL (ref 0.82–1.77)
SODIUM SERPL-SCNC: 130 MMOL/L (ref 134–144)
SODIUM SERPL-SCNC: 130 MMOL/L (ref 135–147)
SODIUM SERPL-SCNC: 131 MMOL/L (ref 134–144)
SP GR UR STRIP.AUTO: 1.02 (ref 1–1.03)
SPECIMEN EXPIRATION DATE: NORMAL
TOXIC GRANULES BLD QL SMEAR: PRESENT
TSH SERPL DL<=0.005 MIU/L-ACNC: 10.3 UIU/ML (ref 0.45–4.5)
UROBILINOGEN UR STRIP-ACNC: <2 MG/DL
WBC # BLD AUTO: 4.7 X10E3/UL (ref 3.4–10.8)
WBC # BLD AUTO: 9.17 THOUSAND/UL (ref 4.31–10.16)
WBC #/AREA URNS AUTO: ABNORMAL /HPF
WBC TOXIC VACUOLES BLD QL SMEAR: PRESENT

## 2024-11-06 PROCEDURE — 96365 THER/PROPH/DIAG IV INF INIT: CPT

## 2024-11-06 PROCEDURE — 99285 EMERGENCY DEPT VISIT HI MDM: CPT

## 2024-11-06 PROCEDURE — 84145 PROCALCITONIN (PCT): CPT | Performed by: EMERGENCY MEDICINE

## 2024-11-06 PROCEDURE — 85007 BL SMEAR W/DIFF WBC COUNT: CPT | Performed by: EMERGENCY MEDICINE

## 2024-11-06 PROCEDURE — 96367 TX/PROPH/DG ADDL SEQ IV INF: CPT

## 2024-11-06 PROCEDURE — 96375 TX/PRO/DX INJ NEW DRUG ADDON: CPT

## 2024-11-06 PROCEDURE — 85730 THROMBOPLASTIN TIME PARTIAL: CPT | Performed by: EMERGENCY MEDICINE

## 2024-11-06 PROCEDURE — 81001 URINALYSIS AUTO W/SCOPE: CPT | Performed by: EMERGENCY MEDICINE

## 2024-11-06 PROCEDURE — 86901 BLOOD TYPING SEROLOGIC RH(D): CPT | Performed by: INTERNAL MEDICINE

## 2024-11-06 PROCEDURE — 85610 PROTHROMBIN TIME: CPT | Performed by: EMERGENCY MEDICINE

## 2024-11-06 PROCEDURE — 93005 ELECTROCARDIOGRAM TRACING: CPT

## 2024-11-06 PROCEDURE — 71045 X-RAY EXAM CHEST 1 VIEW: CPT

## 2024-11-06 PROCEDURE — 86850 RBC ANTIBODY SCREEN: CPT | Performed by: INTERNAL MEDICINE

## 2024-11-06 PROCEDURE — 36415 COLL VENOUS BLD VENIPUNCTURE: CPT | Performed by: EMERGENCY MEDICINE

## 2024-11-06 PROCEDURE — 87040 BLOOD CULTURE FOR BACTERIA: CPT | Performed by: EMERGENCY MEDICINE

## 2024-11-06 PROCEDURE — 99285 EMERGENCY DEPT VISIT HI MDM: CPT | Performed by: EMERGENCY MEDICINE

## 2024-11-06 PROCEDURE — 84484 ASSAY OF TROPONIN QUANT: CPT | Performed by: EMERGENCY MEDICINE

## 2024-11-06 PROCEDURE — 83605 ASSAY OF LACTIC ACID: CPT | Performed by: EMERGENCY MEDICINE

## 2024-11-06 PROCEDURE — 96368 THER/DIAG CONCURRENT INF: CPT

## 2024-11-06 PROCEDURE — 96401 CHEMO ANTI-NEOPL SQ/IM: CPT

## 2024-11-06 PROCEDURE — 80053 COMPREHEN METABOLIC PANEL: CPT | Performed by: EMERGENCY MEDICINE

## 2024-11-06 PROCEDURE — 86900 BLOOD TYPING SEROLOGIC ABO: CPT | Performed by: INTERNAL MEDICINE

## 2024-11-06 PROCEDURE — 85027 COMPLETE CBC AUTOMATED: CPT | Performed by: EMERGENCY MEDICINE

## 2024-11-06 RX ORDER — FAMOTIDINE 10 MG/ML
20 INJECTION, SOLUTION INTRAVENOUS ONCE
Status: COMPLETED | OUTPATIENT
Start: 2024-11-06 | End: 2024-11-06

## 2024-11-06 RX ORDER — ACETAMINOPHEN 325 MG/1
650 TABLET ORAL ONCE
Status: COMPLETED | OUTPATIENT
Start: 2024-11-06 | End: 2024-11-06

## 2024-11-06 RX ORDER — METHYLPREDNISOLONE SODIUM SUCCINATE 125 MG/2ML
125 INJECTION, POWDER, LYOPHILIZED, FOR SOLUTION INTRAMUSCULAR; INTRAVENOUS ONCE
Status: COMPLETED | OUTPATIENT
Start: 2024-11-06 | End: 2024-11-06

## 2024-11-06 RX ORDER — CEFEPIME HYDROCHLORIDE 2 G/50ML
2000 INJECTION, SOLUTION INTRAVENOUS ONCE
Status: COMPLETED | OUTPATIENT
Start: 2024-11-06 | End: 2024-11-06

## 2024-11-06 RX ORDER — SODIUM CHLORIDE 9 MG/ML
20 INJECTION, SOLUTION INTRAVENOUS ONCE
Status: COMPLETED | OUTPATIENT
Start: 2024-11-06 | End: 2024-11-06

## 2024-11-06 RX ORDER — ACETAMINOPHEN 10 MG/ML
1000 INJECTION, SOLUTION INTRAVENOUS ONCE
Status: COMPLETED | OUTPATIENT
Start: 2024-11-06 | End: 2024-11-06

## 2024-11-06 RX ORDER — SODIUM CHLORIDE, SODIUM GLUCONATE, SODIUM ACETATE, POTASSIUM CHLORIDE, MAGNESIUM CHLORIDE, SODIUM PHOSPHATE, DIBASIC, AND POTASSIUM PHOSPHATE .53; .5; .37; .037; .03; .012; .00082 G/100ML; G/100ML; G/100ML; G/100ML; G/100ML; G/100ML; G/100ML
1000 INJECTION, SOLUTION INTRAVENOUS ONCE
Status: COMPLETED | OUTPATIENT
Start: 2024-11-06 | End: 2024-11-06

## 2024-11-06 RX ORDER — SODIUM CHLORIDE 9 MG/ML
3 INJECTION INTRAVENOUS ONCE
Status: DISCONTINUED | OUTPATIENT
Start: 2024-11-06 | End: 2024-11-09 | Stop reason: HOSPADM

## 2024-11-06 RX ORDER — ONDANSETRON 2 MG/ML
4 INJECTION INTRAMUSCULAR; INTRAVENOUS ONCE
Status: COMPLETED | OUTPATIENT
Start: 2024-11-06 | End: 2024-11-06

## 2024-11-06 RX ORDER — DIPHENHYDRAMINE HYDROCHLORIDE 50 MG/ML
25 INJECTION INTRAMUSCULAR; INTRAVENOUS ONCE
Status: COMPLETED | OUTPATIENT
Start: 2024-11-06 | End: 2024-11-06

## 2024-11-06 RX ADMIN — SODIUM CHLORIDE, SODIUM GLUCONATE, SODIUM ACETATE, POTASSIUM CHLORIDE, MAGNESIUM CHLORIDE, SODIUM PHOSPHATE, DIBASIC, AND POTASSIUM PHOSPHATE 1000 ML: .53; .5; .37; .037; .03; .012; .00082 INJECTION, SOLUTION INTRAVENOUS at 21:53

## 2024-11-06 RX ADMIN — FAMOTIDINE 20 MG: 10 INJECTION, SOLUTION INTRAVENOUS at 19:52

## 2024-11-06 RX ADMIN — DARATUMUMAB AND HYALURONIDASE-FIHJ (HUMAN RECOMBINANT) 1800 MG: 1800; 30000 INJECTION SUBCUTANEOUS at 14:11

## 2024-11-06 RX ADMIN — DIPHENHYDRAMINE HYDROCHLORIDE 25 MG: 50 INJECTION, SOLUTION INTRAMUSCULAR; INTRAVENOUS at 19:52

## 2024-11-06 RX ADMIN — METHYLPREDNISOLONE SODIUM SUCCINATE 125 MG: 125 INJECTION, POWDER, FOR SOLUTION INTRAMUSCULAR; INTRAVENOUS at 19:52

## 2024-11-06 RX ADMIN — ACETAMINOPHEN 650 MG: 325 TABLET ORAL at 12:20

## 2024-11-06 RX ADMIN — SODIUM CHLORIDE 20 ML/HR: 0.9 INJECTION, SOLUTION INTRAVENOUS at 12:17

## 2024-11-06 RX ADMIN — ONDANSETRON 4 MG: 2 INJECTION, SOLUTION INTRAMUSCULAR; INTRAVENOUS at 19:59

## 2024-11-06 RX ADMIN — CEFEPIME HYDROCHLORIDE 2000 MG: 2 INJECTION, SOLUTION INTRAVENOUS at 21:53

## 2024-11-06 RX ADMIN — ACETAMINOPHEN 1000 MG: 10 INJECTION INTRAVENOUS at 20:45

## 2024-11-06 RX ADMIN — DIPHENHYDRAMINE HYDROCHLORIDE 25 MG: 50 INJECTION, SOLUTION INTRAMUSCULAR; INTRAVENOUS at 12:41

## 2024-11-06 RX ADMIN — DEXAMETHASONE SODIUM PHOSPHATE 10 MG: 100 INJECTION INTRAMUSCULAR; INTRAVENOUS at 12:17

## 2024-11-06 NOTE — PROGRESS NOTES
Noah Mendez  tolerated treatment well with no complications.      Noah Mendez is aware of future appt on 11/13/2024 at 01:00 PM.     AVS printed and given to Noah Mendez:  Yes

## 2024-11-06 NOTE — TELEPHONE ENCOUNTER
Patient's daughter called with questions about Revlimid dose. She then stated she had looked in his MyChart and was able to find the prescription. Patient will be receiving the medication tomorrow or Friday and will begin once received. She will reach out with any questions.

## 2024-11-07 ENCOUNTER — TELEPHONE (OUTPATIENT)
Age: 84
End: 2024-11-07

## 2024-11-07 DIAGNOSIS — T45.1X5A CINV (CHEMOTHERAPY-INDUCED NAUSEA AND VOMITING): Primary | ICD-10-CM

## 2024-11-07 DIAGNOSIS — R11.2 CINV (CHEMOTHERAPY-INDUCED NAUSEA AND VOMITING): Primary | ICD-10-CM

## 2024-11-07 PROBLEM — J18.9 RIGHT LOWER LOBE PNEUMONIA: Status: ACTIVE | Noted: 2024-11-07

## 2024-11-07 PROBLEM — E87.1 HYPONATREMIA: Status: ACTIVE | Noted: 2024-11-07

## 2024-11-07 PROBLEM — R65.20 SEVERE SEPSIS (HCC): Status: ACTIVE | Noted: 2024-11-07

## 2024-11-07 PROBLEM — A41.9 SEVERE SEPSIS (HCC): Status: ACTIVE | Noted: 2024-11-07

## 2024-11-07 PROBLEM — R06.89 ACUTE RESPIRATORY INSUFFICIENCY: Status: ACTIVE | Noted: 2024-11-07

## 2024-11-07 LAB
4HR DELTA HS TROPONIN: 15 NG/L
ALBUMIN SERPL ELPH-MCNC: 3.4 G/DL (ref 2.9–4.4)
ALBUMIN/GLOB SERPL: 0.5 {RATIO} (ref 0.7–1.7)
ALPHA1 GLOB SERPL ELPH-MCNC: 0.2 G/DL (ref 0–0.4)
ALPHA2 GLOB SERPL ELPH-MCNC: 0.5 G/DL (ref 0.4–1)
ANION GAP SERPL CALCULATED.3IONS-SCNC: 4 MMOL/L (ref 4–13)
B-GLOBULIN SERPL ELPH-MCNC: 0.7 G/DL (ref 0.7–1.3)
B2 MICROGLOB SERPL-MCNC: 5 MG/L (ref 0.6–2.4)
BUN SERPL-MCNC: 27 MG/DL (ref 5–25)
CALCIUM SERPL-MCNC: 7.4 MG/DL (ref 8.4–10.2)
CARDIAC TROPONIN I PNL SERPL HS: 28 NG/L
CHLORIDE SERPL-SCNC: 99 MMOL/L (ref 96–108)
CO2 SERPL-SCNC: 25 MMOL/L (ref 21–32)
CREAT SERPL-MCNC: 1.01 MG/DL (ref 0.6–1.3)
ERYTHROCYTE [DISTWIDTH] IN BLOOD BY AUTOMATED COUNT: 19 % (ref 11.6–15.1)
FLUAV AG UPPER RESP QL IA.RAPID: NEGATIVE
FLUBV AG UPPER RESP QL IA.RAPID: NEGATIVE
GAMMA GLOB SERPL ELPH-MCNC: 4.8 G/DL (ref 0.4–1.8)
GFR SERPL CREATININE-BSD FRML MDRD: 68 ML/MIN/1.73SQ M
GLOBULIN SER CALC-MCNC: 6.2 G/DL (ref 2.2–3.9)
GLUCOSE SERPL-MCNC: 147 MG/DL (ref 65–140)
HCT VFR BLD AUTO: 29.3 % (ref 36.5–49.3)
HGB BLD-MCNC: 9.5 G/DL (ref 12–17)
L PNEUMO1 AG UR QL IA.RAPID: NEGATIVE
LABORATORY COMMENT REPORT: ABNORMAL
LACTATE SERPL-SCNC: 1.7 MMOL/L (ref 0.5–2)
M PROTEIN SERPL ELPH-MCNC: 4.6 G/DL
MAGNESIUM SERPL-MCNC: 2 MG/DL (ref 1.9–2.7)
MCH RBC QN AUTO: 33.6 PG (ref 26.8–34.3)
MCHC RBC AUTO-ENTMCNC: 32.4 G/DL (ref 31.4–37.4)
MCV RBC AUTO: 104 FL (ref 82–98)
PHOSPHATE SERPL-MCNC: 4.5 MG/DL (ref 2.3–4.1)
PLATELET # BLD AUTO: 100 THOUSANDS/UL (ref 149–390)
PMV BLD AUTO: 10.1 FL (ref 8.9–12.7)
POTASSIUM SERPL-SCNC: 4.4 MMOL/L (ref 3.5–5.3)
PROCALCITONIN SERPL-MCNC: 13.16 NG/ML
RBC # BLD AUTO: 2.83 MILLION/UL (ref 3.88–5.62)
S PNEUM AG UR QL: NEGATIVE
SARS-COV+SARS-COV-2 AG RESP QL IA.RAPID: NEGATIVE
SODIUM SERPL-SCNC: 128 MMOL/L (ref 135–147)
WBC # BLD AUTO: 10.43 THOUSAND/UL (ref 4.31–10.16)

## 2024-11-07 PROCEDURE — 87070 CULTURE OTHR SPECIMN AEROBIC: CPT | Performed by: INTERNAL MEDICINE

## 2024-11-07 PROCEDURE — 87811 SARS-COV-2 COVID19 W/OPTIC: CPT | Performed by: INTERNAL MEDICINE

## 2024-11-07 PROCEDURE — 85027 COMPLETE CBC AUTOMATED: CPT | Performed by: PHYSICIAN ASSISTANT

## 2024-11-07 PROCEDURE — 87449 NOS EACH ORGANISM AG IA: CPT | Performed by: INTERNAL MEDICINE

## 2024-11-07 PROCEDURE — 87804 INFLUENZA ASSAY W/OPTIC: CPT | Performed by: INTERNAL MEDICINE

## 2024-11-07 PROCEDURE — 87205 SMEAR GRAM STAIN: CPT | Performed by: INTERNAL MEDICINE

## 2024-11-07 PROCEDURE — 84484 ASSAY OF TROPONIN QUANT: CPT | Performed by: EMERGENCY MEDICINE

## 2024-11-07 PROCEDURE — 83605 ASSAY OF LACTIC ACID: CPT | Performed by: PHYSICIAN ASSISTANT

## 2024-11-07 PROCEDURE — 36415 COLL VENOUS BLD VENIPUNCTURE: CPT | Performed by: EMERGENCY MEDICINE

## 2024-11-07 PROCEDURE — 99223 1ST HOSP IP/OBS HIGH 75: CPT | Performed by: INTERNAL MEDICINE

## 2024-11-07 PROCEDURE — 83735 ASSAY OF MAGNESIUM: CPT | Performed by: PHYSICIAN ASSISTANT

## 2024-11-07 PROCEDURE — 84100 ASSAY OF PHOSPHORUS: CPT | Performed by: PHYSICIAN ASSISTANT

## 2024-11-07 PROCEDURE — 99223 1ST HOSP IP/OBS HIGH 75: CPT | Performed by: NURSE PRACTITIONER

## 2024-11-07 PROCEDURE — 80048 BASIC METABOLIC PNL TOTAL CA: CPT | Performed by: PHYSICIAN ASSISTANT

## 2024-11-07 PROCEDURE — 84145 PROCALCITONIN (PCT): CPT | Performed by: PHYSICIAN ASSISTANT

## 2024-11-07 RX ORDER — ONDANSETRON 4 MG/1
4 TABLET, FILM COATED ORAL EVERY 8 HOURS PRN
Qty: 30 TABLET | Refills: 1 | Status: SHIPPED | OUTPATIENT
Start: 2024-11-07 | End: 2024-11-09

## 2024-11-07 RX ORDER — CEFEPIME HYDROCHLORIDE 2 G/50ML
2000 INJECTION, SOLUTION INTRAVENOUS EVERY 12 HOURS
Status: DISCONTINUED | OUTPATIENT
Start: 2024-11-07 | End: 2024-11-09 | Stop reason: HOSPADM

## 2024-11-07 RX ORDER — FLUTICASONE PROPIONATE 50 MCG
1 SPRAY, SUSPENSION (ML) NASAL DAILY
Status: DISCONTINUED | OUTPATIENT
Start: 2024-11-07 | End: 2024-11-09 | Stop reason: HOSPADM

## 2024-11-07 RX ORDER — AMIODARONE HYDROCHLORIDE 200 MG/1
200 TABLET ORAL DAILY
Status: DISCONTINUED | OUTPATIENT
Start: 2024-11-07 | End: 2024-11-09 | Stop reason: HOSPADM

## 2024-11-07 RX ORDER — ACETAMINOPHEN 325 MG/1
650 TABLET ORAL EVERY 6 HOURS PRN
Status: DISCONTINUED | OUTPATIENT
Start: 2024-11-07 | End: 2024-11-09 | Stop reason: HOSPADM

## 2024-11-07 RX ORDER — ACYCLOVIR 800 MG/1
400 TABLET ORAL 2 TIMES DAILY
Status: DISCONTINUED | OUTPATIENT
Start: 2024-11-07 | End: 2024-11-09 | Stop reason: HOSPADM

## 2024-11-07 RX ORDER — MAGNESIUM HYDROXIDE/ALUMINUM HYDROXICE/SIMETHICONE 120; 1200; 1200 MG/30ML; MG/30ML; MG/30ML
30 SUSPENSION ORAL EVERY 6 HOURS PRN
Status: DISCONTINUED | OUTPATIENT
Start: 2024-11-07 | End: 2024-11-09 | Stop reason: HOSPADM

## 2024-11-07 RX ORDER — LANOLIN ALCOHOL/MO/W.PET/CERES
400 CREAM (GRAM) TOPICAL DAILY
Status: DISCONTINUED | OUTPATIENT
Start: 2024-11-07 | End: 2024-11-09 | Stop reason: HOSPADM

## 2024-11-07 RX ORDER — ONDANSETRON 2 MG/ML
4 INJECTION INTRAMUSCULAR; INTRAVENOUS EVERY 8 HOURS PRN
Status: DISCONTINUED | OUTPATIENT
Start: 2024-11-07 | End: 2024-11-09 | Stop reason: HOSPADM

## 2024-11-07 RX ORDER — SENNOSIDES 8.6 MG
17.2 TABLET ORAL
Status: DISCONTINUED | OUTPATIENT
Start: 2024-11-07 | End: 2024-11-09 | Stop reason: HOSPADM

## 2024-11-07 RX ORDER — FUROSEMIDE 20 MG/1
20 TABLET ORAL 2 TIMES DAILY
Status: DISCONTINUED | OUTPATIENT
Start: 2024-11-07 | End: 2024-11-08

## 2024-11-07 RX ORDER — DOCUSATE SODIUM 100 MG/1
100 CAPSULE, LIQUID FILLED ORAL 2 TIMES DAILY
Status: DISCONTINUED | OUTPATIENT
Start: 2024-11-07 | End: 2024-11-09 | Stop reason: HOSPADM

## 2024-11-07 RX ORDER — METOPROLOL SUCCINATE 50 MG/1
50 TABLET, EXTENDED RELEASE ORAL EVERY 24 HOURS
Status: DISCONTINUED | OUTPATIENT
Start: 2024-11-07 | End: 2024-11-09 | Stop reason: HOSPADM

## 2024-11-07 RX ORDER — ECHINACEA PURPUREA EXTRACT 125 MG
1 TABLET ORAL
Status: DISCONTINUED | OUTPATIENT
Start: 2024-11-07 | End: 2024-11-09 | Stop reason: HOSPADM

## 2024-11-07 RX ADMIN — APIXABAN 5 MG: 5 TABLET, FILM COATED ORAL at 02:40

## 2024-11-07 RX ADMIN — NICOTINE 1 PATCH: 7 PATCH, EXTENDED RELEASE TRANSDERMAL at 09:02

## 2024-11-07 RX ADMIN — ACYCLOVIR 400 MG: 800 TABLET ORAL at 09:02

## 2024-11-07 RX ADMIN — B-COMPLEX W/ C & FOLIC ACID TAB 1 TABLET: TAB at 09:02

## 2024-11-07 RX ADMIN — CEFEPIME HYDROCHLORIDE 2000 MG: 2 INJECTION, SOLUTION INTRAVENOUS at 20:39

## 2024-11-07 RX ADMIN — EMPAGLIFLOZIN 10 MG: 10 TABLET, FILM COATED ORAL at 09:06

## 2024-11-07 RX ADMIN — QUETIAPINE FUMARATE 150 MG: 100 TABLET ORAL at 20:37

## 2024-11-07 RX ADMIN — Medication 400 MG: at 09:02

## 2024-11-07 RX ADMIN — METOPROLOL SUCCINATE 50 MG: 50 TABLET, EXTENDED RELEASE ORAL at 09:02

## 2024-11-07 RX ADMIN — APIXABAN 5 MG: 5 TABLET, FILM COATED ORAL at 09:06

## 2024-11-07 RX ADMIN — APIXABAN 5 MG: 5 TABLET, FILM COATED ORAL at 18:37

## 2024-11-07 RX ADMIN — Medication 9 MG: at 20:37

## 2024-11-07 RX ADMIN — DOCUSATE SODIUM 100 MG: 100 CAPSULE, LIQUID FILLED ORAL at 18:37

## 2024-11-07 RX ADMIN — CEFEPIME HYDROCHLORIDE 2000 MG: 2 INJECTION, SOLUTION INTRAVENOUS at 09:09

## 2024-11-07 RX ADMIN — AMIODARONE HYDROCHLORIDE 200 MG: 200 TABLET ORAL at 09:03

## 2024-11-07 RX ADMIN — ACYCLOVIR 400 MG: 800 TABLET ORAL at 18:37

## 2024-11-07 RX ADMIN — FUROSEMIDE 20 MG: 20 TABLET ORAL at 09:02

## 2024-11-07 RX ADMIN — ALUMINUM HYDROXIDE, MAGNESIUM HYDROXIDE, AND SIMETHICONE 30 ML: 200; 200; 20 SUSPENSION ORAL at 23:42

## 2024-11-07 RX ADMIN — DOCUSATE SODIUM 100 MG: 100 CAPSULE, LIQUID FILLED ORAL at 09:02

## 2024-11-07 NOTE — ASSESSMENT & PLAN NOTE
Follows with [facility] heme-onc  Started first round of chemotherapy (DARZALEX-FASPR ) on 11/6  Fever and shaking initially thought to be secondary to reaction from chemotherapy, however chest x-ray does show possible right lower lobe pneumonia  Oncology consulted

## 2024-11-07 NOTE — TELEPHONE ENCOUNTER
Daughter calling in because patient had treatment yesterday and is now in the hospital.     Received pre-medications and first darzalex faspro injection yesterday.    Around 7 p.m. yesterday had chills and felt SOB and shaking. Entire body head to toe was shaking. Patient went to ER and he has been admitted at [facility].    Daughter would like guidance as to the next steps in his plan and where to go from here with treatment. Advised that patient was seen by hem/onc NP today in the hospital and it appears they are still working him up. Informed her that Dr. Mathis would review and once he is discharged will develop updated treatment plan. Daughter will go to hospital today to get an update from inpatient providers and call back if she has any concerns.    She would like to request that prior to talking to the patient, if she is not present to please call her so she may be on the phone for any visits/consults with patient. She states he does not retain the information given to him and she prefers to hear any information regarding his care.

## 2024-11-07 NOTE — CONSULTS
Medical Oncology/Hematology Consult Note  Noah Mendez, 83 y.o., 1940  ED 12/ED 12, [id number]  11/07/24    Assessment and Plan:  Patient is an 83-year-old gentleman with newly diagnosed multiple myeloma who presented after receiving his first treatment with daratumumab on 11/6/2024 with shaking chills/fever.  Concern for possible infusion reaction    1.  IgG kappa multiple Myeloma  Newly Diagnosed   10/23/24 S/P [facility] pathology consistent with kappa restricted plasma cell neoplasm, 80 to 90% of total cellularity consistent with plasma cell myeloma    Recommended treatment:  DRd Q 28 days  Darzalex faspro D1, 8, 15, 22  Dexamethasone 10 mg weekly as premed  Revlimid 15 mg 3 weeks on 1 week off.     11/6/24 started cycle 1, day 1  Patient received dexamethasone, Daratumumab.  Has not started Revlimid  Discharged from infusion center at 1625.  Tolerated treatment without complication.  Patient's daughter reports approximately 2 hours after he was sent home from infusion he developed chills and severe rigors.  Upon arrival to ED was noted to be febrile with Tmax 103.  Treated with hypersensitivity meds including Pepcid, Solu-Cortef, additional Benadryl, Tylenol with resolution of symptoms      2. Right lower lobe Pneumonia   CXR concerning for right lower lobe pneumonia  Procalcitonin elevated 13.16  CBC-D with mild neutrophilia  Blood cultures pending  Urine strep and Legionella studies negative  COVID/flu negative  IV cefepime      PLAN:  Patient presented with symptoms commonly associated with daratumumab reaction.  No clear indication of pneumonia on pathologist read on chest x-ray, however I personally reviewed imaging and there is some concern for a possible right lower lobe pneumonia developing  Discussed with Dr. Mathis.  Will treat this as daratumumab reaction.    Continue treatment for possible pneumonia per primary team  Will cancel next scheduled infusion appointment on 11/13/2024.  Darzalex to  remain on hold  Will not start Revlimid at this time  Continue daily acyclovir  Patient has follow-up with Dr. Mathis on 11/18/2024.  Will keep this appointment as planned to discuss ongoing treatment which may include Rd alone  Discussed above plan of care with patient's daughter Xuan who verbalized understanding and agreement.      Please do not hesitate to contact me if you have any questions or need additional information. Thank you for this consult.    Shelley Marmolejo MSN, CRNP, ACHPN, OCN  Hematology Oncology Nurse Practitioner      Reason for Consultation: Fever/chills status post first infusion for multiple myeloma        History of present illness:   Noah Mendez is a 83 y.o. male with a an extensive cardiac history of CAD status post PCI, SSS status post PPM, A-fib status post cardioversion on Eliquis, systolic heart failure and newly diagnosed multiple myeloma.  He follows with Dr. Mathis in medical oncology and was initiated on cycle 1 day 1 chemotherapy on 11/6/2024.  Patient presented to ED last evening after receiving his first daratumumab infusion which shaking chills.  At presentation, unclear if patient had adverse reaction from daratumumab which can cause high-grade fever with associated chills.  However he has undergone infectious workup and procalcitonin is elevated. CXR concerning for right lower lobe pneumonia  He met sepsis criteria on admission with tachypnea, fever      Oncologic History:  Primary Outpatient Hematology/Oncology Provider: Anabela Mathis DO  Oncology History Overview Note   10/2024 - IgGK multiple myeloma with 90% plasma cells in the bone marrow, molecular studies pending     Multiple myeloma not having achieved remission (HCC)   10/29/2024 Initial Diagnosis    Multiple myeloma not having achieved remission (HCC)     11/6/2024 -  Chemotherapy    alteplase (CATHFLO), 2 mg, Intracatheter, Every 1 Minute as needed, 1 of 12 cycles  daratumumab-hyaluronidase (DARZALEX  FASPRO), 1,800 mg, Subcutaneous daratumumab-hyaluronidase, Once, 1 of 12 cycles  Administration: 1,800 mg (11/6/2024)         Interval history: Patient feeling well at present.  Afebrile.  Symptoms that brought him to the hospital are resolved.  Denies chest pain, cough, shortness of breath.  Appears to have short-term memory issues    Spoke to patient's daughter Xuan at length about patient's symptoms, plan of care    Review of Systems  All other review of systems were negative.    Past medical history:   Past Medical History:   Diagnosis Date    Anemia     Atrial fibrillation (HCC)     CHF (congestive heart failure) (HCC)     Coronary artery disease     Depression     Hyperlipidemia     Hypertension     Hyponatremia     Insomnia     Low blood pressure     Thrombocytopenia (HCC)        Past surgical history:   Past Surgical History:   Procedure Laterality Date    CARDIAC PACEMAKER PLACEMENT      CARDIAC SURGERY      CORONARY ARTERY BYPASS GRAFT      FRACTURE SURGERY Left     Wrist    IR BIOPSY BONE MARROW  10/23/2024       Allergies: No Known Allergies    Home medications: Not in a hospital admission.    Hospital medications:   Current Facility-Administered Medications:     acetaminophen (TYLENOL) tablet 650 mg, 650 mg, Oral, Q6H PRN, Anabela Gorman PA-C    acyclovir (ZOVIRAX) tablet 400 mg, 400 mg, Oral, BID, Sravanthi Moraes MD, 400 mg at 11/07/24 0902    aluminum-magnesium hydroxide-simethicone (MAALOX) oral suspension 30 mL, 30 mL, Oral, Q6H PRN, Anabela Gorman PA-C    amiodarone tablet 200 mg, 200 mg, Oral, Daily, Sravanthi Moraes MD, 200 mg at 11/07/24 0903    apixaban (ELIQUIS) tablet 5 mg, 5 mg, Oral, BID, Sravanthi Moraes MD, 5 mg at 11/07/24 0906    cefepime (MAXIPIME) IVPB (premix in dextrose) 2,000 mg 50 mL, 2,000 mg, Intravenous, Q12H, Anabela Gorman PA-C, Stopped at 11/07/24 0939    docusate sodium (COLACE) capsule 100 mg, 100 mg, Oral, BID, Sravanthi Moraes MD,  100 mg at 11/07/24 0902    Empagliflozin (JARDIANCE) tablet 10 mg, 10 mg, Oral, QAM, Sravanthi Moraes MD, 10 mg at 11/07/24 0906    fluticasone (FLONASE) 50 mcg/act nasal spray 1 spray, 1 spray, Nasal, Daily, Sravanthi Moraes MD    furosemide (LASIX) tablet 20 mg, 20 mg, Oral, BID, Sravanthi Moraes MD, 20 mg at 11/07/24 0902    magnesium Oxide (MAG-OX) tablet 400 mg, 400 mg, Oral, Daily, Sravanthi Moraes MD, 400 mg at 11/07/24 0902    melatonin tablet 9 mg, 9 mg, Oral, HS, Sravanthi Moraes MD    metoprolol succinate (TOPROL-XL) 24 hr tablet 50 mg, 50 mg, Oral, Q24H, Sravanthi Moraes MD, 50 mg at 11/07/24 0902    multivitamin stress formula tablet 1 tablet, 1 tablet, Oral, Daily, Sravanthi Moraes MD, 1 tablet at 11/07/24 0902    nicotine (NICODERM CQ) 7 mg/24hr TD 24 hr patch 1 patch, 1 patch, Transdermal, Daily, Anabela Gorman PA-C, 1 patch at 11/07/24 0902    ondansetron (ZOFRAN) injection 4 mg, 4 mg, Intravenous, Q8H PRN, Anabela Gorman PA-C    QUEtiapine (SEROquel) tablet 150 mg, 150 mg, Oral, HS, Sravanthi Moraes MD    senna (SENOKOT) tablet 17.2 mg, 17.2 mg, Oral, HS PRN, Sravanthi Moraes MD    Insert peripheral IV, , , Once **AND** sodium chloride (PF) 0.9 % injection 3 mL, 3 mL, Intravenous, Once, Anabela Gorman PA-C    Current Outpatient Medications:     acyclovir (ZOVIRAX) 400 MG tablet, Take 1 tablet (400 mg total) by mouth 2 (two) times a day, Disp: 60 tablet, Rfl: 3    amiodarone 200 mg tablet, Take 1 tablet (200 mg total) by mouth 3 (three) times a day with meals for 7 days, THEN 1 tablet (200 mg total) 2 (two) times a day with meals for 7 days, THEN 1 tablet (200 mg total) daily with breakfast., Disp: 65 tablet, Rfl: 0    amiodarone 200 mg tablet, Take 1 tablet (200 mg total) by mouth daily, Disp: 90 tablet, Rfl: 3    apixaban (ELIQUIS) 5 mg, Take 1 tablet (5 mg total) by mouth 2 (two) times a day, Disp: , Rfl:     Ativan 0.5 MG tablet,  Take 0.5 mg by mouth, Disp: , Rfl:     docusate sodium (COLACE) 100 mg capsule, Take 1 capsule (100 mg total) by mouth 2 (two) times a day, Disp: , Rfl:     Empagliflozin (Jardiance) 10 MG TABS tablet, Take 10 mg by mouth every morning, Disp: , Rfl:     fluticasone (FLONASE) 50 mcg/act nasal spray, 1 spray into each nostril daily, Disp: , Rfl:     furosemide (LASIX) 20 mg tablet, Take 1 tablet (20 mg total) by mouth 2 (two) times a day, Disp: , Rfl:     lenalidomide (REVLIMID) 15 MG CAPS, Take 1 capsule (15 mg total) by mouth daily 21 days on, followed by 7 days off, Disp: 21 capsule, Rfl: 0    Magnesium 300 MG CAPS, Take by mouth, Disp: , Rfl:     melatonin 1 mg, Take 10 mg by mouth daily at bedtime, Disp: , Rfl:     metoprolol succinate (TOPROL-XL) 25 mg 24 hr tablet, Take 2 tablets (50 mg total) by mouth every 24 hours, Disp: , Rfl:     Multiple Vitamin (multivitamin) tablet, Take 1 tablet by mouth daily, Disp: , Rfl:     ondansetron (ZOFRAN) 4 mg tablet, Take 1 tablet (4 mg total) by mouth every 8 (eight) hours as needed for nausea or vomiting, Disp: 30 tablet, Rfl: 1    polyethylene glycol (MIRALAX) 17 g packet, Take 17 g by mouth daily, Disp: , Rfl:     potassium chloride (Klor-Con M20) 20 mEq tablet, Take 1 tablet (20 mEq total) by mouth once for 1 dose (Patient not taking: Reported on 10/8/2024), Disp: 1 tablet, Rfl: 0    QUEtiapine (SEROquel) 50 mg tablet, Take 100 mg by mouth daily at bedtime, Disp: , Rfl:     senna (SENOKOT) 8.6 mg, Take 2 tablets (17.2 mg total) by mouth daily at bedtime as needed for constipation, Disp: , Rfl:     Facility-Administered Medications Ordered in Other Encounters:     [MAR Hold] alteplase (CATHFLO) injection 2 mg, 2 mg, Intracatheter, Q1MIN PRN, Anabela Mathis, DO    Social history:   Social History     Tobacco Use    Smoking status: Every Day     Current packs/day: 0.00     Types: Cigarettes     Last attempt to quit: 2024     Years since quittin.1    Smokeless  tobacco: Never   Vaping Use    Vaping status: Never Used   Substance Use Topics    Alcohol use: Never    Drug use: Never       Family history: History reviewed. No pertinent family history.    Vitals:  Vitals:    11/07/24 1100   BP: 111/59   Pulse: 60   Resp: 20   Temp:    SpO2: 95%       Physical Exam  Constitutional:       General: He is not in acute distress.  HENT:      Head: Normocephalic and atraumatic.      Mouth/Throat:      Pharynx: No oropharyngeal exudate.   Eyes:      General: No scleral icterus.  Cardiovascular:      Rate and Rhythm: Normal rate and regular rhythm.   Pulmonary:      Effort: Pulmonary effort is normal. No respiratory distress.   Musculoskeletal:      Cervical back: Normal range of motion.   Skin:     General: Skin is warm and dry.   Neurological:      General: No focal deficit present.      Mental Status: He is alert and oriented to person, place, and time.   Psychiatric:         Mood and Affect: Mood normal.         Behavior: Behavior normal.         Cognition and Memory: He exhibits impaired recent memory.         Recent Results (from the past 48 hour(s))   Type and screen    Collection Time: 11/06/24 12:08 PM   Result Value Ref Range    ABO Grouping A     Rh Factor Positive     Antibody Screen Negative     Specimen Expiration Date [id number]    ECG 12 lead    Collection Time: 11/06/24  7:57 PM   Result Value Ref Range    Ventricular Rate 66 BPM    Atrial Rate 66 BPM    MN Interval 202 ms    QRSD Interval 102 ms    QT Interval 412 ms    QTC Interval 431 ms    P Axis -8 degrees    QRS Axis 86 degrees    T Wave Axis -42 degrees   ECG 12 lead    Collection Time: 11/06/24  8:03 PM   Result Value Ref Range    Ventricular Rate 64 BPM    Atrial Rate 64 BPM    MN Interval  ms    QRSD Interval 98 ms    QT Interval 392 ms    QTC Interval 404 ms    P Axis  degrees    QRS Axis 90 degrees    T Wave Axis -15 degrees   CBC and differential    Collection Time: 11/06/24  8:56 PM   Result Value Ref Range     WBC 9.17 4.31 - 10.16 Thousand/uL    RBC 3.29 (L) 3.88 - 5.62 Million/uL    Hemoglobin 11.1 (L) 12.0 - 17.0 g/dL    Hematocrit 35.4 (L) 36.5 - 49.3 %     (H) 82 - 98 fL    MCH 33.7 26.8 - 34.3 pg    MCHC 31.4 31.4 - 37.4 g/dL    RDW 19.3 (H) 11.6 - 15.1 %    MPV 10.8 8.9 - 12.7 fL    Platelets 151 149 - 390 Thousands/uL   Comprehensive metabolic panel    Collection Time: 11/06/24  8:56 PM   Result Value Ref Range    Sodium 130 (L) 135 - 147 mmol/L    Potassium 4.3 3.5 - 5.3 mmol/L    Chloride 98 96 - 108 mmol/L    CO2 22 21 - 32 mmol/L    ANION GAP 10 4 - 13 mmol/L    BUN 24 5 - 25 mg/dL    Creatinine 1.23 0.60 - 1.30 mg/dL    Glucose 155 (H) 65 - 140 mg/dL    Calcium 7.8 (L) 8.4 - 10.2 mg/dL    Corrected Calcium 8.4 8.3 - 10.1 mg/dL    AST 42 (H) 13 - 39 U/L    ALT 29 7 - 52 U/L    Alkaline Phosphatase 93 34 - 104 U/L    Total Protein 10.9 (H) 6.4 - 8.4 g/dL    Albumin 3.2 (L) 3.5 - 5.0 g/dL    Total Bilirubin 0.55 0.20 - 1.00 mg/dL    eGFR 53 ml/min/1.73sq m   Lactic acid    Collection Time: 11/06/24  8:56 PM   Result Value Ref Range    LACTIC ACID 3.3 (H) 0.5 - 2.0 mmol/L   Procalcitonin    Collection Time: 11/06/24  8:56 PM   Result Value Ref Range    Procalcitonin 0.14 <=0.25 ng/ml   Protime-INR    Collection Time: 11/06/24  8:56 PM   Result Value Ref Range    Protime 18.4 (H) 12.3 - 15.0 seconds    INR 1.48 (H) 0.85 - 1.19   APTT    Collection Time: 11/06/24  8:56 PM   Result Value Ref Range    PTT 27 23 - 34 seconds   Blood culture #1    Collection Time: 11/06/24  8:56 PM    Specimen: Arm, Left; Blood   Result Value Ref Range    Blood Culture Received in Microbiology Lab. Culture in Progress.    Blood culture #2    Collection Time: 11/06/24  8:56 PM    Specimen: Arm, Right; Blood   Result Value Ref Range    Blood Culture Received in Microbiology Lab. Culture in Progress.    Manual Differential(PHLEBS Do Not Order)    Collection Time: 11/06/24  8:56 PM   Result Value Ref Range    Segmented % 86 (H)  "43 - 75 %    Bands % 1 0 - 8 %    Lymphocytes % 11 (L) 14 - 44 %    Monocytes % 2 (L) 4 - 12 %    Eosinophils % 0 0 - 6 %    Basophils % 0 0 - 1 %    Absolute Neutrophils 7.98 (H) 1.85 - 7.62 Thousand/uL    Absolute Lymphocytes 1.01 0.60 - 4.47 Thousand/uL    Absolute Monocytes 0.18 0.00 - 1.22 Thousand/uL    Absolute Eosinophils 0.00 0.00 - 0.40 Thousand/uL    Absolute Basophils 0.00 0.00 - 0.10 Thousand/uL    Total Counted      Toxic Granulation Present     Toxic Vacuolization Present     RBC Morphology Present     Platelet Estimate Adequate Adequate    Anisocytosis Present     Macrocytes Present     Polychromasia Present    HS Troponin 0hr (reflex protocol)    Collection Time: 11/06/24  8:56 PM   Result Value Ref Range    hs TnI 0hr 13 \"Refer to ACS Flowchart\"- see link ng/L   UA w Reflex to Microscopic w Reflex to Culture    Collection Time: 11/06/24  9:02 PM    Specimen: Urine, Clean Catch   Result Value Ref Range    Color, UA Yellow     Clarity, UA Clear     Specific Gravity, UA 1.020 1.005 - 1.030    pH, UA 6.0 4.5, 5.0, 5.5, 6.0, 6.5, 7.0, 7.5, 8.0    Leukocytes, UA Negative Negative    Nitrite, UA Negative Negative    Protein, UA 30 (1+) (A) Negative mg/dl    Glucose,  (3/20%) (A) Negative mg/dl    Ketones, UA Negative Negative mg/dl    Urobilinogen, UA <2.0 <2.0 mg/dl mg/dl    Bilirubin, UA Negative Negative    Occult Blood, UA Negative Negative   Urine Microscopic    Collection Time: 11/06/24  9:02 PM   Result Value Ref Range    RBC, UA 0-1 None Seen, 0-1, 1-2, 2-4, 0-5 /hpf    WBC, UA 0-1 None Seen, 0-1, 1-2, 0-5, 2-4 /hpf    Epithelial Cells Occasional None Seen, Occasional /hpf    Bacteria, UA Occasional None Seen, Occasional /hpf    Hyaline Casts, UA 4-10 (A) (none) /lpf   ECG 12 lead    Collection Time: 11/06/24 11:03 PM   Result Value Ref Range    Ventricular Rate 60 BPM    Atrial Rate 60 BPM    ND Interval 124 ms    QRSD Interval 116 ms    QT Interval 484 ms    QTC Interval 484 ms    P Axis  " "degrees    QRS Axis 86 degrees    T Wave Axis 240 degrees   Lactic acid 2 Hours    Collection Time: 11/06/24 11:04 PM   Result Value Ref Range    LACTIC ACID 2.2 (H) 0.5 - 2.0 mmol/L   HS Troponin I 2hr    Collection Time: 11/06/24 11:04 PM   Result Value Ref Range    hs TnI 2hr 24 \"Refer to ACS Flowchart\"- see link ng/L    Delta 2hr hsTnI 11 <20 ng/L   HS Troponin I 4hr    Collection Time: 11/07/24 12:59 AM   Result Value Ref Range    hs TnI 4hr 28 \"Refer to ACS Flowchart\"- see link ng/L    Delta 4hr hsTnI 15 <20 ng/L   Lactic acid, plasma (w/reflex if result > 2.0)    Collection Time: 11/07/24  3:18 AM   Result Value Ref Range    LACTIC ACID 1.7 0.5 - 2.0 mmol/L   Basic metabolic panel    Collection Time: 11/07/24  5:52 AM   Result Value Ref Range    Sodium 128 (L) 135 - 147 mmol/L    Potassium 4.4 3.5 - 5.3 mmol/L    Chloride 99 96 - 108 mmol/L    CO2 25 21 - 32 mmol/L    ANION GAP 4 4 - 13 mmol/L    BUN 27 (H) 5 - 25 mg/dL    Creatinine 1.01 0.60 - 1.30 mg/dL    Glucose 147 (H) 65 - 140 mg/dL    Calcium 7.4 (L) 8.4 - 10.2 mg/dL    eGFR 68 ml/min/1.73sq m   Magnesium    Collection Time: 11/07/24  5:52 AM   Result Value Ref Range    Magnesium 2.0 1.9 - 2.7 mg/dL   Phosphorus    Collection Time: 11/07/24  5:52 AM   Result Value Ref Range    Phosphorus 4.5 (H) 2.3 - 4.1 mg/dL   CBC (With Platelets)    Collection Time: 11/07/24  5:52 AM   Result Value Ref Range    WBC 10.43 (H) 4.31 - 10.16 Thousand/uL    RBC 2.83 (L) 3.88 - 5.62 Million/uL    Hemoglobin 9.5 (L) 12.0 - 17.0 g/dL    Hematocrit 29.3 (L) 36.5 - 49.3 %     (H) 82 - 98 fL    MCH 33.6 26.8 - 34.3 pg    MCHC 32.4 31.4 - 37.4 g/dL    RDW 19.0 (H) 11.6 - 15.1 %    Platelets 100 (L) 149 - 390 Thousands/uL    MPV 10.1 8.9 - 12.7 fL   Procalcitonin    Collection Time: 11/07/24  5:52 AM   Result Value Ref Range    Procalcitonin 13.16 (H) <=0.25 ng/ml   COVID-19/ Ceza A/B Rapid Anitgen(30 min. TAT)    Collection Time: 11/07/24 10:36 AM    Specimen: " Nose; Nares   Result Value Ref Range    SARS COV Rapid Antigen Negative Negative    Influenza A Rapid Antigen Negative Negative    Influenza B Rapid Antigen Negative Negative       Imaging Studies:   XR chest 1 view portable    Result Date: 11/7/2024  Narrative: XR CHEST PORTABLE INDICATION: sob. COMPARISON: None FINDINGS: Left chest wall intracardiac device with intact lead(s). No abandoned lead(s). Sternotomy wires. Clear lungs. No pneumothorax or pleural effusion. Cardiomegaly. Status post CABG. Bones are unremarkable for age. Normal upper abdomen.     Impression: No acute cardiopulmonary disease. Cardiomegaly. Workstation performed: [id number]     CT low dose whole body myeloma scan    Result Date: 11/5/2024  Narrative: WHOLE BODY LOW DOSE CT WITHOUT IV CONTRAST (MULTIPLE MYELOMA PROTOCOL.) INDICATION:   R77.8: Other specified abnormalities of plasma proteins D47.2: Monoclonal gammopathy D53.9: Nutritional anemia, unspecified. Oncology note from 10/28/2024 reviewed. Patient had recent bone biopsy demonstrating IgG kappa multiple myeloma. COMPARISON: Chest, abdomen, pelvic CT from 9/16/2024. TECHNIQUE: Low radiation dose thin section CT examination of the whole body was performed without intravenous or oral contrast according to a protocol specifically designed to evaluate for possible multiple myeloma. Scan included the head, cervical spine, chest, abdomen, pelvis, as well as the humeri and femurs.  Axial, sagittal, and coronal 2D reformatted images were created from the source data and submitted for interpretation.  Evaluation for pathology in nonosseous structures is limited. Radiation dose length product (DLP) for this visit:  802.14 mGy-cm .  This examination, like all CT scans performed in the [facility], was performed utilizing techniques to minimize radiation dose exposure, including the use of iterative  reconstruction and automated exposure control. Enteric contrast was not administered.  FINDINGS: Reporting of Bone findings below are based on the 2014 International Myeloma Working Group (IMWG) recommendations. BONE FINDINGS: SUSPICIOUS OSTEOLYTIC LESIONS: None. (There is a prominent arachnoid granulation pit in the occipital bone seen on series 301 image 59.) VERTEBRAL COMPRESSION FRACTURES: There are no potentially acute/malignant compression fracture. There is an unchanged likely benign mild compression fracture of T7 (series 604 image 127.) PERIPHERAL MEDULLARY PATTERN: Fatty pattern. VISUALIZED BRAIN: No acute abnormalities are seen. HEAD/NECK: Unremarkable. CHEST LUNGS: Severe emphysema. No acute pulmonary disease. There is no tracheal or endobronchial lesion. PLEURA:  Unremarkable. HEART/GREAT VESSELS: Heart is unremarkable for patient's age.  No thoracic aortic aneurysm. There is a pacemaker. MEDIASTINUM AND RASHAAD:  Unremarkable. ABDOMEN LIVER/BILIARY TREE:  Unremarkable. GALLBLADDER:  No calcified gallstones. No pericholecystic inflammatory change. SPLEEN:  Unremarkable. PANCREAS:  Unremarkable. ADRENAL GLANDS:  Unremarkable. KIDNEYS/URETERS:  Unremarkable. No hydronephrosis. STOMACH AND BOWEL:  Unremarkable. APPENDIX:  No findings to suggest appendicitis. ABDOMINOPELVIC CAVITY:  No ascites.  No pneumoperitoneum.  No lymphadenopathy. VESSELS:  Unremarkable for patient's age. PELVIS REPRODUCTIVE ORGANS:  Unremarkable for patient's age. URINARY BLADDER:  Unremarkable. ABDOMINAL WALL/INGUINAL REGIONS:  Unremarkable.     Impression: WHOLE BODY LOW DOSE CT FOR EVALUATION OF MULTIPLE MYELOMA: In this patient with biopsy-proven multiple myeloma, there are no suspicious focal lesions. OSTEOLYTIC LESIONS: None. PROBABLY MALIGNANT VERTEBRAL FRACTURE: None. SUSPICIOUS PERIPHERAL MEDULLARY PATTERN: No. Workstation performed: [id number]     IR biopsy bone marrow    Result Date: 10/23/2024  Narrative: INDICATION: IgG MGUS with anemia PROCEDURE: 1. Fluoroscopic-guided right ilium bone marrow aspiration and  core bone biopsy     Impression: Bone marrow aspiration and core ilium bone biopsy. _______________________________________________________________ COMPARISON: None PROCEDURE DETAILS: Operators: Dr. Wilder Anesthesia: Moderate sedation was provided for 30 minutes. Hemodynamic parameters were continuously monitored by IR nursing. Local anesthesia. Medications: 1% lidocaine, fentanyl, Versed Fluoroscopy time: 0.3 Images: 1 COMMENTS: The patient was placed prone on the imaging table.  A preprocedure timeout was performed per [facility] protocol. The posterior inferior iliac spine was localized by fluoroscopy and the low back was prepped and draped in sterile fashion. An 11-gauge needle was advanced using fluoroscopic guidance through the cortex of the iliac spine into the marrow. Bone marrow aspiration was performed followed by a core ilium bone biopsy. The puncture site was cleansed and a dressing was applied. Workstation performed: [id number]     Echo follow up/limited w/ contrast if indicated    Result Date: 10/18/2024  Narrative:   Left Ventricle: Left ventricular cavity size is mildly dilated. Wall thickness is mildly increased. The left ventricular ejection fraction is 35%. Systolic function is moderately reduced. Diastolic function is moderately abnormal, consistent with grade II (pseudonormal) relaxation.   The following segments are akinetic: basal inferior, basal inferolateral and mid inferolateral.   The following segments are hypokinetic: basal inferoseptal, mid inferoseptal, mid inferior, mid anterolateral, apical inferior and apical lateral.   All other segments are normal.   Left Atrium: The atrium is moderately dilated.   Right Atrium: The atrium is dilated.   Mitral Valve: There is moderate diffuse thickening. There is mild calcification with mild chordal involvement. The leaflets exhibit normal mobility. There is mild annular calcification. There is mild regurgitation.   Tricuspid Valve: There is  moderate to severe regurgitation.   Pulmonic Valve: There is mild regurgitation.       Counseling / Coordination of Care  Total floor / unit time spent today 75 minutes. Greater than 50% of total time was spent with the patient and / or family counseling and / or coordination of care.     KAYA Donaldson    Please note:  This report has been generated by voice recognition software system. Therefore, there may be syntax, spelling and/or grammatical errors.  Please call if you have any questions.

## 2024-11-07 NOTE — H&P
H&P - Hospitalist   Name: Noah Mendez 83 y.o. male I MRN: [id number]  Unit/Bed#: ED 12 I Date of Admission: 11/6/2024   Date of Service: 11/7/2024 I Hospital Day: 0     Assessment & Plan  Right lower lobe pneumonia  Developed rigors 11/6 at 1630  Chest x-ray concerning for right lower lobe pneumonia  Continue cefepime due to immunocompromise state  Urine strep and Legionella studies   sputum culture and Gram stain  Respiratory protocol  Severe sepsis ([facility])  SIRS criteria: Tachypnea and fever  Suspect source is pneumonia  Severe sepsis criteria met with lactic acidosis that improved with IVF  Continue cefepime  Follow-up blood cultures and sputum culture  Acute respiratory insufficiency  SpO2 77% on room air, improved on 2 L nasal cannula  Suspect secondary pneumonia  Wean oxygen as able  Hyponatremia  Sodium 130 on admission  Baseline sodium 129-1 31  Continue home diuretic  Multiple myeloma not having achieved remission ([facility])  Follows with [facility] heme-onc  Started first round of chemotherapy (DARZALEX-FASPR ) on 11/6  Fever and shaking initially thought to be secondary to reaction from chemotherapy, however chest x-ray does show possible right lower lobe pneumonia  Oncology consulted  HFrEF (heart failure with reduced ejection fraction) ([facility])  Wt Readings from Last 3 Encounters:   11/06/24 74.8 kg (164 lb 14.5 oz)   10/31/24 72.6 kg (160 lb)   10/28/24 76.2 kg (168 lb)   Last echo 10/7/2024 LVEF 35%.  G2 DD.  Follows with [facility] cardiology  Home regimen: Lasix 20 Mg twice daily, Jardiance, and Toprol-XL -continue  Sodium and fluid restriction  Hypertension  Home regimen: Toprol-XL 50 Mg daily and Lasix 20 Mg twice daily  Continue  Paroxysmal atrial fibrillation (HCC)  History of PAF-s/p JAZMYN guided cardioversion in September 2024  Home medications: Eliquis 5 Mg twice daily, amiodarone 200 Mg daily, Toprol-XL 50 Mg daily  History of sick sinus syndrome s/p PPM  Continue home meds  EKG on admit showing  "paced rhythm  Coronary artery disease involving native coronary artery of native heart without angina pectoris  History of CABG x 2 and left circumflex PCI in 2015  Denies any chest pain on admit  Continue home beta-blocker      VTE Pharmacologic Prophylaxis: VTE Score: 5 High Risk (Score >/= 5) - Pharmacological DVT Prophylaxis Ordered: apixaban (Eliquis). Sequential Compression Devices Ordered.  Code Status: Level 3 - DNAR and DNI   Discussion with family: Patient declined call to .     Anticipated Length of Stay: Patient will be admitted on an inpatient basis with an anticipated length of stay of greater than 2 midnights secondary to right lower lobe pneumonia.    History of Present Illness   Chief Complaint: \" I was shaking\"    Noah Mendez is a 83 y.o. male with a PMH of multiple myeloma currently undergoing chemotherapy with first dose 11/6.  CAD s/p PCI, SSS s/p PPM, PAF s/p cardioversion on Eliquis, and systolic HF who presents with sudden onset shaking at 1630 on 11/6.  Patient reports he received his first infusion for his multiple myeloma around noon.  He felt well afterwards and then he developed significant chills while doing a puzzle with his daughter later in the day.  Endorses some sneezing but no sinus congestion or pressure.  Reports he has occasional cough that is productive of thick phlegm, but this is unchanged from baseline for him.  Denies any chest pain.  No abdominal pain, nausea, vomiting, or change in bowel habits.  Reports he has been having intermittent nosebleeds.    Review of Systems   Constitutional:  Positive for chills and fever.   HENT:  Positive for nosebleeds and sneezing. Negative for congestion, postnasal drip and sinus pressure.    Respiratory:  Positive for cough. Negative for shortness of breath.    Cardiovascular:  Negative for chest pain and leg swelling.   Gastrointestinal:  Negative for abdominal pain, constipation, diarrhea, nausea and vomiting. "   Genitourinary:  Negative for difficulty urinating, dysuria and hematuria.   Musculoskeletal:  Negative for gait problem.   Allergic/Immunologic: Positive for immunocompromised state.   Neurological:  Negative for weakness and numbness.   All other systems reviewed and are negative.      Historical Information   Past Medical History:   Diagnosis Date    Anemia     Atrial fibrillation (HCC)     CHF (congestive heart failure) (HCC)     Coronary artery disease     Depression     Hyperlipidemia     Hypertension     Hyponatremia     Insomnia     Low blood pressure     Thrombocytopenia (HCC)      Past Surgical History:   Procedure Laterality Date    CARDIAC PACEMAKER PLACEMENT      CARDIAC SURGERY      CORONARY ARTERY BYPASS GRAFT      FRACTURE SURGERY Left     Wrist    IR BIOPSY BONE MARROW  10/23/2024     Social History     Tobacco Use    Smoking status: Every Day     Current packs/day: 0.00     Types: Cigarettes     Last attempt to quit: 2024     Years since quittin.1    Smokeless tobacco: Never   Vaping Use    Vaping status: Never Used   Substance and Sexual Activity    Alcohol use: Never    Drug use: Never    Sexual activity: Not Currently     E-Cigarette/Vaping    E-Cigarette Use Never User      E-Cigarette/Vaping Substances    Nicotine No     THC No     CBD No     Flavoring No     Other No     Unknown No      History reviewed. No pertinent family history.  Social History:  Marital Status:    Occupation: Retired  Patient Pre-hospital Living Situation: Home, Alone  Patient Pre-hospital Level of Mobility: walks  Patient Pre-hospital Diet Restrictions: Sodium and fluid restriction    Meds/Allergies   I have reviewed home medications with patient personally.  Prior to Admission medications    Medication Sig Start Date End Date Taking? Authorizing Provider   acyclovir (ZOVIRAX) 400 MG tablet Take 1 tablet (400 mg total) by mouth 2 (two) times a day 10/28/24 2/25/25  Anabela Mathis,    amiodarone 200 mg  tablet Take 1 tablet (200 mg total) by mouth 3 (three) times a day with meals for 7 days, THEN 1 tablet (200 mg total) 2 (two) times a day with meals for 7 days, THEN 1 tablet (200 mg total) daily with breakfast. 9/21/24 11/4/24  Cande Richards MD   amiodarone 200 mg tablet Take 1 tablet (200 mg total) by mouth daily 10/8/24   Jhony Pierre PA-C   apixaban (ELIQUIS) 5 mg Take 1 tablet (5 mg total) by mouth 2 (two) times a day 9/19/24   Cande Richards MD   Ativan 0.5 MG tablet Take 0.5 mg by mouth 10/1/24   Historical Provider, MD   docusate sodium (COLACE) 100 mg capsule Take 1 capsule (100 mg total) by mouth 2 (two) times a day 9/19/24   Cande Richards MD   Empagliflozin (Jardiance) 10 MG TABS tablet Take 10 mg by mouth every morning    Historical Provider, MD   fluticasone (FLONASE) 50 mcg/act nasal spray 1 spray into each nostril daily 9/20/24   Cande Richards MD   furosemide (LASIX) 20 mg tablet Take 1 tablet (20 mg total) by mouth 2 (two) times a day 9/19/24 10/28/24  Cande Richards MD   lenalidomide (REVLIMID) 15 MG CAPS Take 1 capsule (15 mg total) by mouth daily 21 days on, followed by 7 days off 11/5/24   Anabela Mathis DO   Magnesium 300 MG CAPS Take by mouth    Historical Provider, MD   melatonin 1 mg Take 10 mg by mouth daily at bedtime    Historical Provider, MD   metoprolol succinate (TOPROL-XL) 25 mg 24 hr tablet Take 2 tablets (50 mg total) by mouth every 24 hours 9/19/24   Cande Richards MD   Multiple Vitamin (multivitamin) tablet Take 1 tablet by mouth daily    Historical Provider, MD   polyethylene glycol (MIRALAX) 17 g packet Take 17 g by mouth daily    Historical Provider, MD   potassium chloride (Klor-Con M20) 20 mEq tablet Take 1 tablet (20 mEq total) by mouth once for 1 dose  Patient not taking: Reported on 10/8/2024 9/19/24 10/8/24  Cande Richards MD   QUEtiapine (SEROquel) 50 mg tablet Take 100 mg by mouth daily at bedtime 9/10/24   Historical Provider, MD   senna  (SENOKOT) 8.6 mg Take 2 tablets (17.2 mg total) by mouth daily at bedtime as needed for constipation 9/19/24   Cande Richards MD     No Known Allergies    Objective :  Temp:  [98.2 °F (36.8 °C)-103 °F (39.4 °C)] 98.7 °F (37.1 °C)  HR:  [59-92] 60  BP: ()/(51-98) 133/74  Resp:  [14-24] 17  SpO2:  [77 %-99 %] 94 %  O2 Device: Nasal cannula  Nasal Cannula O2 Flow Rate (L/min):  [2 L/min] 2 L/min    Physical Exam  Vitals and nursing note reviewed.   Constitutional:       General: He is not in acute distress.     Appearance: Normal appearance. He is not ill-appearing.      Comments: Pleasant and conversational   HENT:      Head: Normocephalic.      Nose: Nose normal.      Mouth/Throat:      Mouth: Mucous membranes are moist.   Eyes:      Conjunctiva/sclera: Conjunctivae normal.   Cardiovascular:      Rate and Rhythm: Normal rate and regular rhythm.      Pulses: Normal pulses.      Heart sounds: No murmur heard.  Pulmonary:      Effort: Pulmonary effort is normal.      Breath sounds: No wheezing or rales.      Comments: 2 L nasal cannula.  No respiratory distress.  Mild rhonchi in right lung base.  Abdominal:      General: Abdomen is flat.      Palpations: Abdomen is soft.      Tenderness: There is no abdominal tenderness. There is no guarding or rebound.   Musculoskeletal:         General: Normal range of motion.      Cervical back: Normal range of motion.      Right lower leg: No edema.      Left lower leg: No edema.   Skin:     General: Skin is warm and dry.      Coloration: Skin is not pale.   Neurological:      General: No focal deficit present.      Mental Status: He is alert.   Psychiatric:         Mood and Affect: Mood normal.         Thought Content: Thought content normal.          Lines/Drains:            Lab Results: I have reviewed the following results:  Results from last 7 days   Lab Units 11/06/24 2056 11/05/24  1054   WBC Thousand/uL 9.17  --    WHITE BLOOD CELL COUNT. x10E3/uL  --  4.7  "  HEMOGLOBIN g/dL 11.1*  --    HEMOGLOBIN. g/dL  --  10.4*   HEMATOCRIT % 35.4*  --    HEMATOCRIT. %  --  31.9*   PLATELETS Thousands/uL 151  --    PLATELETS. x10E3/uL  --  147*   BANDS PCT % 1  --    NEUTROS PCT. %  --  69   LYMPHO PCT % 11*  --    LYMPHS PCT. %  --  17   MONO PCT % 2*  --    MONOS PCT. %  --  9   EOS PCT % 0  --    EOS PCT. %  --  1     Results from last 7 days   Lab Units 11/06/24 2056   SODIUM mmol/L 130*   POTASSIUM mmol/L 4.3   CHLORIDE mmol/L 98   CO2 mmol/L 22   BUN mg/dL 24   CREATININE mg/dL 1.23   ANION GAP mmol/L 10   CALCIUM mg/dL 7.8*   ALBUMIN g/dL 3.2*   TOTAL BILIRUBIN mg/dL 0.55   ALK PHOS U/L 93   ALT U/L 29   AST U/L 42*   GLUCOSE RANDOM mg/dL 155*     Results from last 7 days   Lab Units 11/06/24 2056   INR  1.48*         No results found for: \"HGBA1C\"  Results from last 7 days   Lab Units 11/07/24  0318 11/06/24  2304 11/06/24 2056   LACTIC ACID mmol/L 1.7 2.2* 3.3*   PROCALCITONIN ng/ml  --   --  0.14       Personally reviewed CXR my interpretation is: consolidation in right lower lobe  Other Study Results Review: EKG was personally reviewed and my interpretation is: EKG is showing paced rhythm.  QTc 4 8 4 ms...    Administrative Statements       ** Please Note: This note has been constructed using a voice recognition system. **    "

## 2024-11-07 NOTE — SEPSIS NOTE
"  Sepsis Note   Noah Mendez 83 y.o. male MRN: [id number]  Unit/Bed#: ED 12 Encounter: [id number]       Initial Sepsis Screening       Row Name 11/06/24 2138                Is the patient's history suggestive of a new or worsening infection? Yes (Proceed)  -RP        Suspected source of infection suspect infection, source unknown  -RP        Indicate SIRS criteria Hyperthemia > 38.3C (100.9F) OR Hypothermia <36C (96.8F);Tachypnea > 20 resp per min  -RP        Are two or more of the above signs & symptoms of infection both present and new to the patient? Yes (Proceed)  -RP        Assess for evidence of organ dysfunction: Are any of the below criteria present within 6 hours of suspected infection and SIRS criteria that are NOT considered to be chronic conditions? Lactate > 2.0  -RP        Date of presentation of severe sepsis 11/06/24  -RP        Time of presentation of severe sepsis 2139  -RP        Sepsis Note: Click \"NEXT\" below (NOT \"close\") to generate sepsis note based on above information. --                  User Key  (r) = Recorded By, (t) = Taken By, (c) = Cosigned By      Initials Name Provider Type    RP Sravanthi Moraes MD Physician                    Default Flowsheet Data (Last 720 Hours)       Sepsis Reassess       Row Name 11/06/24 2234                   Repeat Volume Status and Tissue Perfusion Assessment Performed    Date of Reassessment: 11/06/24  -RP        Time of Reassessment: 2234  -RP        Sepsis Reassessment Note: Click \"NEXT\" below (NOT \"close\") to generate sepsis reassessment note. --        Repeat Volume Status and Tissue Perfusion Assessment Performed --                  User Key  (r) = Recorded By, (t) = Taken By, (c) = Cosigned By      Initials Name Provider Type    RP Sravanthi Moraes MD Physician                    There is no height or weight on file to calculate BMI.  Wt Readings from Last 1 Encounters:   11/06/24 74.8 kg (164 lb 14.5 oz)     IBW (Ideal Body Weight): " 75.26 kg    Ideal body weight: 75.3 kg (165 lb 14.8 oz)

## 2024-11-07 NOTE — ASSESSMENT & PLAN NOTE
History of CABG x 2 and left circumflex PCI in 2015  Denies any chest pain on admit  Continue home beta-blocker

## 2024-11-07 NOTE — ASSESSMENT & PLAN NOTE
SpO2 77% on room air, improved on 2 L nasal cannula  Suspect secondary pneumonia  Wean oxygen as able

## 2024-11-07 NOTE — TELEPHONE ENCOUNTER
My father had his first treatment today and he started having chills. Now, he is heavy shaking head to toes. He is also experiencing lower back pain.     On call provider paged.

## 2024-11-07 NOTE — TELEPHONE ENCOUNTER
Telephone Encounter: Medical Oncology:  Noah Mendez 83 y.o. 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    Patient family member called [facility] line alerting that patient has been having chills with associated back pain.    Unsure if any fevers.    I returned phone call however at that time patient family member was taking patient to [place] ED for evaluation.    Chills with arthralgias/back pain could be side effects of daratumumab    Recommendations:  Since patient is in ED can give Tylenol 1000 mg IV or p.o.  Further workup as deemed necessary per ED provider    Uriel Denise D.O.  Hematology-Oncology Fellow (PGY-4)

## 2024-11-07 NOTE — PLAN OF CARE
Problem: INFECTION - ADULT  Goal: Absence or prevention of progression during hospitalization  Description: INTERVENTIONS:  - Assess and monitor for signs and symptoms of infection  - Monitor lab/diagnostic results  - Monitor all insertion sites, i.e. indwelling lines, tubes, and drains  - Monitor endotracheal if appropriate and nasal secretions for changes in amount and color  - [place] appropriate cooling/warming therapies per order  - Administer medications as ordered  - Instruct and encourage patient and family to use good hand hygiene technique  - Identify and instruct in appropriate isolation precautions for identified infection/condition  Outcome: Progressing     Problem: RESPIRATORY - ADULT  Goal: Achieves optimal ventilation and oxygenation  Description: INTERVENTIONS:  - Assess for changes in respiratory status  - Assess for changes in mentation and behavior  - Position to facilitate oxygenation and minimize respiratory effort  - Oxygen administered by appropriate delivery if ordered  - Initiate smoking cessation education as indicated  - Encourage broncho-pulmonary hygiene including cough, deep breathe, Incentive Spirometry  - Assess the need for suctioning and aspirate as needed  - Assess and instruct to report SOB or any respiratory difficulty  - Respiratory Therapy support as indicated  Outcome: Progressing

## 2024-11-07 NOTE — ASSESSMENT & PLAN NOTE
SIRS criteria: Tachypnea and fever  Suspect source is pneumonia  Severe sepsis criteria met with lactic acidosis that improved with IVF  Continue cefepime  Follow-up blood cultures and sputum culture

## 2024-11-07 NOTE — ASSESSMENT & PLAN NOTE
Wt Readings from Last 3 Encounters:   11/06/24 74.8 kg (164 lb 14.5 oz)   10/31/24 72.6 kg (160 lb)   10/28/24 76.2 kg (168 lb)   Last echo 10/7/2024 LVEF 35%.  G2 DD.  Follows with [facility] cardiology  Home regimen: Lasix 20 Mg twice daily, Jardiance, and Toprol-XL -continue  Sodium and fluid restriction

## 2024-11-07 NOTE — ASSESSMENT & PLAN NOTE
History of PAF-s/p JAZMYN guided cardioversion in September 2024  Home medications: Eliquis 5 Mg twice daily, amiodarone 200 Mg daily, Toprol-XL 50 Mg daily  History of sick sinus syndrome s/p PPM  Continue home meds  EKG on admit showing paced rhythm

## 2024-11-07 NOTE — TELEPHONE ENCOUNTER
Patient is admitted. Please cancel 11/13 infusion appointment. We will see him in the office on 11/18 prior to resuming treatment. Thanks!

## 2024-11-07 NOTE — SEPSIS NOTE
"  Sepsis Note   Noah Mendez 83 y.o. male MRN: [id number]  Unit/Bed#: ED 12 Encounter: [id number]       Initial Sepsis Screening       Row Name 11/06/24 2138                Is the patient's history suggestive of a new or worsening infection? Yes (Proceed)  -RP        Suspected source of infection suspect infection, source unknown  -RP        Indicate SIRS criteria Hyperthemia > 38.3C (100.9F) OR Hypothermia <36C (96.8F);Tachypnea > 20 resp per min  -RP        Are two or more of the above signs & symptoms of infection both present and new to the patient? Yes (Proceed)  -RP        Assess for evidence of organ dysfunction: Are any of the below criteria present within 6 hours of suspected infection and SIRS criteria that are NOT considered to be chronic conditions? Lactate > 2.0  -RP        Date of presentation of severe sepsis 11/06/24  -RP        Time of presentation of severe sepsis 2139  -RP        Sepsis Note: Click \"NEXT\" below (NOT \"close\") to generate sepsis note based on above information. --                  User Key  (r) = Recorded By, (t) = Taken By, (c) = Cosigned By      Initials Name Provider Type    RP Sravanthi Moraes MD Physician                        There is no height or weight on file to calculate BMI.  Wt Readings from Last 1 Encounters:   11/06/24 74.8 kg (164 lb 14.5 oz)     IBW (Ideal Body Weight): 75.26 kg    Ideal body weight: 75.3 kg (165 lb 14.8 oz)    "

## 2024-11-07 NOTE — ED NOTES
Primary RN sent report via SecureChat to Neg[facility] on MS 3.     Sravanthi Monterroso RN  11/07/24 2575

## 2024-11-07 NOTE — UTILIZATION REVIEW
Initial Clinical Review    Admission: Date/Time/Statement:  at time of IP order, patient had crossed one midnight with care starting on 11/6/24 in ED  Admission Orders (From admission, onward)       Ordered        11/07/24 0008  INPATIENT ADMISSION  Once                          Orders Placed This Encounter   Procedures    INPATIENT ADMISSION     Standing Status:   Standing     Number of Occurrences:   1     Order Specific Question:   Level of Care     Answer:   Med Surg [16]     Order Specific Question:   Estimated length of stay     Answer:   More than 2 Midnights     Order Specific Question:   Certification     Answer:   I certify that inpatient services are medically necessary for this patient for a duration of greater than two midnights. See H&P and MD Progress Notes for additional information about the patient's course of treatment.     ED Arrival Information       Expected   -    Arrival   11/6/2024 19:35    Acuity   Emergent              Means of arrival   Walk-In    Escorted by   Family Member    Service   Hospitalist    Admission type   Emergency              Arrival complaint   Reaction to infusion today.             Chief Complaint   Patient presents with    Allergic Reaction - Major     Pt having an allergic reaction to infusion he had today, they left around 4:30 pm. Pt had been pre-medicated with benadryl prior to infusion. Lips are blue, pt is having SOB, denies any chest pain       Initial Presentation: 83 y.o. male  to ED via walk in from home.    Admitted to inpatient with Dx: Severe sepsis possible secondary to pneumonia/Acute respiratory insufficiency/Hyponatremia/Multiple myeloma.  Presented to ED with sudden shaking at 3 hours prior to arrival, had first infusion for multiple myeloma about noon.   Cough unchanged.  Intermittent nose bleeds unchanged     PMHx: multiple myeloma currently undergoing chemotherapy with first dose 11/6.  CAD s/p PCI, SSS s/p PPM, PAF s/p cardioversion on Eliquis,  and systolic HF.   On exam:  Rhonchi in right lung base.  Hypoxia to 77% room air.     Wbc 9.17.  H&H 11.1/35.4.  platelets 151.  INR 1.48.   Procalcitonin 0.14.  Lactic acid 3.3.   na 130, baseline of 129 - 131.  Glucose 155.    Imaging shows Cardiomegaly on CxR.  Per ED read, possible RLL pneumonia.     ED treatment: Benadryl, Solu medrol, Pepcid given. Given IVF bolus, Zofran, IV acetaminophen and started on cefepime.    Plan includes to continue cefepime.   Check sputum culture, urine strep and legionella.   Oximetry, wean oxygen  as able.   Continue Lasix 20 Mg twice daily, Jardiance, and Toprol-XL,  amiodarone, sodium and fluid restriction.    Monitor BP.       11/6/24 per oncology - IgGK multiple myeloma with 90% plasma cells in the bone marrow.  Unclear if adverse reaction from daratumumab can cause high-grade fever with associated chills however agree ruling out underlying infection workup with sepsis evaluation.  If patient is neutropenic would empirically treat with antipseudomonal coverage, cefepime otherwise abx guidance pending workup. Recommend IV versus p.o. Tylenol 1000 mg every 8 hours     Anticipated Length of Stay/Certification Statement: Patient will be admitted on an inpatient basis with an anticipated length of stay of greater than 2 midnights secondary to right lower lobe pneumonia.     Date: 11/7/24    Day 2:  overnight labored breathing.  Cyanosis to mouth and bilateral hands.   Today with some dyspnea at rest.  On exam tachypnea.   Placed  on oxygen.  Check COVID  and flu antigen.  Continue cefepime.  Follow cultures.   Trend WBC.  Fluid restriction.     Patient has crossed 3 midnights and requires ongoing care    11/8/2024 .  Patient presents with  being very restless overnight.    On exam diminished breath sounds.  Anxiety.  forgetful  Abnormal labs or imaging:  Procalcitonin 15.40.   na 127.  Bun 36.  Creatinine 1.05.   Diagnosis/Plan    Sepsis due to pneumonia/acute respiratory  insufficiency/Hyponatremia/Multiple Myeloma.   On acyclovir.  Continue Cefepime, IVF.   Fluid restriction and trend BMP. Started on IVF today    ED Treatment-Medication Administration from 11/06/2024 1934 to 11/07/2024 1201         Date/Time Order Dose Route Action     11/06/2024 1952 diphenhydrAMINE (BENADRYL) injection 25 mg 25 mg Intravenous Given     11/06/2024 1952 methylPREDNISolone sodium succinate (Solu-MEDROL) injection 125 mg 125 mg Intravenous Given     11/06/2024 1952 Famotidine (PF) (PEPCID) injection 20 mg 20 mg Intravenous Given     11/06/2024 1959 ondansetron (ZOFRAN) injection 4 mg 4 mg Intravenous Given     11/06/2024 2045 acetaminophen (Ofirmev) injection 1,000 mg 1,000 mg Intravenous New Bag     11/06/2024 2153 multi-electrolyte (PLASMALYTE-A/ISOLYTE-S PH 7.4) IV solution 1,000 mL 1,000 mL Intravenous New Bag     11/06/2024 2153 cefepime (MAXIPIME) IVPB (premix in dextrose) 2,000 mg 50 mL 2,000 mg Intravenous New Bag     11/07/2024 0903 amiodarone tablet 200 mg 200 mg Oral Given     11/07/2024 0902 acyclovir (ZOVIRAX) tablet 400 mg 400 mg Oral Given     11/07/2024 0240 apixaban (ELIQUIS) tablet 5 mg 5 mg Oral Given     11/07/2024 0906 apixaban (ELIQUIS) tablet 5 mg 5 mg Oral Given     11/07/2024 0902 docusate sodium (COLACE) capsule 100 mg 100 mg Oral Given     11/07/2024 0906 Empagliflozin (JARDIANCE) tablet 10 mg 10 mg Oral Given     11/07/2024 0902 furosemide (LASIX) tablet 20 mg 20 mg Oral Given     11/07/2024 0902 magnesium Oxide (MAG-OX) tablet 400 mg 400 mg Oral Given     11/07/2024 0902 metoprolol succinate (TOPROL-XL) 24 hr tablet 50 mg 50 mg Oral Given     11/07/2024 0902 multivitamin stress formula tablet 1 tablet 1 tablet Oral Given     11/07/2024 0909 cefepime (MAXIPIME) IVPB (premix in dextrose) 2,000 mg 50 mL 2,000 mg Intravenous New Bag     11/07/2024 0902 nicotine (NICODERM CQ) 7 mg/24hr TD 24 hr patch 1 patch 1 patch Transdermal Medication Applied            Scheduled  Medications:  acyclovir, 400 mg, Oral, BID  amiodarone, 200 mg, Oral, Daily  apixaban, 5 mg, Oral, BID  cefepime, 2,000 mg, Intravenous, Q12H  docusate sodium, 100 mg, Oral, BID  Empagliflozin, 10 mg, Oral, QAM  fluticasone, 1 spray, Nasal, Daily  furosemide, 20 mg, Oral, BID  magnesium Oxide, 400 mg, Oral, Daily  melatonin, 9 mg, Oral, HS  metoprolol succinate, 50 mg, Oral, Q24H  multivitamin stress formula, 1 tablet, Oral, Daily  nicotine, 1 patch, Transdermal, Daily - dc 11/8  QUEtiapine, 150 mg, Oral, HS  sodium chloride (PF), 3 mL, Intravenous, Once      Continuous IV Infusions:  sodium chloride, 75 mL/hr, Intravenous, Continuous      PRN Meds:  acetaminophen, 650 mg, Oral, Q6H PRN  ALPRAZolam, 0.5 mg, Oral, HS PRN  aluminum-magnesium hydroxide-simethicone, 30 mL, Oral, Q6H PRN  ondansetron, 4 mg, Intravenous, Q8H PRN  senna, 17.2 mg, Oral, HS PRN  sodium chloride, 1 spray, Each Nare, Q1H PRN      ED Triage Vitals   Temperature Pulse Respirations Blood Pressure SpO2 Pain Score   11/06/24 2002 11/06/24 1941 11/06/24 1941 11/06/24 1945 11/06/24 1942 11/07/24 0145   (!) 103 °F (39.4 °C) 92 (!) 24 149/98 (!) 77 % No Pain     Weight (last 2 days)       Date/Time Weight    11/08/24 0336 75.8 (167)    11/07/24 0648 75.3 (166)            Vital Signs (last 3 days)       Date/Time Temp Pulse Resp BP MAP (mmHg) SpO2 Calculated FIO2 (%) - Nasal Cannula Nasal Cannula O2 Flow Rate (L/min) O2 Device Patient Position - Orthostatic VS Graeme Coma Scale Score Pain    11/08/24 1132 -- -- -- -- -- -- -- -- -- -- 15 --    11/08/24 1100 -- -- -- -- -- -- -- -- None (Room air) -- -- No Pain    11/08/24 08:53:24 -- 55 -- 101/57 72 94 % -- -- -- -- -- --    11/08/24 08:18:40 97 °F (36.1 °C) 63 -- -- -- 96 % -- -- -- -- -- --    11/08/24 07:17:23 -- 60 18 102/57 72 94 % -- -- -- -- -- --    11/08/24 0717 98.2 °F (36.8 °C) -- -- -- -- -- -- -- -- -- -- --    11/08/24 0003 -- -- -- -- -- -- -- -- -- -- -- 8    11/07/24 23:34:05 97.3 °F  (36.3 °C) 62 19 103/57 72 91 % -- -- -- -- -- --    11/07/24 2100 -- -- -- -- -- -- -- -- -- -- 15 No Pain    11/07/24 20:25:05 97.5 °F (36.4 °C) 64 -- 103/56 72 91 % -- -- -- -- -- --    11/07/24 18:41:53 -- 60 -- 101/55 70 90 % -- -- -- -- -- --    11/07/24 1633 -- -- -- -- -- -- -- -- -- -- -- No Pain    11/07/24 16:03:11 97.4 °F (36.3 °C) 61 14 117/62 80 90 % -- -- -- -- -- --    11/07/24 1404 -- -- -- -- -- 93 % -- -- None (Room air) -- -- --    11/07/24 1316 -- -- -- -- -- 94 % 28 2 L/min -- -- 15 --    11/07/24 1257 -- -- -- -- -- -- -- -- -- -- -- No Pain    11/07/24 12:46:16 97.5 °F (36.4 °C) 60 14 106/56 73 93 % 28 2 L/min Nasal cannula -- -- --    11/07/24 1201 -- 60 18 93/47 68 94 % -- -- Nasal cannula Lying -- --    11/07/24 1100 -- 60 20 111/59 79 95 % -- -- -- -- -- --    11/07/24 1000 -- 60 20 139/63 90 94 % -- -- -- -- -- --    11/07/24 0902 -- 60 -- 119/87 -- -- -- -- -- -- -- --    11/07/24 0900 -- 60 20 119/87 99 93 % -- -- -- -- -- --    11/07/24 0745 -- 60 19 -- -- 96 % -- -- -- -- -- --    11/07/24 0700 -- 60 15 117/56 81 97 % -- -- -- -- -- --    11/07/24 0648 -- 75 18 128/65 90 94 % -- -- -- Lying -- No Pain    11/07/24 0600 -- 60 16 128/65 90 96 % 28 2 L/min Nasal cannula -- -- No Pain    11/07/24 0500 -- 60 17 133/74 99 94 % -- -- -- -- -- --    11/07/24 0445 -- 60 18 137/72 96 95 % -- -- -- -- -- --    11/07/24 0430 -- 64 20 129/68 91 94 % -- -- -- -- -- --    11/07/24 0400 -- 60 17 129/74 97 95 % -- -- -- -- -- --    11/07/24 0340 -- -- -- -- -- -- -- -- -- -- 15 --    11/07/24 0245 -- 60 20 121/70 90 95 % -- -- -- -- -- --    11/07/24 0215 -- 60 20 137/73 99 94 % -- -- Nasal cannula -- -- --    11/07/24 0200 -- 64 20 127/62 88 94 % 28 2 L/min Nasal cannula -- -- --    11/07/24 0145 -- 60 18 122/67 89 93 % -- -- Nasal cannula -- -- No Pain    11/07/24 0100 -- 60 20 126/71 93 92 % -- -- -- -- -- --    11/07/24 0045 -- 60 16 129/71 94 93 % -- -- -- -- -- --    11/07/24 0030 -- 60 14 126/72  94 94 % -- -- -- -- -- --    11/07/24 0015 -- 60 16 127/70 93 95 % -- -- -- -- -- --    11/07/24 0000 -- 60 16 126/68 92 95 % -- -- -- -- -- --    11/06/24 2345 -- 60 16 116/66 86 95 % -- -- -- -- -- --    11/06/24 2342 -- -- -- -- -- -- -- -- -- -- 15 --    11/06/24 2330 -- 60 16 116/65 85 96 % 28 2 L/min Nasal cannula -- -- --    11/06/24 2315 98.7 °F (37.1 °C) 60 15 118/66 87 97 % -- -- -- -- -- --    11/06/24 2300 -- 60 21 111/63 82 94 % -- -- None (Room air) -- -- --    11/06/24 2245 -- 60 20 113/59 80 91 % -- -- -- -- -- --    11/06/24 2230 -- 60 19 107/56 74 92 % -- -- -- -- -- --    11/06/24 2215 -- 60 20 105/57 75 92 % -- -- None (Room air) -- 15 --    11/06/24 2200 -- 60 20 106/62 73 95 % -- -- None (Room air) Sitting -- --    11/06/24 2145 -- 60 23 104/55 75 93 % -- -- None (Room air) -- -- --    11/06/24 2115 -- 60 18 110/51 73 96 % -- -- None (Room air) -- -- --    11/06/24 2100 -- 61 20 118/56 80 99 % -- -- None (Room air) -- -- --    11/06/24 2030 -- 60 18 135/68 96 97 % -- -- -- -- -- --    11/06/24 2002 103 °F (39.4 °C) -- -- -- -- -- -- -- -- -- -- --    11/06/24 1945 -- 62 24 149/98 116 97 % -- -- None (Room air) -- -- --    11/06/24 1942 -- -- -- -- -- 77 % -- -- None (Room air) -- -- --    11/06/24 1941 -- 92 24 -- -- -- -- -- -- -- -- --              Pertinent Labs/Diagnostic Test Results:   Radiology:  XR chest 1 view portable   ED Interpretation by Sravanthi Moraes MD (11/06 2259)   Increased markings RLL      Final Interpretation by Jerry Bullock MD (11/07 0904)      No acute cardiopulmonary disease. Cardiomegaly.            Workstation performed: [id number]           Cardiology:  ECG 12 lead    by Interface, Ris Results In (11/07 2329)          Results from last 7 days   Lab Units 11/08/24  0244 11/07/24  0552 11/06/24  2056 11/05/24  1054   WBC Thousand/uL 3.92* 10.43* 9.17  --    WHITE BLOOD CELL COUNT. x10E3/uL  --   --   --  4.7   HEMOGLOBIN g/dL 9.2* 9.5* 11.1*  --     HEMOGLOBIN. g/dL  --   --   --  10.4*   HEMATOCRIT % 29.0* 29.3* 35.4*  --    HEMATOCRIT. %  --   --   --  31.9*   PLATELETS Thousands/uL 99* 100* 151  --    PLATELETS. x10E3/uL  --   --   --  147*   NEUTROS ABS. x10E3/uL  --   --   --  3.3   BANDS PCT %  --   --  1  --      Results from last 7 days   Lab Units 11/08/24 0244 11/07/24 0552 11/06/24 2056 11/05/24 1057 11/05/24  1054   SODIUM mmol/L 127* 128* 130* 130* 131*   POTASSIUM mmol/L 4.0 4.4 4.3 4.0 4.0   CHLORIDE mmol/L 100 99 98 96 96   CO2 mmol/L 25 25 22 24 25   ANION GAP mmol/L 2* 4 10  --   --    BUN mg/dL 36* 27* 24 17 17   CREATININE mg/dL 1.05 1.01 1.23 1.20 1.19   EGFR ml/min/1.73sq m 65 68 53 60 61   CALCIUM mg/dL 7.1* 7.4* 7.8*  --   --    MAGNESIUM mg/dL 2.0 2.0  --   --   --    PHOSPHORUS mg/dL  --  4.5*  --   --   --      Results from last 7 days   Lab Units 11/08/24 0244 11/06/24 2056 11/05/24  1054   AST U/L 52* 42* 23   ALT U/L 46 29 21   ALK PHOS U/L 71 93  --    TOTAL PROTEIN g/dL 9.1* 10.9* 9.6*   ALBUMIN g/dL 2.7* 3.2* 3.0*   TOTAL BILIRUBIN mg/dL 0.56 0.55 0.5     Results from last 7 days   Lab Units 11/08/24 0244 11/07/24 0552 11/06/24 2056 11/05/24 1057 11/05/24  1054   GLUCOSE RANDOM mg/dL 99 147* 155* 132* 128*     Results from last 7 days   Lab Units 11/07/24 0059 11/06/24 2304 11/06/24 2056   HS TNI 0HR ng/L  --   --  13   HS TNI 2HR ng/L  --  24  --    HSTNI D2 ng/L  --  11  --    HS TNI 4HR ng/L 28  --   --    HSTNI D4 ng/L 15  --   --      Results from last 7 days   Lab Units 11/06/24 2056   PROTIME seconds 18.4*   INR  1.48*   PTT seconds 27     Results from last 7 days   Lab Units 11/08/24  0244 11/07/24  0552 11/06/24 2056   PROCALCITONIN ng/ml 15.40* 13.16* 0.14     Results from last 7 days   Lab Units 11/07/24  0318 11/06/24  2304 11/06/24 2056   LACTIC ACID mmol/L 1.7 2.2* 3.3*     Results from last 7 days   Lab Units 11/05/24  1054   FERRITIN ng/mL 83   IRON SATURATION % 33   IRON ug/dL 78   TIBC ug/dL  237*     Results from last 7 days   Lab Units 11/06/24  2102 11/05/24  1054   CLARITY UA  Clear  --    COLOR UA  Yellow  --    SPEC GRAV UA  1.020  --    PH UA  6.0  --    GLUCOSE UA mg/dl 150 (3/20%)*  --    KETONES UA mg/dl Negative  --    BLOOD UA  Negative  --    PROTEIN UA mg/dl 30 (1+)* 7.5   NITRITE UA  Negative  --    BILIRUBIN UA  Negative  --    UROBILINOGEN UA (BE) mg/dl <2.0  --    LEUKOCYTES UA  Negative  --    WBC UA /hpf 0-1  --    RBC UA /hpf 0-1  --    BACTERIA UA /hpf Occasional  --    EPITHELIAL CELLS WET PREP /hpf Occasional  --      Results from last 7 days   Lab Units 11/07/24  0744   STREP PNEUMONIAE ANTIGEN, URINE  Negative   LEGIONELLA URINARY ANTIGEN  Negative     Results from last 7 days   Lab Units 11/07/24  2349 11/06/24  2056   BLOOD CULTURE   --  No Growth at 24 hrs.  No Growth at 24 hrs.   GRAM STAIN RESULT  1+ Epithelial cells per low power field*  Rare Polys*  1+ Gram negative rods*  Rare Gram positive cocci in pairs*  --      Past Medical History:   Diagnosis Date    Anemia     Atrial fibrillation (HCC)     CHF (congestive heart failure) (HCC)     Coronary artery disease     Depression     Hyperlipidemia     Hypertension     Hyponatremia     Insomnia     Low blood pressure     MI (myocardial infarction) (HCC)     Multiple myeloma (HCC)     Thrombocytopenia (HCC)      Present on Admission:   Paroxysmal atrial fibrillation (HCC)   HFrEF (heart failure with reduced ejection fraction) (HCC)   Hypertension   Multiple myeloma not having achieved remission (HCC)   Coronary artery disease involving native coronary artery of native heart without angina pectoris    Admitting Diagnosis: Allergic reaction [T78.40XA]  Pulmonary infiltrate [R91.8]  Hypoxia [R09.02]  Fever [R50.9]  Medication reaction [T50.905A]  Age/Sex: 83 y.o. male    Network Utilization Review Department  ATTENTION: Please call with any questions or concerns to [phone] and carefully listen to the prompts so that you  are directed to the right person. All voicemails are confidential.   For Discharge needs, contact Care Management DC Support Team at [phone] opt. 2  Send all requests for admission clinical reviews, approved or denied determinations and any other requests to dedicated fax number below belonging to the campus where the patient is receiving treatment. List of dedicated fax numbers for the Facilities:  FACILITY NAME UR FAX NUMBER   ADMISSION DENIALS (Administrative/Medical Necessity) [phone]   DISCHARGE SUPPORT TEAM (NETWORK) [phone]   PARENT CHILD HEALTH (Maternity/NICU/Pediatrics) [phone]   [facility] [phone]   [facility] [phone]   [facility] [phone]   [facility] [phone]   [facility] [phone]   [facility] [phone]   [facility] [phone]   [facility] [phone]   [facility] [phone]   [facility] [phone]   [facility] [phone]   [facility] [phone]

## 2024-11-07 NOTE — ASSESSMENT & PLAN NOTE
Developed rigors 11/6 at 1630  Chest x-ray concerning for right lower lobe pneumonia  Continue cefepime due to immunocompromise state  Urine strep and Legionella studies   sputum culture and Gram stain  Respiratory protocol

## 2024-11-07 NOTE — QUICK NOTE
Quick Note:    83 year old male with history of IgGK multiple myeloma with 90% plasma cells in the bone marrow      Current regimen dexamethasone 10 mg as premed, Revlimid 15 mg 3 weeks on 1 week off, and daratumumab which he received today.    Contacted from ED, patient noted transient hypoxia now 97 on 2 L nasal cannula in addition patient had temperature of 103 associated with chills.    Additional sepsis workup pending    Assessment/plan  -Unclear if adverse reaction from daratumumab can cause high-grade fever with associated chills however agree ruling out underlying infection workup with sepsis evaluation per ED provider  -Labs and imaging pending   -CBC with differential pending, if patient is neutropenic would empirically treat with antipseudomonal coverage, cefepime otherwise abx guidance pending workup/clinical presentation   -Recommend IV versus p.o. Tylenol 1000 mg every 8 hours    Uriel Denise, DO  PGY 4 hematology/oncology fellow

## 2024-11-07 NOTE — ED NOTES
Sputum collection container provided to patient. Education provided.      Sravanthi Monterroso RN  11/07/24 0582

## 2024-11-08 LAB
A1 AG RBC QL: POSITIVE
ABO GROUP BLD: NORMAL
ALBUMIN MFR UR ELPH: 14.9 %
ALBUMIN SERPL BCG-MCNC: 2.7 G/DL (ref 3.5–5)
ALBUMIN SERPL ELPH-MCNC: 3.2 G/DL (ref 2.9–4.4)
ALBUMIN SERPL-MCNC: 2.9 G/DL (ref 3.7–4.7)
ALBUMIN/GLOB SERPL: 0.5 {RATIO} (ref 0.7–1.7)
ALP SERPL-CCNC: 71 U/L (ref 34–104)
ALP SERPL-CCNC: 89 IU/L (ref 44–121)
ALPHA1 GLOB MFR UR ELPH: 3.2 %
ALPHA1 GLOB SERPL ELPH-MCNC: 0.2 G/DL (ref 0–0.4)
ALPHA2 GLOB MFR UR ELPH: 11.3 %
ALPHA2 GLOB SERPL ELPH-MCNC: 0.6 G/DL (ref 0.4–1)
ALT SERPL W P-5'-P-CCNC: 46 U/L (ref 7–52)
ALT SERPL-CCNC: 19 IU/L (ref 0–44)
ANION GAP SERPL CALCULATED.3IONS-SCNC: 2 MMOL/L (ref 4–13)
AST SERPL W P-5'-P-CCNC: 52 U/L (ref 13–39)
AST SERPL-CCNC: 22 IU/L (ref 0–40)
ATRIAL RATE: 60 BPM
ATRIAL RATE: 64 BPM
ATRIAL RATE: 66 BPM
ATRIAL RATE: 66 BPM
B-GLOBULIN MFR UR ELPH: 10.5 %
B-GLOBULIN SERPL ELPH-MCNC: 0.8 G/DL (ref 0.7–1.3)
BASOPHILS # BLD AUTO: 0 X10E3/UL (ref 0–0.2)
BASOPHILS NFR BLD AUTO: 0 %
BILIRUB SERPL-MCNC: 0.5 MG/DL (ref 0–1.2)
BILIRUB SERPL-MCNC: 0.56 MG/DL (ref 0.2–1)
BLD GP AB SCN SERPL QL: NEGATIVE
BLOOD GROUP ANTIBODIES SERPL: NEGATIVE
BUN SERPL-MCNC: 16 MG/DL (ref 8–27)
BUN SERPL-MCNC: 36 MG/DL (ref 5–25)
BUN/CREAT SERPL: 15 (ref 10–24)
CALCIUM ALBUM COR SERPL-MCNC: 8.1 MG/DL (ref 8.3–10.1)
CALCIUM SERPL-MCNC: 7.1 MG/DL (ref 8.4–10.2)
CALCIUM SERPL-MCNC: 8.2 MG/DL (ref 8.6–10.2)
CHLORIDE SERPL-SCNC: 100 MMOL/L (ref 96–108)
CHLORIDE SERPL-SCNC: 96 MMOL/L (ref 96–106)
CO2 SERPL-SCNC: 24 MMOL/L (ref 20–29)
CO2 SERPL-SCNC: 25 MMOL/L (ref 21–32)
CREAT SERPL-MCNC: 1.05 MG/DL (ref 0.6–1.3)
CREAT SERPL-MCNC: 1.1 MG/DL (ref 0.76–1.27)
EGFR: 67 ML/MIN/1.73
EOSINOPHIL # BLD AUTO: 0 X10E3/UL (ref 0–0.4)
EOSINOPHIL NFR BLD AUTO: 1 %
ERYTHROCYTE [DISTWIDTH] IN BLOOD BY AUTOMATED COUNT: 16.1 % (ref 11.6–15.4)
ERYTHROCYTE [DISTWIDTH] IN BLOOD BY AUTOMATED COUNT: 19.1 % (ref 11.6–15.1)
GAMMA GLOB MFR UR ELPH: 60.1 %
GAMMA GLOB SERPL ELPH-MCNC: 4.5 G/DL (ref 0.4–1.8)
GFR SERPL CREATININE-BSD FRML MDRD: 65 ML/MIN/1.73SQ M
GLOBULIN SER CALC-MCNC: 6.2 G/DL (ref 2.2–3.9)
GLOBULIN SER-MCNC: 6.5 G/DL (ref 1.5–4.5)
GLUCOSE SERPL-MCNC: 133 MG/DL (ref 70–99)
GLUCOSE SERPL-MCNC: 99 MG/DL (ref 65–140)
HCT VFR BLD AUTO: 29 % (ref 36.5–49.3)
HCT VFR BLD AUTO: 31.7 % (ref 37.5–51)
HGB BLD-MCNC: 10.7 G/DL (ref 13–17.7)
HGB BLD-MCNC: 9.2 G/DL (ref 12–17)
IGA SERPL-MCNC: 29 MG/DL (ref 61–437)
IGG SERPL-MCNC: 6259 MG/DL (ref 603–1613)
IGM SERPL-MCNC: 33 MG/DL (ref 15–143)
KAPPA LC FREE SER-MCNC: 340.4 MG/L (ref 3.3–19.4)
KAPPA LC FREE/LAMBDA FREE SER: 56.73 {RATIO} (ref 0.26–1.65)
LABORATORY COMMENT REPORT: ABNORMAL
LABORATORY COMMENT REPORT: ABNORMAL
LAMBDA LC FREE SERPL-MCNC: 6 MG/L (ref 5.7–26.3)
LYMPHOCYTES # BLD AUTO: 0.6 X10E3/UL (ref 0.7–3.1)
LYMPHOCYTES NFR BLD AUTO: 14 %
M PROTEIN MFR UR ELPH: 51.6 %
M PROTEIN SERPL ELPH-MCNC: 4.4 G/DL
MAGNESIUM SERPL-MCNC: 2 MG/DL (ref 1.9–2.7)
MCH RBC QN AUTO: 33.3 PG (ref 26.8–34.3)
MCH RBC QN AUTO: 34.3 PG (ref 26.6–33)
MCHC RBC AUTO-ENTMCNC: 31.7 G/DL (ref 31.4–37.4)
MCHC RBC AUTO-ENTMCNC: 33.8 G/DL (ref 31.5–35.7)
MCV RBC AUTO: 102 FL (ref 79–97)
MCV RBC AUTO: 105 FL (ref 82–98)
MONOCYTES # BLD AUTO: 0.4 X10E3/UL (ref 0.1–0.9)
MONOCYTES NFR BLD AUTO: 9 %
MORPHOLOGY BLD-IMP: ABNORMAL
MYELOCYTES (CD4): 3 % (ref 0–0)
NEUTROPHILS # BLD AUTO: 3.1 X10E3/UL (ref 1.4–7)
NEUTROPHILS NFR BLD AUTO: 73 %
P AXIS: -8 DEGREES
PLATELET # BLD AUTO: 144 X10E3/UL (ref 150–450)
PLATELET # BLD AUTO: 99 THOUSANDS/UL (ref 149–390)
PMV BLD AUTO: 10.7 FL (ref 8.9–12.7)
POTASSIUM SERPL-SCNC: 4 MMOL/L (ref 3.5–5.2)
POTASSIUM SERPL-SCNC: 4 MMOL/L (ref 3.5–5.3)
PR INTERVAL: 124 MS
PR INTERVAL: 202 MS
PR INTERVAL: 236 MS
PROCALCITONIN SERPL-MCNC: 15.4 NG/ML
PROT SERPL-MCNC: 9.1 G/DL (ref 6.4–8.4)
PROT SERPL-MCNC: 9.4 G/DL (ref 6–8.5)
PROT UR-MCNC: 7.5 MG/DL
QRS AXIS: 79 DEGREES
QRS AXIS: 86 DEGREES
QRS AXIS: 86 DEGREES
QRS AXIS: 90 DEGREES
QRSD INTERVAL: 102 MS
QRSD INTERVAL: 116 MS
QRSD INTERVAL: 120 MS
QRSD INTERVAL: 98 MS
QT INTERVAL: 392 MS
QT INTERVAL: 412 MS
QT INTERVAL: 484 MS
QT INTERVAL: 486 MS
QTC INTERVAL: 404 MS
QTC INTERVAL: 431 MS
QTC INTERVAL: 484 MS
QTC INTERVAL: 509 MS
RBC # BLD AUTO: 2.76 MILLION/UL (ref 3.88–5.62)
RBC # BLD AUTO: 3.12 X10E6/UL (ref 4.14–5.8)
RH BLD: POSITIVE
SL AMB IMMATURE CELLS: ABNORMAL
SODIUM SERPL-SCNC: 127 MMOL/L (ref 135–147)
SODIUM SERPL-SCNC: 131 MMOL/L (ref 134–144)
T WAVE AXIS: -15 DEGREES
T WAVE AXIS: -42 DEGREES
T WAVE AXIS: 207 DEGREES
T WAVE AXIS: 240 DEGREES
VENTRICULAR RATE: 60 BPM
VENTRICULAR RATE: 64 BPM
VENTRICULAR RATE: 66 BPM
VENTRICULAR RATE: 66 BPM
WBC # BLD AUTO: 3.92 THOUSAND/UL (ref 4.31–10.16)
WBC # BLD AUTO: 4.3 X10E3/UL (ref 3.4–10.8)

## 2024-11-08 PROCEDURE — 93005 ELECTROCARDIOGRAM TRACING: CPT

## 2024-11-08 PROCEDURE — 80053 COMPREHEN METABOLIC PANEL: CPT | Performed by: INTERNAL MEDICINE

## 2024-11-08 PROCEDURE — 83735 ASSAY OF MAGNESIUM: CPT | Performed by: INTERNAL MEDICINE

## 2024-11-08 PROCEDURE — 93010 ELECTROCARDIOGRAM REPORT: CPT | Performed by: INTERNAL MEDICINE

## 2024-11-08 PROCEDURE — 84145 PROCALCITONIN (PCT): CPT | Performed by: INTERNAL MEDICINE

## 2024-11-08 PROCEDURE — 99232 SBSQ HOSP IP/OBS MODERATE 35: CPT | Performed by: INTERNAL MEDICINE

## 2024-11-08 PROCEDURE — 85027 COMPLETE CBC AUTOMATED: CPT | Performed by: INTERNAL MEDICINE

## 2024-11-08 RX ORDER — ALPRAZOLAM 0.5 MG
0.5 TABLET ORAL DAILY PRN
Status: DISCONTINUED | OUTPATIENT
Start: 2024-11-08 | End: 2024-11-09 | Stop reason: HOSPADM

## 2024-11-08 RX ORDER — ALPRAZOLAM 0.5 MG
0.5 TABLET ORAL
Status: DISCONTINUED | OUTPATIENT
Start: 2024-11-08 | End: 2024-11-08

## 2024-11-08 RX ORDER — SODIUM CHLORIDE 9 MG/ML
75 INJECTION, SOLUTION INTRAVENOUS CONTINUOUS
Status: DISCONTINUED | OUTPATIENT
Start: 2024-11-08 | End: 2024-11-09

## 2024-11-08 RX ADMIN — DOCUSATE SODIUM 100 MG: 100 CAPSULE, LIQUID FILLED ORAL at 17:19

## 2024-11-08 RX ADMIN — CEFEPIME HYDROCHLORIDE 2000 MG: 2 INJECTION, SOLUTION INTRAVENOUS at 20:45

## 2024-11-08 RX ADMIN — SODIUM CHLORIDE 75 ML/HR: 0.9 INJECTION, SOLUTION INTRAVENOUS at 14:31

## 2024-11-08 RX ADMIN — APIXABAN 5 MG: 5 TABLET, FILM COATED ORAL at 08:55

## 2024-11-08 RX ADMIN — ACYCLOVIR 400 MG: 800 TABLET ORAL at 17:19

## 2024-11-08 RX ADMIN — Medication 400 MG: at 08:55

## 2024-11-08 RX ADMIN — ACYCLOVIR 400 MG: 800 TABLET ORAL at 08:55

## 2024-11-08 RX ADMIN — FLUTICASONE PROPIONATE 1 SPRAY: 50 SPRAY, METERED NASAL at 08:56

## 2024-11-08 RX ADMIN — Medication 9 MG: at 21:55

## 2024-11-08 RX ADMIN — APIXABAN 5 MG: 5 TABLET, FILM COATED ORAL at 17:19

## 2024-11-08 RX ADMIN — AMIODARONE HYDROCHLORIDE 200 MG: 200 TABLET ORAL at 08:56

## 2024-11-08 RX ADMIN — DOCUSATE SODIUM 100 MG: 100 CAPSULE, LIQUID FILLED ORAL at 08:55

## 2024-11-08 RX ADMIN — B-COMPLEX W/ C & FOLIC ACID TAB 1 TABLET: TAB at 08:56

## 2024-11-08 RX ADMIN — ALPRAZOLAM 0.5 MG: 0.5 TABLET ORAL at 18:05

## 2024-11-08 RX ADMIN — ACETAMINOPHEN 650 MG: 325 TABLET, FILM COATED ORAL at 00:03

## 2024-11-08 RX ADMIN — QUETIAPINE FUMARATE 150 MG: 100 TABLET ORAL at 21:55

## 2024-11-08 RX ADMIN — EMPAGLIFLOZIN 10 MG: 10 TABLET, FILM COATED ORAL at 08:55

## 2024-11-08 RX ADMIN — CEFEPIME HYDROCHLORIDE 2000 MG: 2 INJECTION, SOLUTION INTRAVENOUS at 08:56

## 2024-11-08 NOTE — CASE MANAGEMENT
Case Management Progress Note    Patient name Noah Fernandez /-01 MRN [id number]  : 1940 Date 2024       LOS (days): 1  Geometric Mean LOS (GMLOS) (days): 4.9  Days to GMLOS:3.3        OBJECTIVE:        Current admission status: Inpatient  Preferred Pharmacy:   [facility]/pharm[id number] - [place] - [address]  [address]  [place] [address]  Phone: [phone] Fax: [phone]    Primary Care Provider: Guanako Cortes MD    Primary Insurance: [employer] REP  Secondary Insurance:     PROGRESS NOTE:  Notification made to OP CM Handoff: [facility] OP CM regarding discharge planning and disposition.  Call placed to [facility], left message for Erna to inform of tentative discharge over weekend with [facility].

## 2024-11-08 NOTE — CASE MANAGEMENT
Case Management Discharge Planning Note    Patient name Noah Fernandez /-01 MRN [id number]  : 1940 Date 2024       Current Admission Date: 2024  Current Admission Diagnosis:Right lower lobe pneumonia   Patient Active Problem List    Diagnosis Date Noted Date Diagnosed    Right lower lobe pneumonia 2024     Hyponatremia 2024     Severe sepsis (HCC) 2024     Acute respiratory insufficiency 2024     Multiple myeloma not having achieved remission (HCC) 10/29/2024     Other thrombophilia (HCC) 10/28/2024     Peripheral vascular disease, unspecified (HCC) 10/08/2024     Elevated total protein 2024     Coronary artery disease involving native coronary artery of native heart without angina pectoris 2024     Cardiomyopathy (HCC) 2024     COVID-19 2024     Paroxysmal atrial fibrillation (HCC) 2024     HFrEF (heart failure with reduced ejection fraction) ([facility]) 2024     Hypertension 2024       LOS (days): 1  Geometric Mean LOS (GMLOS) (days): 4.9  Days to GMLOS:3.5     OBJECTIVE:  Risk of Unplanned Readmission Score: 32.2         Current admission status: Inpatient   Preferred Pharmacy:   [id number] - [place] - [address]  [address]  [place] [address]  Phone: [phone] Fax: [phone]    Primary Care Provider: Guanako Cortes MD    Primary Insurance: [employer] RE[employer]  Secondary Insurance:     DISCHARGE DETAILS:    Discharge planning discussed with:: left message for Pt's dtr: Xuan Palma  Patient Contacts: Xuan Cardona dtr  Relationship to Patient:: Family  Contact Method: Phone  Phone Number: [phone]  Reason/Outcome: Emergency Contact, Continuity of Care, Discharge Planning    Requested Home Health Care         Is the patient interested in HHC at discharge?: Yes  Home Health Discipline requested:: Nursing  Home Health Agency Name:: [facility]  HHA External Referral  Reason (only applicable if external HHA name selected): Patient has established relationship with provider  Home Health Follow-Up Provider:: PCP  Home Health Services Needed:: Evaluate Functional Status and Safety  Homebound Criteria Met:: Uses an Assist Device (i.e. cane, walker, etc)  Supporting Clincal Findings:: Limited Endurance    Additional Comments: Met with Pt. Pt alert and oriented. Discussed role of case management. [facility] [place] [facility] [place] [address] [address] [facility] [place] [facility]. Uses cane, owns walker, shower chair, commode. Hx of SNF at. [place]. Hx of [facility]. [facility] [place] and able to afford medications. Pt reports his family provides transportation.Pt reports his plan to return home and unsure of discharge needs. Call placed to Pt's dtr(Xuan) per Pt's permission, left message. Anticipate return call.

## 2024-11-08 NOTE — ASSESSMENT & PLAN NOTE
SIRS criteria: Tachypnea and fever  Suspect source is pneumonia  Severe sepsis criteria met with lactic acidosis that improved with IVF  Continue cefepime  Blood Cultures are negative to date

## 2024-11-08 NOTE — TELEPHONE ENCOUNTER
Noted. Patient has a follow-up appt on 11/18 to discuss. Patient still hospitalized and has been seen by KAYA Ramos.

## 2024-11-08 NOTE — CASE MANAGEMENT
Case Management Assessment & Discharge Planning Note    Patient name Noah Fernandez /-01 MRN [id number]  : 1940 Date 2024       Current Admission Date: 2024  Current Admission Diagnosis:Right lower lobe pneumonia   Patient Active Problem List    Diagnosis Date Noted Date Diagnosed    Right lower lobe pneumonia 2024     Hyponatremia 2024     Severe sepsis (HCC) 2024     Acute respiratory insufficiency 2024     Multiple myeloma not having achieved remission (HCC) 10/29/2024     Other thrombophilia (HCC) 10/28/2024     Peripheral vascular disease, unspecified (HCC) 10/08/2024     Elevated total protein 2024     Coronary artery disease involving native coronary artery of native heart without angina pectoris 2024     Cardiomyopathy (HCC) 2024     COVID-19 2024     Paroxysmal atrial fibrillation (HCC) 2024     HFrEF (heart failure with reduced ejection fraction) ([facility]) 2024     Hypertension 2024       LOS (days): 1  Geometric Mean LOS (GMLOS) (days): 4.9  Days to GMLOS:3.5     OBJECTIVE:    Risk of Unplanned Readmission Score: 32.2         Current admission status: Inpatient       Preferred Pharmacy:   [facility]/pharmacy #[id number] - [place] - [address]  [address]  [place] [address]  Phone: [phone] Fax: [phone]    Primary Care Provider: Guanako Cortes MD    Primary Insurance: BLUE CROSS [employer] REP  Secondary Insurance:     ASSESSMENT:  Active Health Care Proxies       Xuan Prakash [facility] Care Agent - Child   Primary Phone: [phone] (Mobile)                 Advance Directives  Does patient have a Health Care POA?: Yes  Does patient have Advance Directives?: Yes  Advance Directives: Living will  Primary Contact: Xuan Prakash: dtr: [phone]    Readmission Root Cause  30 Day Readmission: No    Patient Information  Admitted from:: Home  Mental Status: Alert  During Assessment patient was  accompanied by: Not accompanied during assessment  Assessment information provided by:: Patient  Primary Caregiver: Self  Support Systems: Self, Children, Family members  County of Residence: [place]  What city do you live in?: [place]  Home entry access options. Select all that apply.: Stairs  Number of steps to enter home.: 2  Do the steps have railings?: Yes  Type of Current Residence: [facility]  Living Arrangements: Lives Alone  Is patient a ?: No    Activities of Daily Living Prior to Admission  Functional Status: Independent  Completes ADLs independently?: Yes  Ambulates independently?: Yes  Does patient use assisted devices?: Yes  Assisted Devices (DME) used: Straight Cane  Does patient currently own DME?: Yes  What DME does the patient currently own?: Straight Cane, Walker, Bedside Commode, Shower Chair  Does patient have a history of Outpatient Therapy (PT/OT)?: No  Does the patient have a history of Short-Term Rehab?:  ([facility])  Does patient have a history of HHC?: Yes ([facility])  Does patient currently have HHC?: No    Patient Information Continued  Income Source: Pension/alf  Does patient have prescription coverage?: Yes  Does patient receive dialysis treatments?: No  Does patient have a history of substance abuse?: No  Does patient have a history of Mental Health Diagnosis?: No    Means of Transportation  Means of Transport to Appts:: Family transport    DISCHARGE DETAILS:    Discharge planning discussed with:: left message for Pt's dtr: Xuan    Contacts  Patient Contacts: Xuan Cardona dtr  Relationship to Patient:: Family  Contact Method: Phone  Phone Number: [phone]  Reason/Outcome: Emergency Contact, Continuity of Care, Discharge Planning    Additional Comments: Met with Pt. Pt alert and oriented. Discussed role of case management. Pt lives alone in 1sh, 2 elias to poch with railings. Uses cane, owns walker, shower chair, commode. Hx of SNF [facility]. Hx of [facility]. [facility]ses [facility] in  [place] and able to afford medications. Pt reports his family provides transportation.Pt reports his plan to return home and unsure of discharge needs. Call placed to Pt's dtr(Xuan) per Pt's permission, left message. Anticipate return call.

## 2024-11-08 NOTE — NURSING NOTE
Pt was rambling and yelling at people who weren't in the room. Concerned for confusion because of him not sleeping in 12 hours. Let day shift nurse know. Pt was also found with bed alarm in his hand. Educated pt on why we have him on the bed alarm and that it has to stay on. Pt verbalizes understanding at this time. Pt is A&O x 4 to oriented questions. Took pt for a walk around the unit to help with him feeling restless. Pt is now back in chair, chair alarm on, call bell in reach bedside table in front of him.

## 2024-11-08 NOTE — ASSESSMENT & PLAN NOTE
Developed rigors 11/6 at 1630  Chest x-ray concerning for right lower lobe pneumonia  Continue cefepime due to immunocompromise state  Urine strep and Legionella studies negative  sputum culture and Gram stain  Respiratory protocol

## 2024-11-08 NOTE — ASSESSMENT & PLAN NOTE
Wt Readings from Last 3 Encounters:   11/08/24 75.8 kg (167 lb)   11/06/24 74.8 kg (164 lb 14.5 oz)   10/31/24 72.6 kg (160 lb)   Last echo 10/7/2024 LVEF 35%.  G2 DD.  Follows with [facility] cardiology  Home regimen: Lasix 20 Mg twice daily, Jardiance, and Toprol-XL -continue  Sodium and fluid restriction

## 2024-11-08 NOTE — PROGRESS NOTES
Progress Note - Hospitalist   Name: Noah Mendez 83 y.o. male I MRN: [id number]  Unit/Bed#: -01 I Date of Admission: 11/6/2024   Date of Service: 11/8/2024 I Hospital Day: 1    Assessment & Plan  Right lower lobe pneumonia  Developed rigors 11/6 at 1630  Chest x-ray concerning for right lower lobe pneumonia  Continue cefepime due to immunocompromise state  Urine strep and Legionella studies negative  sputum culture and Gram stain  Respiratory protocol  Severe sepsis ([facility])  SIRS criteria: Tachypnea and fever  Suspect source is pneumonia  Severe sepsis criteria met with lactic acidosis that improved with IVF  Continue cefepime  Blood Cultures are negative to date  Acute respiratory insufficiency  SpO2 77% on room air, improved on 2 L nasal cannula  Suspect secondary pneumonia  On RA  Hyponatremia  Sodium 130 on admission  Baseline sodium 129-131  On fluids  Continue home diuretic  Multiple myeloma not having achieved remission ([facility])  Follows with [facility] heme-onc  Started first round of chemotherapy (DARZALEX-FASPR ) on 11/6  Fever and shaking initially thought to be secondary to reaction from chemotherapy, however chest x-ray does show possible right lower lobe pneumonia  Oncology consult appreciated  HFrEF (heart failure with reduced ejection fraction) ([facility])  Wt Readings from Last 3 Encounters:   11/08/24 75.8 kg (167 lb)   11/06/24 74.8 kg (164 lb 14.5 oz)   10/31/24 72.6 kg (160 lb)   Last echo 10/7/2024 LVEF 35%.  G2 DD.  Follows with [facility] cardiology  Home regimen: Lasix 20 Mg twice daily, Jardiance, and Toprol-XL -continue  Sodium and fluid restriction  Hypertension  Home regimen: Toprol-XL 50 Mg daily and Lasix 20 Mg twice daily  Continue  Paroxysmal atrial fibrillation (HCC)  History of PAF-s/p JAZMYN guided cardioversion in September 2024  Home medications: Eliquis 5 Mg twice daily, amiodarone 200 Mg daily, Toprol-XL 50 Mg daily  History of sick sinus syndrome s/p PPM  Continue home meds  EKG on  "admit showing paced rhythm  Coronary artery disease involving native coronary artery of native heart without angina pectoris  History 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    VTE Prophylaxis: in place.    Code Status:   Level 3 - DNAR and DNI    Patient Centered Rounds: I have performed bedside rounds with nursing staff today.    Mobility:   Basic Mobility Inpatient Raw Score: 18  JH-HLM Goal: 6: Walk 10 steps or more  JH-HLM Achieved: 7: Walk 25 feet or more  JH-HLM Goal NOT achieved. Continue with multidisciplinary rounding and encourage appropriate mobility to improve upon JH-HLM goals.    Discussions with Specialists or Other Care Team Provider: CM    Education and Discussions with Family / Patient: Daughter    Total Time Spent on Date of Encounter in care of patient: 35 mins. This time was spent on one or more of the following: performing physical exam; counseling and coordination of care; obtaining or reviewing history; documenting in the medical record; reviewing/ordering tests, medications or procedures; communicating with other healthcare professionals and discussing with patient's family/caregivers.    Current Length of Stay: 1 day(s)    Current Patient Status: Inpatient   Certification Statement: The patient will continue to require additional inpatient hospital stay due to see my assessment and plan.     Subjective:   Patient is seen and examined at bedside.  Overnight events needed.  Patient did not sleep properly.  Afebrile  All other ROS are negative.    Objective:    Vitals: Blood pressure 102/57, pulse 60, temperature 98.2 °F (36.8 °C), temperature source Oral, resp. rate 18, height 5' 10\" (1.778 m), weight 75.8 kg (167 lb), SpO2 94%.,Body mass index is 23.96 kg/m².  SPO2 RA Rest      Flowsheet Row ED to Hosp-Admission (Current) from 11/6/2024 in . " [facility]urg [facility]   SpO2 94 %   SpO2 Activity At Rest   O2 Device None (Room air)   O2 Flow Rate --          I&O:   Intake/Output Summary (Last 24 hours) at 11/8/2024 0727  Last data filed at 11/8/2024 0501  Gross per 24 hour   Intake 1485 ml   Output 1000 ml   Net 485 ml       Physical Exam:    General- Alert, lying comfortably in bed. Not in any acute distress.  Neck- Supple, No JVD  CVS- regular, S1 and S2 normal  Chest- Bilateral Air entry, No rhochi, crackles or wheezing present.  Abdomen- soft, nontender, not distended, no guarding or rigidity, BS+  Extremities-  No pedal edema, No calf tenderness                         Normal ROM in all extremities.  CNS-   Alert, awake and orientedx3. No focal deficits present.    Invasive Devices       Peripheral Intravenous Line  Duration             Peripheral IV 11/06/24 Right Antecubital 1 day                          Social History  reviewed  History reviewed. No pertinent family history. reviewed    Meds:  Current Facility-Administered Medications   Medication Dose Route Frequency Provider Last Rate Last Admin    acetaminophen (TYLENOL) tablet 650 mg  650 mg Oral Q6H PRN Anabela Gorman PA-C   650 mg at 11/08/24 0003    acyclovir (ZOVIRAX) tablet 400 mg  400 mg Oral BID Sravanthi Moraes MD   400 mg at 11/07/24 1837    aluminum-magnesium hydroxide-simethicone (MAALOX) oral suspension 30 mL  30 mL Oral Q6H PRN Anabela Gorman PA-C   30 mL at 11/07/24 2342    amiodarone tablet 200 mg  200 mg Oral Daily Sravanthi Moraes MD   200 mg at 11/07/24 0903    apixaban (ELIQUIS) tablet 5 mg  5 mg Oral BID Sravanthi Moraes MD   5 mg at 11/07/24 1837    cefepime (MAXIPIME) IVPB (premix in dextrose) 2,000 mg 50 mL  2,000 mg Intravenous Q12H Anabela Gorman PA-C 100 mL/hr at 11/07/24 2039 2,000 mg at 11/07/24 2039    docusate sodium (COLACE) capsule 100 mg  100 mg Oral BID Sravanthi Moraes MD   100 mg at 11/07/24 1837     Empagliflozin (JARDIANCE) tablet 10 mg  10 mg Oral QAM Sravanthi Moraes MD   10 mg at 11/07/24 0906    fluticasone (FLONASE) 50 mcg/act nasal spray 1 spray  1 spray Nasal Daily Sravanthi Moraes MD        furosemide (LASIX) tablet 20 mg  20 mg Oral BID Sravanthi Moraes MD   20 mg at 11/07/24 0902    magnesium Oxide (MAG-OX) tablet 400 mg  400 mg Oral Daily Sravanthi Moraes MD   400 mg at 11/07/24 0902    melatonin tablet 9 mg  9 mg Oral HS Sravanthi Moraes MD   9 mg at 11/07/24 2037    metoprolol succinate (TOPROL-XL) 24 hr tablet 50 mg  50 mg Oral Q24H Sravanthi Moraes MD   50 mg at 11/07/24 0902    multivitamin stress formula tablet 1 tablet  1 tablet Oral Daily Sravanthi Moraes MD   1 tablet at 11/07/24 0902    ondansetron (ZOFRAN) injection 4 mg  4 mg Intravenous Q8H PRN Anabela Gorman PA-C        QUEtiapine (SEROquel) tablet 150 mg  150 mg Oral HS Sravanthi Moraes MD   150 mg at 11/07/24 2037    senna (SENOKOT) tablet 17.2 mg  17.2 mg Oral HS PRN Sravanthi Moraes MD        sodium chloride (OCEAN) 0.65 % nasal spray 1 spray  1 spray Each Nare Q1H PRN Kaylan Land MD        sodium chloride (PF) 0.9 % injection 3 mL  3 mL Intravenous Once Anabela Gorman PA-C         Facility-Administered Medications Ordered in Other Encounters   Medication Dose Route Frequency Provider Last Rate Last Admin    [MAR Hold] alteplase (CATHFLO) injection 2 mg  2 mg Intracatheter Q1MIN PRN Anabela Mathis DO            Medications Prior to Admission:     acyclovir (ZOVIRAX) 400 MG tablet    amiodarone 200 mg tablet    apixaban (ELIQUIS) 5 mg    Ativan 0.5 MG tablet    docusate sodium (COLACE) 100 mg capsule    Empagliflozin (Jardiance) 10 MG TABS tablet    fluticasone (FLONASE) 50 mcg/act nasal spray    furosemide (LASIX) 20 mg tablet    Magnesium 300 MG CAPS    melatonin 1 mg    metoprolol succinate (TOPROL-XL) 25 mg 24 hr tablet    Multiple Vitamin (multivitamin)  tablet    QUEtiapine (SEROquel) 50 mg tablet    senna (SENOKOT) 8.6 mg    amiodarone 200 mg tablet    lenalidomide (REVLIMID) 15 MG CAPS    ondansetron (ZOFRAN) 4 mg tablet    polyethylene glycol (MIRALAX) 17 g packet    Labs:  Results from last 7 days   Lab Units 11/08/24 0244 11/07/24 0552 11/06/24 2056 11/05/24  1054   WBC Thousand/uL 3.92* 10.43* 9.17  --    WHITE BLOOD CELL COUNT. x10E3/uL  --   --   --  4.7   HEMOGLOBIN g/dL 9.2* 9.5* 11.1*  --    HEMOGLOBIN. g/dL  --   --   --  10.4*   HEMATOCRIT % 29.0* 29.3* 35.4*  --    HEMATOCRIT. %  --   --   --  31.9*   PLATELETS Thousands/uL 99* 100* 151  --    PLATELETS. x10E3/uL  --   --   --  147*   NEUTROS PCT. %  --   --   --  69   LYMPHO PCT %  --   --  11*  --    LYMPHS PCT. %  --   --   --  17   MONO PCT %  --   --  2*  --    MONOS PCT. %  --   --   --  9   EOS PCT %  --   --  0  --    EOS PCT. %  --   --   --  1     Results from last 7 days   Lab Units 11/08/24 0244 11/07/24 0552 11/06/24 2056 11/05/24  1057 11/05/24  1054   POTASSIUM mmol/L 4.0 4.4 4.3   < > 4.0   CHLORIDE mmol/L 100 99 98   < > 96   CO2 mmol/L 25 25 22   < > 25   BUN mg/dL 36* 27* 24   < > 17   CREATININE mg/dL 1.05 1.01 1.23   < > 1.19   CALCIUM mg/dL 7.1* 7.4* 7.8*  --   --    ALK PHOS U/L 71  --  93  --   --    ALT U/L 46  --  29  --  21   AST U/L 52*  --  42*  --  23    < > = values in this interval not displayed.     Lab Results   Component Value Date    CKTOTAL 40 09/16/2024     Results from last 7 days   Lab Units 11/06/24 2056   INR  1.48*     Lab Results   Component Value Date    BLOODCX No Growth at 24 hrs. 11/06/2024    BLOODCX No Growth at 24 hrs. 11/06/2024         Imaging:  Results for orders placed during the hospital encounter of 11/06/24    XR chest 1 view portable    Narrative  XR CHEST PORTABLE    INDICATION: sob.    COMPARISON: None    FINDINGS: Left chest wall intracardiac device with intact lead(s). No abandoned lead(s). Sternotomy wires.    Clear lungs. No  pneumothorax or pleural effusion.    Cardiomegaly. Status post CABG.    Bones are unremarkable for age.    Normal upper abdomen.    Impression  No acute cardiopulmonary disease. Cardiomegaly.        Workstation performed: [id number]    No results found for this or any previous visit.      Last 24 Hours Medication List:   Current Facility-Administered Medications   Medication Dose Route Frequency Provider Last Rate    acetaminophen  650 mg Oral Q6H PRN Anabela Gorman PA-C      acyclovir  400 mg Oral BID Sravanthi Moraes MD      aluminum-magnesium hydroxide-simethicone  30 mL Oral Q6H PRN Anabela Gorman PA-C      amiodarone  200 mg Oral Daily Sravanthi Moraes MD      apixaban  5 mg Oral BID Sravanthi Moraes MD      cefepime  2,000 mg Intravenous Q12H Anabela Gorman PA-C 2,000 mg (11/07/24 2039)    docusate sodium  100 mg Oral BID Sravanthi Moraes MD      Empagliflozin  10 mg Oral QAM Sravanthi Moraes MD      fluticasone  1 spray Nasal Daily Sravanthi Moraes MD      furosemide  20 mg Oral BID Sravanthi Moraes MD      magnesium Oxide  400 mg Oral Daily Sravanthi Moraes MD      melatonin  9 mg Oral HS Sravanthi Moraes MD      metoprolol succinate  50 mg Oral Q24H Sravanthi Moraes MD      multivitamin stress formula  1 tablet Oral Daily Sravanthi Moraes MD      ondansetron  4 mg Intravenous Q8H PRN Anabela Gorman PA-C      QUEtiapine  150 mg Oral HS Sravanthi Moraes MD      senna  17.2 mg Oral HS PRN Sravanthi Moraes MD      sodium chloride  1 spray Each Nare Q1H PRN Kaylan Land MD      sodium chloride (PF)  3 mL Intravenous Once Anabela Gorman PA-C       Facility-Administered Medications Ordered in Other Encounters   Medication Dose Route Frequency Provider Last Rate    [MAR Hold] alteplase  2 mg Intracatheter Q1MIN PRN Anabela Mathis DO          Today, Patient Was Seen By: Stefano Alba MD    ** Please Note:  Dictation voice to text software may have been used in the creation of this document. **

## 2024-11-08 NOTE — ASSESSMENT & PLAN NOTE
Follows with [facility] heme-onc  Started first round of chemotherapy (DARZALEX-FASPR ) on 11/6  Fever and shaking initially thought to be secondary to reaction from chemotherapy, however chest x-ray does show possible right lower lobe pneumonia  Oncology consult appreciated   - Not currently on AC, was only on AC for 9 month period stopped in the end of 2022

## 2024-11-08 NOTE — PLAN OF CARE
Problem: Potential for Falls  Goal: Patient will remain free of falls  Description: INTERVENTIONS:  - Educate patient/family on patient safety including physical limitations  - Instruct patient to call for assistance with activity   - Consult OT/PT to assist with strengthening/mobility   - Keep Call bell within reach  - Keep bed low and locked with side rails adjusted as appropriate  - Keep care items and personal belongings within reach  - Initiate and maintain comfort rounds  - Make Fall Risk Sign visible to staff  - Offer Toileting every 2 Hours, in advance of need  - Initiate/Maintain alarm  - Obtain necessary fall risk management equipment:   - Apply yellow socks and bracelet for high fall risk patients  - Consider moving patient to room near nurses station  Outcome: Progressing     Problem: Prexisting or High Potential for Compromised Skin Integrity  Goal: Skin integrity is maintained or improved  Description: INTERVENTIONS:  - Identify patients at risk for skin breakdown  - Assess and monitor skin integrity  - Assess and monitor nutrition and hydration status  - Monitor labs   - Assess for incontinence   - Turn and reposition patient  - Assist with mobility/ambulation  - Relieve pressure over bony prominences  - Avoid friction and shearing  - Provide appropriate hygiene as needed including keeping skin clean and dry  - Evaluate need for skin moisturizer/barrier cream  - Collaborate with interdisciplinary team   - Patient/family teaching  - Consider wound care consult   Outcome: Progressing     Problem: PAIN - ADULT  Goal: Verbalizes/displays adequate comfort level or baseline comfort level  Description: Interventions:  - Encourage patient to monitor pain and request assistance  - Assess pain using appropriate pain scale  - Administer analgesics based on type and severity of pain and evaluate response  - Implement non-pharmacological measures as appropriate and evaluate response  - Consider cultural and  social influences on pain and pain management  - Notify physician/advanced practitioner if interventions unsuccessful or patient reports new pain  Outcome: Progressing     Problem: INFECTION - ADULT  Goal: Absence or prevention of progression during hospitalization  Description: INTERVENTIONS:  - Assess and monitor for signs and symptoms of infection  - Monitor lab/diagnostic results  - Monitor all insertion sites, i.e. indwelling lines, tubes, and drains  - Monitor endotracheal if appropriate and nasal secretions for changes in amount and color  - [place] appropriate cooling/warming therapies per order  - Administer medications as ordered  - Instruct and encourage patient and family to use good hand hygiene technique  - Identify and instruct in appropriate isolation precautions for identified infection/condition  Outcome: Progressing     Problem: SAFETY ADULT  Goal: Patient will remain free of falls  Description: INTERVENTIONS:  - Educate patient/family on patient safety including physical limitations  - Instruct patient to call for assistance with activity   - Consult OT/PT to assist with strengthening/mobility   - Keep Call bell within reach  - Keep bed low and locked with side rails adjusted as appropriate  - Keep care items and personal belongings within reach  - Initiate and maintain comfort rounds  - Make Fall Risk Sign visible to staff  - Offer Toileting every 2 Hours, in advance of need  - Initiate/Maintain alarm  - Obtain necessary fall risk management equipment:   - Apply yellow socks and bracelet for high fall risk patients  - Consider moving patient to room near nurses station  Outcome: Progressing  Goal: Maintain or return to baseline ADL function  Description: INTERVENTIONS:  -  Assess patient's ability to carry out ADLs; assess patient's baseline for ADL function and identify physical deficits which impact ability to perform ADLs (bathing, care of mouth/teeth, toileting, grooming, dressing,  etc.)  - Assess/evaluate cause of self-care deficits   - Assess range of motion  - Assess patient's mobility; develop plan if impaired  - Assess patient's need for assistive devices and provide as appropriate  - Encourage maximum independence but intervene and supervise when necessary  - Involve family in performance of ADLs  - Assess for home care needs following discharge   - Consider OT consult to assist with ADL evaluation and planning for discharge  - Provide patient education as appropriate  Outcome: Progressing     Problem: RESPIRATORY - ADULT  Goal: Achieves optimal ventilation and oxygenation  Description: INTERVENTIONS:  - Assess for changes in respiratory status  - Assess for changes in mentation and behavior  - Position to facilitate oxygenation and minimize respiratory effort  - Oxygen administered by appropriate delivery if ordered  - Initiate smoking cessation education as indicated  - Encourage broncho-pulmonary hygiene including cough, deep breathe, Incentive Spirometry  - Assess the need for suctioning and aspirate as needed  - Assess and instruct to report SOB or any respiratory difficulty  - Respiratory Therapy support as indicated  Outcome: Progressing     Problem: HEMATOLOGIC - ADULT  Goal: Maintains hematologic stability  Description: INTERVENTIONS  - Assess for signs and symptoms of bleeding or hemorrhage  - Monitor labs  - Administer supportive blood products/factors as ordered and appropriate  Outcome: Progressing

## 2024-11-08 NOTE — CASE MANAGEMENT
Case Management Progress Note    Patient name Noah Fernandez /-01 MRN [id number]  : 1940 Date 2024       LOS (days): 1  Geometric Mean LOS (GMLOS) (days): 4.9  Days to GMLOS:3.5        OBJECTIVE:        Current admission status: Inpatient  Preferred Pharmacy:   [id number] - [place] - [address]  [address]  [place] [address]  Phone: [phone] Fax: [phone]    Primary Care Provider: Guanako Cortes MD    Primary Insurance: BLUE CROSS [employer] REP  Secondary Insurance:     PROGRESS NOTE:    Call received from Pt's dtr(Sanaz, confirmed Pt's living environment and functional status. Xuan reports Pt has [facility] and is requesting JULIA. Referral sent to [facility] via [facility]. CM to follow.

## 2024-11-08 NOTE — QUICK NOTE
Called to bedside as pt very restless throughout the night. He is fully alert and oriented x4. Could be secondary to nicotine patch applied earlier in the day vs ongoing reaction to Daratumumab. Will d/c nicotine patch at this time.

## 2024-11-09 VITALS
TEMPERATURE: 97.8 F | BODY MASS INDEX: 23.65 KG/M2 | HEIGHT: 70 IN | OXYGEN SATURATION: 95 % | SYSTOLIC BLOOD PRESSURE: 114 MMHG | HEART RATE: 62 BPM | RESPIRATION RATE: 20 BRPM | WEIGHT: 165.2 LBS | DIASTOLIC BLOOD PRESSURE: 65 MMHG

## 2024-11-09 LAB
ANION GAP SERPL CALCULATED.3IONS-SCNC: 2 MMOL/L (ref 4–13)
BASOPHILS # BLD AUTO: 0.01 THOUSANDS/ÂΜL (ref 0–0.1)
BASOPHILS NFR BLD AUTO: 0 % (ref 0–1)
BUN SERPL-MCNC: 24 MG/DL (ref 5–25)
CALCIUM SERPL-MCNC: 7.2 MG/DL (ref 8.4–10.2)
CHLORIDE SERPL-SCNC: 105 MMOL/L (ref 96–108)
CO2 SERPL-SCNC: 25 MMOL/L (ref 21–32)
CREAT SERPL-MCNC: 0.76 MG/DL (ref 0.6–1.3)
EOSINOPHIL # BLD AUTO: 0.02 THOUSAND/ÂΜL (ref 0–0.61)
EOSINOPHIL NFR BLD AUTO: 1 % (ref 0–6)
ERYTHROCYTE [DISTWIDTH] IN BLOOD BY AUTOMATED COUNT: 19.2 % (ref 11.6–15.1)
GFR SERPL CREATININE-BSD FRML MDRD: 84 ML/MIN/1.73SQ M
GLUCOSE SERPL-MCNC: 92 MG/DL (ref 65–140)
HCT VFR BLD AUTO: 29.6 % (ref 36.5–49.3)
HGB BLD-MCNC: 9.3 G/DL (ref 12–17)
IMM GRANULOCYTES # BLD AUTO: 0.02 THOUSAND/UL (ref 0–0.2)
IMM GRANULOCYTES NFR BLD AUTO: 1 % (ref 0–2)
LYMPHOCYTES # BLD AUTO: 0.76 THOUSANDS/ÂΜL (ref 0.6–4.47)
LYMPHOCYTES NFR BLD AUTO: 26 % (ref 14–44)
MCH RBC QN AUTO: 33 PG (ref 26.8–34.3)
MCHC RBC AUTO-ENTMCNC: 31.4 G/DL (ref 31.4–37.4)
MCV RBC AUTO: 105 FL (ref 82–98)
MONOCYTES # BLD AUTO: 0.29 THOUSAND/ÂΜL (ref 0.17–1.22)
MONOCYTES NFR BLD AUTO: 10 % (ref 4–12)
NEUTROPHILS # BLD AUTO: 1.81 THOUSANDS/ÂΜL (ref 1.85–7.62)
NEUTS SEG NFR BLD AUTO: 62 % (ref 43–75)
NRBC BLD AUTO-RTO: 1 /100 WBCS
PLATELET # BLD AUTO: 122 THOUSANDS/UL (ref 149–390)
PMV BLD AUTO: 11.1 FL (ref 8.9–12.7)
POTASSIUM SERPL-SCNC: 3.9 MMOL/L (ref 3.5–5.3)
PROCALCITONIN SERPL-MCNC: 6.53 NG/ML
RBC # BLD AUTO: 2.82 MILLION/UL (ref 3.88–5.62)
SODIUM SERPL-SCNC: 132 MMOL/L (ref 135–147)
WBC # BLD AUTO: 2.91 THOUSAND/UL (ref 4.31–10.16)

## 2024-11-09 PROCEDURE — 80048 BASIC METABOLIC PNL TOTAL CA: CPT | Performed by: INTERNAL MEDICINE

## 2024-11-09 PROCEDURE — 85025 COMPLETE CBC W/AUTO DIFF WBC: CPT | Performed by: INTERNAL MEDICINE

## 2024-11-09 PROCEDURE — 99239 HOSP IP/OBS DSCHRG MGMT >30: CPT | Performed by: INTERNAL MEDICINE

## 2024-11-09 PROCEDURE — 84145 PROCALCITONIN (PCT): CPT | Performed by: INTERNAL MEDICINE

## 2024-11-09 RX ORDER — CEFADROXIL 500 MG/1
500 CAPSULE ORAL EVERY 12 HOURS SCHEDULED
Qty: 8 CAPSULE | Refills: 0 | Status: SHIPPED | OUTPATIENT
Start: 2024-11-09 | End: 2024-11-13

## 2024-11-09 RX ADMIN — APIXABAN 5 MG: 5 TABLET, FILM COATED ORAL at 08:29

## 2024-11-09 RX ADMIN — AMIODARONE HYDROCHLORIDE 200 MG: 200 TABLET ORAL at 08:29

## 2024-11-09 RX ADMIN — FLUTICASONE PROPIONATE 1 SPRAY: 50 SPRAY, METERED NASAL at 08:30

## 2024-11-09 RX ADMIN — CEFEPIME HYDROCHLORIDE 2000 MG: 2 INJECTION, SOLUTION INTRAVENOUS at 08:28

## 2024-11-09 RX ADMIN — B-COMPLEX W/ C & FOLIC ACID TAB 1 TABLET: TAB at 08:29

## 2024-11-09 RX ADMIN — DOCUSATE SODIUM 100 MG: 100 CAPSULE, LIQUID FILLED ORAL at 08:29

## 2024-11-09 RX ADMIN — ACYCLOVIR 400 MG: 800 TABLET ORAL at 08:29

## 2024-11-09 RX ADMIN — Medication 400 MG: at 08:29

## 2024-11-09 RX ADMIN — EMPAGLIFLOZIN 10 MG: 10 TABLET, FILM COATED ORAL at 08:29

## 2024-11-09 RX ADMIN — METOPROLOL SUCCINATE 50 MG: 50 TABLET, EXTENDED RELEASE ORAL at 08:29

## 2024-11-09 RX ADMIN — SODIUM CHLORIDE 75 ML/HR: 0.9 INJECTION, SOLUTION INTRAVENOUS at 08:33

## 2024-11-09 NOTE — ASSESSMENT & PLAN NOTE
SpO2 77% on room air, improved on 2 L nasal cannula  Suspect secondary pneumonia  20 well on room air at discharge

## 2024-11-09 NOTE — ASSESSMENT & PLAN NOTE
Follows with [facility]-onc  Started first round of chemotherapy (DARZALEX-FASPR ) on 11/6  Fever and shaking initially thought to be secondary to reaction from chemotherapy, however chest x-ray does show possible right lower lobe pneumonia  Oncology consult appreciated  Outpatient follow-up with oncology

## 2024-11-09 NOTE — DISCHARGE SUMMARY
Discharge Summary - Hospitalist   Name: Noah Mendez 83 y.o. male I MRN: [id number]  Unit/Bed#: -01 I Date of Admission: 11/6/2024   Date of Service: 11/9/2024 I Hospital Day: 2     Assessment & Plan  Right lower lobe pneumonia  Developed rigors 11/6 at 1630  Chest x-ray concerning for right lower lobe pneumonia  Continue cefepime due to immunocompromise state  Urine strep and Legionella studies negative  sputum culture and Gram stain  Respiratory protocol.  Procalcitonin is trending down  Patient will be switched to Duricef to complete the course  Severe sepsis (HCC)  SIRS criteria: Tachypnea and fever  Suspect source is pneumonia  Severe sepsis criteria met with lactic acidosis that improved with IVF  Continue cefepime  Blood Cultures are negative to date  Switch to Duricef at discharge  Acute respiratory insufficiency  SpO2 77% on room air, improved on 2 L nasal cannula  Suspect secondary pneumonia  20 well on room air at discharge  Hyponatremia  Sodium 130 on admission  Baseline sodium 129-131  Patient's sodium improved to 132  Continue home diuretic  Multiple myeloma not having achieved remission (Formerly [facility])  Follows with [facility] heme-onc  Started first round of chemotherapy (DARZALEX-FASPR ) on 11/6  Fever and shaking initially thought to be secondary to reaction from chemotherapy, however chest x-ray does show possible right lower lobe pneumonia  Oncology consult appreciated  Outpatient follow-up with oncology  HFrEF (heart failure with reduced ejection fraction) ([facility])  Wt Readings from Last 3 Encounters:   11/09/24 74.9 kg (165 lb 3.2 oz)   11/06/24 74.8 kg (164 lb 14.5 oz)   10/31/24 72.6 kg (160 lb)   Last echo 10/7/2024 LVEF 35%.  G2 DD.  Follows with [facility] cardiology  Home regimen: Lasix 20 Mg twice daily, Jardiance, and Toprol-XL -continue  Sodium and fluid restriction  Hypertension  Home regimen: Toprol-XL 50 Mg daily and Lasix 20 Mg twice daily  Continue  Paroxysmal atrial fibrillation  (Formerly [facility])  History of PAF-s/p JAZMYN guided cardioversion in September 2024  Home medications: Eliquis 5 Mg twice daily, amiodarone 200 Mg daily, Toprol-XL 50 Mg daily  History of sick sinus syndrome s/p PPM  Continue home meds  EKG on admit showing paced rhythm  Coronary artery disease involving native coronary artery of native heart without angina pectoris  History of CABG x 2 and left circumflex PCI in 2015  Denies any chest pain on admit  Continue home beta-blocker   Hospital Course:     Noah Mendez is a 83 y.o. male patient who originally presented to the hospital on   Admission Orders (From admission, onward)       Ordered        11/07/24 0008  INPATIENT ADMISSION  Once                         due to severe sepsis possibly secondary to pneumonia/chemotherapy reaction.  Patient was started on cefepime.  Patient was weaned off oxygen and saturating well on room air at discharge.  Patient was seen by oncology.  Patient will be switched to Duricef at discharge.  Patient will be discharged home with home care services.  Outpatient follow-up with oncology  On Exam-  Chest-bilateral air entry, clear to auscultation  Abdomen-soft, nontender  Heart-S1 S2 regular  Extremities-no pedal edema or calf tenderness  Neuro-alert awake oriented x3.  No focal deficits    Please see above list of diagnoses and related plan for additional information.   Follow-up with PCP and oncology as outpatient    CONSULTING PROVIDERS   IP CONSULT TO ONCOLOGY    PROCEDURES PERFORMED  * No surgery found *    RADIOLOGY RESULTS  XR chest 1 view portable    Result Date: 11/7/2024  Narrative: XR CHEST PORTABLE INDICATION: sob. COMPARISON: None FINDINGS: Left chest wall intracardiac device with intact lead(s). No abandoned lead(s). Sternotomy wires. Clear lungs. No pneumothorax or pleural effusion. Cardiomegaly. Status post CABG. Bones are unremarkable for age. Normal upper abdomen.     Impression: No acute cardiopulmonary disease. Cardiomegaly.  Workstation performed: [id number]     CT low dose whole body myeloma scan    Result Date: 11/5/2024  Narrative: WHOLE BODY LOW DOSE CT WITHOUT IV CONTRAST (MULTIPLE MYELOMA PROTOCOL.) INDICATION:   R77.8: Other specified abnormalities of plasma proteins D47.2: Monoclonal gammopathy D53.9: Nutritional anemia, unspecified. Oncology note from 10/28/2024 reviewed. Patient had recent bone biopsy demonstrating IgG kappa multiple myeloma. COMPARISON: Chest, abdomen, pelvic CT from 9/16/2024. TECHNIQUE: Low radiation dose thin section CT examination of the whole body was performed without intravenous or oral contrast according to a protocol specifically designed to evaluate for possible multiple myeloma. Scan included the head, cervical spine, chest, abdomen, pelvis, as well as the humeri and femurs.  Axial, sagittal, and coronal 2D reformatted images were created from the source data and submitted for interpretation.  Evaluation for pathology in nonosseous structures is limited. Radiation dose length product (DLP) for this visit:  802.14 mGy-cm .  This examination, like all CT scans performed in the [facility], was performed utilizing techniques to minimize radiation dose exposure, including the use of iterative  reconstruction and automated exposure control. Enteric contrast was not administered. FINDINGS: Reporting of Bone findings below are based on the 2014 International Myeloma Working Group (IMWG) recommendations. BONE FINDINGS: SUSPICIOUS OSTEOLYTIC LESIONS: None. (There is a prominent arachnoid granulation pit in the occipital bone seen on series 301 image 59.) VERTEBRAL COMPRESSION FRACTURES: There are no potentially acute/malignant compression fracture. There is an unchanged likely benign mild compression fracture of T7 (series 604 image 127.) PERIPHERAL MEDULLARY PATTERN: Fatty pattern. VISUALIZED BRAIN: No acute abnormalities are seen. HEAD/NECK: Unremarkable. CHEST LUNGS: Severe emphysema. No  acute pulmonary disease. There is no tracheal or endobronchial lesion. PLEURA:  Unremarkable. HEART/GREAT VESSELS: Heart is unremarkable for patient's age.  No thoracic aortic aneurysm. There is a pacemaker. MEDIASTINUM AND RASHAAD:  Unremarkable. ABDOMEN LIVER/BILIARY TREE:  Unremarkable. GALLBLADDER:  No calcified gallstones. No pericholecystic inflammatory change. SPLEEN:  Unremarkable. PANCREAS:  Unremarkable. ADRENAL GLANDS:  Unremarkable. KIDNEYS/URETERS:  Unremarkable. No hydronephrosis. STOMACH AND BOWEL:  Unremarkable. APPENDIX:  No findings to suggest appendicitis. ABDOMINOPELVIC CAVITY:  No ascites.  No pneumoperitoneum.  No lymphadenopathy. VESSELS:  Unremarkable for patient's age. PELVIS REPRODUCTIVE ORGANS:  Unremarkable for patient's age. URINARY BLADDER:  Unremarkable. ABDOMINAL WALL/INGUINAL REGIONS:  Unremarkable.     Impression: WHOLE BODY LOW DOSE CT FOR EVALUATION OF MULTIPLE MYELOMA: In this patient with biopsy-proven multiple myeloma, there are no suspicious focal lesions. OSTEOLYTIC LESIONS: None. PROBABLY MALIGNANT VERTEBRAL FRACTURE: None. SUSPICIOUS PERIPHERAL MEDULLARY PATTERN: No. Workstation performed: [id number]     IR biopsy bone marrow    Result Date: 10/23/2024  Narrative: INDICATION: IgG MGUS with anemia PROCEDURE: 1. Fluoroscopic-guided right ilium bone marrow aspiration and core bone biopsy     Impression: Bone marrow aspiration and core ilium bone biopsy. _______________________________________________________________ COMPARISON: None PROCEDURE DETAILS: Operators: Dr. Wilder Anesthesia: Moderate sedation was provided for 30 minutes. Hemodynamic parameters were continuously monitored by IR nursing. Local anesthesia. Medications: 1% lidocaine, fentanyl, Versed Fluoroscopy time: 0.3 Images: 1 COMMENTS: The patient was placed prone on the imaging table.  A preprocedure timeout was performed per [facility] protocol. The posterior inferior iliac spine was localized by fluoroscopy and the  low back was prepped and draped in sterile fashion. An 11-gauge needle was advanced using fluoroscopic guidance through the cortex of the iliac spine into the marrow. Bone marrow aspiration was performed followed by a core ilium bone biopsy. The puncture site was cleansed and a dressing was applied. Workstation performed: [id number]     Echo follow up/limited w/ contrast if indicated    Result Date: 10/18/2024  Narrative:   Left Ventricle: Left ventricular cavity size is mildly dilated. Wall thickness is mildly increased. The left ventricular ejection fraction is 35%. Systolic function is moderately reduced. Diastolic function is moderately abnormal, consistent with grade II (pseudonormal) relaxation.   The following segments are akinetic: basal inferior, basal inferolateral and mid inferolateral.   The following segments are hypokinetic: basal inferoseptal, mid inferoseptal, mid inferior, mid anterolateral, apical inferior and apical lateral.   All other segments are normal.   Left Atrium: The atrium is moderately dilated.   Right Atrium: The atrium is dilated.   Mitral Valve: There is moderate diffuse thickening. There is mild calcification with mild chordal involvement. The leaflets exhibit normal mobility. There is mild annular calcification. There is mild regurgitation.   Tricuspid Valve: There is moderate to severe regurgitation.   Pulmonic Valve: There is mild regurgitation.       LABS  Results from last 7 days   Lab Units 11/09/24  0450 11/08/24  0244 11/07/24  0552 11/06/24  2056 11/05/24  1055 11/05/24  1054   WBC Thousand/uL 2.91* 3.92* 10.43* 9.17  --   --    WHITE BLOOD CELL COUNT. x10E3/uL  --   --   --   --  4.3 4.7   HEMOGLOBIN g/dL 9.3* 9.2* 9.5* 11.1*  --   --    HEMOGLOBIN. g/dL  --   --   --   --  10.7* 10.4*   HEMATOCRIT % 29.6* 29.0* 29.3* 35.4*  --   --    HEMATOCRIT. %  --   --   --   --  31.7* 31.9*   MCV fL 105* 105* 104* 108*  --   --    MCV. fL  --   --   --   --  102* 102*   TOTAL NEUT  ABS Thousand/uL  --   --   --  7.98*  --   --    BANDS PCT %  --   --   --  1  --   --    PLATELETS Thousands/uL 122* 99* 100* 151  --   --    PLATELETS. x10E3/uL  --   --   --   --  144* 147*   INR   --   --   --  1.48*  --   --      Results from last 7 days   Lab Units 11/09/24  0450 11/08/24  0244 11/07/24  0552 11/06/24 2056 11/05/24  1057 11/05/24  1055 11/05/24  1054   SODIUM mmol/L 132* 127* 128* 130* 130* 131* 131*   POTASSIUM mmol/L 3.9 4.0 4.4 4.3 4.0 4.0 4.0   CHLORIDE mmol/L 105 100 99 98 96 96 96   CO2 mmol/L 25 25 25 22 24 24 25   BUN mg/dL 24 36* 27* 24 17 16 17   CREATININE mg/dL 0.76 1.05 1.01 1.23 1.20 1.10 1.19   CALCIUM mg/dL 7.2* 7.1* 7.4* 7.8*  --   --   --    ALBUMIN g/dL  --  2.7*  --  3.2*  --  2.9* 3.0*   TOTAL BILIRUBIN mg/dL  --  0.56  --  0.55  --  0.5 0.5   ALK PHOS U/L  --  71  --  93  --   --   --    ALT U/L  --  46  --  29  --  19 21   AST U/L  --  52*  --  42*  --  22 23   EGFR ml/min/1.73sq m 84 65 68 53 60 67 61   GLUCOSE RANDOM mg/dL 92 99 147* 155* 132* 133* 128*     Results from last 7 days   Lab Units 11/07/24  0059 11/06/24 2304 11/06/24 2056   HS TNI 0HR ng/L  --   --  13   HS TNI 2HR ng/L  --  24  --    HS TNI 4HR ng/L 28  --   --                           Results from last 7 days   Lab Units 11/09/24  0450 11/07/24  0552 11/07/24  0318 11/06/24 2304 11/06/24 2056   LACTIC ACID mmol/L  --   --  1.7 2.2* 3.3*   PROCALCITONIN ng/ml 6.53*   < >  --   --  0.14    < > = values in this interval not displayed.           Cultures:   Results from last 7 days   Lab Units 11/06/24 2102   COLOR UA  Yellow   CLARITY UA  Clear   SPEC GRAV UA  1.020   PH UA  6.0   LEUKOCYTES UA  Negative   NITRITE UA  Negative   GLUCOSE UA mg/dl 150 (3/20%)*   KETONES UA mg/dl Negative   BILIRUBIN UA  Negative   BLOOD UA  Negative      Results from last 7 days   Lab Units 11/06/24 2102   RBC UA /hpf 0-1   WBC UA /hpf 0-1   EPITHELIAL CELLS WET PREP /hpf Occasional   BACTERIA UA /hpf Occasional       Results from last 7 days   Lab Units 11/07/24  2349 11/07/24  0744 11/06/24 2056   BLOOD CULTURE   --   --  No Growth at 48 hrs.  No Growth at 48 hrs.   SPUTUM CULTURE  Culture too young- will reincubate  --   --    GRAM STAIN RESULT  1+ Epithelial cells per low power field*  Rare Polys*  1+ Gram negative rods*  Rare Gram positive cocci in pairs*  --   --    LEGIONELLA URINARY ANTIGEN   --  Negative  --    STREP PNEUMONIAE ANTIGEN, URINE   --  Negative  --        Condition at Discharge:  good      Discharge instructions/Information to patient and family:   See after visit summary for information provided to patient and family.      Provisions for Follow-Up Care:  See after visit summary for information related to follow-up care and any pertinent home health orders.      Disposition:     Home with [facility]NA Services (Reminder: Complete face to face encounter)       Discharge Statement:  I spent 40 minutes discharging the patient. This time was spent on the day of discharge. I had direct contact with the patient on the day of discharge. Greater than 50% of the total time was spent examining patient, answering all patient questions, arranging and discussing plan of care with patient as well as directly providing post-discharge instructions.  Additional time then spent on discharge activities.    Discharge Medications:  See after visit summary for reconciled discharge medications provided to patient and family.      ** Please Note: This note has been constructed using a voice recognition system **

## 2024-11-09 NOTE — PLAN OF CARE
Problem: Potential for Falls  Goal: Patient will remain free of falls  Description: INTERVENTIONS:  - Educate patient/family on patient safety including physical limitations  - Instruct patient to call for assistance with activity   - Consult OT/PT to assist with strengthening/mobility   - Keep Call bell within reach  - Keep bed low and locked with side rails adjusted as appropriate  - Keep care items and personal belongings within reach  - Initiate and maintain comfort rounds  - Make Fall Risk Sign visible to staff  - Offer Toileting every 2 Hours, in advance of need  - Initiate/Maintain alarm  - Obtain necessary fall risk management equipment:   - Apply yellow socks and bracelet for high fall risk patients  - Consider moving patient to room near nurses station  Outcome: Progressing     Problem: Prexisting or High Potential for Compromised Skin Integrity  Goal: Skin integrity is maintained or improved  Description: INTERVENTIONS:  - Identify patients at risk for skin breakdown  - Assess and monitor skin integrity  - Assess and monitor nutrition and hydration status  - Monitor labs   - Assess for incontinence   - Turn and reposition patient  - Assist with mobility/ambulation  - Relieve pressure over bony prominences  - Avoid friction and shearing  - Provide appropriate hygiene as needed including keeping skin clean and dry  - Evaluate need for skin moisturizer/barrier cream  - Collaborate with interdisciplinary team   - Patient/family teaching  - Consider wound care consult   Outcome: Progressing     Problem: PAIN - ADULT  Goal: Verbalizes/displays adequate comfort level or baseline comfort level  Description: Interventions:  - Encourage patient to monitor pain and request assistance  - Assess pain using appropriate pain scale  - Administer analgesics based on type and severity of pain and evaluate response  - Implement non-pharmacological measures as appropriate and evaluate response  - Consider cultural and  social influences on pain and pain management  - Notify physician/advanced practitioner if interventions unsuccessful or patient reports new pain  Outcome: Progressing     Problem: INFECTION - ADULT  Goal: Absence or prevention of progression during hospitalization  Description: INTERVENTIONS:  - Assess and monitor for signs and symptoms of infection  - Monitor lab/diagnostic results  - Monitor all insertion sites, i.e. indwelling lines, tubes, and drains  - Monitor endotracheal if appropriate and nasal secretions for changes in amount and color  - [place] appropriate cooling/warming therapies per order  - Administer medications as ordered  - Instruct and encourage patient and family to use good hand hygiene technique  - Identify and instruct in appropriate isolation precautions for identified infection/condition  Outcome: Progressing     Problem: SAFETY ADULT  Goal: Patient will remain free of falls  Description: INTERVENTIONS:  - Educate patient/family on patient safety including physical limitations  - Instruct patient to call for assistance with activity   - Consult OT/PT to assist with strengthening/mobility   - Keep Call bell within reach  - Keep bed low and locked with side rails adjusted as appropriate  - Keep care items and personal belongings within reach  - Initiate and maintain comfort rounds  - Make Fall Risk Sign visible to staff  - Offer Toileting every 2 Hours, in advance of need  - Initiate/Maintain alarm  - Obtain necessary fall risk management equipment:   - Apply yellow socks and bracelet for high fall risk patients  - Consider moving patient to room near nurses station  Outcome: Progressing  Goal: Maintain or return to baseline ADL function  Description: INTERVENTIONS:  -  Assess patient's ability to carry out ADLs; assess patient's baseline for ADL function and identify physical deficits which impact ability to perform ADLs (bathing, care of mouth/teeth, toileting, grooming, dressing,  etc.)  - Assess/evaluate cause of self-care deficits   - Assess range of motion  - Assess patient's mobility; develop plan if impaired  - Assess patient's need for assistive devices and provide as appropriate  - Encourage maximum independence but intervene and supervise when necessary  - Involve family in performance of ADLs  - Assess for home care needs following discharge   - Consider OT consult to assist with ADL evaluation and planning for discharge  - Provide patient education as appropriate  Outcome: Progressing     Problem: RESPIRATORY - ADULT  Goal: Achieves optimal ventilation and oxygenation  Description: INTERVENTIONS:  - Assess for changes in respiratory status  - Assess for changes in mentation and behavior  - Position to facilitate oxygenation and minimize respiratory effort  - Oxygen administered by appropriate delivery if ordered  - Initiate smoking cessation education as indicated  - Encourage broncho-pulmonary hygiene including cough, deep breathe, Incentive Spirometry  - Assess the need for suctioning and aspirate as needed  - Assess and instruct to report SOB or any respiratory difficulty  - Respiratory Therapy support as indicated  Outcome: Progressing     Problem: HEMATOLOGIC - ADULT  Goal: Maintains hematologic stability  Description: INTERVENTIONS  - Assess for signs and symptoms of bleeding or hemorrhage  - Monitor labs  - Administer supportive blood products/factors as ordered and appropriate  Outcome: Progressing

## 2024-11-09 NOTE — ASSESSMENT & PLAN NOTE
Sodium 130 on admission  Baseline sodium 129-131  Patient's sodium improved to 132  Continue home diuretic

## 2024-11-09 NOTE — ASSESSMENT & PLAN NOTE
SIRS criteria: Tachypnea and fever  Suspect source is pneumonia  Severe sepsis criteria met with lactic acidosis that improved with IVF  Continue cefepime  Blood Cultures are negative to date  Switch to Duricef at discharge

## 2024-11-09 NOTE — PLAN OF CARE
Problem: Potential for Falls  Goal: Patient will remain free of falls  Description: INTERVENTIONS:  - Educate patient/family on patient safety including physical limitations  - Instruct patient to call for assistance with activity   - Consult OT/PT to assist with strengthening/mobility   - Keep Call bell within reach  - Keep bed low and locked with side rails adjusted as appropriate  - Keep care items and personal belongings within reach  - Initiate and maintain comfort rounds  - Make Fall Risk Sign visible to staff  - Offer Toileting every 2 Hours, in advance of need  - Initiate/Maintain alarm  - Obtain necessary fall risk management equipment:   - Apply yellow socks and bracelet for high fall risk patients  - Consider moving patient to room near nurses station  Outcome: Progressing     Problem: Prexisting or High Potential for Compromised Skin Integrity  Goal: Skin integrity is maintained or improved  Description: INTERVENTIONS:  - Identify patients at risk for skin breakdown  - Assess and monitor skin integrity  - Assess and monitor nutrition and hydration status  - Monitor labs   - Assess for incontinence   - Turn and reposition patient  - Assist with mobility/ambulation  - Relieve pressure over bony prominences  - Avoid friction and shearing  - Provide appropriate hygiene as needed including keeping skin clean and dry  - Evaluate need for skin moisturizer/barrier cream  - Collaborate with interdisciplinary team   - Patient/family teaching  - Consider wound care consult   Outcome: Progressing     Problem: PAIN - ADULT  Goal: Verbalizes/displays adequate comfort level or baseline comfort level  Description: Interventions:  - Encourage patient to monitor pain and request assistance  - Assess pain using appropriate pain scale  - Administer analgesics based on type and severity of pain and evaluate response  - Implement non-pharmacological measures as appropriate and evaluate response  - Consider cultural and  social influences on pain and pain management  - Notify physician/advanced practitioner if interventions unsuccessful or patient reports new pain  Outcome: Progressing     Problem: INFECTION - ADULT  Goal: Absence or prevention of progression during hospitalization  Description: INTERVENTIONS:  - Assess and monitor for signs and symptoms of infection  - Monitor lab/diagnostic results  - Monitor all insertion sites, i.e. indwelling lines, tubes, and drains  - Monitor endotracheal if appropriate and nasal secretions for changes in amount and color  - [place] appropriate cooling/warming therapies per order  - Administer medications as ordered  - Instruct and encourage patient and family to use good hand hygiene technique  - Identify and instruct in appropriate isolation precautions for identified infection/condition  Outcome: Progressing     Problem: SAFETY ADULT  Goal: Patient will remain free of falls  Description: INTERVENTIONS:  - Educate patient/family on patient safety including physical limitations  - Instruct patient to call for assistance with activity   - Consult OT/PT to assist with strengthening/mobility   - Keep Call bell within reach  - Keep bed low and locked with side rails adjusted as appropriate  - Keep care items and personal belongings within reach  - Initiate and maintain comfort rounds  - Make Fall Risk Sign visible to staff  - Offer Toileting every 2 Hours, in advance of need  - Initiate/Maintain alarm  - Obtain necessary fall risk management equipment:   - Apply yellow socks and bracelet for high fall risk patients  - Consider moving patient to room near nurses station  Outcome: Progressing  Goal: Maintain or return to baseline ADL function  Description: INTERVENTIONS:  -  Assess patient's ability to carry out ADLs; assess patient's baseline for ADL function and identify physical deficits which impact ability to perform ADLs (bathing, care of mouth/teeth, toileting, grooming, dressing,  etc.)  - Assess/evaluate cause of self-care deficits   - Assess range of motion  - Assess patient's mobility; develop plan if impaired  - Assess patient's need for assistive devices and provide as appropriate  - Encourage maximum independence but intervene and supervise when necessary  - Involve family in performance of ADLs  - Assess for home care needs following discharge   - Consider OT consult to assist with ADL evaluation and planning for discharge  - Provide patient education as appropriate  Outcome: Progressing     Problem: RESPIRATORY - ADULT  Goal: Achieves optimal ventilation and oxygenation  Description: INTERVENTIONS:  - Assess for changes in respiratory status  - Assess for changes in mentation and behavior  - Position to facilitate oxygenation and minimize respiratory effort  - Oxygen administered by appropriate delivery if ordered  - Initiate smoking cessation education as indicated  - Encourage broncho-pulmonary hygiene including cough, deep breathe, Incentive Spirometry  - Assess the need for suctioning and aspirate as needed  - Assess and instruct to report SOB or any respiratory difficulty  - Respiratory Therapy support as indicated  Outcome: Progressing     Problem: HEMATOLOGIC - ADULT  Goal: Maintains hematologic stability  Description: INTERVENTIONS  - Assess for signs and symptoms of bleeding or hemorrhage  - Monitor labs  - Administer supportive blood products/factors as ordered and appropriate  Outcome: Progressing

## 2024-11-09 NOTE — ASSESSMENT & PLAN NOTE
Wt Readings from Last 3 Encounters:   11/09/24 74.9 kg (165 lb 3.2 oz)   11/06/24 74.8 kg (164 lb 14.5 oz)   10/31/24 72.6 kg (160 lb)   Last echo 10/7/2024 LVEF 35%.  G2 DD.  Follows with [facility] cardiology  Home regimen: Lasix 20 Mg twice daily, Jardiance, and Toprol-XL -continue  Sodium and fluid restriction

## 2024-11-09 NOTE — ASSESSMENT & PLAN NOTE
Developed rigors 11/6 at 1630  Chest x-ray concerning for right lower lobe pneumonia  Continue cefepime due to immunocompromise state  Urine strep and Legionella studies negative  sputum culture and Gram stain  Respiratory protocol.  Procalcitonin is trending down  Patient will be switched to Duricef to complete the course

## 2024-11-10 LAB
BACTERIA SPT RESP CULT: ABNORMAL
GRAM STN SPEC: ABNORMAL

## 2024-11-11 NOTE — UTILIZATION REVIEW
NOTIFICATION OF ADMISSION DISCHARGE   This is a Notification of Discharge from [facility]. Please be advised that this patient has been discharge from our facility. Below you will find the admission and discharge date and time including the patient’s disposition.   UTILIZATION REVIEW CONTACT:  Estee Birmingham  Utilization   Network Utilization Review Department  Phone: [phone] x carefully listen to the prompts. All voicemails are confidential.  Email: [email or web address]     ADMISSION INFORMATION  PRESENTATION DATE: 11/6/2024  7:39 PM  OBERVATION ADMISSION DATE: N/A  INPATIENT ADMISSION DATE: 11/7/24 12:08 AM   DISCHARGE DATE: 11/9/2024  2:25 PM   DISPOSITION:Home with Home Health Care    Network Utilization Review Department  ATTENTION: Please call with any questions or concerns to [phone] and carefully listen to the prompts so that you are directed to the right person. All voicemails are confidential.   For Discharge needs, contact Care Management DC Support Team at [phone] opt. 2  Send all requests for admission clinical reviews, approved or denied determinations and any other requests to dedicated fax number below belonging to the campus where the patient is receiving treatment. List of dedicated fax numbers for the Facilities:  FACILITY NAME UR FAX NUMBER   ADMISSION DENIALS (Administrative/Medical Necessity) [phone]   DISCHARGE SUPPORT TEAM ([facility]) [phone]   PARENT CHILD HEALTH (Maternity/NICU/Pediatrics) [phone]   [facility] [phone]   [facility] [phone]   [facility] [phone]   [facility] [phone]   [facility] [phone]   [facility] [phone]   [facility] [phone]   [facility]  [phone]   [facility] [phone]   [facility] [phone]   [facility] [phone]   [facility] [phone]

## 2024-11-12 LAB
BACTERIA BLD CULT: NORMAL
BACTERIA BLD CULT: NORMAL

## 2024-11-13 ENCOUNTER — HOSPITAL ENCOUNTER (OUTPATIENT)
Dept: INFUSION CENTER | Facility: HOSPITAL | Age: 84
End: 2024-11-13
Attending: INTERNAL MEDICINE

## 2024-11-15 LAB — SCAN RESULT: NORMAL

## 2024-11-18 ENCOUNTER — TELEPHONE (OUTPATIENT)
Age: 84
End: 2024-11-18

## 2024-11-18 ENCOUNTER — OFFICE VISIT (OUTPATIENT)
Age: 84
End: 2024-11-18
Payer: COMMERCIAL

## 2024-11-18 VITALS
OXYGEN SATURATION: 98 % | HEART RATE: 55 BPM | RESPIRATION RATE: 16 BRPM | TEMPERATURE: 98.6 F | BODY MASS INDEX: 24.05 KG/M2 | WEIGHT: 168 LBS | HEIGHT: 70 IN | DIASTOLIC BLOOD PRESSURE: 78 MMHG | SYSTOLIC BLOOD PRESSURE: 124 MMHG

## 2024-11-18 DIAGNOSIS — C90.00 MULTIPLE MYELOMA NOT HAVING ACHIEVED REMISSION (HCC): Primary | ICD-10-CM

## 2024-11-18 PROCEDURE — 99214 OFFICE O/P EST MOD 30 MIN: CPT | Performed by: INTERNAL MEDICINE

## 2024-11-18 PROCEDURE — G2211 COMPLEX E/M VISIT ADD ON: HCPCS | Performed by: INTERNAL MEDICINE

## 2024-11-18 RX ORDER — DEXAMETHASONE 4 MG/1
4 TABLET ORAL WEEKLY
Qty: 12 TABLET | Refills: 3 | Status: SHIPPED | OUTPATIENT
Start: 2024-11-18

## 2024-11-18 NOTE — PROGRESS NOTES
HEMATOLOGY / ONCOLOGY CLINIC FOLLOW UP NOTE    Primary Care Provider: Guanako Cortes MD  Referring Provider:    MRN: [id number]  : 1940    Reason for Encounter: follow up multiple  myeloma       Oncology History Overview Note   10/2024 - IgGK multiple myeloma with 90% plasma cells in the bone marrow, molecular studies pending    2024 - joy first dose with admission for PNA vs reaction     Multiple myeloma not having achieved remission (HCC)   10/29/2024 Initial Diagnosis    Multiple myeloma not having achieved remission (HCC)     2024 -  Chemotherapy    alteplase (CATHFLO), 2 mg, Intracatheter, Every 1 Minute as needed, 1 of 12 cycles  daratumumab-hyaluronidase (DARZALEX FASPRO), 1,800 mg, Subcutaneous daratumumab-hyaluronidase, Once, 1 of 12 cycles  Administration: 1,800 mg (2024)         Interval History: Patient presents for follow up of his IgG kappa multiple myeloma.  He got a dose of daratumumab on .  He tolerated it well in the chair and through the observation period.  When he got home he had a fever with rigors and went to the ER.  He was admitted and treated for pneumonia.  The read of the chest x-ray did not comment on a right lower lobe pneumonia but he was treated for 1 and did have a pro calcitonin that was high.  He has completed all antibiotics.  He is afebrile now.  He was very constipated after he got home from the hospital but that has improved.  He has received the Revlimid but did not start it yet.  He also had a lot of difficulty sleeping after the dexamethasone that was given on .  He has been active and has been working outside recently and feels back to normal this week.         REVIEW OF SYSTEMS:  Please note that a 14-point review of systems was performed to include Constitutional, HEENT, Respiratory, CVS, GI, , Musculoskeletal, Integumentary, Neurologic, Rheumatologic, Endocrinologic, Psychiatric, Lymphatic, and Hematologic/Oncologic  systems were reviewed and are negative unless otherwise stated in HPI. Positive and negative findings pertinent to this evaluation are incorporated into the history of present illness.      ECOG PS: 0    PROBLEM LIST:  Patient Active Problem List   Diagnosis    COVID-19    Paroxysmal atrial fibrillation (HCC)    HFrEF (heart failure with reduced ejection fraction) (HCC)    Hypertension    Coronary artery disease involving native coronary artery of native heart without angina pectoris    Cardiomyopathy (HCC)    Elevated total protein    Peripheral vascular disease, unspecified (HCC)    Other thrombophilia (HCC)    Multiple myeloma not having achieved remission (HCC)    Right lower lobe pneumonia    Hyponatremia    Severe sepsis (HCC)    Acute respiratory insufficiency       Assessment / Plan: 84-year-old male with IgG kappa multiple myeloma.  I am going to get a new set of labs today with CBC, CMP, SPEP, free light, immunoglobulin levels.  I want a get a new baseline after his daratumumab.  I am going to hold further daratumumab.  I am still concerned that he might of had a reaction to it.  We will see what kind of remission we can get with Revlimid 15 mg 3 weeks on 1 week off.  I will have him take dexamethasone 4 mg weekly in the morning with food.  That is the lowest dose of steroids that is reasonable to give and hopefully that will not affect his sleep too much.  We reviewed what side effects to watch for with Revlimid.  We will check a weekly CBC and CMP now that he is starting it.  I will plan on seeing him back in 1 month for ongoing evaluation.       I spent 30 minutes on chart review, face to face counseling time, coordination of care and documentation.    Past Medical History:   has a past medical history of Anemia, Atrial fibrillation (HCC), CHF (congestive heart failure) (HCC), Coronary artery disease, Depression, Hyperlipidemia, Hypertension, Hyponatremia, Insomnia, Low blood pressure, MI (myocardial  infarction) (HCC), Multiple myeloma (HCC), and Thrombocytopenia (HCC).    PAST SURGICAL HISTORY:   has a past surgical history that includes Cardiac surgery; Fracture surgery (Left); Cardiac pacemaker placement; Coronary artery bypass graft; and IR biopsy bone marrow (10/23/2024).    CURRENT MEDICATIONS  Current Outpatient Medications   Medication Sig Dispense Refill    acyclovir (ZOVIRAX) 400 MG tablet Take 1 tablet (400 mg total) by mouth 2 (two) times a day 60 tablet 3    amiodarone 200 mg tablet Take 1 tablet (200 mg total) by mouth daily 90 tablet 3    apixaban (ELIQUIS) 5 mg Take 1 tablet (5 mg total) by mouth 2 (two) times a day      dexamethasone (DECADRON) 4 mg tablet Take 1 tablet (4 mg total) by mouth once a week 12 tablet 3    docusate sodium (COLACE) 100 mg capsule Take 1 capsule (100 mg total) by mouth 2 (two) times a day      Empagliflozin (Jardiance) 10 MG TABS tablet Take 10 mg by mouth every morning      fluticasone (FLONASE) 50 mcg/act nasal spray 1 spray into each nostril daily      Magnesium 300 MG CAPS Take by mouth      melatonin 1 mg Take 10 mg by mouth daily at bedtime      metoprolol succinate (TOPROL-XL) 25 mg 24 hr tablet Take 2 tablets (50 mg total) by mouth every 24 hours      Multiple Vitamin (multivitamin) tablet Take 1 tablet by mouth daily      QUEtiapine (SEROquel) 50 mg tablet Take 100 mg by mouth daily at bedtime      senna (SENOKOT) 8.6 mg Take 2 tablets (17.2 mg total) by mouth daily at bedtime as needed for constipation      Ativan 0.5 MG tablet Take 0.5 mg by mouth every 8 (eight) hours as needed (Patient not taking: Reported on 11/18/2024)      furosemide (LASIX) 20 mg tablet Take 1 tablet (20 mg total) by mouth 2 (two) times a day      lenalidomide (REVLIMID) 15 MG CAPS Take 1 capsule (15 mg total) by mouth daily 21 days on, followed by 7 days off (Patient not taking: Reported on 11/18/2024) 21 capsule 0     No current facility-administered medications for this visit.  "    [unfilled]    SOCIAL HISTORY:   reports that he has been smoking cigarettes. He has never used smokeless tobacco. He reports that he does not drink alcohol and does not use drugs.     FAMILY HISTORY:  family history is not on file.     ALLERGIES:  has no known allergies.      Physical Exam:  Vital Signs:   Visit Vitals  /78 (BP Location: Left arm, Patient Position: Sitting, Cuff Size: Adult)   Pulse 55   Temp 98.6 °F (37 °C) (Temporal)   Resp 16   Ht 5' 10\" (1.778 m)   Wt 76.2 kg (168 lb)   SpO2 98%   BMI 24.11 kg/m²   Smoking Status Every Day   BSA 1.94 m²     Body mass index is 24.11 kg/m².  Body surface area is 1.94 meters squared.    GEN: Alert, awake oriented x3, in no acute distress  HEENT- No pallor, icterus, cyanosis, no oral mucosal lesions,   LAD - no palpable cervical, clavicle, axillary, inguinal LAD  Heart- normal S1 S2, regular rate and rhythm, No murmur, rubs.   Lungs- clear breathing sound bilateral.   Abdomen- soft, Non tender, bowel sounds present  Extremities- No cyanosis, clubbing, edema  Neuro- No focal neurological deficit    Labs:  Lab Results   Component Value Date    WBC 2.91 (L) 11/09/2024    HGB 9.3 (L) 11/09/2024    HCT 29.6 (L) 11/09/2024     (H) 11/09/2024     (L) 11/09/2024     Lab Results   Component Value Date    SODIUM 132 (L) 11/09/2024    K 3.9 11/09/2024     11/09/2024    CO2 25 11/09/2024    AGAP 2 (L) 11/09/2024    BUN 24 11/09/2024    CREATININE 0.76 11/09/2024    GLUC 92 11/09/2024    CALCIUM 7.2 (L) 11/09/2024    AST 52 (H) 11/08/2024    ALT 46 11/08/2024    ALKPHOS 71 11/08/2024    TP 9.1 (H) 11/08/2024    TBILI 0.56 11/08/2024    EGFR 84 11/09/2024       "

## 2024-11-19 LAB
ALBUMIN SERPL-MCNC: 3.3 G/DL (ref 3.7–4.7)
ALP SERPL-CCNC: 109 IU/L (ref 44–121)
ALT SERPL-CCNC: 25 IU/L (ref 0–44)
AST SERPL-CCNC: 24 IU/L (ref 0–40)
BASOPHILS # BLD AUTO: 0 X10E3/UL (ref 0–0.2)
BASOPHILS NFR BLD AUTO: 0 %
BILIRUB SERPL-MCNC: 0.8 MG/DL (ref 0–1.2)
BUN SERPL-MCNC: 19 MG/DL (ref 8–27)
BUN/CREAT SERPL: 20 (ref 10–24)
CALCIUM SERPL-MCNC: 8.5 MG/DL (ref 8.6–10.2)
CHLORIDE SERPL-SCNC: 98 MMOL/L (ref 96–106)
CO2 SERPL-SCNC: 23 MMOL/L (ref 20–29)
CREAT SERPL-MCNC: 0.94 MG/DL (ref 0.76–1.27)
EGFR: 80 ML/MIN/1.73
EOSINOPHIL # BLD AUTO: 0 X10E3/UL (ref 0–0.4)
EOSINOPHIL NFR BLD AUTO: 1 %
ERYTHROCYTE [DISTWIDTH] IN BLOOD BY AUTOMATED COUNT: 16.3 % (ref 11.6–15.4)
GLOBULIN SER-MCNC: 5.8 G/DL (ref 1.5–4.5)
GLUCOSE SERPL-MCNC: 143 MG/DL (ref 70–99)
HCT VFR BLD AUTO: 35.1 % (ref 37.5–51)
HGB BLD-MCNC: 11.2 G/DL (ref 13–17.7)
IMM GRANULOCYTES # BLD: 0 X10E3/UL (ref 0–0.1)
IMM GRANULOCYTES NFR BLD: 1 %
LYMPHOCYTES # BLD AUTO: 0.6 X10E3/UL (ref 0.7–3.1)
LYMPHOCYTES NFR BLD AUTO: 13 %
MCH RBC QN AUTO: 32.2 PG (ref 26.6–33)
MCHC RBC AUTO-ENTMCNC: 31.9 G/DL (ref 31.5–35.7)
MCV RBC AUTO: 101 FL (ref 79–97)
MONOCYTES # BLD AUTO: 0.4 X10E3/UL (ref 0.1–0.9)
MONOCYTES NFR BLD AUTO: 9 %
NEUTROPHILS # BLD AUTO: 3.4 X10E3/UL (ref 1.4–7)
NEUTROPHILS NFR BLD AUTO: 76 %
PLATELET # BLD AUTO: 201 X10E3/UL (ref 150–450)
POTASSIUM SERPL-SCNC: 3.9 MMOL/L (ref 3.5–5.2)
PROT SERPL-MCNC: 9.1 G/DL (ref 6–8.5)
RBC # BLD AUTO: 3.48 X10E6/UL (ref 4.14–5.8)
SODIUM SERPL-SCNC: 135 MMOL/L (ref 134–144)
WBC # BLD AUTO: 4.4 X10E3/UL (ref 3.4–10.8)

## 2024-11-19 NOTE — ED PROVIDER NOTES
Time reflects when diagnosis was documented in both MDM as applicable and the Disposition within this note       Time User Action Codes Description Comment    11/7/2024 12:00 AM Sravanthi Moraes Add [T50.905A] Medication reaction     11/7/2024 12:00 AM Sravanthi Moraes Add [R50.9] Fever     11/7/2024 12:01 AM Sravanthi Moraes Add [R09.02] Hypoxia     11/7/2024 12:01 AM Sravanthi Moraes Add [R91.8] Pulmonary infiltrate           ED Disposition       ED Disposition   Admit    Condition   Stable    Date/Time   u Nov 7, 2024 12:00 AM    Comment   Case was discussed with [facility] and the patient's admission status was agreed to be Admission Status: inpatient status to the service of Dr. Alba .               Assessment & Plan       Medical Decision Making  Patient with fever and chills. S/p chemotherapeutic today. Question reaction to medication. On CXR at 2259 there was concern for RLL infiltrate. Patient was started on cefepime. Given IV Fluids in ED. Patient was reassessed at time of admission 2359. Advised of admission to [facility] service. No acute distress. Received IV fluids in ED. Overall improved.     Amount and/or Complexity of Data Reviewed  Independent Historian: caregiver and spouse     Details: Family at bedside and assist with history of events today as patient with rigors.  External Data Reviewed: notes.     Details: Reviewed infusion notes from earlier in day. Patient did receive steroids and benadryl prior to infusion.   Labs: ordered. Decision-making details documented in ED Course.  Radiology: ordered and independent interpretation performed.  ECG/medicine tests: ordered and independent interpretation performed.    Risk  Prescription drug management.  Decision regarding hospitalization.        ED Course as of 11/19/24 1612   Wed Nov 06, 2024 2032 Case discussed with heme/onc fellow. May be side effects from meds he received today. Suggests IV acetaminophen. Patient is febrile, may be medication  reaction, will look for possible source.   2135 Patient with likely reaction to chemotherapeutic agent. Lactic acid likely from rigors and hypoxia. Will cover with cefepime given hx of chemotherapy.   2316 Initial trop 13.       Medications   diphenhydrAMINE (BENADRYL) injection 25 mg (25 mg Intravenous Given 11/6/24 1952)   methylPREDNISolone sodium succinate (Solu-MEDROL) injection 125 mg (125 mg Intravenous Given 11/6/24 1952)   Famotidine (PF) (PEPCID) injection 20 mg (20 mg Intravenous Given 11/6/24 1952)   ondansetron (ZOFRAN) injection 4 mg (4 mg Intravenous Given 11/6/24 1959)   acetaminophen (Ofirmev) injection 1,000 mg (0 mg Intravenous Stopped 11/6/24 2150)   multi-electrolyte (PLASMALYTE-A/ISOLYTE-S PH 7.4) IV solution 1,000 mL (0 mL Intravenous Stopped 11/6/24 2300)   cefepime (MAXIPIME) IVPB (premix in dextrose) 2,000 mg 50 mL (0 mg Intravenous Stopped 11/6/24 2223)       ED Risk Strat Scores                           SBIRT 20yo+      Flowsheet Row Most Recent Value   Initial Alcohol Screen: US AUDIT-C     1. How often do you have a drink containing alcohol? 0 Filed at: 11/06/2024 2335   2. How many drinks containing alcohol do you have on a typical day you are drinking?  0 Filed at: 11/06/2024 2335   3a. Male UNDER 65: How often do you have five or more drinks on one occasion? 0 Filed at: 11/06/2024 2335   3b. FEMALE Any Age, or MALE 65+: How often do you have 4 or more drinks on one occassion? 0 Filed at: 11/06/2024 2335   Audit-C Score 0 Filed at: 11/06/2024 2335   EFREN: How many times in the past year have you...    Used an illegal drug or used a prescription medication for non-medical reasons? Never Filed at: 11/06/2024 2335                            History of Present Illness       Chief Complaint   Patient presents with    Allergic Reaction - Major     Pt having an allergic reaction to infusion he had today, they left around 4:30 pm. Pt had been pre-medicated with benadryl prior to infusion.  Lips are blue, pt is having SOB, denies any chest pain       Past Medical History:   Diagnosis Date    Anemia     Atrial fibrillation (HCC)     CHF (congestive heart failure) (HCC)     Coronary artery disease     Depression     Hyperlipidemia     Hypertension     Hyponatremia     Insomnia     Low blood pressure     MI (myocardial infarction) (HCC)     Multiple myeloma (HCC)     Thrombocytopenia (HCC)       Past Surgical History:   Procedure Laterality Date    CARDIAC PACEMAKER PLACEMENT      CARDIAC SURGERY      CORONARY ARTERY BYPASS GRAFT      FRACTURE SURGERY Left     Wrist    IR BIOPSY BONE MARROW  10/23/2024      History reviewed. No pertinent family history.   Social History     Tobacco Use    Smoking status: Every Day     Types: Cigarettes    Smokeless tobacco: Never    Tobacco comments:     Smokes 2 cigarettes daily    Vaping Use    Vaping status: Never Used   Substance Use Topics    Alcohol use: Never    Drug use: Never      E-Cigarette/Vaping    E-Cigarette Use Never User       E-Cigarette/Vaping Substances    Nicotine No     THC No     CBD No     Flavoring No     Other No     Unknown No       I have reviewed and agree with the history as documented.     Patient with chemotherapy treatment earlier today. Developed shaking chills shortly within approximately 1 hour of same. Developed SOB. Unable to stop shaking. + fever upon arrival to the ED. No prior history of similar reaction. No cough. No urinary complaints. No chest pain. DDX: chemotherapy reaction, will check CXR, will check UA        Review of Systems   Constitutional:  Positive for chills and fever.   Respiratory:  Positive for shortness of breath.            Objective       ED Triage Vitals   Temperature Pulse Blood Pressure Respirations SpO2 Patient Position - Orthostatic VS   11/06/24 2002 11/06/24 1941 11/06/24 1945 11/06/24 1941 11/06/24 1942 11/06/24 2200   (!) 103 °F (39.4 °C) 92 149/98 (!) 24 (!) 77 % Sitting      Temp Source Heart Rate  "Source BP Location FiO2 (%) Pain Score    11/06/24 2002 11/06/24 1941 11/06/24 1945 -- 11/07/24 0145    Axillary Monitor Right arm  No Pain      Vitals      Date and Time Temp Pulse SpO2 Resp BP Pain Score FACES Pain Rating User   11/09/24 1104 -- -- -- -- -- No Pain -- KR   11/09/24 0729 97.8 °F (36.6 °C) 62 95 % -- 114/65 -- -- DII   11/08/24 2154 98 °F (36.7 °C) 59 95 % 20 111/65 -- -- DII   11/08/24 2003 -- -- -- -- -- No Pain patient states \"anxious, not pain\" -- NS   11/08/24 1407 96.7 °F (35.9 °C) 58 95 % 13 110/64 -- -- DII   11/08/24 1100 -- -- -- -- -- No Pain -- KR   11/08/24 0853 -- 55 94 % -- 101/57 -- -- DII   11/08/24 0818 97 °F (36.1 °C) 63 96 % -- -- -- -- DII   11/08/24 0717 -- 60 94 % 18 102/57 -- -- DII   11/08/24 0717 98.2 °F (36.8 °C) -- -- -- -- -- -- AB   11/08/24 0003 -- -- -- -- -- 8 -- MUMTAZ   11/07/24 2334 97.3 °F (36.3 °C) 62 91 % 19 103/57 -- -- DII   11/07/24 2100 -- -- -- -- -- No Pain -- MUMTAZ   11/07/24 2025 97.5 °F (36.4 °C) 64 91 % -- 103/56 -- -- DII   11/07/24 1841 -- 60 90 % -- 101/55 -- -- DII   11/07/24 1633 -- -- -- -- -- No Pain -- MKB   11/07/24 1603 97.4 °F (36.3 °C) 61 90 % 14 117/62 -- -- DII   11/07/24 1404 -- -- 93 % -- -- -- -- SAK   11/07/24 1316 -- -- 94 % -- -- -- -- SAK   11/07/24 1257 -- -- -- -- -- No Pain -- SAK   11/07/24 1246 97.5 °F (36.4 °C) 60 93 % 14 106/56 -- -- DII   11/07/24 1201 -- 60 94 % 18 93/47 -- -- MB   11/07/24 1100 -- 60 95 % 20 111/59 -- -- MI   11/07/24 1000 -- 60 94 % 20 139/63 -- -- RD   11/07/24 0902 -- 60 -- -- 119/87 -- -- RD   11/07/24 0900 -- 60 93 % 20 119/87 -- -- MB   11/07/24 0745 -- 60 96 % 19 -- -- -- MI   11/07/24 0700 -- 60 97 % 15 117/56 -- -- MI   11/07/24 0648 -- 75 94 % 18 128/65 No Pain -- MB   11/07/24 0600 -- 60 96 % 16 128/65 No Pain -- [place]   11/07/24 0500 -- 60 94 % 17 133/74 -- -- [place]   11/07/24 0445 -- 60 95 % 18 137/72 -- -- [place]   11/07/24 0430 -- 64 94 % 20 129/68 -- -- [place]   11/07/24 0400 -- 60 95 % 17 129/74 -- -- [place] "   11/07/24 0245 -- 60 95 % 20 121/70 -- -- [place]   11/07/24 0215 -- 60 94 % 20 137/73 -- -- [place]   11/07/24 0200 -- 64 94 % 20 127/62 -- -- [place]   11/07/24 0145 -- 60 93 % 18 122/67 No Pain -- [place]   11/07/24 0100 -- 60 92 % 20 126/71 -- -- [place]   11/07/24 0045 -- 60 93 % 16 129/71 -- -- [place]   11/07/24 0030 -- 60 94 % 14 126/72 -- -- [place]   11/07/24 0015 -- 60 95 % 16 127/70 -- -- [place]   11/07/24 0000 -- 60 95 % 16 126/68 -- -- [place]   11/06/24 2345 -- 60 95 % 16 116/66 -- -- [place]   11/06/24 2330 -- 60 96 % 16 116/65 -- -- [place]   11/06/24 2315 98.7 °F (37.1 °C) 60 97 % 15 118/66 -- -- SS   11/06/24 2300 -- 60 94 % 21 111/63 -- -- LK   11/06/24 2245 -- 60 91 % 20 113/59 -- -- SS   11/06/24 2230 -- 60 92 % 19 107/56 -- -- SS   11/06/24 2215 -- 60 92 % 20 105/57 -- -- LK   11/06/24 2200 -- 60 95 % 20 106/62 -- -- SS   11/06/24 2145 -- 60 93 % 23 104/55 -- -- LK   11/06/24 2115 -- 60 96 % 18 110/51 -- -- LK   11/06/24 2100 -- 61 99 % 20 118/56 -- -- LK   11/06/24 2030 -- 60 97 % 18 135/68 -- -- LK   11/06/24 2002 103 °F (39.4 °C) -- -- -- -- -- -- LK   11/06/24 1945 -- 62 97 % 24 149/98 -- -- LK   11/06/24 1942 -- -- 77 % -- -- -- -- KK   11/06/24 1941 -- 92 -- 24 -- -- -- KK            Physical Exam  Vitals and nursing note reviewed.   Constitutional:       General: He is in acute distress.      Appearance: He is well-developed. He is ill-appearing.   HENT:      Head: Normocephalic and atraumatic.      Nose: Nose normal.      Mouth/Throat:      Mouth: Mucous membranes are moist.   Eyes:      General: No scleral icterus.     Conjunctiva/sclera: Conjunctivae normal.      Pupils: Pupils are equal, round, and reactive to light.   Cardiovascular:      Rate and Rhythm: Normal rate and regular rhythm.      Heart sounds: Normal heart sounds.   Pulmonary:      Effort: Respiratory distress (patient presents with tachypnea, in distress.) present.      Breath sounds: Normal breath sounds. No stridor. No wheezing.   Abdominal:      General: There is no  distension.      Palpations: Abdomen is soft.      Tenderness: There is no abdominal tenderness. There is no guarding or rebound.   Musculoskeletal:         General: No deformity.      Cervical back: Normal range of motion and neck supple.   Skin:     General: Skin is warm and dry.      Findings: No rash.   Neurological:      Mental Status: He is alert and oriented to person, place, and time.   Psychiatric:         Thought Content: Thought content normal.         Results Reviewed       Procedure Component Value Units Date/Time    Blood culture #1 [[id number]] Collected: 11/06/24 2056    Lab Status: Final result Specimen: Blood from Arm, Left Updated: 11/12/24 0701     Blood Culture No Growth After 5 Days.    Blood culture #2 [[id number]] Collected: 11/06/24 2056    Lab Status: Final result Specimen: Blood from Arm, Right Updated: 11/12/24 0701     Blood Culture No Growth After 5 Days.    Sputum culture and Gram stain [[id number]]  (Abnormal) Collected: 11/07/24 2349    Lab Status: Final result Specimen: Expectorated Sputum Updated: 11/10/24 0807     Sputum Culture 2+ Growth of     Gram Stain Result 1+ Epithelial cells per low power field      Rare Polys      1+ Gram negative rods      Rare Gram positive cocci in pairs    Strep Pneumoniae, Urine [[id number]]  (Normal) Collected: 11/07/24 0744    Lab Status: Final result Specimen: Urine, Clean Catch Updated: 11/07/24 1446     Strep pneumoniae antigen, urine Negative    Legionella antigen, urine [[id number]]  (Normal) Collected: 11/07/24 0744    Lab Status: Final result Specimen: Urine, Clean Catch Updated: 11/07/24 1446     Legionella Urinary Antigen Negative    COVID-19/ Infleunza A/B Rapid Anitgen(30 min. TAT) [[id number]]  (Normal) Collected: 11/07/24 1036    Lab Status: Final result Specimen: Nares from Nose Updated: 11/07/24 1058     SARS COV Rapid Antigen Negative     Influenza A Rapid Antigen Negative     Influenza B Rapid Antigen Negative    Narrative:      This  test has been performed using the Quidel Lynn 2 FLU+SARS Antigen test under the Emergency Use Authorization (EUA). This test has been validated by the  and verified by the performing laboratory. The Lynn uses lateral flow immunofluorescent sandwich assay to detect SARS-COV, Influenza A and Influenza B Antigen.     The Quidel Lynn 2 SARS Antigen test does not differentiate between SARS-CoV and SARS-CoV-2.     Negative results are presumptive and may be confirmed with a molecular assay, if necessary, for patient management. Negative results do not rule out SARS-CoV-2 or influenza infection and should not be used as the sole basis for treatment or patient management decisions. A negative test result may occur if the level of antigen in a sample is below the limit of detection of this test.     Positive results are indicative of the presence of viral antigens, but do not rule out bacterial infection or co-infection with other viruses.     All test results should be used as an adjunct to clinical observations and other information available to the provider.    FOR PEDIATRIC PATIENTS - copy/paste COVID Guidelines URL to browser: [email or web address]    Procalcitonin [[id number]]  (Abnormal) Collected: 11/07/24 0552    Lab Status: Final result Specimen: Blood from Arm, Right Updated: 11/07/24 0801     Procalcitonin 13.16 ng/ml     CBC (With Platelets) [[id number]]  (Abnormal) Collected: 11/07/24 0552    Lab Status: Final result Specimen: Blood from Arm, Right Updated: 11/07/24 0645     WBC 10.43 Thousand/uL      RBC 2.83 Million/uL      Hemoglobin 9.5 g/dL      Hematocrit 29.3 %       fL      MCH 33.6 pg      MCHC 32.4 g/dL      RDW 19.0 %      Platelets 100 Thousands/uL      MPV 10.1 fL     Basic metabolic panel [[id number]]  (Abnormal) Collected: 11/07/24 0552    Lab Status: Final result Specimen: Blood from Arm, Right Updated: 11/07/24 0615      Sodium 128 mmol/L      Potassium 4.4 mmol/L      Chloride 99 mmol/L      CO2 25 mmol/L      ANION GAP 4 mmol/L      BUN 27 mg/dL      Creatinine 1.01 mg/dL      Glucose 147 mg/dL      Calcium 7.4 mg/dL      eGFR 68 ml/min/1.73sq m     Narrative:      National Kidney Disease Foundation guidelines for Chronic Kidney Disease (CKD):     Stage 1 with normal or high GFR (GFR > 90 mL/min/1.73 square meters)    Stage 2 Mild CKD (GFR = 60-89 mL/min/1.73 square meters)    Stage 3A Moderate CKD (GFR = 45-59 mL/min/1.73 square meters)    Stage 3B Moderate CKD (GFR = 30-44 mL/min/1.73 square meters)    Stage 4 Severe CKD (GFR = 15-29 mL/min/1.73 square meters)    Stage 5 End Stage CKD (GFR <15 mL/min/1.73 square meters)  Note: GFR calculation is accurate only with a steady state creatinine    Magnesium [[id number]]  (Normal) Collected: 11/07/24 0552    Lab Status: Final result Specimen: Blood from Arm, Right Updated: 11/07/24 0615     Magnesium 2.0 mg/dL     Phosphorus [[id number]]  (Abnormal) Collected: 11/07/24 0552    Lab Status: Final result Specimen: Blood from Arm, Right Updated: 11/07/24 0615     Phosphorus 4.5 mg/dL     Lactic acid, plasma (w/reflex if result > 2.0) [[id number]]  (Normal) Collected: 11/07/24 0318    Lab Status: Final result Specimen: Blood from Arm, Right Updated: 11/07/24 0359     LACTIC ACID 1.7 mmol/L     Narrative:      Result may be elevated if tourniquet was used during collection.    HS Troponin I 4hr [[id number]]  (Normal) Collected: 11/07/24 0059    Lab Status: Final result Specimen: Blood from Arm, Right Updated: 11/07/24 0131     hs TnI 4hr 28 ng/L      Delta 4hr hsTnI 15 ng/L     HS Troponin I 2hr [[id number]]  (Normal) Collected: 11/06/24 2304    Lab Status: Final result Specimen: Blood Updated: 11/06/24 2338     hs TnI 2hr 24 ng/L      Delta 2hr hsTnI 11 ng/L     Lactic acid 2 Hours [[id number]]  (Abnormal) Collected: 11/06/24 1118    Lab Status: Final result Specimen: Blood from Arm, Left  Updated: 11/06/24 2329     LACTIC ACID 2.2 mmol/L     Narrative:      Result may be elevated if tourniquet was used during collection.    HS Troponin 0hr (reflex protocol) [[id number]]  (Normal) Collected: 11/06/24 2056    Lab Status: Final result Specimen: Blood from Arm, Right Updated: 11/06/24 2303     hs TnI 0hr 13 ng/L     RBC Morphology Reflex Test [[id number]] Collected: 11/06/24 2056    Lab Status: Final result Specimen: Blood from Arm, Right Updated: 11/06/24 2201    Urine Microscopic [[id number]]  (Abnormal) Collected: 11/06/24 2102    Lab Status: Final result Specimen: Urine, Clean Catch Updated: 11/06/24 2158     RBC, UA 0-1 /hpf      WBC, UA 0-1 /hpf      Epithelial Cells Occasional /hpf      Bacteria, UA Occasional /hpf      Hyaline Casts, UA 4-10 /lpf     CBC and differential [[id number]]  (Abnormal) Collected: 11/06/24 2056    Lab Status: Final result Specimen: Blood from Arm, Right Updated: 11/06/24 2156     WBC 9.17 Thousand/uL      RBC 3.29 Million/uL      Hemoglobin 11.1 g/dL      Hematocrit 35.4 %       fL      MCH 33.7 pg      MCHC 31.4 g/dL      RDW 19.3 %      MPV 10.8 fL      Platelets 151 Thousands/uL     Narrative:      This is an appended report.  These results have been appended to a previously verified report.    Manual Differential(PHLEBS Do Not Order) [[id number]]  (Abnormal) Collected: 11/06/24 2056    Lab Status: Final result Specimen: Blood from Arm, Right Updated: 11/06/24 2156     Segmented % 86 %      Bands % 1 %      Lymphocytes % 11 %      Monocytes % 2 %      Eosinophils % 0 %      Basophils % 0 %      Absolute Neutrophils 7.98 Thousand/uL      Absolute Lymphocytes 1.01 Thousand/uL      Absolute Monocytes 0.18 Thousand/uL      Absolute Eosinophils 0.00 Thousand/uL      Absolute Basophils 0.00 Thousand/uL      Total Counted --     Toxic Granulation Present     Toxic Vacuolization Present     RBC Morphology Present     Platelet Estimate Adequate     Anisocytosis Present      Macrocytes Present     Polychromasia Present    Procalcitonin [[id number]]  (Normal) Collected: 11/06/24 2056    Lab Status: Final result Specimen: Blood from Arm, Right Updated: 11/06/24 2140     Procalcitonin 0.14 ng/ml     Lactic acid [[id number]]  (Abnormal) Collected: 11/06/24 2056    Lab Status: Final result Specimen: Blood from Arm, Right Updated: 11/06/24 2130     LACTIC ACID 3.3 mmol/L     Narrative:      Result may be elevated if tourniquet was used during collection.    Comprehensive metabolic panel [[id number]]  (Abnormal) Collected: 11/06/24 2056    Lab Status: Final result Specimen: Blood from Arm, Right Updated: 11/06/24 2130     Sodium 130 mmol/L      Potassium 4.3 mmol/L      Chloride 98 mmol/L      CO2 22 mmol/L      ANION GAP 10 mmol/L      BUN 24 mg/dL      Creatinine 1.23 mg/dL      Glucose 155 mg/dL      Calcium 7.8 mg/dL      Corrected Calcium 8.4 mg/dL      AST 42 U/L      ALT 29 U/L      Alkaline Phosphatase 93 U/L      Total Protein 10.9 g/dL      Albumin 3.2 g/dL      Total Bilirubin 0.55 mg/dL      eGFR 53 ml/min/1.73sq m     Narrative:      National Kidney Disease Foundation guidelines for Chronic Kidney Disease (CKD):     Stage 1 with normal or high GFR (GFR > 90 mL/min/1.73 square meters)    Stage 2 Mild CKD (GFR = 60-89 mL/min/1.73 square meters)    Stage 3A Moderate CKD (GFR = 45-59 mL/min/1.73 square meters)    Stage 3B Moderate CKD (GFR = 30-44 mL/min/1.73 square meters)    Stage 4 Severe CKD (GFR = 15-29 mL/min/1.73 square meters)    Stage 5 End Stage CKD (GFR <15 mL/min/1.73 square meters)  Note: GFR calculation is accurate only with a steady state creatinine    Protime-INR [[id number]]  (Abnormal) Collected: 11/06/24 2056    Lab Status: Final result Specimen: Blood from Arm, Right Updated: 11/06/24 2127     Protime 18.4 seconds      INR 1.48    Narrative:      INR Therapeutic Range    Indication                                             INR Range      Atrial Fibrillation                                                2.0-3.0  Hypercoagulable State                                    2.0.2.3  Left Ventricular Asist Device                            2.0-3.0  Mechanical Heart Valve                                  -    Aortic(with afib, MI, embolism, HF, LA enlargement,    and/or coagulopathy)                                     2.0-3.0 (2.5-3.5)     Mitral                                                             2.5-3.5  Prosthetic/Bioprosthetic Heart Valve               2.0-3.0  Venous thromboembolism (VTE: VT, PE        2.0-3.0    APTT [[id number]]  (Normal) Collected: 11/06/24 2056    Lab Status: Final result Specimen: Blood from Arm, Right Updated: 11/06/24 2127     PTT 27 seconds     UA w Reflex to Microscopic w Reflex to Culture [[id number]]  (Abnormal) Collected: 11/06/24 2102    Lab Status: Final result Specimen: Urine, Clean Catch Updated: 11/06/24 2127     Color, UA Yellow     Clarity, UA Clear     Specific Gravity, UA 1.020     pH, UA 6.0     Leukocytes, UA Negative     Nitrite, UA Negative     Protein, UA 30 (1+) mg/dl      Glucose,  (3/20%) mg/dl      Ketones, UA Negative mg/dl      Urobilinogen, UA <2.0 mg/dl      Bilirubin, UA Negative     Occult Blood, UA Negative            XR chest 1 view portable   ED Interpretation by Sravanthi Moraes MD (11/06 4452)   Increased markings RLL      Final Interpretation by Jerry Bullock MD (11/07 0904)      No acute cardiopulmonary disease. Cardiomegaly.            Workstation performed: [id number]             ECG 12 Lead Documentation Only    Date/Time: 11/6/2024 7:57 PM    Performed by: Sravanthi Moraes MD  Authorized by: Sravanthi Moraes MD    Indications / Diagnosis:  Sob  ECG reviewed by me, the ED Provider: yes    Patient location:  ED  Rate:     ECG rate assessment: tachycardic    Rhythm:     Rhythm: sinus rhythm    Ectopy:     Ectopy: bigeminy and PVCs    ST segments:     ST segments:  Non-specific  T waves:      T waves: non-specific        ED Medication and Procedure Management   Prior to Admission Medications   Prescriptions Last Dose Informant Patient Reported? Taking?   Ativan 0.5 MG tablet Past Week  Yes Yes   Sig: Take 0.5 mg by mouth every 8 (eight) hours as needed   Patient not taking: Reported on 2024   Empagliflozin (Jardiance) 10 MG TABS tablet 2024 Self Yes Yes   Sig: Take 10 mg by mouth every morning   Magnesium 300 MG CAPS 2024 Self Yes Yes   Sig: Take by mouth   Multiple Vitamin (multivitamin) tablet 2024 Self Yes Yes   Sig: Take 1 tablet by mouth daily   QUEtiapine (SEROquel) 50 mg tablet Past Week Self Yes Yes   Sig: Take 100 mg by mouth daily at bedtime   acyclovir (ZOVIRAX) 400 MG tablet 2024  No Yes   Sig: Take 1 tablet (400 mg total) by mouth 2 (two) times a day   amiodarone 200 mg tablet  Self No No   Sig: Take 1 tablet (200 mg total) by mouth 3 (three) times a day with meals for 7 days, THEN 1 tablet (200 mg total) 2 (two) times a day with meals for 7 days, THEN 1 tablet (200 mg total) daily with breakfast.   amiodarone 200 mg tablet 2024  No Yes   Sig: Take 1 tablet (200 mg total) by mouth daily   apixaban (ELIQUIS) 5 mg 2024 Self No Yes   Sig: Take 1 tablet (5 mg total) by mouth 2 (two) times a day   docusate sodium (COLACE) 100 mg capsule 2024 Self No Yes   Sig: Take 1 capsule (100 mg total) by mouth 2 (two) times a day   fluticasone (FLONASE) 50 mcg/act nasal spray 2024 Self No Yes   Si spray into each nostril daily   furosemide (LASIX) 20 mg tablet 2024 Self No Yes   Sig: Take 1 tablet (20 mg total) by mouth 2 (two) times a day   lenalidomide (REVLIMID) 15 MG CAPS Not Taking  No No   Sig: Take 1 capsule (15 mg total) by mouth daily 21 days on, followed by 7 days off   Patient not taking: Reported on 2024   melatonin 1 mg Past Week Self Yes Yes   Sig: Take 10 mg by mouth daily at bedtime   metoprolol succinate (TOPROL-XL) 25 mg 24 hr  tablet Past Week Self No Yes   Sig: Take 2 tablets (50 mg total) by mouth every 24 hours   polyethylene glycol (MIRALAX) 17 g packet Not Taking Self Yes No   Sig: Take 17 g by mouth daily   Patient not taking: Reported on 11/7/2024   senna (SENOKOT) 8.6 mg Past Week Self No Yes   Sig: Take 2 tablets (17.2 mg total) by mouth daily at bedtime as needed for constipation      Facility-Administered Medications: None     Discharge Medication List as of 11/9/2024  1:51 PM        START taking these medications    Details   cefadroxil (DURICEF) 500 mg capsule Take 1 capsule (500 mg total) by mouth every 12 (twelve) hours for 4 days, Starting Sat 11/9/2024, Until Wed 11/13/2024, Normal           CONTINUE these medications which have NOT CHANGED    Details   acyclovir (ZOVIRAX) 400 MG tablet Take 1 tablet (400 mg total) by mouth 2 (two) times a day, Starting Mon 10/28/2024, Until Tue 2/25/2025, Normal      amiodarone 200 mg tablet Take 1 tablet (200 mg total) by mouth daily, Starting Tue 10/8/2024, Normal      apixaban (ELIQUIS) 5 mg Take 1 tablet (5 mg total) by mouth 2 (two) times a day, Starting Thu 9/19/2024, No Print      Ativan 0.5 MG tablet Take 0.5 mg by mouth every 8 (eight) hours as needed, Starting Tue 10/1/2024, Historical Med      docusate sodium (COLACE) 100 mg capsule Take 1 capsule (100 mg total) by mouth 2 (two) times a day, Starting Thu 9/19/2024, No Print      Empagliflozin (Jardiance) 10 MG TABS tablet Take 10 mg by mouth every morning, Historical Med      fluticasone (FLONASE) 50 mcg/act nasal spray 1 spray into each nostril daily, Starting Fri 9/20/2024, No Print      furosemide (LASIX) 20 mg tablet Take 1 tablet (20 mg total) by mouth 2 (two) times a day, Starting Thu 9/19/2024, Until Thu 11/7/2024, No Print      Magnesium 300 MG CAPS Take by mouth, Historical Med      melatonin 1 mg Take 10 mg by mouth daily at bedtime, Historical Med      metoprolol succinate (TOPROL-XL) 25 mg 24 hr tablet Take 2  tablets (50 mg total) by mouth every 24 hours, Starting Thu 9/19/2024, No Print      Multiple Vitamin (multivitamin) tablet Take 1 tablet by mouth daily, Historical Med      QUEtiapine (SEROquel) 50 mg tablet Take 100 mg by mouth daily at bedtime, Starting Tue 9/10/2024, Historical Med      senna (SENOKOT) 8.6 mg Take 2 tablets (17.2 mg total) by mouth daily at bedtime as needed for constipation, Starting Thu 9/19/2024, No Print      lenalidomide (REVLIMID) 15 MG CAPS Take 1 capsule (15 mg total) by mouth daily 21 days on, followed by 7 days off, Starting Tue 11/5/2024, Normal           STOP taking these medications       ondansetron (ZOFRAN) 4 mg tablet Comments:   Reason for Stopping:         polyethylene glycol (MIRALAX) 17 g packet Comments:   Reason for Stopping:             Outpatient Discharge Orders   EXTERNAL: Ambulatory Referral to Home Health   Standing Status: Future Standing Exp. Date: 11/09/25      Discharge Diet     Call provider for:  persistent dizziness or light-headedness     Call provider for:  difficulty breathing, headache or visual disturbances     ED SEPSIS DOCUMENTATION   Time reflects when diagnosis was documented in both MDM as applicable and the Disposition within this note       Time User Action Codes Description Comment    11/7/2024 12:00 AM Sravanthi Moraes [T50.905A] Medication reaction     11/7/2024 12:00 AM Sravanthi Moraes Add [R50.9] Fever     11/7/2024 12:01 AM Sravanthi Moraes [R09.02] Hypoxia     11/7/2024 12:01 AM Sravanthi Moraes [R91.8] Pulmonary infiltrate            Initial Sepsis Screening       Row Name 11/06/24 7435                Is the patient's history suggestive of a new or worsening infection? Yes (Proceed)  -RP        Suspected source of infection suspect infection, source unknown  -RP        Indicate SIRS criteria Hyperthemia > 38.3C (100.9F) OR Hypothermia <36C (96.8F);Tachypnea > 20 resp per min  -RP        Are two or more of the above signs  "& symptoms of infection both present and new to the patient? Yes (Proceed)  -RP        Assess for evidence of organ dysfunction: Are any of the below criteria present within 6 hours of suspected infection and SIRS criteria that are NOT considered to be chronic conditions? Lactate > 2.0  -RP        Date of presentation of severe sepsis 11/06/24  -RP        Time of presentation of severe sepsis 2139  -RP        Sepsis Note: Click \"NEXT\" below (NOT \"close\") to generate sepsis note based on above information. --                  User Key  (r) = Recorded By, (t) = Taken By, (c) = Cosigned By      Initials Name Provider Type    RP Sravanthi Moraes MD Physician                  Default Flowsheet Data (Last 720 Hours)       Sepsis Reassess       Row Name 11/06/24 2234                   Repeat Volume Status and Tissue Perfusion Assessment Performed    Date of Reassessment: 11/06/24  -RP        Time of Reassessment: 2234  -RP        Sepsis Reassessment Note: Click \"NEXT\" below (NOT \"close\") to generate sepsis reassessment note. --        Repeat Volume Status and Tissue Perfusion Assessment Performed --                  User Key  (r) = Recorded By, (t) = Taken By, (c) = Cosigned By      Initials Name Provider Type    RP Sravanthi Moraes MD Physician                     Sravanthi Moraes MD  11/19/24 1612    "

## 2024-11-20 ENCOUNTER — IN-CLINIC DEVICE VISIT (OUTPATIENT)
Dept: CARDIOLOGY CLINIC | Facility: CLINIC | Age: 84
End: 2024-11-20
Payer: COMMERCIAL

## 2024-11-20 ENCOUNTER — HOSPITAL ENCOUNTER (OUTPATIENT)
Dept: INFUSION CENTER | Facility: HOSPITAL | Age: 84
Discharge: HOME | End: 2024-11-20
Attending: INTERNAL MEDICINE

## 2024-11-20 ENCOUNTER — RESULTS FOLLOW-UP (OUTPATIENT)
Dept: CARDIOLOGY CLINIC | Facility: CLINIC | Age: 84
End: 2024-11-20

## 2024-11-20 DIAGNOSIS — I48.91 ATRIAL FIBRILLATION, UNSPECIFIED TYPE (HCC): ICD-10-CM

## 2024-11-20 DIAGNOSIS — I49.5 SSS (SICK SINUS SYNDROME) (HCC): Primary | ICD-10-CM

## 2024-11-20 LAB
ALBUMIN SERPL ELPH-MCNC: 3.6 G/DL (ref 2.9–4.4)
ALBUMIN/GLOB SERPL: 0.6 {RATIO} (ref 0.7–1.7)
ALPHA1 GLOB SERPL ELPH-MCNC: 0.2 G/DL (ref 0–0.4)
ALPHA2 GLOB SERPL ELPH-MCNC: 0.6 G/DL (ref 0.4–1)
B-GLOBULIN SERPL ELPH-MCNC: 0.8 G/DL (ref 0.7–1.3)
GAMMA GLOB SERPL ELPH-MCNC: 3.9 G/DL (ref 0.4–1.8)
GLOBULIN SER CALC-MCNC: 5.6 G/DL (ref 2.2–3.9)
IGA SERPL-MCNC: 24 MG/DL (ref 61–437)
IGG SERPL-MCNC: 4740 MG/DL (ref 603–1613)
IGM SERPL-MCNC: 34 MG/DL (ref 15–143)
KAPPA LC FREE SER-MCNC: 130.2 MG/L (ref 3.3–19.4)
KAPPA LC FREE/LAMBDA FREE SER: 48.22 {RATIO} (ref 0.26–1.65)
LABORATORY COMMENT REPORT: ABNORMAL
LAMBDA LC FREE SERPL-MCNC: 2.7 MG/L (ref 5.7–26.3)
M PROTEIN SERPL ELPH-MCNC: 3.7 G/DL
PROT SERPL-MCNC: 9.2 G/DL (ref 6–8.5)

## 2024-11-20 PROCEDURE — 93280 PM DEVICE PROGR EVAL DUAL: CPT | Performed by: INTERNAL MEDICINE

## 2024-11-20 NOTE — PROGRESS NOTES
Results for orders placed or performed in visit on 11/20/24   Cardiac EP device report    Narrative    DEVICE INTERROGATED IN THE [place] OFFICE: BATTERY VOLTAGE ADEQUATE (3.4 YRS). AP: 76%. : 20%. ALL LEAD PARAMETERS WITHIN NORMAL LIMITS. NO SIGNIFICANT HIGH RATE EPISODES. NO PROGRAMMING CHANGES MADE TO DEVICE PARAMETERS. NORMAL DEVICE FUNCTION. CH

## 2024-11-30 NOTE — CASE MANAGEMENT
Case Management Discharge Planning Note    Patient name Noah Fernandez /-01 MRN [id number]  : 1940 Date 2024       Current Admission Date: 2024  Current Admission Diagnosis:Atrial fibrillation with RVR (HCC)   Patient Active Problem List    Diagnosis Date Noted Date Diagnosed    Elevated total protein 2024     Coronary artery disease involving coronary bypass graft 2024     Cardiomyopathy (HCC) 2024     COVID-19 2024     Atrial fibrillation with RVR (HCC) 2024     CHF (congestive heart failure) (HCC) 2024     Hypertension 2024       LOS (days): 3  Geometric Mean LOS (GMLOS) (days): 3.5  Days to GMLOS:0.4     OBJECTIVE:  Risk of Unplanned Readmission Score: 15.72         Current admission status: Inpatient   Preferred Pharmacy:   [id number] - [place] - [address]  [address]  [place] [address]  Phone: [phone] Fax: [phone]    Primary Care Provider: Shira Floyd DO    Primary Insurance: BLUE CROSS [employer] REP  Secondary Insurance:     DISCHARGE DETAILS:        Treatment Team Recommendation: Short Term Rehab  Discharge Destination Plan:: Short Term Rehab  Transport at Discharge : Wheelchair van  Dispatcher Contacted: Yes  Number/Name of Dispatcher: [employer]  Transported by (Company and Unit #): [phone]  ETA of Transport (Date): 24  ETA of Transport (Time): 1500     Transfer Mode: Wheelchair        IMM Given (Date):: 24  IMM Given to:: Family  Family notified:: Xuan (daughter)  IMM reviewed with patient's caregiver, patient's caregiver agrees with discharge determination.    Additional Comments: CM was informed that pt will be medically stable for dc tomorrow  to [facility]. CM arranged with [email or web address] [facility] for a 3pm  for tomorrow . CM notified pt's bedside RN Kim, pt's ted Govea, and Carie at [facility] of  time. Facility transfer  form completed.    Accepting Facility Name, City & State : [facility]  Receiving Facility/Agency Phone Number: [phone]  Facility/Agency Fax Number: [phone]            Adequate (%)

## 2024-12-05 ENCOUNTER — HOSPITAL ENCOUNTER (OUTPATIENT)
Dept: NUCLEAR MEDICINE | Facility: HOSPITAL | Age: 84
End: 2024-12-05
Payer: COMMERCIAL

## 2024-12-05 ENCOUNTER — HOSPITAL ENCOUNTER (OUTPATIENT)
Dept: NON INVASIVE DIAGNOSTICS | Facility: HOSPITAL | Age: 84
Discharge: HOME | End: 2024-12-05
Payer: COMMERCIAL

## 2024-12-05 ENCOUNTER — HOSPITAL ENCOUNTER (OUTPATIENT)
Dept: NUCLEAR MEDICINE | Facility: HOSPITAL | Age: 84
Discharge: HOME | End: 2024-12-05
Payer: COMMERCIAL

## 2024-12-05 DIAGNOSIS — I42.0 DILATED CARDIOMYOPATHY (HCC): ICD-10-CM

## 2024-12-05 DIAGNOSIS — I25.5 ISCHEMIC CARDIOMYOPATHY: ICD-10-CM

## 2024-12-05 LAB
CHEST PAIN STATEMENT: NORMAL
MAX DIASTOLIC BP: 60 MMHG
MAX PREDICTED HEART RATE: 136 BPM
NUC STRESS EJECTION FRACTION: 35 %
PROTOCOL NAME: NORMAL
REASON FOR TERMINATION: NORMAL
SL CV REST NUCLEAR ISOTOPE DOSE: 10.1 MCI
SL CV STRESS NUCLEAR ISOTOPE DOSE: 32 MCI
STRESS ANGINA INDEX: 0
STRESS BASELINE HR: 67 BPM
STRESS POST EXERCISE DUR MIN: 3 MIN
STRESS POST EXERCISE DUR SEC: 1 SEC
STRESS POST PEAK BP: 110 MMHG
STRESS POST PEAK HR: 72 BPM
STRESS POST PEAK SYSTOLIC BP: 110 MMHG
STRESS/REST PERFUSION RATIO: 1.08
TARGET HR FORMULA: NORMAL
TEST INDICATION: NORMAL

## 2024-12-05 PROCEDURE — 93016 CV STRESS TEST SUPVJ ONLY: CPT | Performed by: INTERNAL MEDICINE

## 2024-12-05 PROCEDURE — 78452 HT MUSCLE IMAGE SPECT MULT: CPT

## 2024-12-05 PROCEDURE — 78452 HT MUSCLE IMAGE SPECT MULT: CPT | Performed by: INTERNAL MEDICINE

## 2024-12-05 PROCEDURE — 93017 CV STRESS TEST TRACING ONLY: CPT

## 2024-12-05 PROCEDURE — 93018 CV STRESS TEST I&R ONLY: CPT | Performed by: INTERNAL MEDICINE

## 2024-12-05 PROCEDURE — A9502 TC99M TETROFOSMIN: HCPCS

## 2024-12-05 RX ORDER — REGADENOSON 0.08 MG/ML
0.4 INJECTION, SOLUTION INTRAVENOUS ONCE
Status: COMPLETED | OUTPATIENT
Start: 2024-12-05 | End: 2024-12-05

## 2024-12-05 RX ADMIN — REGADENOSON 0.4 MG: 0.08 INJECTION, SOLUTION INTRAVENOUS at 13:43

## 2024-12-07 PROBLEM — A41.9 SEVERE SEPSIS (HCC): Status: RESOLVED | Noted: 2024-11-07 | Resolved: 2024-12-07

## 2024-12-07 PROBLEM — R65.20 SEVERE SEPSIS (HCC): Status: RESOLVED | Noted: 2024-11-07 | Resolved: 2024-12-07

## 2024-12-07 PROBLEM — J18.9 RIGHT LOWER LOBE PNEUMONIA: Status: RESOLVED | Noted: 2024-11-07 | Resolved: 2024-12-07

## 2024-12-09 DIAGNOSIS — C90.00 MULTIPLE MYELOMA NOT HAVING ACHIEVED REMISSION (HCC): ICD-10-CM

## 2024-12-10 RX ORDER — LENALIDOMIDE 15 MG/1
15 CAPSULE ORAL DAILY
Qty: 21 CAPSULE | Refills: 0 | Status: SHIPPED | OUTPATIENT
Start: 2024-12-10

## 2024-12-11 ENCOUNTER — DOCUMENTATION (OUTPATIENT)
Dept: HEMATOLOGY ONCOLOGY | Facility: CLINIC | Age: 84
End: 2024-12-11

## 2024-12-11 NOTE — PROGRESS NOTES
[facility] ID  [id number]  Reference Number  [id number]  Multiple Myeloma - Medicare Access  Assistance Type  Co-pay  Start Date  11/11/2024  End Date  11/10/2025  BIN  [id number]  MUN  [id number]  [facility]  [id number]  Card #   [id number]      Paige Almanza MPH  Phone: [phone]  Email: [email or web address]

## 2024-12-16 ENCOUNTER — TELEPHONE (OUTPATIENT)
Age: 84
End: 2024-12-16

## 2024-12-16 NOTE — TELEPHONE ENCOUNTER
Patient's daughter, Xuan, called to confirm that patient should still come in on 12/18 for his follow up. States he received Revlimid and Dexamethasone, but has not started because he had been sick and then she was sick andshe didn't want to be around him sick while he was on new medication. Requests call back to advise whether he should proceed with appointment or this should be postponed and whether he should begin taking the pills at this time.

## 2024-12-18 ENCOUNTER — OFFICE VISIT (OUTPATIENT)
Age: 84
End: 2024-12-18
Payer: COMMERCIAL

## 2024-12-18 VITALS
HEART RATE: 75 BPM | RESPIRATION RATE: 16 BRPM | BODY MASS INDEX: 22.76 KG/M2 | DIASTOLIC BLOOD PRESSURE: 78 MMHG | OXYGEN SATURATION: 95 % | WEIGHT: 159 LBS | TEMPERATURE: 97.3 F | SYSTOLIC BLOOD PRESSURE: 128 MMHG | HEIGHT: 70 IN

## 2024-12-18 DIAGNOSIS — C90.00 MULTIPLE MYELOMA NOT HAVING ACHIEVED REMISSION (HCC): Primary | ICD-10-CM

## 2024-12-18 PROCEDURE — 99213 OFFICE O/P EST LOW 20 MIN: CPT | Performed by: INTERNAL MEDICINE

## 2024-12-18 PROCEDURE — G2211 COMPLEX E/M VISIT ADD ON: HCPCS | Performed by: INTERNAL MEDICINE

## 2024-12-21 NOTE — ASSESSMENT & PLAN NOTE
Orders:    CBC and differential; Future    Comprehensive metabolic panel; Future    Protein electrophoresis, serum; Future    IgG, IgA, IgM; Future    Immunoglobulin free LT chains blood; Future

## 2024-12-21 NOTE — PROGRESS NOTES
Name: Noah Mendez      : 1940      MRN: [id number]  Encounter Provider: Anabela Mathis DO  Encounter Date: 2024   Encounter department: [facility] HEMATOLOGY ONCOLOGY SPECIALISTS [facility]  :  Assessment & Plan  Multiple myeloma not having achieved remission (HCC)    Orders:    CBC and differential; Future    Comprehensive metabolic panel; Future    Protein electrophoresis, serum; Future    IgG, IgA, IgM; Future    Immunoglobulin free LT chains blood; Future    84-year-old male with IgG kappa multiple myeloma.  He is going to start the Revlimid at 15 mg 3 weeks on 1 week off on a 20-day cycle after Oz.  He will continue to take dexamethasone weekly.  I will see him back in 3 to 4 weeks with a CBC CMP and myeloma studies prior to assess his tolerance.  I reassured his daughter that was a good thing that she did not start that Revlimid in the face of the infection that he had.  She should keep us updated on any other signs of infection.  He knows to call in the interim with any questions or concerns.    History of Present Illness   Chief Complaint   Patient presents with    Follow-up   Noah Mendez is a 84 y.o. male with IgG kappa multiple myeloma.  He did not end up starting the Revlimid after our last visit.  He had an upper respiratory infection with a cough fever and chills and his daughter very astutely held the Revlimid.  He has not had any diarrhea or dysuria.  No other signs of infections and his symptoms have resolved.  With a 1 dose of daratumumab he got his myeloma proteins did come down significantly.  He has no other new medical problems since our last visit.  No headache, chest pain shortness of breath, abdominal pain.       Oncology History   Oncology History Overview Note   10/2024 - IgGK multiple myeloma with 90% plasma cells in the bone marrow, molecular studies pending    2024 - joy first dose with admission for PNA vs reaction     Multiple myeloma not  "having achieved remission (HCC)   10/29/2024 Initial Diagnosis    Multiple myeloma not having achieved remission (HCC)     11/6/2024 -  Chemotherapy    alteplase (CATHFLO), 2 mg, Intracatheter, Every 1 Minute as needed, 1 of 12 cycles  daratumumab-hyaluronidase (DARZALEX FASPRO), 1,800 mg, Subcutaneous daratumumab-hyaluronidase, Once, 1 of 12 cycles  Administration: 1,800 mg (11/6/2024)        Review of Systems otherwise neg        Objective   /78 (BP Location: Left arm, Patient Position: Sitting, Cuff Size: Adult)   Pulse 75   Temp (!) 97.3 °F (36.3 °C) (Temporal)   Resp 16   Ht 5' 10\" (1.778 m)   Wt 72.1 kg (159 lb)   SpO2 95%   BMI 22.81 kg/m²     ECOG   0  Physical Exam    GEN: Alert, awake oriented x3, in no acute distress  HEENT- No pallor, icterus, cyanosis, no oral mucosal lesions,   LAD - no palpable cervical, clavicle, axillary, inguinal LAD  Heart- normal S1 S2, regular rate and rhythm, No murmur, rubs.   Lungs- clear breathing sound bilateral.   Abdomen- soft, Non tender, bowel sounds present  Extremities- No cyanosis, clubbing, edema  Neuro- No focal neurological deficit    Labs: I have reviewed the following labs:  Lab Results   Component Value Date/Time    WBC 2.91 (L) 11/09/2024 04:50 AM    White Blood Cell Count 4.4 11/18/2024 01:23 PM    RBC 2.82 (L) 11/09/2024 04:50 AM    Red Blood Cell Count 3.48 (L) 11/18/2024 01:23 PM    Hemoglobin 11.2 (L) 11/18/2024 01:23 PM    Hemoglobin 9.3 (L) 11/09/2024 04:50 AM    Hematocrit 29.6 (L) 11/09/2024 04:50 AM    HCT 35.1 (L) 11/18/2024 01:23 PM     (H) 11/18/2024 01:23 PM     (H) 11/09/2024 04:50 AM    MCH 32.2 11/18/2024 01:23 PM    MCH 33.0 11/09/2024 04:50 AM    RDW 16.3 (H) 11/18/2024 01:23 PM    RDW 19.2 (H) 11/09/2024 04:50 AM    Platelet Count 201 11/18/2024 01:23 PM    Platelets 122 (L) 11/09/2024 04:50 AM    Segmented % 62 11/09/2024 04:50 AM    Neutrophils 76 11/18/2024 01:23 PM    Lymphocytes % 26 11/09/2024 04:50 AM    " Lymphocytes 13 11/18/2024 01:23 PM    Monocytes % 10 11/09/2024 04:50 AM    Monocytes 9 11/18/2024 01:23 PM    Eosinophils Relative 1 11/09/2024 04:50 AM    Eosinophils 1 11/18/2024 01:23 PM    Basophils Relative 0 11/09/2024 04:50 AM    Immature Grans % 1 11/09/2024 04:50 AM    Absolute Neutrophils 1.81 (L) 11/09/2024 04:50 AM    Neutrophils (Absolute) 3.4 11/18/2024 01:23 PM     Lab Results   Component Value Date/Time    Potassium 3.9 11/18/2024 01:23 PM    Chloride 98 11/18/2024 01:23 PM    CO2 23 11/18/2024 01:23 PM    BUN 19 11/18/2024 01:23 PM    Creatinine 0.94 11/18/2024 01:23 PM    Creatinine 0.76 11/09/2024 04:50 AM    Calcium 7.2 (L) 11/09/2024 04:50 AM    Corrected Calcium 8.1 (L) 11/08/2024 02:44 AM    AST 24 11/18/2024 01:23 PM    ALT 25 11/18/2024 01:23 PM    Alkaline Phosphatase 71 11/08/2024 02:44 AM    Protein, Total 9.1 (H) 11/18/2024 01:23 PM    Albumin 3.3 (L) 11/18/2024 01:23 PM    Albumin 2.7 (L) 11/08/2024 02:44 AM    TOTAL BILIRUBIN 0.8 11/18/2024 01:23 PM    eGFR 80 11/18/2024 01:23 PM    eGFR 84 11/09/2024 04:50 AM     Lab Results   Component Value Date/Time    SPEP Interpretation See Comment 09/19/2024 04:01 AM    UPEP Interp See Comment 09/17/2024 02:00 PM    Beta-2 1.7 (L) 09/19/2024 04:01 AM    Ig [place] Free Light Chain 130.2 (H) 11/18/2024 01:22 PM    Ig Lambda Free Light Chain 2.7 (L) 11/18/2024 01:22 PM    Gamma Globulin 3.9 (H) 11/18/2024 01:22 PM    Gamma Globulin 52.5 (H) 09/19/2024 04:01 AM    IgG 4,740 (H) 11/18/2024 01:22 PM    IgA 24 (L) 11/18/2024 01:22 PM    IGM 34 11/18/2024 01:22 PM    IGM 43 (L) 09/19/2024 04:01 AM

## 2024-12-23 ENCOUNTER — TELEPHONE (OUTPATIENT)
Age: 84
End: 2024-12-23

## 2024-12-23 DIAGNOSIS — C90.00 MULTIPLE MYELOMA NOT HAVING ACHIEVED REMISSION (HCC): ICD-10-CM

## 2024-12-23 RX ORDER — ACYCLOVIR 400 MG/1
TABLET ORAL
Qty: 180 TABLET | Refills: 0 | Status: SHIPPED | OUTPATIENT
Start: 2024-12-23 | End: 2025-12-23

## 2024-12-23 NOTE — TELEPHONE ENCOUNTER
Spoke with patient's daughter and informed her that he can start the Revlimid next week after getting his shots this week. Patient's daughter verbalized understanding and is in agreement with the plan.

## 2024-12-23 NOTE — TELEPHONE ENCOUNTER
Pt stated Oncology Provider: Dr Mathis    Actionable item: Please call Xuan back with Dr Mathis's recommendations    What is the reason for the call/chief complaint?  Patient's daughter,Xuan called with questions regarding vaccines and Revlimid.  Dad is in the process of resuming the Revlimid.  He received his shingles shot this afternoon and is planning to get his Covid booster sometime this week.    Xuan will like to know if he is ok to begin the Revlimid this week as planned or wait until next week being,that he will be receiving 2 vaccines this week.    She understands that moving the Revlimid cycle may mean his appointment will be rescheduled, and will be ok doing that if appropriate.

## 2025-01-01 ENCOUNTER — RESULTS FOLLOW-UP (OUTPATIENT)
Dept: CARDIOLOGY CLINIC | Facility: CLINIC | Age: 85
End: 2025-01-01

## 2025-01-01 ENCOUNTER — APPOINTMENT (INPATIENT)
Dept: RADIOLOGY | Facility: HOSPITAL | Age: 85
DRG: 840 | End: 2025-01-01
Payer: COMMERCIAL

## 2025-01-01 ENCOUNTER — APPOINTMENT (EMERGENCY)
Dept: CT IMAGING | Facility: HOSPITAL | Age: 85
DRG: 840 | End: 2025-01-01
Payer: COMMERCIAL

## 2025-01-01 ENCOUNTER — HOSPITAL ENCOUNTER (INPATIENT)
Facility: HOSPITAL | Age: 85
LOS: 3 days | DRG: 840 | End: 2025-03-20
Attending: EMERGENCY MEDICINE | Admitting: STUDENT IN AN ORGANIZED HEALTH CARE EDUCATION/TRAINING PROGRAM
Payer: COMMERCIAL

## 2025-01-01 ENCOUNTER — TELEPHONE (OUTPATIENT)
Age: 85
End: 2025-01-01

## 2025-01-01 VITALS
DIASTOLIC BLOOD PRESSURE: 55 MMHG | TEMPERATURE: 99.3 F | BODY MASS INDEX: 22.57 KG/M2 | WEIGHT: 157.63 LBS | OXYGEN SATURATION: 93 % | SYSTOLIC BLOOD PRESSURE: 91 MMHG | HEIGHT: 70 IN

## 2025-01-01 DIAGNOSIS — M54.9 BACK PAIN: ICD-10-CM

## 2025-01-01 DIAGNOSIS — R09.02 HYPOXIA: ICD-10-CM

## 2025-01-01 DIAGNOSIS — E87.1 HYPONATREMIA: Primary | ICD-10-CM

## 2025-01-01 DIAGNOSIS — C90.00 MULTIPLE MYELOMA NOT HAVING ACHIEVED REMISSION (HCC): ICD-10-CM

## 2025-01-01 LAB
ALBUMIN SERPL BCG-MCNC: 2.9 G/DL (ref 3.5–5)
ALP SERPL-CCNC: 85 U/L (ref 34–104)
ALT SERPL W P-5'-P-CCNC: 13 U/L (ref 7–52)
ANION GAP SERPL CALCULATED.3IONS-SCNC: 2 MMOL/L (ref 4–13)
ANION GAP SERPL CALCULATED.3IONS-SCNC: 3 MMOL/L (ref 4–13)
ANION GAP SERPL CALCULATED.3IONS-SCNC: 5 MMOL/L (ref 4–13)
ANION GAP SERPL CALCULATED.3IONS-SCNC: 6 MMOL/L (ref 4–13)
ANION GAP SERPL CALCULATED.3IONS-SCNC: 7 MMOL/L (ref 4–13)
ANION GAP SERPL CALCULATED.3IONS-SCNC: 8 MMOL/L (ref 4–13)
AST SERPL W P-5'-P-CCNC: 19 U/L (ref 13–39)
BASE EX.OXY STD BLDV CALC-SCNC: 28.9 % (ref 60–80)
BASE EXCESS BLDV CALC-SCNC: -2.1 MMOL/L
BASOPHILS # BLD MANUAL: 0 THOUSAND/UL (ref 0–0.1)
BASOPHILS # BLD MANUAL: 0.06 THOUSAND/UL (ref 0–0.1)
BASOPHILS NFR MAR MANUAL: 0 % (ref 0–1)
BASOPHILS NFR MAR MANUAL: 1 % (ref 0–1)
BILIRUB SERPL-MCNC: 0.56 MG/DL (ref 0.2–1)
BNP SERPL-MCNC: 1176 PG/ML (ref 0–100)
BNP SERPL-MCNC: 1685 PG/ML (ref 0–100)
BUN SERPL-MCNC: 16 MG/DL (ref 5–25)
BUN SERPL-MCNC: 16 MG/DL (ref 5–25)
BUN SERPL-MCNC: 18 MG/DL (ref 5–25)
BUN SERPL-MCNC: 19 MG/DL (ref 5–25)
BUN SERPL-MCNC: 19 MG/DL (ref 5–25)
BUN SERPL-MCNC: 23 MG/DL (ref 5–25)
BUN SERPL-MCNC: 24 MG/DL (ref 5–25)
BUN SERPL-MCNC: 28 MG/DL (ref 5–25)
BUN SERPL-MCNC: 32 MG/DL (ref 5–25)
BUN SERPL-MCNC: 35 MG/DL (ref 5–25)
CALCIUM ALBUM COR SERPL-MCNC: 9.1 MG/DL (ref 8.3–10.1)
CALCIUM SERPL-MCNC: 7.7 MG/DL (ref 8.4–10.2)
CALCIUM SERPL-MCNC: 7.7 MG/DL (ref 8.4–10.2)
CALCIUM SERPL-MCNC: 8 MG/DL (ref 8.4–10.2)
CALCIUM SERPL-MCNC: 8.1 MG/DL (ref 8.4–10.2)
CALCIUM SERPL-MCNC: 8.2 MG/DL (ref 8.4–10.2)
CALCIUM SERPL-MCNC: 8.4 MG/DL (ref 8.4–10.2)
CALCIUM SERPL-MCNC: 8.7 MG/DL (ref 8.4–10.2)
CALCIUM SERPL-MCNC: 8.7 MG/DL (ref 8.4–10.2)
CARDIAC TROPONIN I PNL SERPL HS: 59 NG/L (ref 8–18)
CARDIAC TROPONIN I PNL SERPL HS: 99 NG/L (ref ?–50)
CHLORIDE SERPL-SCNC: 95 MMOL/L (ref 96–108)
CHLORIDE SERPL-SCNC: 96 MMOL/L (ref 96–108)
CHLORIDE SERPL-SCNC: 98 MMOL/L (ref 96–108)
CHLORIDE SERPL-SCNC: 99 MMOL/L (ref 96–108)
CHLORIDE SERPL-SCNC: 99 MMOL/L (ref 96–108)
CHLORIDE UR-SCNC: <15 MMOL/L
CO2 SERPL-SCNC: 24 MMOL/L (ref 21–32)
CO2 SERPL-SCNC: 25 MMOL/L (ref 21–32)
CO2 SERPL-SCNC: 26 MMOL/L (ref 21–32)
CO2 SERPL-SCNC: 27 MMOL/L (ref 21–32)
CO2 SERPL-SCNC: 27 MMOL/L (ref 21–32)
CO2 SERPL-SCNC: 28 MMOL/L (ref 21–32)
CREAT SERPL-MCNC: 0.74 MG/DL (ref 0.6–1.3)
CREAT SERPL-MCNC: 0.75 MG/DL (ref 0.6–1.3)
CREAT SERPL-MCNC: 0.75 MG/DL (ref 0.6–1.3)
CREAT SERPL-MCNC: 0.78 MG/DL (ref 0.6–1.3)
CREAT SERPL-MCNC: 0.85 MG/DL (ref 0.6–1.3)
CREAT SERPL-MCNC: 0.86 MG/DL (ref 0.6–1.3)
CREAT SERPL-MCNC: 0.87 MG/DL (ref 0.6–1.3)
CREAT SERPL-MCNC: 0.93 MG/DL (ref 0.6–1.3)
CREAT SERPL-MCNC: 0.93 MG/DL (ref 0.6–1.3)
CREAT SERPL-MCNC: 1.18 MG/DL (ref 0.6–1.3)
DACRYOCYTES BLD QL SMEAR: PRESENT
EOSINOPHIL # BLD MANUAL: 0 THOUSAND/UL (ref 0–0.4)
EOSINOPHIL # BLD MANUAL: 0 THOUSAND/UL (ref 0–0.4)
EOSINOPHIL NFR BLD MANUAL: 0 % (ref 0–6)
EOSINOPHIL NFR BLD MANUAL: 0 % (ref 0–6)
ERYTHROCYTE [DISTWIDTH] IN BLOOD BY AUTOMATED COUNT: 17.3 % (ref 11.6–15.1)
ERYTHROCYTE [DISTWIDTH] IN BLOOD BY AUTOMATED COUNT: 17.3 % (ref 11.6–15.1)
ERYTHROCYTE [DISTWIDTH] IN BLOOD BY AUTOMATED COUNT: 17.8 % (ref 11.6–15.1)
ERYTHROCYTE [DISTWIDTH] IN BLOOD BY AUTOMATED COUNT: 17.9 % (ref 11.6–15.1)
FLUAV RNA RESP QL NAA+PROBE: NEGATIVE
FLUBV RNA RESP QL NAA+PROBE: NEGATIVE
GFR SERPL CREATININE-BSD FRML MDRD: 56 ML/MIN/1.73SQ M
GFR SERPL CREATININE-BSD FRML MDRD: 75 ML/MIN/1.73SQ M
GFR SERPL CREATININE-BSD FRML MDRD: 75 ML/MIN/1.73SQ M
GFR SERPL CREATININE-BSD FRML MDRD: 79 ML/MIN/1.73SQ M
GFR SERPL CREATININE-BSD FRML MDRD: 79 ML/MIN/1.73SQ M
GFR SERPL CREATININE-BSD FRML MDRD: 80 ML/MIN/1.73SQ M
GFR SERPL CREATININE-BSD FRML MDRD: 82 ML/MIN/1.73SQ M
GFR SERPL CREATININE-BSD FRML MDRD: 84 ML/MIN/1.73SQ M
GLUCOSE SERPL-MCNC: 105 MG/DL (ref 65–140)
GLUCOSE SERPL-MCNC: 108 MG/DL (ref 65–140)
GLUCOSE SERPL-MCNC: 110 MG/DL (ref 65–140)
GLUCOSE SERPL-MCNC: 123 MG/DL (ref 65–140)
GLUCOSE SERPL-MCNC: 129 MG/DL (ref 65–140)
GLUCOSE SERPL-MCNC: 136 MG/DL (ref 65–140)
GLUCOSE SERPL-MCNC: 147 MG/DL (ref 65–140)
GLUCOSE SERPL-MCNC: 151 MG/DL (ref 65–140)
GLUCOSE SERPL-MCNC: 168 MG/DL (ref 65–140)
GLUCOSE SERPL-MCNC: 94 MG/DL (ref 65–140)
HCO3 BLDV-SCNC: 25 MMOL/L (ref 24–30)
HCT VFR BLD AUTO: 30.4 % (ref 36.5–49.3)
HCT VFR BLD AUTO: 30.5 % (ref 36.5–49.3)
HCT VFR BLD AUTO: 32.1 % (ref 36.5–49.3)
HCT VFR BLD AUTO: 33.2 % (ref 36.5–49.3)
HGB BLD-MCNC: 10.4 G/DL (ref 12–17)
HGB BLD-MCNC: 10.7 G/DL (ref 12–17)
HGB BLD-MCNC: 9.5 G/DL (ref 12–17)
HGB BLD-MCNC: 9.9 G/DL (ref 12–17)
LACTATE SERPL-SCNC: 4.2 MMOL/L (ref 0.5–2)
LACTATE SERPL-SCNC: 4.8 MMOL/L (ref 0.5–2)
LYMPHOCYTES # BLD AUTO: 0.37 THOUSAND/UL (ref 0.6–4.47)
LYMPHOCYTES # BLD AUTO: 0.74 THOUSAND/UL (ref 0.6–4.47)
LYMPHOCYTES # BLD AUTO: 13 % (ref 14–44)
LYMPHOCYTES # BLD AUTO: 16 % (ref 14–44)
MAGNESIUM SERPL-MCNC: 1.7 MG/DL (ref 1.9–2.7)
MAGNESIUM SERPL-MCNC: 1.8 MG/DL (ref 1.9–2.7)
MCH RBC QN AUTO: 33.6 PG (ref 26.8–34.3)
MCH RBC QN AUTO: 33.7 PG (ref 26.8–34.3)
MCH RBC QN AUTO: 33.9 PG (ref 26.8–34.3)
MCH RBC QN AUTO: 34.6 PG (ref 26.8–34.3)
MCHC RBC AUTO-ENTMCNC: 31.1 G/DL (ref 31.4–37.4)
MCHC RBC AUTO-ENTMCNC: 32.2 G/DL (ref 31.4–37.4)
MCHC RBC AUTO-ENTMCNC: 32.4 G/DL (ref 31.4–37.4)
MCHC RBC AUTO-ENTMCNC: 32.6 G/DL (ref 31.4–37.4)
MCV RBC AUTO: 104 FL (ref 82–98)
MCV RBC AUTO: 104 FL (ref 82–98)
MCV RBC AUTO: 107 FL (ref 82–98)
MCV RBC AUTO: 108 FL (ref 82–98)
MONOCYTES # BLD AUTO: 0.12 THOUSAND/UL (ref 0–1.22)
MONOCYTES # BLD AUTO: 0.57 THOUSAND/UL (ref 0–1.22)
MONOCYTES NFR BLD: 10 % (ref 4–12)
MONOCYTES NFR BLD: 5 % (ref 4–12)
NEUTROPHILS # BLD MANUAL: 1.85 THOUSAND/UL (ref 1.85–7.62)
NEUTROPHILS # BLD MANUAL: 4.34 THOUSAND/UL (ref 1.85–7.62)
NEUTS SEG NFR BLD AUTO: 76 % (ref 43–75)
NEUTS SEG NFR BLD AUTO: 79 % (ref 43–75)
O2 CT BLDV-SCNC: 4.2 ML/DL
OSMOLALITY UR/SERPL-RTO: 299 MMOL/KG (ref 282–298)
OSMOLALITY UR: 483 MMOL/KG (ref 250–900)
OVALOCYTES BLD QL SMEAR: PRESENT
PCO2 BLDV: 53.9 MM HG (ref 42–50)
PH BLDV: 7.28 [PH] (ref 7.3–7.4)
PHOSPHATE SERPL-MCNC: 4 MG/DL (ref 2.3–4.1)
PLATELET # BLD AUTO: 143 THOUSANDS/UL (ref 149–390)
PLATELET # BLD AUTO: 163 THOUSANDS/UL (ref 149–390)
PLATELET # BLD AUTO: 164 THOUSANDS/UL (ref 149–390)
PLATELET # BLD AUTO: 182 THOUSANDS/UL (ref 149–390)
PLATELET BLD QL SMEAR: ADEQUATE
PLATELET BLD QL SMEAR: ADEQUATE
PMV BLD AUTO: 10.3 FL (ref 8.9–12.7)
PMV BLD AUTO: 10.3 FL (ref 8.9–12.7)
PMV BLD AUTO: 10.4 FL (ref 8.9–12.7)
PMV BLD AUTO: 9.9 FL (ref 8.9–12.7)
PO2 BLDV: 22.5 MM HG (ref 35–45)
POLYCHROMASIA BLD QL SMEAR: PRESENT
POTASSIUM SERPL-SCNC: 3.9 MMOL/L (ref 3.5–5.3)
POTASSIUM SERPL-SCNC: 4 MMOL/L (ref 3.5–5.3)
POTASSIUM SERPL-SCNC: 4 MMOL/L (ref 3.5–5.3)
POTASSIUM SERPL-SCNC: 4.2 MMOL/L (ref 3.5–5.3)
POTASSIUM SERPL-SCNC: 4.3 MMOL/L (ref 3.5–5.3)
POTASSIUM SERPL-SCNC: 4.3 MMOL/L (ref 3.5–5.3)
POTASSIUM SERPL-SCNC: 4.6 MMOL/L (ref 3.5–5.3)
PROCALCITONIN SERPL-MCNC: 10.17 NG/ML
PROCALCITONIN SERPL-MCNC: 4.42 NG/ML
PROT SERPL-MCNC: 9.2 G/DL (ref 6.4–8.4)
RBC # BLD AUTO: 2.82 MILLION/UL (ref 3.88–5.62)
RBC # BLD AUTO: 2.92 MILLION/UL (ref 3.88–5.62)
RBC # BLD AUTO: 3.01 MILLION/UL (ref 3.88–5.62)
RBC # BLD AUTO: 3.18 MILLION/UL (ref 3.88–5.62)
RBC MORPH BLD: NORMAL
RBC MORPH BLD: PRESENT
RSV RNA RESP QL NAA+PROBE: NEGATIVE
SARS-COV-2 RNA RESP QL NAA+PROBE: NEGATIVE
SODIUM SERPL-SCNC: 125 MMOL/L (ref 135–147)
SODIUM SERPL-SCNC: 125 MMOL/L (ref 135–147)
SODIUM SERPL-SCNC: 126 MMOL/L (ref 135–147)
SODIUM SERPL-SCNC: 128 MMOL/L (ref 135–147)
SODIUM SERPL-SCNC: 128 MMOL/L (ref 135–147)
SODIUM SERPL-SCNC: 129 MMOL/L (ref 135–147)
SODIUM SERPL-SCNC: 130 MMOL/L (ref 135–147)
SODIUM SERPL-SCNC: 130 MMOL/L (ref 135–147)
SODIUM UR-SCNC: <10 MMOL/L
T4 FREE SERPL-MCNC: 0.82 NG/DL (ref 0.61–1.12)
TSH SERPL DL<=0.05 MIU/L-ACNC: 11.64 UIU/ML (ref 0.45–4.5)
URATE SERPL-MCNC: 3 MG/DL (ref 3.5–8.5)
WBC # BLD AUTO: 2.34 THOUSAND/UL (ref 4.31–10.16)
WBC # BLD AUTO: 4.06 THOUSAND/UL (ref 4.31–10.16)
WBC # BLD AUTO: 5.71 THOUSAND/UL (ref 4.31–10.16)
WBC # BLD AUTO: 6.2 THOUSAND/UL (ref 4.31–10.16)

## 2025-01-01 PROCEDURE — 87077 CULTURE AEROBIC IDENTIFY: CPT | Performed by: PHYSICIAN ASSISTANT

## 2025-01-01 PROCEDURE — 84484 ASSAY OF TROPONIN QUANT: CPT | Performed by: NURSE PRACTITIONER

## 2025-01-01 PROCEDURE — 94760 N-INVAS EAR/PLS OXIMETRY 1: CPT

## 2025-01-01 PROCEDURE — 83930 ASSAY OF BLOOD OSMOLALITY: CPT | Performed by: STUDENT IN AN ORGANIZED HEALTH CARE EDUCATION/TRAINING PROGRAM

## 2025-01-01 PROCEDURE — 99232 SBSQ HOSP IP/OBS MODERATE 35: CPT | Performed by: PHYSICIAN ASSISTANT

## 2025-01-01 PROCEDURE — 96376 TX/PRO/DX INJ SAME DRUG ADON: CPT

## 2025-01-01 PROCEDURE — 85027 COMPLETE CBC AUTOMATED: CPT | Performed by: STUDENT IN AN ORGANIZED HEALTH CARE EDUCATION/TRAINING PROGRAM

## 2025-01-01 PROCEDURE — 80053 COMPREHEN METABOLIC PANEL: CPT

## 2025-01-01 PROCEDURE — 74177 CT ABD & PELVIS W/CONTRAST: CPT

## 2025-01-01 PROCEDURE — 87070 CULTURE OTHR SPECIMN AEROBIC: CPT | Performed by: PHYSICIAN ASSISTANT

## 2025-01-01 PROCEDURE — 83605 ASSAY OF LACTIC ACID: CPT | Performed by: NURSE PRACTITIONER

## 2025-01-01 PROCEDURE — 99223 1ST HOSP IP/OBS HIGH 75: CPT

## 2025-01-01 PROCEDURE — 99291 CRITICAL CARE FIRST HOUR: CPT | Performed by: PHYSICIAN ASSISTANT

## 2025-01-01 PROCEDURE — 80048 BASIC METABOLIC PNL TOTAL CA: CPT | Performed by: INTERNAL MEDICINE

## 2025-01-01 PROCEDURE — 0241U HB NFCT DS VIR RESP RNA 4 TRGT: CPT | Performed by: INTERNAL MEDICINE

## 2025-01-01 PROCEDURE — 84550 ASSAY OF BLOOD/URIC ACID: CPT | Performed by: INTERNAL MEDICINE

## 2025-01-01 PROCEDURE — 84439 ASSAY OF FREE THYROXINE: CPT | Performed by: STUDENT IN AN ORGANIZED HEALTH CARE EDUCATION/TRAINING PROGRAM

## 2025-01-01 PROCEDURE — 99446 NTRPROF PH1/NTRNET/EHR 5-10: CPT | Performed by: PHYSICIAN ASSISTANT

## 2025-01-01 PROCEDURE — 99284 EMERGENCY DEPT VISIT MOD MDM: CPT

## 2025-01-01 PROCEDURE — 83880 ASSAY OF NATRIURETIC PEPTIDE: CPT | Performed by: NURSE PRACTITIONER

## 2025-01-01 PROCEDURE — 83735 ASSAY OF MAGNESIUM: CPT | Performed by: NURSE PRACTITIONER

## 2025-01-01 PROCEDURE — 84145 PROCALCITONIN (PCT): CPT | Performed by: INTERNAL MEDICINE

## 2025-01-01 PROCEDURE — 84484 ASSAY OF TROPONIN QUANT: CPT | Performed by: PHYSICIAN ASSISTANT

## 2025-01-01 PROCEDURE — 84145 PROCALCITONIN (PCT): CPT | Performed by: NURSE PRACTITIONER

## 2025-01-01 PROCEDURE — 99223 1ST HOSP IP/OBS HIGH 75: CPT | Performed by: INTERNAL MEDICINE

## 2025-01-01 PROCEDURE — 99232 SBSQ HOSP IP/OBS MODERATE 35: CPT

## 2025-01-01 PROCEDURE — 93005 ELECTROCARDIOGRAM TRACING: CPT

## 2025-01-01 PROCEDURE — 83735 ASSAY OF MAGNESIUM: CPT

## 2025-01-01 PROCEDURE — 94664 DEMO&/EVAL PT USE INHALER: CPT

## 2025-01-01 PROCEDURE — 87040 BLOOD CULTURE FOR BACTERIA: CPT | Performed by: NURSE PRACTITIONER

## 2025-01-01 PROCEDURE — 94668 MNPJ CHEST WALL SBSQ: CPT

## 2025-01-01 PROCEDURE — 85027 COMPLETE CBC AUTOMATED: CPT | Performed by: EMERGENCY MEDICINE

## 2025-01-01 PROCEDURE — 82805 BLOOD GASES W/O2 SATURATION: CPT | Performed by: NURSE PRACTITIONER

## 2025-01-01 PROCEDURE — 83880 ASSAY OF NATRIURETIC PEPTIDE: CPT | Performed by: INTERNAL MEDICINE

## 2025-01-01 PROCEDURE — 84300 ASSAY OF URINE SODIUM: CPT | Performed by: INTERNAL MEDICINE

## 2025-01-01 PROCEDURE — 84100 ASSAY OF PHOSPHORUS: CPT | Performed by: NURSE PRACTITIONER

## 2025-01-01 PROCEDURE — 87081 CULTURE SCREEN ONLY: CPT | Performed by: NURSE PRACTITIONER

## 2025-01-01 PROCEDURE — 99285 EMERGENCY DEPT VISIT HI MDM: CPT | Performed by: EMERGENCY MEDICINE

## 2025-01-01 PROCEDURE — 99223 1ST HOSP IP/OBS HIGH 75: CPT | Performed by: NURSE PRACTITIONER

## 2025-01-01 PROCEDURE — 71045 X-RAY EXAM CHEST 1 VIEW: CPT

## 2025-01-01 PROCEDURE — 87077 CULTURE AEROBIC IDENTIFY: CPT | Performed by: NURSE PRACTITIONER

## 2025-01-01 PROCEDURE — 36415 COLL VENOUS BLD VENIPUNCTURE: CPT | Performed by: EMERGENCY MEDICINE

## 2025-01-01 PROCEDURE — 80048 BASIC METABOLIC PNL TOTAL CA: CPT | Performed by: EMERGENCY MEDICINE

## 2025-01-01 PROCEDURE — 85027 COMPLETE CBC AUTOMATED: CPT

## 2025-01-01 PROCEDURE — 99239 HOSP IP/OBS DSCHRG MGMT >30: CPT | Performed by: PHYSICIAN ASSISTANT

## 2025-01-01 PROCEDURE — 94640 AIRWAY INHALATION TREATMENT: CPT

## 2025-01-01 PROCEDURE — 83935 ASSAY OF URINE OSMOLALITY: CPT | Performed by: INTERNAL MEDICINE

## 2025-01-01 PROCEDURE — 82436 ASSAY OF URINE CHLORIDE: CPT | Performed by: INTERNAL MEDICINE

## 2025-01-01 PROCEDURE — 99232 SBSQ HOSP IP/OBS MODERATE 35: CPT | Performed by: INTERNAL MEDICINE

## 2025-01-01 PROCEDURE — 94002 VENT MGMT INPAT INIT DAY: CPT

## 2025-01-01 PROCEDURE — 85007 BL SMEAR W/DIFF WBC COUNT: CPT | Performed by: STUDENT IN AN ORGANIZED HEALTH CARE EDUCATION/TRAINING PROGRAM

## 2025-01-01 PROCEDURE — 87185 SC STD ENZYME DETCJ PER NZM: CPT | Performed by: PHYSICIAN ASSISTANT

## 2025-01-01 PROCEDURE — 87205 SMEAR GRAM STAIN: CPT | Performed by: PHYSICIAN ASSISTANT

## 2025-01-01 PROCEDURE — 85007 BL SMEAR W/DIFF WBC COUNT: CPT | Performed by: EMERGENCY MEDICINE

## 2025-01-01 PROCEDURE — 96374 THER/PROPH/DIAG INJ IV PUSH: CPT

## 2025-01-01 PROCEDURE — 92610 EVALUATE SWALLOWING FUNCTION: CPT

## 2025-01-01 PROCEDURE — 84443 ASSAY THYROID STIM HORMONE: CPT | Performed by: STUDENT IN AN ORGANIZED HEALTH CARE EDUCATION/TRAINING PROGRAM

## 2025-01-01 PROCEDURE — 71260 CT THORAX DX C+: CPT

## 2025-01-01 PROCEDURE — 80048 BASIC METABOLIC PNL TOTAL CA: CPT | Performed by: STUDENT IN AN ORGANIZED HEALTH CARE EDUCATION/TRAINING PROGRAM

## 2025-01-01 RX ORDER — GLYCOPYRROLATE 0.2 MG/ML
0.1 INJECTION INTRAMUSCULAR; INTRAVENOUS EVERY 4 HOURS PRN
Status: DISCONTINUED | OUTPATIENT
Start: 2025-01-01 | End: 2025-01-01 | Stop reason: HOSPADM

## 2025-01-01 RX ORDER — FUROSEMIDE 10 MG/ML
40 INJECTION INTRAMUSCULAR; INTRAVENOUS ONCE
Status: COMPLETED | OUTPATIENT
Start: 2025-01-01 | End: 2025-01-01

## 2025-01-01 RX ORDER — METOPROLOL TARTRATE 1 MG/ML
2.5 INJECTION, SOLUTION INTRAVENOUS ONCE
Status: COMPLETED | OUTPATIENT
Start: 2025-01-01 | End: 2025-01-01

## 2025-01-01 RX ORDER — GUAIFENESIN 600 MG/1
600 TABLET, EXTENDED RELEASE ORAL EVERY 12 HOURS SCHEDULED
Status: DISCONTINUED | OUTPATIENT
Start: 2025-01-01 | End: 2025-01-01

## 2025-01-01 RX ORDER — HYDROMORPHONE HCL IN WATER/PF 6 MG/30 ML
0.2 PATIENT CONTROLLED ANALGESIA SYRINGE INTRAVENOUS
Status: DISCONTINUED | OUTPATIENT
Start: 2025-01-01 | End: 2025-01-01

## 2025-01-01 RX ORDER — LIDOCAINE 50 MG/G
1 PATCH TOPICAL ONCE
Status: COMPLETED | OUTPATIENT
Start: 2025-01-01 | End: 2025-01-01

## 2025-01-01 RX ORDER — IPRATROPIUM BROMIDE AND ALBUTEROL SULFATE 2.5; .5 MG/3ML; MG/3ML
3 SOLUTION RESPIRATORY (INHALATION) 4 TIMES DAILY PRN
Status: DISCONTINUED | OUTPATIENT
Start: 2025-01-01 | End: 2025-01-01

## 2025-01-01 RX ORDER — DOCUSATE SODIUM 100 MG/1
100 CAPSULE, LIQUID FILLED ORAL 2 TIMES DAILY
Status: DISCONTINUED | OUTPATIENT
Start: 2025-01-01 | End: 2025-01-01

## 2025-01-01 RX ORDER — OXYCODONE HYDROCHLORIDE 5 MG/1
5 TABLET ORAL EVERY 6 HOURS PRN
Refills: 0 | Status: DISCONTINUED | OUTPATIENT
Start: 2025-01-01 | End: 2025-01-01

## 2025-01-01 RX ORDER — BISACODYL 10 MG
10 SUPPOSITORY, RECTAL RECTAL DAILY PRN
Status: DISCONTINUED | OUTPATIENT
Start: 2025-01-01 | End: 2025-01-01

## 2025-01-01 RX ORDER — DEXAMETHASONE 2 MG/1
2 TABLET ORAL
Status: DISCONTINUED | OUTPATIENT
Start: 2025-01-01 | End: 2025-01-01

## 2025-01-01 RX ORDER — ALBUTEROL SULFATE 90 UG/1
2 INHALANT RESPIRATORY (INHALATION) EVERY 4 HOURS PRN
Status: DISCONTINUED | OUTPATIENT
Start: 2025-01-01 | End: 2025-01-01

## 2025-01-01 RX ORDER — QUETIAPINE FUMARATE 25 MG/1
100 TABLET, FILM COATED ORAL
Status: DISCONTINUED | OUTPATIENT
Start: 2025-01-01 | End: 2025-01-01

## 2025-01-01 RX ORDER — HYDROMORPHONE HCL/PF 1 MG/ML
0.3 SYRINGE (ML) INJECTION
Status: DISCONTINUED | OUTPATIENT
Start: 2025-01-01 | End: 2025-01-01 | Stop reason: HOSPADM

## 2025-01-01 RX ORDER — AMIODARONE HYDROCHLORIDE 200 MG/1
200 TABLET ORAL DAILY
Status: DISCONTINUED | OUTPATIENT
Start: 2025-01-01 | End: 2025-01-01

## 2025-01-01 RX ORDER — FUROSEMIDE 10 MG/ML
20 INJECTION INTRAMUSCULAR; INTRAVENOUS ONCE
Status: COMPLETED | OUTPATIENT
Start: 2025-01-01 | End: 2025-01-01

## 2025-01-01 RX ORDER — HYDROMORPHONE HCL/PF 1 MG/ML
0.5 SYRINGE (ML) INJECTION ONCE
Refills: 0 | Status: COMPLETED | OUTPATIENT
Start: 2025-01-01 | End: 2025-01-01

## 2025-01-01 RX ORDER — FUROSEMIDE 20 MG/1
20 TABLET ORAL DAILY
Status: DISCONTINUED | OUTPATIENT
Start: 2025-01-01 | End: 2025-01-01

## 2025-01-01 RX ORDER — NICOTINE 21 MG/24HR
1 PATCH, TRANSDERMAL 24 HOURS TRANSDERMAL DAILY
Status: DISCONTINUED | OUTPATIENT
Start: 2025-01-01 | End: 2025-01-01

## 2025-01-01 RX ORDER — LEVALBUTEROL INHALATION SOLUTION 1.25 MG/3ML
1.25 SOLUTION RESPIRATORY (INHALATION)
Status: DISCONTINUED | OUTPATIENT
Start: 2025-01-01 | End: 2025-01-01

## 2025-01-01 RX ORDER — OXYCODONE HYDROCHLORIDE 5 MG/1
5 TABLET ORAL EVERY 4 HOURS PRN
Refills: 0 | Status: DISCONTINUED | OUTPATIENT
Start: 2025-01-01 | End: 2025-01-01

## 2025-01-01 RX ORDER — MAGNESIUM SULFATE HEPTAHYDRATE 40 MG/ML
2 INJECTION, SOLUTION INTRAVENOUS ONCE
Status: DISCONTINUED | OUTPATIENT
Start: 2025-01-01 | End: 2025-01-01

## 2025-01-01 RX ORDER — TORSEMIDE 10 MG/1
10 TABLET ORAL DAILY
Status: DISCONTINUED | OUTPATIENT
Start: 2025-01-01 | End: 2025-01-01

## 2025-01-01 RX ORDER — SODIUM CHLORIDE 1 G/1
2 TABLET ORAL
Status: DISCONTINUED | OUTPATIENT
Start: 2025-01-01 | End: 2025-01-01

## 2025-01-01 RX ORDER — DEXAMETHASONE SODIUM PHOSPHATE 4 MG/ML
4 INJECTION, SOLUTION INTRA-ARTICULAR; INTRALESIONAL; INTRAMUSCULAR; INTRAVENOUS; SOFT TISSUE ONCE
Status: COMPLETED | OUTPATIENT
Start: 2025-01-01 | End: 2025-01-01

## 2025-01-01 RX ORDER — LORAZEPAM 2 MG/ML
1 INJECTION INTRAMUSCULAR
Status: DISCONTINUED | OUTPATIENT
Start: 2025-01-01 | End: 2025-01-01

## 2025-01-01 RX ORDER — GUAIFENESIN 600 MG/1
1200 TABLET, EXTENDED RELEASE ORAL EVERY 12 HOURS SCHEDULED
Status: DISCONTINUED | OUTPATIENT
Start: 2025-01-01 | End: 2025-01-01

## 2025-01-01 RX ORDER — HYDROMORPHONE HCL/PF 1 MG/ML
0.3 SYRINGE (ML) INJECTION
Status: DISCONTINUED | OUTPATIENT
Start: 2025-01-01 | End: 2025-01-01

## 2025-01-01 RX ORDER — LORAZEPAM 2 MG/ML
1 INJECTION INTRAMUSCULAR
Status: DISCONTINUED | OUTPATIENT
Start: 2025-01-01 | End: 2025-01-01 | Stop reason: HOSPADM

## 2025-01-01 RX ORDER — CALCIUM GLUCONATE 20 MG/ML
2 INJECTION, SOLUTION INTRAVENOUS ONCE
Status: DISCONTINUED | OUTPATIENT
Start: 2025-01-01 | End: 2025-01-01

## 2025-01-01 RX ORDER — HALOPERIDOL 5 MG/ML
0.5 INJECTION INTRAMUSCULAR EVERY 2 HOUR PRN
Status: DISCONTINUED | OUTPATIENT
Start: 2025-01-01 | End: 2025-01-01 | Stop reason: HOSPADM

## 2025-01-01 RX ORDER — LIDOCAINE 50 MG/G
1 PATCH TOPICAL DAILY
Status: DISCONTINUED | OUTPATIENT
Start: 2025-01-01 | End: 2025-01-01

## 2025-01-01 RX ORDER — MAGNESIUM SULFATE HEPTAHYDRATE 40 MG/ML
2 INJECTION, SOLUTION INTRAVENOUS ONCE
Status: COMPLETED | OUTPATIENT
Start: 2025-01-01 | End: 2025-01-01

## 2025-01-01 RX ORDER — ALBUTEROL SULFATE 0.83 MG/ML
2.5 SOLUTION RESPIRATORY (INHALATION) EVERY 4 HOURS PRN
Status: DISCONTINUED | OUTPATIENT
Start: 2025-01-01 | End: 2025-01-01

## 2025-01-01 RX ORDER — POLYETHYLENE GLYCOL 3350 17 G/17G
17 POWDER, FOR SOLUTION ORAL 2 TIMES DAILY
Status: DISCONTINUED | OUTPATIENT
Start: 2025-01-01 | End: 2025-01-01

## 2025-01-01 RX ORDER — CEFTRIAXONE 1 G/50ML
1000 INJECTION, SOLUTION INTRAVENOUS EVERY 24 HOURS
Status: DISCONTINUED | OUTPATIENT
Start: 2025-03-21 | End: 2025-01-01

## 2025-01-01 RX ORDER — LEVALBUTEROL INHALATION SOLUTION 1.25 MG/3ML
1.25 SOLUTION RESPIRATORY (INHALATION) EVERY 8 HOURS PRN
Status: DISCONTINUED | OUTPATIENT
Start: 2025-01-01 | End: 2025-01-01

## 2025-01-01 RX ORDER — NICOTINE 21 MG/24HR
1 PATCH, TRANSDERMAL 24 HOURS TRANSDERMAL EVERY EVENING
Status: DISCONTINUED | OUTPATIENT
Start: 2025-01-01 | End: 2025-01-01

## 2025-01-01 RX ORDER — ACETAMINOPHEN 325 MG/1
975 TABLET ORAL EVERY 8 HOURS
Status: DISCONTINUED | OUTPATIENT
Start: 2025-01-01 | End: 2025-01-01

## 2025-01-01 RX ORDER — METOPROLOL TARTRATE 1 MG/ML
INJECTION, SOLUTION INTRAVENOUS
Status: COMPLETED
Start: 2025-01-01 | End: 2025-01-01

## 2025-01-01 RX ORDER — SENNOSIDES 8.6 MG
17.2 TABLET ORAL
Status: DISCONTINUED | OUTPATIENT
Start: 2025-01-01 | End: 2025-01-01

## 2025-01-01 RX ORDER — METOPROLOL SUCCINATE 25 MG/1
25 TABLET, EXTENDED RELEASE ORAL EVERY 24 HOURS
Status: DISCONTINUED | OUTPATIENT
Start: 2025-01-01 | End: 2025-01-01

## 2025-01-01 RX ORDER — POLYETHYLENE GLYCOL 3350 17 G/17G
17 POWDER, FOR SOLUTION ORAL DAILY
Status: DISCONTINUED | OUTPATIENT
Start: 2025-01-01 | End: 2025-01-01

## 2025-01-01 RX ORDER — HYDROMORPHONE HCL/PF 1 MG/ML
1 SYRINGE (ML) INJECTION ONCE
Status: COMPLETED | OUTPATIENT
Start: 2025-01-01 | End: 2025-01-01

## 2025-01-01 RX ORDER — CEFTRIAXONE 1 G/50ML
1000 INJECTION, SOLUTION INTRAVENOUS ONCE
Status: COMPLETED | OUTPATIENT
Start: 2025-01-01 | End: 2025-01-01

## 2025-01-01 RX ADMIN — FUROSEMIDE 40 MG: 10 INJECTION, SOLUTION INTRAVENOUS at 03:43

## 2025-01-01 RX ADMIN — ACETAMINOPHEN 975 MG: 325 TABLET, FILM COATED ORAL at 08:04

## 2025-01-01 RX ADMIN — METOPROLOL SUCCINATE 25 MG: 25 TABLET, EXTENDED RELEASE ORAL at 20:33

## 2025-01-01 RX ADMIN — GUAIFENESIN 1200 MG: 600 TABLET ORAL at 09:45

## 2025-01-01 RX ADMIN — METOPROLOL SUCCINATE 25 MG: 25 TABLET, EXTENDED RELEASE ORAL at 21:58

## 2025-01-01 RX ADMIN — HYDROMORPHONE HYDROCHLORIDE 0.5 MG: 1 INJECTION, SOLUTION INTRAMUSCULAR; INTRAVENOUS; SUBCUTANEOUS at 11:17

## 2025-01-01 RX ADMIN — OXYCODONE HYDROCHLORIDE 5 MG: 5 TABLET ORAL at 01:29

## 2025-01-01 RX ADMIN — GUAIFENESIN 600 MG: 600 TABLET ORAL at 08:04

## 2025-01-01 RX ADMIN — ACETAMINOPHEN 975 MG: 325 TABLET, FILM COATED ORAL at 00:00

## 2025-01-01 RX ADMIN — OXYCODONE HYDROCHLORIDE 5 MG: 5 TABLET ORAL at 08:04

## 2025-01-01 RX ADMIN — OXYCODONE HYDROCHLORIDE 5 MG: 5 TABLET ORAL at 17:43

## 2025-01-01 RX ADMIN — DOCUSATE SODIUM 100 MG: 100 CAPSULE, LIQUID FILLED ORAL at 08:04

## 2025-01-01 RX ADMIN — Medication 7.5 MG: at 09:40

## 2025-01-01 RX ADMIN — SODIUM CHLORIDE 60 ML/HR: 234 INJECTION, SOLUTION, CONCENTRATE INTRAVENOUS at 11:01

## 2025-01-01 RX ADMIN — DEXAMETHASONE SODIUM PHOSPHATE 4 MG: 4 INJECTION, SOLUTION INTRAMUSCULAR; INTRAVENOUS at 13:37

## 2025-01-01 RX ADMIN — APIXABAN 5 MG: 5 TABLET, FILM COATED ORAL at 17:53

## 2025-01-01 RX ADMIN — AZITHROMYCIN MONOHYDRATE 500 MG: 500 INJECTION, POWDER, LYOPHILIZED, FOR SOLUTION INTRAVENOUS at 03:46

## 2025-01-01 RX ADMIN — DOCUSATE SODIUM 100 MG: 100 CAPSULE, LIQUID FILLED ORAL at 17:53

## 2025-01-01 RX ADMIN — STANDARDIZED SENNA CONCENTRATE 17.2 MG: 8.6 TABLET ORAL at 21:07

## 2025-01-01 RX ADMIN — GUAIFENESIN 600 MG: 600 TABLET ORAL at 01:06

## 2025-01-01 RX ADMIN — MAGNESIUM SULFATE HEPTAHYDRATE 2 G: 40 INJECTION, SOLUTION INTRAVENOUS at 07:33

## 2025-01-01 RX ADMIN — STANDARDIZED SENNA CONCENTRATE 17.2 MG: 8.6 TABLET ORAL at 20:34

## 2025-01-01 RX ADMIN — MORPHINE SULFATE 2 MG: 2 INJECTION, SOLUTION INTRAMUSCULAR; INTRAVENOUS at 07:30

## 2025-01-01 RX ADMIN — Medication 7.5 MG: at 23:43

## 2025-01-01 RX ADMIN — QUETIAPINE FUMARATE 100 MG: 100 TABLET ORAL at 21:58

## 2025-01-01 RX ADMIN — ACETAMINOPHEN 975 MG: 325 TABLET, FILM COATED ORAL at 00:07

## 2025-01-01 RX ADMIN — METOPROLOL TARTRATE 2.5 MG: 1 INJECTION, SOLUTION INTRAVENOUS at 07:12

## 2025-01-01 RX ADMIN — APIXABAN 5 MG: 5 TABLET, FILM COATED ORAL at 09:41

## 2025-01-01 RX ADMIN — HYDROMORPHONE HYDROCHLORIDE 1 MG: 1 INJECTION, SOLUTION INTRAMUSCULAR; INTRAVENOUS; SUBCUTANEOUS at 12:45

## 2025-01-01 RX ADMIN — LIDOCAINE 5% 1 PATCH: 700 PATCH TOPICAL at 08:03

## 2025-01-01 RX ADMIN — NICOTINE 1 PATCH: 14 PATCH, EXTENDED RELEASE TRANSDERMAL at 18:13

## 2025-01-01 RX ADMIN — DEXAMETHASONE 2 MG: 2 TABLET ORAL at 12:28

## 2025-01-01 RX ADMIN — DEXAMETHASONE 2 MG: 2 TABLET ORAL at 06:17

## 2025-01-01 RX ADMIN — APIXABAN 5 MG: 5 TABLET, FILM COATED ORAL at 18:12

## 2025-01-01 RX ADMIN — GUAIFENESIN 1200 MG: 600 TABLET ORAL at 20:33

## 2025-01-01 RX ADMIN — Medication 6 MG: at 20:34

## 2025-01-01 RX ADMIN — EMPAGLIFLOZIN 10 MG: 10 TABLET, FILM COATED ORAL at 09:41

## 2025-01-01 RX ADMIN — AMIODARONE HYDROCHLORIDE 200 MG: 200 TABLET ORAL at 09:41

## 2025-01-01 RX ADMIN — FUROSEMIDE 20 MG: 20 TABLET ORAL at 08:04

## 2025-01-01 RX ADMIN — SODIUM CHLORIDE 60 ML/HR: 234 INJECTION, SOLUTION, CONCENTRATE INTRAVENOUS at 05:18

## 2025-01-01 RX ADMIN — AMIODARONE HYDROCHLORIDE 200 MG: 200 TABLET ORAL at 08:04

## 2025-01-01 RX ADMIN — HYDROMORPHONE HYDROCHLORIDE 0.3 MG: 1 INJECTION, SOLUTION INTRAMUSCULAR; INTRAVENOUS; SUBCUTANEOUS at 08:14

## 2025-01-01 RX ADMIN — FUROSEMIDE 20 MG: 10 INJECTION, SOLUTION INTRAVENOUS at 19:34

## 2025-01-01 RX ADMIN — DEXTROSE 150 MG: 50 INJECTION, SOLUTION INTRAVENOUS at 07:35

## 2025-01-01 RX ADMIN — DOCUSATE SODIUM 100 MG: 100 CAPSULE, LIQUID FILLED ORAL at 18:12

## 2025-01-01 RX ADMIN — LORAZEPAM 1 MG: 2 INJECTION INTRAMUSCULAR; INTRAVENOUS at 08:26

## 2025-01-01 RX ADMIN — LIDOCAINE 5% 1 PATCH: 700 PATCH TOPICAL at 09:41

## 2025-01-01 RX ADMIN — IOHEXOL 100 ML: 350 INJECTION, SOLUTION INTRAVENOUS at 12:29

## 2025-01-01 RX ADMIN — APIXABAN 5 MG: 5 TABLET, FILM COATED ORAL at 08:04

## 2025-01-01 RX ADMIN — ACETAMINOPHEN 975 MG: 325 TABLET, FILM COATED ORAL at 17:53

## 2025-01-01 RX ADMIN — APIXABAN 5 MG: 5 TABLET, FILM COATED ORAL at 17:43

## 2025-01-01 RX ADMIN — IPRATROPIUM BROMIDE 0.5 MG: 0.5 SOLUTION RESPIRATORY (INHALATION) at 19:42

## 2025-01-01 RX ADMIN — DOCUSATE SODIUM 100 MG: 100 CAPSULE, LIQUID FILLED ORAL at 09:41

## 2025-01-01 RX ADMIN — CEFTRIAXONE 1000 MG: 1 INJECTION, SOLUTION INTRAVENOUS at 03:34

## 2025-01-01 RX ADMIN — EMPAGLIFLOZIN 10 MG: 10 TABLET, FILM COATED ORAL at 08:04

## 2025-01-01 RX ADMIN — POLYETHYLENE GLYCOL 3350 17 G: 17 POWDER, FOR SOLUTION ORAL at 08:03

## 2025-01-01 RX ADMIN — LEVALBUTEROL HYDROCHLORIDE 1.25 MG: 1.25 SOLUTION RESPIRATORY (INHALATION) at 19:42

## 2025-01-01 RX ADMIN — GUAIFENESIN 600 MG: 600 TABLET ORAL at 21:58

## 2025-01-01 RX ADMIN — ACETAMINOPHEN 975 MG: 325 TABLET, FILM COATED ORAL at 18:13

## 2025-01-01 RX ADMIN — QUETIAPINE FUMARATE 100 MG: 100 TABLET ORAL at 21:07

## 2025-01-01 RX ADMIN — ACETAMINOPHEN 975 MG: 325 TABLET, FILM COATED ORAL at 23:03

## 2025-01-01 RX ADMIN — LIDOCAINE 5% 1 PATCH: 700 PATCH TOPICAL at 11:18

## 2025-01-01 RX ADMIN — SODIUM CHLORIDE 2 G: 1 TABLET ORAL at 14:46

## 2025-01-01 RX ADMIN — IPRATROPIUM BROMIDE 0.5 MG: 0.5 SOLUTION RESPIRATORY (INHALATION) at 13:52

## 2025-01-01 RX ADMIN — HALOPERIDOL LACTATE 0.5 MG: 5 INJECTION, SOLUTION INTRAMUSCULAR at 08:15

## 2025-01-01 RX ADMIN — METOROPROLOL TARTRATE 2.5 MG: 5 INJECTION, SOLUTION INTRAVENOUS at 07:12

## 2025-01-01 RX ADMIN — POLYETHYLENE GLYCOL 3350 17 G: 17 POWDER, FOR SOLUTION ORAL at 20:33

## 2025-01-01 RX ADMIN — IPRATROPIUM BROMIDE AND ALBUTEROL SULFATE 3 ML: .5; 3 SOLUTION RESPIRATORY (INHALATION) at 03:48

## 2025-01-01 RX ADMIN — CALCIUM GLUCONATE 2 G: 20 INJECTION, SOLUTION INTRAVENOUS at 07:33

## 2025-01-01 RX ADMIN — NICOTINE 1 PATCH: 14 PATCH, EXTENDED RELEASE TRANSDERMAL at 17:53

## 2025-01-01 RX ADMIN — STANDARDIZED SENNA CONCENTRATE 17.2 MG: 8.6 TABLET ORAL at 21:58

## 2025-01-01 RX ADMIN — QUETIAPINE FUMARATE 100 MG: 100 TABLET ORAL at 20:33

## 2025-01-01 RX ADMIN — METOPROLOL SUCCINATE 25 MG: 25 TABLET, EXTENDED RELEASE ORAL at 21:07

## 2025-01-01 RX ADMIN — LEVALBUTEROL HYDROCHLORIDE 1.25 MG: 1.25 SOLUTION RESPIRATORY (INHALATION) at 13:52

## 2025-01-01 RX ADMIN — ACETAMINOPHEN 975 MG: 325 TABLET, FILM COATED ORAL at 09:45

## 2025-01-01 RX ADMIN — MAGNESIUM SULFATE HEPTAHYDRATE 2 G: 40 INJECTION, SOLUTION INTRAVENOUS at 11:02

## 2025-01-01 RX ADMIN — HYDROMORPHONE HYDROCHLORIDE 0.2 MG: 0.2 INJECTION, SOLUTION INTRAMUSCULAR; INTRAVENOUS; SUBCUTANEOUS at 00:20

## 2025-01-01 RX ADMIN — MAGNESIUM SULFATE HEPTAHYDRATE 2 G: 40 INJECTION, SOLUTION INTRAVENOUS at 04:31

## 2025-01-01 RX ADMIN — GLYCOPYRROLATE 0.1 MG: 0.2 INJECTION, SOLUTION INTRAMUSCULAR; INTRAVENOUS at 08:15

## 2025-01-01 RX ADMIN — DOCUSATE SODIUM 100 MG: 100 CAPSULE, LIQUID FILLED ORAL at 17:43

## 2025-01-01 RX ADMIN — POLYETHYLENE GLYCOL 3350 17 G: 17 POWDER, FOR SOLUTION ORAL at 09:41

## 2025-01-06 ENCOUNTER — TELEPHONE (OUTPATIENT)
Dept: HEMATOLOGY ONCOLOGY | Facility: CLINIC | Age: 85
End: 2025-01-06

## 2025-01-06 NOTE — TELEPHONE ENCOUNTER
Left voicemail as a reminder to complete blood work prior to appointment with Dr. Mathis on 1/13. Stated orders are in the chart and are non-fasting. Provided call back number for questions.

## 2025-01-13 DIAGNOSIS — C90.00 MULTIPLE MYELOMA NOT HAVING ACHIEVED REMISSION (HCC): ICD-10-CM

## 2025-01-13 RX ORDER — LENALIDOMIDE 15 MG/1
15 CAPSULE ORAL DAILY
Qty: 21 CAPSULE | Refills: 0 | Status: SHIPPED | OUTPATIENT
Start: 2025-01-13

## 2025-01-22 ENCOUNTER — TELEPHONE (OUTPATIENT)
Age: 85
End: 2025-01-22

## 2025-01-22 NOTE — TELEPHONE ENCOUNTER
Returned call to patients daughter.  Explained patient should be evaluated by his PCP to determine if or what additional testing is needed based on his symptoms  Daughter was agreeable and verbalized understanding.  Explained ok to follow recommendation from PCP

## 2025-01-22 NOTE — TELEPHONE ENCOUNTER
Call received from patients daughter Xuan. Stated starting on Saturday patient has been having back pain in his mid back area. Said that same day, patient was working outside and moving panels and is not sure if that is what could've caused his back pain. She is concerned that he is also having a hard time taking full deep breaths in with this now as well. She also mentioned patient was having a hard time lifting his coffee cup. Denies any dizziness and lightheadedness. Said that twisting hurts his back as well. He has been using a heating pad and tylenol. Daughter said she wants advice from our office instead of PCP office on what to do about this. Questioning if patient needs any imaging done of his back.

## 2025-01-28 ENCOUNTER — TELEPHONE (OUTPATIENT)
Dept: HEMATOLOGY ONCOLOGY | Facility: CLINIC | Age: 85
End: 2025-01-28

## 2025-01-28 NOTE — TELEPHONE ENCOUNTER
Spoke with Xuan to inform her patient will need to complete lab work prior to his appointment with Dr. Mathis on 2/5. Stated the orders are in his chart and are for [facility]. Advised to complete labs by Sunday the latest so we can have the results prior to appointment. Xuan verbalized understanding and is in agreement with plan.    normal (ped)...

## 2025-02-04 ENCOUNTER — TELEPHONE (OUTPATIENT)
Age: 85
End: 2025-02-04

## 2025-02-04 NOTE — TELEPHONE ENCOUNTER
Patient daughter called in to request that tomorrows appt be virtual.  Patient hasn't been feeling well & would prefer not to come into the office.    Please call Ceryl back to confirm at [phone]

## 2025-02-05 ENCOUNTER — TELEMEDICINE (OUTPATIENT)
Age: 85
End: 2025-02-05
Payer: COMMERCIAL

## 2025-02-05 DIAGNOSIS — C90.00 MULTIPLE MYELOMA NOT HAVING ACHIEVED REMISSION (HCC): ICD-10-CM

## 2025-02-05 DIAGNOSIS — M54.6 ACUTE LEFT-SIDED THORACIC BACK PAIN: Primary | ICD-10-CM

## 2025-02-05 PROCEDURE — 99214 OFFICE O/P EST MOD 30 MIN: CPT | Performed by: INTERNAL MEDICINE

## 2025-02-05 NOTE — PROGRESS NOTES
Virtual Regular Visit  Name: Noah Mendez      : 1940      MRN: [id number]  Encounter Provider: Anabela Mathis DO  Encounter Date: 2025   Encounter department: [facility] HEMATOLOGY ONCOLOGY SPECIALISTS [facility]      Verification of patient location:  Patient is located at Home in the following state in which I hold an active license [place] :  Assessment & Plan  Acute left-sided thoracic back pain    Orders:    XR spine lumbar 2 or 3 views injury; Future    XR spine thoracic 3 vw; Future    Multiple myeloma not having achieved remission (HCC)         84-year-old male with IgG kappa multiple myeloma.  It is very possible that the problems he had with his bowels are from the Revlimid.  He never had anything like this before Revlimid.  We discussed options for treatment which includes rechallenging the daratumumab with significant premedications to try to avoid reactions.  Rechallenging Revlimid at a dose reduction.  We could even go as low as 5 mg 3 weeks on 1 week off.  We could try single agent a cetuximab.  I am still avoiding Velcade because he absolutely does not want to take any chances neuropathy in his feet could get any worse.  He was in a lot of pain and overwhelmed by the discussion.  We are going to meet my office in person in 3 weeks and we can revisit the idea of treatment versus more comfort based approach at that time.  I ordered x-rays of his lumbar and thoracic spine to further assess the pain and I will call him with that result.  He is taking Tylenol for his back pain and does not want anything stronger at this time.  He knows to call in the interim with any questions or concerns.        Encounter provider Anabela Mathis DO    The patient was identified by name and date of birth. Noah Mendez was informed that this is a telemedicine visit and that the visit is being conducted through the Epic Embedded platform. He agrees to proceed..  My office door was closed. No  one else was in the room.  He acknowledged consent and understanding of privacy and security of the video platform. The patient has agreed to participate and understands they can discontinue the visit at any time.    Patient is aware this is a billable service.     History of Present Illness     HPI    84-year-old male with IgG kappa multiple myeloma.  He is 1 week status post finishing his first cycle of Revlimid given at 15 mg 3 weeks on 1 week off.  For the past 2 weeks he has had significant left sided mid back pain.  No lower extremity weakness.  He also was having diarrhea and small amounts too numerous to count.  On 2 occasions he had a large stool and that seemed to help a bit.  He is still having the back pain after the large bowel movement.  He had some bright red blood as well.  He denies any fevers or infections.  No rash.  He has baseline neuropathy with numbness on the bottom of his whole foot.  That is stable.  His daughter was present on the call today as well to assist with history.    Review of Systems otherwise neg    Objective   There were no vitals taken for this visit.    Physical Exam    AAOx3, in distress from back pain    Visit Time  Total Visit Duration: 21 min

## 2025-02-10 ENCOUNTER — HOSPITAL ENCOUNTER (OUTPATIENT)
Dept: NON INVASIVE DIAGNOSTICS | Age: 85
Discharge: HOME | End: 2025-02-10
Payer: COMMERCIAL

## 2025-02-10 VITALS
WEIGHT: 159 LBS | DIASTOLIC BLOOD PRESSURE: 78 MMHG | HEART RATE: 60 BPM | SYSTOLIC BLOOD PRESSURE: 128 MMHG | BODY MASS INDEX: 22.76 KG/M2 | HEIGHT: 70 IN

## 2025-02-10 DIAGNOSIS — I42.0 DILATED CARDIOMYOPATHY (HCC): ICD-10-CM

## 2025-02-10 LAB
AORTIC ROOT: 3.7 CM
AORTIC VALVE MEAN VELOCITY: 10.6 M/S
AV LVOT MEAN GRADIENT: 1 MMHG
AV LVOT PEAK GRADIENT: 3 MMHG
AV MEAN PRESS GRAD SYS DOP V1V2: 5 MMHG
AV PEAK GRADIENT: 10 MMHG
AV VELOCITY RATIO: 0.53
AV VMAX SYS DOP: 1.58 M/S
BSA FOR ECHO PROCEDURE: 1.89 M2
DOP CALC AO VTI: 33.7 CM
DOP CALC LVOT PEAK VEL VTI: 17.71 CM
DOP CALC LVOT PEAK VEL: 0.82 M/S
DOP CALC MV VTI: 26.29 CM
E WAVE DECELERATION TIME: 129 MS
E/A RATIO: 0.96
FRACTIONAL SHORTENING: 10 (ref 28–44)
GLOBAL LONGITUIDAL STRAIN: -14 %
INTERVENTRICULAR SEPTUM IN DIASTOLE (PARASTERNAL SHORT AXIS VIEW): 0.8 CM
INTERVENTRICULAR SEPTUM: 0.8 CM (ref 0.6–1.1)
LEFT ATRIUM SIZE: 4.9 CM
LEFT INTERNAL DIMENSION IN SYSTOLE: 6 CM (ref 2.1–4)
LEFT VENTRICLE DIASTOLIC VOLUME (MOD BIPLANE): 268 ML
LEFT VENTRICLE DIASTOLIC VOLUME INDEX (MOD BIPLANE): 141.8 ML/M2
LEFT VENTRICLE SYSTOLIC VOLUME (MOD BIPLANE): 145 ML
LEFT VENTRICLE SYSTOLIC VOLUME INDEX (MOD BIPLANE): 76.7 ML/M2
LEFT VENTRICULAR INTERNAL DIMENSION IN DIASTOLE: 6.7 CM (ref 3.5–6)
LEFT VENTRICULAR POSTERIOR WALL IN END DIASTOLE: 0.5 CM
LEFT VENTRICULAR STROKE VOLUME: 54 ML
LV EF BIPLANE MOD: 46 %
LV EF US.2D.A4C+ESTIMATED: 43 %
LVSV (TEICH): 54 ML
MV E'TISSUE VEL-LAT: 8 CM/S
MV E'TISSUE VEL-SEP: 6 CM/S
MV MEAN GRADIENT: 1 MMHG
MV PEAK A VEL: 0.77 M/S
MV PEAK E VEL: 74 CM/S
MV PEAK GRADIENT: 2 MMHG
MV STENOSIS PRESSURE HALF TIME: 37 MS
MV VALVE AREA P 1/2 METHOD: 5.95
RA PRESSURE ESTIMATED: 3 MMHG
SL CV LV EF: 40
SL CV PED ECHO LEFT VENTRICLE DIASTOLIC VOLUME (MOD BIPLANE) 2D: 234 ML
SL CV PED ECHO LEFT VENTRICLE SYSTOLIC VOLUME (MOD BIPLANE) 2D: 180 ML
TRICUSPID ANNULAR PLANE SYSTOLIC EXCURSION: 1.9 CM

## 2025-02-10 PROCEDURE — 93306 TTE W/DOPPLER COMPLETE: CPT

## 2025-02-10 PROCEDURE — 93356 MYOCRD STRAIN IMG SPCKL TRCK: CPT

## 2025-02-10 PROCEDURE — 93356 MYOCRD STRAIN IMG SPCKL TRCK: CPT | Performed by: INTERNAL MEDICINE

## 2025-02-10 PROCEDURE — 93306 TTE W/DOPPLER COMPLETE: CPT | Performed by: INTERNAL MEDICINE

## 2025-02-11 ENCOUNTER — HOSPITAL ENCOUNTER (OUTPATIENT)
Dept: RADIOLOGY | Facility: HOSPITAL | Age: 85
Discharge: HOME | End: 2025-02-11
Payer: COMMERCIAL

## 2025-02-11 DIAGNOSIS — M54.6 ACUTE LEFT-SIDED THORACIC BACK PAIN: ICD-10-CM

## 2025-02-11 PROCEDURE — 72100 X-RAY EXAM L-S SPINE 2/3 VWS: CPT

## 2025-02-11 PROCEDURE — 72072 X-RAY EXAM THORAC SPINE 3VWS: CPT

## 2025-02-14 ENCOUNTER — APPOINTMENT (EMERGENCY)
Dept: CT IMAGING | Facility: HOSPITAL | Age: 85
End: 2025-02-14
Payer: COMMERCIAL

## 2025-02-14 ENCOUNTER — HOSPITAL ENCOUNTER (EMERGENCY)
Facility: HOSPITAL | Age: 85
Discharge: HOME | End: 2025-02-14
Attending: EMERGENCY MEDICINE
Payer: COMMERCIAL

## 2025-02-14 VITALS
HEIGHT: 70 IN | BODY MASS INDEX: 19.76 KG/M2 | RESPIRATION RATE: 18 BRPM | WEIGHT: 138 LBS | SYSTOLIC BLOOD PRESSURE: 151 MMHG | TEMPERATURE: 97.8 F | OXYGEN SATURATION: 97 % | HEART RATE: 78 BPM | DIASTOLIC BLOOD PRESSURE: 70 MMHG

## 2025-02-14 DIAGNOSIS — I71.9 AORTIC ANEURYSM (HCC): ICD-10-CM

## 2025-02-14 DIAGNOSIS — K59.00 CONSTIPATION: Primary | ICD-10-CM

## 2025-02-14 LAB
ALBUMIN SERPL BCG-MCNC: 3.3 G/DL (ref 3.5–5)
ALP SERPL-CCNC: 113 U/L (ref 34–104)
ALT SERPL W P-5'-P-CCNC: 23 U/L (ref 7–52)
ANION GAP SERPL CALCULATED.3IONS-SCNC: 5 MMOL/L (ref 4–13)
AST SERPL W P-5'-P-CCNC: 24 U/L (ref 13–39)
BASOPHILS # BLD AUTO: 0.05 THOUSANDS/ÂΜL (ref 0–0.1)
BASOPHILS NFR BLD AUTO: 1 % (ref 0–1)
BILIRUB SERPL-MCNC: 0.65 MG/DL (ref 0.2–1)
BUN SERPL-MCNC: 19 MG/DL (ref 5–25)
CALCIUM ALBUM COR SERPL-MCNC: 9.2 MG/DL (ref 8.3–10.1)
CALCIUM SERPL-MCNC: 8.6 MG/DL (ref 8.4–10.2)
CHLORIDE SERPL-SCNC: 101 MMOL/L (ref 96–108)
CO2 SERPL-SCNC: 28 MMOL/L (ref 21–32)
CREAT SERPL-MCNC: 0.89 MG/DL (ref 0.6–1.3)
EOSINOPHIL # BLD AUTO: 0.05 THOUSAND/ÂΜL (ref 0–0.61)
EOSINOPHIL NFR BLD AUTO: 1 % (ref 0–6)
ERYTHROCYTE [DISTWIDTH] IN BLOOD BY AUTOMATED COUNT: 18 % (ref 11.6–15.1)
GFR SERPL CREATININE-BSD FRML MDRD: 78 ML/MIN/1.73SQ M
GLUCOSE SERPL-MCNC: 107 MG/DL (ref 65–140)
HCT VFR BLD AUTO: 37.3 % (ref 36.5–49.3)
HGB BLD-MCNC: 11.9 G/DL (ref 12–17)
IMM GRANULOCYTES # BLD AUTO: 0.08 THOUSAND/UL (ref 0–0.2)
IMM GRANULOCYTES NFR BLD AUTO: 2 % (ref 0–2)
LIPASE SERPL-CCNC: 13 U/L (ref 11–82)
LYMPHOCYTES # BLD AUTO: 0.88 THOUSANDS/ÂΜL (ref 0.6–4.47)
LYMPHOCYTES NFR BLD AUTO: 20 % (ref 14–44)
MCH RBC QN AUTO: 33.8 PG (ref 26.8–34.3)
MCHC RBC AUTO-ENTMCNC: 31.9 G/DL (ref 31.4–37.4)
MCV RBC AUTO: 106 FL (ref 82–98)
MONOCYTES # BLD AUTO: 0.34 THOUSAND/ÂΜL (ref 0.17–1.22)
MONOCYTES NFR BLD AUTO: 8 % (ref 4–12)
NEUTROPHILS # BLD AUTO: 3.06 THOUSANDS/ÂΜL (ref 1.85–7.62)
NEUTS SEG NFR BLD AUTO: 68 % (ref 43–75)
NRBC BLD AUTO-RTO: 0 /100 WBCS
PLATELET # BLD AUTO: 218 THOUSANDS/UL (ref 149–390)
PMV BLD AUTO: 9.6 FL (ref 8.9–12.7)
POTASSIUM SERPL-SCNC: 3.7 MMOL/L (ref 3.5–5.3)
PROT SERPL-MCNC: 9.1 G/DL (ref 6.4–8.4)
RBC # BLD AUTO: 3.52 MILLION/UL (ref 3.88–5.62)
SODIUM SERPL-SCNC: 134 MMOL/L (ref 135–147)
WBC # BLD AUTO: 4.46 THOUSAND/UL (ref 4.31–10.16)

## 2025-02-14 PROCEDURE — 99285 EMERGENCY DEPT VISIT HI MDM: CPT

## 2025-02-14 PROCEDURE — 99285 EMERGENCY DEPT VISIT HI MDM: CPT | Performed by: PHYSICIAN ASSISTANT

## 2025-02-14 PROCEDURE — 80053 COMPREHEN METABOLIC PANEL: CPT | Performed by: PHYSICIAN ASSISTANT

## 2025-02-14 PROCEDURE — 74177 CT ABD & PELVIS W/CONTRAST: CPT

## 2025-02-14 PROCEDURE — 36415 COLL VENOUS BLD VENIPUNCTURE: CPT | Performed by: PHYSICIAN ASSISTANT

## 2025-02-14 PROCEDURE — 85025 COMPLETE CBC W/AUTO DIFF WBC: CPT | Performed by: PHYSICIAN ASSISTANT

## 2025-02-14 PROCEDURE — 83690 ASSAY OF LIPASE: CPT | Performed by: PHYSICIAN ASSISTANT

## 2025-02-14 RX ADMIN — IOHEXOL 100 ML: 350 INJECTION, SOLUTION INTRAVENOUS at 17:30

## 2025-02-14 NOTE — ED PROVIDER NOTES
Time reflects when diagnosis was documented in both MDM as applicable and the Disposition within this note       Time User Action Codes Description Comment    2/14/2025  6:25 PM Tessy Gunter Add [K59.00] Constipation     2/14/2025  6:25 PM Tessy Gunter [I71.9] Aortic aneurysm (HCC)           ED Disposition       ED Disposition   Discharge    Condition   Stable    Date/Time   Fri Feb 14, 2025  8:43 PM    Comment   Noah Mendez discharge to home/self care.                   Assessment & Plan       Medical Decision Making  Patient with constipation, will order labs, CT scan to r/o obstruction.  Patient with increase stool on CT scan, will give enema and reassess.  Patient did not have good results with enema, offered admission, but declines.     Amount and/or Complexity of Data Reviewed  Labs: ordered.  Radiology: ordered.    Risk  Prescription drug management.        ED Course as of 02/15/25 0720   Fri Feb 14, 2025 2001 Care transferred to Dr. Michele Ashford.        Medications   iohexol (OMNIPAQUE) 350 MG/ML injection (MULTI-DOSE) 100 mL (100 mL Intravenous Given 2/14/25 1730)       ED Risk Strat Scores                            SBIRT 22yo+      Flowsheet Row Most Recent Value   Initial Alcohol Screen: US AUDIT-C     1. How often do you have a drink containing alcohol? 0 Filed at: 02/14/2025 1634   2. How many drinks containing alcohol do you have on a typical day you are drinking?  0 Filed at: 02/14/2025 1634   3a. Male UNDER 65: How often do you have five or more drinks on one occasion? 0 Filed at: 02/14/2025 1634   3b. FEMALE Any Age, or MALE 65+: How often do you have 4 or more drinks on one occassion? 0 Filed at: 02/14/2025 1634   Audit-C Score 0 Filed at: 02/14/2025 1634   EFREN: How many times in the past year have you...    Used an illegal drug or used a prescription medication for non-medical reasons? Never Filed at: 02/14/2025 1634                            History of Present Illness  "      Chief Complaint   Patient presents with    Constipation     Been going on for weeks, taking laxatives for relief but then gets diarrhea, feels like he's impacted still       Past Medical History:   Diagnosis Date    Anemia     Atrial fibrillation (HCC)     CHF (congestive heart failure) (HCC)     Coronary artery disease     Depression     Hyperlipidemia     Hypertension     Hyponatremia     Insomnia     Low blood pressure     MI (myocardial infarction) (HCC)     Multiple myeloma (HCC)     Thrombocytopenia (HCC)       Past Surgical History:   Procedure Laterality Date    CARDIAC PACEMAKER PLACEMENT      CARDIAC SURGERY      CORONARY ARTERY BYPASS GRAFT      FRACTURE SURGERY Left     Wrist    IR BIOPSY BONE MARROW  10/23/2024      History reviewed. No pertinent family history.   Social History     Tobacco Use    Smoking status: Every Day     Types: Cigarettes    Smokeless tobacco: Never    Tobacco comments:     Smokes 2 cigarettes daily    Vaping Use    Vaping status: Never Used   Substance Use Topics    Alcohol use: Never    Drug use: Never      E-Cigarette/Vaping    E-Cigarette Use Never User       E-Cigarette/Vaping Substances    Nicotine No     THC No     CBD No     Flavoring No     Other No     Unknown No       I have reviewed and agree with the history as documented.     Patient is an 85 y/o M with h/o constipation that presents to the ED with alternating constipation and diarrhea for the past couple weeks. He has been taking colace for constipation, but then gets diarrhea.  He states he took metamucil yesterday.  He states he has hemorrhoids really bad and it is \"making my poop come out crooked.\"  Patient states he has not had a colonoscopy for over 10 years.  He has blood in his stool occasionally.  He denies nausea or vomiting.  He has loss of appetite.       History provided by:  Patient  Constipation  Associated symptoms: diarrhea    Associated symptoms: no abdominal pain, no fever, no nausea and no " vomiting        Review of Systems   Constitutional:  Negative for chills and fever.   HENT: Negative.     Gastrointestinal:  Positive for blood in stool, constipation, diarrhea and rectal pain. Negative for abdominal pain, nausea and vomiting.   Skin:  Negative for color change, pallor and rash.   Neurological:  Negative for dizziness, weakness, light-headedness and numbness.   Psychiatric/Behavioral:  Negative for confusion.    All other systems reviewed and are negative.          Objective       ED Triage Vitals [02/14/25 1603]   Temperature Pulse Blood Pressure Respirations SpO2 Patient Position - Orthostatic VS   97.8 °F (36.6 °C) 78 151/70 18 97 % Sitting      Temp Source Heart Rate Source BP Location FiO2 (%) Pain Score    Temporal Monitor Right arm -- No Pain      Vitals      Date and Time Temp Pulse SpO2 Resp BP Pain Score FACES Pain Rating User   02/14/25 1633 -- -- -- -- -- No Pain --    02/14/25 1603 97.8 °F (36.6 °C) 78 97 % 18 151/70 No Pain -- MS            Physical Exam  Vitals and nursing note reviewed.   Constitutional:       General: He is not in acute distress.     Appearance: Normal appearance. He is well-developed and well-groomed. He is not ill-appearing or diaphoretic.   HENT:      Head: Normocephalic and atraumatic.      Right Ear: Hearing normal.      Left Ear: Hearing normal.      Nose: Nose normal.      Mouth/Throat:      Mouth: Mucous membranes are moist.      Pharynx: Oropharynx is clear.   Eyes:      Conjunctiva/sclera: Conjunctivae normal.   Cardiovascular:      Rate and Rhythm: Normal rate and regular rhythm.      Heart sounds: Normal heart sounds.   Pulmonary:      Effort: Pulmonary effort is normal.      Breath sounds: Normal breath sounds. No wheezing, rhonchi or rales.   Abdominal:      General: Abdomen is flat. Bowel sounds are normal.      Palpations: Abdomen is soft.      Tenderness: There is no abdominal tenderness.   Musculoskeletal:         General: Normal range of motion.       Cervical back: Normal range of motion.   Skin:     General: Skin is warm and dry.      Coloration: Skin is not jaundiced or pale.      Findings: No rash.   Neurological:      General: No focal deficit present.      Mental Status: He is alert and oriented to person, place, and time.      Motor: No weakness.   Psychiatric:         Mood and Affect: Mood normal.         Behavior: Behavior is cooperative.         Results Reviewed       Procedure Component Value Units Date/Time    Comprehensive metabolic panel [[id number]]  (Abnormal) Collected: 02/14/25 1636    Lab Status: Final result Specimen: Blood from Arm, Left Updated: 02/14/25 1704     Sodium 134 mmol/L      Potassium 3.7 mmol/L      Chloride 101 mmol/L      CO2 28 mmol/L      ANION GAP 5 mmol/L      BUN 19 mg/dL      Creatinine 0.89 mg/dL      Glucose 107 mg/dL      Calcium 8.6 mg/dL      Corrected Calcium 9.2 mg/dL      AST 24 U/L      ALT 23 U/L      Alkaline Phosphatase 113 U/L      Total Protein 9.1 g/dL      Albumin 3.3 g/dL      Total Bilirubin 0.65 mg/dL      eGFR 78 ml/min/1.73sq m     Narrative:      National Kidney Disease Foundation guidelines for Chronic Kidney Disease (CKD):     Stage 1 with normal or high GFR (GFR > 90 mL/min/1.73 square meters)    Stage 2 Mild CKD (GFR = 60-89 mL/min/1.73 square meters)    Stage 3A Moderate CKD (GFR = 45-59 mL/min/1.73 square meters)    Stage 3B Moderate CKD (GFR = 30-44 mL/min/1.73 square meters)    Stage 4 Severe CKD (GFR = 15-29 mL/min/1.73 square meters)    Stage 5 End Stage CKD (GFR <15 mL/min/1.73 square meters)  Note: GFR calculation is accurate only with a steady state creatinine    Lipase [[id number]]  (Normal) Collected: 02/14/25 1636    Lab Status: Final result Specimen: Blood from Arm, Left Updated: 02/14/25 1704     Lipase 13 u/L     CBC and differential [[id number]]  (Abnormal) Collected: 02/14/25 1636    Lab Status: Final result Specimen: Blood from Arm, Left Updated: 02/14/25 1644     WBC 4.46  Thousand/uL      RBC 3.52 Million/uL      Hemoglobin 11.9 g/dL      Hematocrit 37.3 %       fL      MCH 33.8 pg      MCHC 31.9 g/dL      RDW 18.0 %      MPV 9.6 fL      Platelets 218 Thousands/uL      nRBC 0 /100 WBCs      Segmented % 68 %      Immature Grans % 2 %      Lymphocytes % 20 %      Monocytes % 8 %      Eosinophils Relative 1 %      Basophils Relative 1 %      Absolute Neutrophils 3.06 Thousands/µL      Absolute Immature Grans 0.08 Thousand/uL      Absolute Lymphocytes 0.88 Thousands/µL      Absolute Monocytes 0.34 Thousand/µL      Eosinophils Absolute 0.05 Thousand/µL      Basophils Absolute 0.05 Thousands/µL             CT abdomen pelvis with contrast   Final Interpretation by Lemuel Diaz MD (02/14 1804)   1. Stool-filled colon with rectal distention suggesting impaction.               Workstation performed: [id number]             Procedures    ED Medication and Procedure Management   Prior to Admission Medications   Prescriptions Last Dose Informant Patient Reported? Taking?   Ativan 0.5 MG tablet   Yes No   Sig: Take 0.5 mg by mouth every 8 (eight) hours as needed   Patient not taking: Reported on 11/18/2024   Empagliflozin (Jardiance) 10 MG TABS tablet  Self Yes No   Sig: Take 10 mg by mouth every morning   Magnesium 300 MG CAPS  Self Yes No   Sig: Take by mouth   Multiple Vitamin (multivitamin) tablet  Self Yes No   Sig: Take 1 tablet by mouth daily   QUEtiapine (SEROquel) 50 mg tablet  Self Yes No   Sig: Take 100 mg by mouth daily at bedtime   acyclovir (ZOVIRAX) 400 MG tablet   No No   Sig: TAKE 1 TABLET (400 MG TOTAL) BY MOUTH 2 TIMES A DAY   amiodarone 200 mg tablet   No No   Sig: Take 1 tablet (200 mg total) by mouth daily   apixaban (ELIQUIS) 5 mg  Self No No   Sig: Take 1 tablet (5 mg total) by mouth 2 (two) times a day   dexamethasone (DECADRON) 4 mg tablet   No No   Sig: Take 1 tablet (4 mg total) by mouth once a week   docusate sodium (COLACE) 100 mg capsule  Self No No   Sig: Take  1 capsule (100 mg total) by mouth 2 (two) times a day   fluticasone (FLONASE) 50 mcg/act nasal spray  Self No No   Si spray into each nostril daily   furosemide (LASIX) 20 mg tablet  Self No No   Sig: Take 1 tablet (20 mg total) by mouth 2 (two) times a day   lenalidomide (REVLIMID) 15 MG CAPS   No No   Sig: Take 1 capsule (15 mg total) by mouth daily 21 days on, followed by 7 days off   melatonin 1 mg  Self Yes No   Sig: Take 10 mg by mouth daily at bedtime   metoprolol succinate (TOPROL-XL) 25 mg 24 hr tablet  Self No No   Sig: Take 2 tablets (50 mg total) by mouth every 24 hours   senna (SENOKOT) 8.6 mg  Self No No   Sig: Take 2 tablets (17.2 mg total) by mouth daily at bedtime as needed for constipation      Facility-Administered Medications: None     Discharge Medication List as of 2025  8:45 PM        CONTINUE these medications which have NOT CHANGED    Details   acyclovir (ZOVIRAX) 400 MG tablet TAKE 1 TABLET (400 MG TOTAL) BY MOUTH 2 TIMES A DAY, Normal      amiodarone 200 mg tablet Take 1 tablet (200 mg total) by mouth daily, Starting Tue 10/8/2024, Normal      apixaban (ELIQUIS) 5 mg Take 1 tablet (5 mg total) by mouth 2 (two) times a day, Starting 2024, No Print      Ativan 0.5 MG tablet Take 0.5 mg by mouth every 8 (eight) hours as needed, Starting Tue 10/1/2024, Historical Med      dexamethasone (DECADRON) 4 mg tablet Take 1 tablet (4 mg total) by mouth once a week, Starting 2024, Normal      docusate sodium (COLACE) 100 mg capsule Take 1 capsule (100 mg total) by mouth 2 (two) times a day, Starting 2024, No Print      Empagliflozin (Jardiance) 10 MG TABS tablet Take 10 mg by mouth every morning, Historical Med      fluticasone (FLONASE) 50 mcg/act nasal spray 1 spray into each nostril daily, Starting 2024, No Print      furosemide (LASIX) 20 mg tablet Take 1 tablet (20 mg total) by mouth 2 (two) times a day, Starting 2024, Until 2024, No  Print      lenalidomide (REVLIMID) 15 MG CAPS Take 1 capsule (15 mg total) by mouth daily 21 days on, followed by 7 days off, Starting Mon 1/13/2025, Normal      Magnesium 300 MG CAPS Take by mouth, Historical Med      melatonin 1 mg Take 10 mg by mouth daily at bedtime, Historical Med      metoprolol succinate (TOPROL-XL) 25 mg 24 hr tablet Take 2 tablets (50 mg total) by mouth every 24 hours, Starting Thu 9/19/2024, No Print      Multiple Vitamin (multivitamin) tablet Take 1 tablet by mouth daily, Historical Med      QUEtiapine (SEROquel) 50 mg tablet Take 100 mg by mouth daily at bedtime, Starting Tue 9/10/2024, Historical Med      senna (SENOKOT) 8.6 mg Take 2 tablets (17.2 mg total) by mouth daily at bedtime as needed for constipation, Starting Thu 9/19/2024, No Print           No discharge procedures on file.  ED SEPSIS DOCUMENTATION   Time reflects when diagnosis was documented in both MDM as applicable and the Disposition within this note       Time User Action Codes Description Comment    2/14/2025  6:25 PM Tessy Gunter [K59.00] Constipation     2/14/2025  6:25 PM Tessy Gunter [I71.9] Aortic aneurysm (HCC)                  Tessy Gunter PA-C  02/15/25 0791

## 2025-02-15 NOTE — DISCHARGE INSTRUCTIONS
Rest, increase fluids.  Colace twice a day.  Miralax daily.  Follow up with GI doctor concering chronic constipation.   Use magnesium citrate if no improvement with colace and Miralax.

## 2025-02-15 NOTE — ASSESSMENT & PLAN NOTE
Hx of PAF s/p JAZMYN guided cardioversion in September 2024   Home regimen   Eliquis 5 mg bid   Amiodarone 200 mg daily   Toprol-xl 50 mg daily

## 2025-02-15 NOTE — ASSESSMENT & PLAN NOTE
Presented due to constipation. Has had alternating constipation with diarrhea over the past couple of weeks   Colonoscopy > 10 years ago.   Disimpaction and enemas attempted in the ER without improvement.   CT abdomen pelvis   Stool-filled colon with rectal distention suggesting impaction   Plan   Continue home colace and senna  Start bowel prep to aid in colon clearance  Consult GI

## 2025-02-15 NOTE — INCIDENTAL FINDINGS
The following findings require follow up:  Radiographic finding   Finding: aortic aneurysm   Follow up required: yes   Follow up should be done within 6 month(s)    Please notify the following clinician to assist with the follow up:       Incidental finding results were discussed with the Patient by Michele Ashford DO on 02/14/25.   They expressed understanding and all questions answered.

## 2025-02-15 NOTE — ASSESSMENT & PLAN NOTE
Wt Readings from Last 3 Encounters:   02/14/25 62.6 kg (138 lb)   02/10/25 72.1 kg (159 lb)   12/18/24 72.1 kg (159 lb)     Last ECHO 10/7/24: EF 35% G2DD   Follows with [facility] cardiology  Home regimen  Lasix 20 mg twice daily  Jardiance  Toprol XL  Monitor I/O

## 2025-02-21 ENCOUNTER — REMOTE DEVICE CLINIC VISIT (OUTPATIENT)
Dept: CARDIOLOGY CLINIC | Facility: CLINIC | Age: 85
End: 2025-02-21
Payer: COMMERCIAL

## 2025-02-21 DIAGNOSIS — Z95.0 CARDIAC PACEMAKER: ICD-10-CM

## 2025-02-21 DIAGNOSIS — I49.5 SSS (SICK SINUS SYNDROME) (HCC): Primary | ICD-10-CM

## 2025-02-21 PROCEDURE — 93296 REM INTERROG EVL PM/IDS: CPT | Performed by: INTERNAL MEDICINE

## 2025-02-21 PROCEDURE — 93294 REM INTERROG EVL PM/LDLS PM: CPT | Performed by: INTERNAL MEDICINE

## 2025-02-21 NOTE — PROGRESS NOTES
Results for orders placed or performed in visit on 02/21/25   Cardiac EP device report    Narrative    ABBOTT/[place] DC PPM - ACTIVE SYSTEM IS MRI CONDITIONAL  MERLIN TRANSMISSION: BATTERY VOLTAGE ADEQUATE (2.3-3.5 YR). AP 97%  1.5% (DDDR 60, -250 MS). ALL AVAILABLE LEAD PARAMETERS WITHIN NORMAL LIMITS. 225 AMS EPISODES WITH PAFL ON EGM'S; AT/AF BURDEN 1.1%. KNOWN PAF & PATIENT TAKING ELIQUIS, AMIODORONE, METOPROLOL SUCC. EF 40% (2/10/25 ECHO). NORMAL DEVICE FUNCTION.  ES

## 2025-02-26 ENCOUNTER — OFFICE VISIT (OUTPATIENT)
Age: 85
End: 2025-02-26
Payer: COMMERCIAL

## 2025-02-26 VITALS
SYSTOLIC BLOOD PRESSURE: 143 MMHG | RESPIRATION RATE: 18 BRPM | OXYGEN SATURATION: 95 % | WEIGHT: 143 LBS | BODY MASS INDEX: 20.47 KG/M2 | HEIGHT: 70 IN | DIASTOLIC BLOOD PRESSURE: 73 MMHG | TEMPERATURE: 98 F | HEART RATE: 59 BPM

## 2025-02-26 DIAGNOSIS — C90.00 MULTIPLE MYELOMA NOT HAVING ACHIEVED REMISSION (HCC): Primary | ICD-10-CM

## 2025-02-26 PROCEDURE — 99214 OFFICE O/P EST MOD 30 MIN: CPT | Performed by: INTERNAL MEDICINE

## 2025-02-27 ENCOUNTER — TELEPHONE (OUTPATIENT)
Age: 85
End: 2025-02-27

## 2025-02-27 NOTE — TELEPHONE ENCOUNTER
Called patient to follow up appt with   Dr Mathis on 2/26 and left a message.  Patient needs a MRI scheduled and office needs to screen patient to schedule. Left [employer] number to return call to office to answer questions.

## 2025-02-28 ENCOUNTER — TELEPHONE (OUTPATIENT)
Age: 85
End: 2025-02-28

## 2025-02-28 ENCOUNTER — TELEPHONE (OUTPATIENT)
Facility: MEDICAL CENTER | Age: 85
End: 2025-02-28

## 2025-02-28 NOTE — TELEPHONE ENCOUNTER
Note in chart after leaving a message for someone to schedule there MRI appointment:  I left a message today to call me at [phone] to schedule their MRI appointment.

## 2025-02-28 NOTE — TELEPHONE ENCOUNTER
Called patient to screen for MRI and to gather information, model and serial numbers of pacemaker.Information was provided by patient, patient prefers [employer].  Office explained that MRI may call to schedule directly with patient after contacting pacemaker co.

## 2025-02-28 NOTE — TELEPHONE ENCOUNTER
Advised patient MRI will reach out to patient to schedule.  Office provided Pacemaker numbers to central scheduling.

## 2025-03-03 ENCOUNTER — TELEPHONE (OUTPATIENT)
Facility: MEDICAL CENTER | Age: 85
End: 2025-03-03

## 2025-03-03 NOTE — QUICK NOTE
Device investigated:   [employer] pacer/icd and leads            Method investigated (surgical report, imaging report, vendor contacted, website used etc):  [place] to rep Alex  Time spent investigating in minutes: 15    Investigation findings:  MRI Conditional implant    Risk vs Benefit performed.  If so, list physician and outcome:  No

## 2025-03-05 NOTE — PROGRESS NOTES
Name: Noah Mendez      : 1940      MRN: [id number]  Encounter Provider: Anabela Mathis DO  Encounter Date: 2025   Encounter department: [facility]  :  Assessment & Plan  Multiple myeloma not having achieved remission (HCC)    Orders:    MRI thoracic spine w wo contrast; Future    Ambulatory referral to Spine & Pain Management; Future    CBC and differential; Future    Comprehensive metabolic panel; Future    Immunoglobulin free LT chains blood; Future    IgG, IgA, IgM; Future    Protein electrophoresis, serum; Future    The patient is having severe mid back pain and is unwilling to consider any treatment for myeloma until this can be addressed.  I am going to get an MRI of the T spine and I am going to refer him to pain management.  We will plan on meeting again in 6 weeks with cbc, cmp and myeloma studies prior.  I will call him with the MRI results.      Return in about 6 weeks (around 2025) for 20 minute follow up visit.    History of Present Illness   Chief Complaint   Patient presents with    Follow-up     Patient present for follow up of his IgG kappa MM.  We are meeting again to reconsider treatment for his revlimid.  He is having severe mid thoracic back pain.  CT in the past for myeloma did not show any lytic lesions.  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  He hasn't had any further issues with constipation since he was in the ER.      Oncology History   Cancer Staging   No matching staging information was found for the patient.  Oncology History Overview Note   10/2024 - IgGK multiple myeloma with 90% plasma cells in the bone marrow, molecular studies pending    2024 - joy first dose with admission for PNA vs reaction     Multiple myeloma not having achieved remission (HCC)   10/29/2024 Initial Diagnosis    Multiple myeloma not having achieved remission (HCC)     2024 -  Chemotherapy     "alteplase (CATHFLO), 2 mg, Intracatheter, Every 1 Minute as needed, 1 of 12 cycles  daratumumab-hyaluronidase (DARZALEX FASPRO), 1,800 mg, Subcutaneous daratumumab-hyaluronidase, Once, 1 of 12 cycles  Administration: 1,800 mg (11/6/2024)              Review of Systems otherwise neg        Objective   /73 (BP Location: Left arm, Patient Position: Sitting, Cuff Size: Standard)   Pulse 59   Temp 98 °F (36.7 °C) (Temporal)   Resp 18   Ht 5' 10\" (1.778 m)   Wt 64.9 kg (143 lb)   SpO2 95%   BMI 20.52 kg/m²     Pain Screening:  Pain Score:   6  ECOG   1  Physical Exam      GEN: Alert, awake oriented x3, in no acute distress  HEENT- No pallor, icterus, cyanosis, no oral mucosal lesions,   LAD - no palpable cervical, clavicle, axillary, inguinal LAD  Heart- normal S1 S2, regular rate and rhythm, No murmur, rubs.   Lungs- clear breathing sound bilateral.   Abdomen- soft, Non tender, bowel sounds present  Extremities- No cyanosis, clubbing, edema  Neuro- No focal neurological deficit    Labs: I have reviewed the following labs:  Lab Results   Component Value Date/Time    WBC 4.46 02/14/2025 04:36 PM    White Blood Cell Count 5.1 02/24/2025 12:56 PM    RBC 3.52 (L) 02/14/2025 04:36 PM    Red Blood Cell Count 3.45 (L) 02/24/2025 12:56 PM    Hemoglobin 12.2 (L) 02/24/2025 12:56 PM    Hemoglobin 11.9 (L) 02/14/2025 04:36 PM    Hematocrit 37.3 02/14/2025 04:36 PM    HCT 35.2 (L) 02/24/2025 12:56 PM     (H) 02/24/2025 12:56 PM     (H) 02/14/2025 04:36 PM    MCH 35.4 (H) 02/24/2025 12:56 PM    MCH 33.8 02/14/2025 04:36 PM    RDW 16.0 (H) 02/24/2025 12:56 PM    RDW 18.0 (H) 02/14/2025 04:36 PM    Platelet Count 195 02/24/2025 12:56 PM    Platelets 218 02/14/2025 04:36 PM    Segmented % 68 02/14/2025 04:36 PM    Neutrophils 67 02/24/2025 12:56 PM    Lymphocytes % 20 02/14/2025 04:36 PM    Lymphocytes 18 02/24/2025 12:56 PM    Monocytes % 8 02/14/2025 04:36 PM    Monocytes 9 02/24/2025 12:56 PM    Eosinophils " Relative 1 02/14/2025 04:36 PM    Eosinophils 3 02/24/2025 12:56 PM    Basophils Relative 1 02/14/2025 04:36 PM    Immature Grans % 2 02/14/2025 04:36 PM    Absolute Neutrophils 3.06 02/14/2025 04:36 PM    Neutrophils (Absolute) 3.4 02/24/2025 12:56 PM     Lab Results   Component Value Date/Time    Potassium 4.2 02/24/2025 12:56 PM    Chloride 99 02/24/2025 12:56 PM    CO2 23 02/24/2025 12:56 PM    BUN 15 02/24/2025 12:56 PM    Creatinine 1.01 02/24/2025 12:56 PM    Creatinine 0.89 02/14/2025 04:36 PM    Calcium 8.6 02/14/2025 04:36 PM    Corrected Calcium 9.2 02/14/2025 04:36 PM    AST 20 02/24/2025 12:56 PM    ALT 15 02/24/2025 12:56 PM    Alkaline Phosphatase 113 (H) 02/14/2025 04:36 PM    Protein, Total 8.9 (H) 02/24/2025 12:56 PM    Albumin 3.3 (L) 02/24/2025 12:56 PM    Albumin 3.3 (L) 02/14/2025 04:36 PM    TOTAL BILIRUBIN 0.7 02/24/2025 12:56 PM    eGFR 73 02/24/2025 12:56 PM    eGFR 78 02/14/2025 04:36 PM

## 2025-03-05 NOTE — ASSESSMENT & PLAN NOTE
Orders:    MRI thoracic spine w wo contrast; Future    Ambulatory referral to Spine & Pain Management; Future    CBC and differential; Future    Comprehensive metabolic panel; Future    Immunoglobulin free LT chains blood; Future    IgG, IgA, IgM; Future    Protein electrophoresis, serum; Future

## 2025-03-10 ENCOUNTER — TELEPHONE (OUTPATIENT)
Age: 85
End: 2025-03-10

## 2025-03-10 DIAGNOSIS — M54.6 ACUTE MIDLINE THORACIC BACK PAIN: Primary | ICD-10-CM

## 2025-03-10 NOTE — TELEPHONE ENCOUNTER
Call received from patients daughter Xuan. She called spine and pain management to get patient in for an appt and they told her his diagnosis is not an diagnosis they treat. They said they can do it if the referral says back pain, or he should he be seen by palliative care. Daughter is asking what the right course of action would be for patient to get him seen sooner, but patient also has his MRI tomorrow as well. She is asking if the referral can be fixed for spine and pain to approve it, and then based on the MRI results, a referral put into palliative care if that is more appropriate. She is overall looking for some guidance on what the best plan for patient is to get him seen sooner.

## 2025-03-10 NOTE — TELEPHONE ENCOUNTER
Called and spoke to Xuan to let her know that a new referral was placed with new diagnosis and they should be reaching out soon to get him scheduled.

## 2025-03-11 ENCOUNTER — HOSPITAL ENCOUNTER (OUTPATIENT)
Dept: RADIOLOGY | Facility: HOSPITAL | Age: 85
Discharge: HOME | End: 2025-03-11
Payer: COMMERCIAL

## 2025-03-11 DIAGNOSIS — C90.00 MULTIPLE MYELOMA NOT HAVING ACHIEVED REMISSION (HCC): ICD-10-CM

## 2025-03-11 PROCEDURE — 76018 MR SAFETY IMPLANT ELEC PREPJ: CPT

## 2025-03-11 PROCEDURE — 76014 MR SFTY IMPLT&/FB ASMT STF 1: CPT

## 2025-03-11 PROCEDURE — A9585 GADOBUTROL INJECTION: HCPCS | Performed by: INTERNAL MEDICINE

## 2025-03-11 PROCEDURE — 72157 MRI CHEST SPINE W/O & W/DYE: CPT

## 2025-03-11 RX ORDER — GADOBUTROL 604.72 MG/ML
6 INJECTION INTRAVENOUS
Status: COMPLETED | OUTPATIENT
Start: 2025-03-11 | End: 2025-03-11

## 2025-03-11 RX ADMIN — GADOBUTROL 6 ML: 604.72 INJECTION INTRAVENOUS at 13:08

## 2025-03-11 NOTE — NURSING NOTE
Device interrogation for MRI.  Normal device function prior to MRI.  Leads and device meet all requirements per policy for MRI.  Device programmed DOO 80bpm per Cardiology for MRI.  Patient has no complaints.  Vital signs monitored throughout by KEZIA Murphy RN.  Normal device function post MRI.  Device reprogrammed to prior settings per Cardiology.

## 2025-03-12 ENCOUNTER — TELEPHONE (OUTPATIENT)
Age: 85
End: 2025-03-12

## 2025-03-12 NOTE — TELEPHONE ENCOUNTER
Caller: Patient     Doctor: Dr. Figueroa    Reason for call: Patient calling due to referral on file.     Patient did not want to schedule appt at this time.     While on the phone with patient it appears daughter called in to schedule and advised to keep the appt at this time.     She will speak to patient     Call back#: n/a

## 2025-03-13 ENCOUNTER — TELEPHONE (OUTPATIENT)
Dept: OTHER | Facility: OTHER | Age: 85
End: 2025-03-13

## 2025-03-13 NOTE — TELEPHONE ENCOUNTER
Pt is scheduled for an appointment tomorrow 03/14 @1000a. PT daughter would like to know if this appointment can be done virtually due to pt having an increased amount of back pain.       Please give her a call back as soon as possible

## 2025-03-14 ENCOUNTER — APPOINTMENT (EMERGENCY)
Dept: RADIOLOGY | Facility: HOSPITAL | Age: 85
DRG: 840 | End: 2025-03-14
Payer: COMMERCIAL

## 2025-03-14 ENCOUNTER — HOSPITAL ENCOUNTER (EMERGENCY)
Facility: HOSPITAL | Age: 85
Discharge: HOME | DRG: 840 | End: 2025-03-14
Attending: EMERGENCY MEDICINE
Payer: COMMERCIAL

## 2025-03-14 VITALS
OXYGEN SATURATION: 92 % | TEMPERATURE: 97.8 F | HEART RATE: 60 BPM | RESPIRATION RATE: 20 BRPM | SYSTOLIC BLOOD PRESSURE: 156 MMHG | DIASTOLIC BLOOD PRESSURE: 72 MMHG | WEIGHT: 143 LBS | BODY MASS INDEX: 20.47 KG/M2 | HEIGHT: 70 IN

## 2025-03-14 DIAGNOSIS — C90.00 MULTIPLE MYELOMA NOT HAVING ACHIEVED REMISSION (HCC): ICD-10-CM

## 2025-03-14 DIAGNOSIS — E87.1 HYPONATREMIA: ICD-10-CM

## 2025-03-14 DIAGNOSIS — R07.81 RIB PAIN ON LEFT SIDE: Primary | ICD-10-CM

## 2025-03-14 LAB
ALBUMIN SERPL BCG-MCNC: 3.1 G/DL (ref 3.5–5)
ALP SERPL-CCNC: 86 U/L (ref 34–104)
ALT SERPL W P-5'-P-CCNC: 14 U/L (ref 7–52)
ANION GAP SERPL CALCULATED.3IONS-SCNC: 5 MMOL/L (ref 4–13)
ANISOCYTOSIS BLD QL SMEAR: PRESENT
AST SERPL W P-5'-P-CCNC: 21 U/L (ref 13–39)
BASOPHILS # BLD MANUAL: 0.04 THOUSAND/UL (ref 0–0.1)
BASOPHILS NFR MAR MANUAL: 1 % (ref 0–1)
BILIRUB SERPL-MCNC: 0.56 MG/DL (ref 0.2–1)
BUN SERPL-MCNC: 23 MG/DL (ref 5–25)
CALCIUM ALBUM COR SERPL-MCNC: 8.6 MG/DL (ref 8.3–10.1)
CALCIUM SERPL-MCNC: 7.9 MG/DL (ref 8.4–10.2)
CHLORIDE SERPL-SCNC: 96 MMOL/L (ref 96–108)
CO2 SERPL-SCNC: 26 MMOL/L (ref 21–32)
CREAT SERPL-MCNC: 0.83 MG/DL (ref 0.6–1.3)
EOSINOPHIL # BLD MANUAL: 0.13 THOUSAND/UL (ref 0–0.4)
EOSINOPHIL NFR BLD MANUAL: 3 % (ref 0–6)
ERYTHROCYTE [DISTWIDTH] IN BLOOD BY AUTOMATED COUNT: 17.5 % (ref 11.6–15.1)
GFR SERPL CREATININE-BSD FRML MDRD: 80 ML/MIN/1.73SQ M
GLUCOSE SERPL-MCNC: 98 MG/DL (ref 65–140)
HCT VFR BLD AUTO: 37.1 % (ref 36.5–49.3)
HGB BLD-MCNC: 11.7 G/DL (ref 12–17)
LYMPHOCYTES # BLD AUTO: 0.8 THOUSAND/UL (ref 0.6–4.47)
LYMPHOCYTES # BLD AUTO: 19 % (ref 14–44)
MCH RBC QN AUTO: 33.4 PG (ref 26.8–34.3)
MCHC RBC AUTO-ENTMCNC: 31.5 G/DL (ref 31.4–37.4)
MCV RBC AUTO: 106 FL (ref 82–98)
METAMYELOCYTE ABSOLUTE CT: 0.08 THOUSAND/UL (ref 0–0.1)
METAMYELOCYTES NFR BLD MANUAL: 2 % (ref 0–1)
MONOCYTES # BLD AUTO: 0.42 THOUSAND/UL (ref 0–1.22)
MONOCYTES NFR BLD: 10 % (ref 4–12)
MYELOCYTE ABSOLUTE CT: 0.04 THOUSAND/UL (ref 0–0.1)
MYELOCYTES NFR BLD MANUAL: 1 % (ref 0–1)
NEUTROPHILS # BLD MANUAL: 2.69 THOUSAND/UL (ref 1.85–7.62)
NEUTS BAND NFR BLD MANUAL: 3 % (ref 0–8)
NEUTS SEG NFR BLD AUTO: 61 % (ref 43–75)
PLATELET # BLD AUTO: 177 THOUSANDS/UL (ref 149–390)
PLATELET BLD QL SMEAR: ADEQUATE
PMV BLD AUTO: 10.4 FL (ref 8.9–12.7)
POLYCHROMASIA BLD QL SMEAR: PRESENT
POTASSIUM SERPL-SCNC: 3.5 MMOL/L (ref 3.5–5.3)
PROT SERPL-MCNC: 9.7 G/DL (ref 6.4–8.4)
RBC # BLD AUTO: 3.5 MILLION/UL (ref 3.88–5.62)
RBC MORPH BLD: PRESENT
SODIUM SERPL-SCNC: 127 MMOL/L (ref 135–147)
WBC # BLD AUTO: 4.2 THOUSAND/UL (ref 4.31–10.16)

## 2025-03-14 PROCEDURE — 71101 X-RAY EXAM UNILAT RIBS/CHEST: CPT

## 2025-03-14 PROCEDURE — 80053 COMPREHEN METABOLIC PANEL: CPT | Performed by: EMERGENCY MEDICINE

## 2025-03-14 PROCEDURE — 96375 TX/PRO/DX INJ NEW DRUG ADDON: CPT

## 2025-03-14 PROCEDURE — 96361 HYDRATE IV INFUSION ADD-ON: CPT

## 2025-03-14 PROCEDURE — 99285 EMERGENCY DEPT VISIT HI MDM: CPT | Performed by: EMERGENCY MEDICINE

## 2025-03-14 PROCEDURE — 99284 EMERGENCY DEPT VISIT MOD MDM: CPT

## 2025-03-14 PROCEDURE — 85007 BL SMEAR W/DIFF WBC COUNT: CPT | Performed by: EMERGENCY MEDICINE

## 2025-03-14 PROCEDURE — 36415 COLL VENOUS BLD VENIPUNCTURE: CPT | Performed by: EMERGENCY MEDICINE

## 2025-03-14 PROCEDURE — 96374 THER/PROPH/DIAG INJ IV PUSH: CPT

## 2025-03-14 PROCEDURE — 85027 COMPLETE CBC AUTOMATED: CPT | Performed by: EMERGENCY MEDICINE

## 2025-03-14 PROCEDURE — 93005 ELECTROCARDIOGRAM TRACING: CPT

## 2025-03-14 RX ORDER — KETOROLAC TROMETHAMINE 30 MG/ML
15 INJECTION, SOLUTION INTRAMUSCULAR; INTRAVENOUS ONCE
Status: COMPLETED | OUTPATIENT
Start: 2025-03-14 | End: 2025-03-14

## 2025-03-14 RX ORDER — HYDROMORPHONE HCL IN WATER/PF 6 MG/30 ML
0.2 PATIENT CONTROLLED ANALGESIA SYRINGE INTRAVENOUS ONCE
Status: COMPLETED | OUTPATIENT
Start: 2025-03-14 | End: 2025-03-14

## 2025-03-14 RX ORDER — LIDOCAINE 50 MG/G
1 PATCH TOPICAL ONCE
Status: DISCONTINUED | OUTPATIENT
Start: 2025-03-14 | End: 2025-03-14 | Stop reason: HOSPADM

## 2025-03-14 RX ORDER — HYDROMORPHONE HCL/PF 1 MG/ML
0.5 SYRINGE (ML) INJECTION ONCE
Status: DISCONTINUED | OUTPATIENT
Start: 2025-03-14 | End: 2025-03-14

## 2025-03-14 RX ADMIN — HYDROMORPHONE HYDROCHLORIDE 0.2 MG: 0.2 INJECTION, SOLUTION INTRAMUSCULAR; INTRAVENOUS; SUBCUTANEOUS at 06:39

## 2025-03-14 RX ADMIN — LIDOCAINE 5% 1 PATCH: 700 PATCH TOPICAL at 06:38

## 2025-03-14 RX ADMIN — KETOROLAC TROMETHAMINE 15 MG: 30 INJECTION, SOLUTION INTRAMUSCULAR; INTRAVENOUS at 08:01

## 2025-03-14 RX ADMIN — SODIUM CHLORIDE 500 ML: 0.9 INJECTION, SOLUTION INTRAVENOUS at 08:01

## 2025-03-14 NOTE — ED PROVIDER NOTES
Time reflects when diagnosis was documented in both MDM as applicable and the Disposition within this note       Time User Action Codes Description Comment    3/14/2025  7:31 AM Danial Pollard [R07.81] Rib pain on left side     3/14/2025  7:31 AM Danial Pollard [C90.00] Multiple myeloma not having achieved remission (HCC)     3/14/2025  7:32 AM Danial Pollard [E87.1] Hyponatremia           ED Disposition       ED Disposition   Discharge    Condition   Stable    Date/Time   Fri Mar 14, 2025  8:41 AM    Comment   Noah Mendez discharge to home/self care.                   Assessment & Plan       Medical Decision Making    84 y.o. male presenting for left-sided rib pain and back pain.  Will obtain labs to evaluate for leukocytosis, anemia, electrolyte abnormality or KATH  Will obtain xray to evaluate for rib fractures.  No symptoms suggestive of spinal cord compression.   He recently underwent MRI of thoracic spine which demonstrated compression fractures, will defer additional spine imaging at this time.    Reassessment: VSS, pain improved.  Reviewed preliminary CXR results. Discussed clinically exam suggestive of rib fracture not evident on CXR.  Mild hyponatremia treated with gentle IV fluids bolus.    Disposition: I have discussed with the patient our plan to discharge them from the ED and the patient is in agreement with this plan.     Discharge Plan: encouraged to continue home medication regimen. He reportedly has appointment later today for repeat evaluation. RTED precautions emphasized. The patient was provided a written after visit summary with strict RTED precautions.     Followup: I have discussed with the patient plan to follow up with their PCP. Contact information provided in AVS.    Amount and/or Complexity of Data Reviewed  Labs: ordered.  Radiology: ordered and independent interpretation performed.    Risk  Prescription drug management.             Medications   HYDROmorphone HCl  (DILAUDID) injection 0.2 mg (0.2 mg Intravenous Given 3/14/25 0639)   sodium chloride 0.9 % bolus 500 mL (0 mL Intravenous Stopped 3/14/25 0839)   ketorolac (TORADOL) injection 15 mg (15 mg Intravenous Given 3/14/25 0801)       ED Risk Strat Scores                            SBIRT 20yo+      Flowsheet Row Most Recent Value   Initial Alcohol Screen: US AUDIT-C     1. How often do you have a drink containing alcohol? 0 Filed at: 03/14/2025 0612   2. How many drinks containing alcohol do you have on a typical day you are drinking?  0 Filed at: 03/14/2025 0612   3a. Male UNDER 65: How often do you have five or more drinks on one occasion? 0 Filed at: 03/14/2025 0612   3b. FEMALE Any Age, or MALE 65+: How often do you have 4 or more drinks on one occassion? 0 Filed at: 03/14/2025 0612   Audit-C Score 0 Filed at: 03/14/2025 0612   EFREN: How many times in the past year have you...    Used an illegal drug or used a prescription medication for non-medical reasons? Never Filed at: 03/14/2025 0612                            History of Present Illness       Chief Complaint   Patient presents with    Back Pain     Pt c/o back pain that started a month ago, pt had an MRI on the 11th. Pt c/o increased pain today       Past Medical History:   Diagnosis Date    Anemia     Atrial fibrillation (HCC)     CHF (congestive heart failure) (HCC)     Coronary artery disease     Depression     Hyperlipidemia     Hypertension     Hyponatremia     Insomnia     Low blood pressure     MI (myocardial infarction) (HCC)     Multiple myeloma (HCC)     Thrombocytopenia (HCC)       Past Surgical History:   Procedure Laterality Date    CARDIAC PACEMAKER PLACEMENT      CARDIAC SURGERY      CORONARY ARTERY BYPASS GRAFT      FRACTURE SURGERY Left     Wrist    IR BIOPSY BONE MARROW  10/23/2024      History reviewed. No pertinent family history.   Social History     Tobacco Use    Smoking status: Every Day     Current packs/day: 1.00     Types: Cigarettes     Smokeless tobacco: Never    Tobacco comments:     Smokes 2 cigarettes daily    Vaping Use    Vaping status: Never Used   Substance Use Topics    Alcohol use: Never    Drug use: Never      E-Cigarette/Vaping    E-Cigarette Use Never User       E-Cigarette/Vaping Substances    Nicotine No     THC No     CBD No     Flavoring No     Other No     Unknown No       I have reviewed and agree with the history as documented.     Naoh Mendez is a 84 y.o. year old male with PMH of multiple myeloma presenting to the [facility] ED for back pain. Patient has had several days of thoracic back and left-sided rib pain. He was seen by Hem/Onc for symptoms and recently underwent MRI of thoracic spine which demonstrated compression fractures. He is also reporting left-sided rib pain during this time. The patient has taken advil and tylenol at home for symptomatic treatment. He reports decreased sensation in b/l LE which has been present for the past five years and unchanged recently. Patient denies fevers, chest pain, dyspnea, cough, N/V/D, abdominal pain. Patient denies bowel/bladder incontinence, saddle anesthesia. Patient denies recent trauma to the area. Denies unexplained fever/night sweats, weight loss, new neurologic symptoms, urinary retention.       History provided by:  Medical records and patient   used: No    Back Pain  Associated symptoms: no abdominal pain, no chest pain, no fever, no numbness and no weakness        Review of Systems   Constitutional:  Negative for fever.   HENT:  Negative for congestion.    Respiratory:  Negative for cough and shortness of breath.    Cardiovascular:  Negative for chest pain.   Gastrointestinal:  Negative for abdominal pain, diarrhea, nausea and vomiting.   Genitourinary:  Negative for decreased urine volume and difficulty urinating.   Musculoskeletal:  Positive for back pain.   Neurological:  Negative for weakness and numbness.   All other systems reviewed and are  negative.          Objective       ED Triage Vitals   Temperature Pulse Blood Pressure Respirations SpO2 Patient Position - Orthostatic VS   03/14/25 0612 03/14/25 0730 03/14/25 0614 03/14/25 0612 03/14/25 0730 03/14/25 0612   97.8 °F (36.6 °C) 60 156/72 20 92 % Sitting      Temp Source Heart Rate Source BP Location FiO2 (%) Pain Score    03/14/25 0612 03/14/25 0612 03/14/25 0612 -- 03/14/25 0801    Oral Monitor Left arm  10 - Worst Possible Pain      Vitals      Date and Time Temp Pulse SpO2 Resp BP Pain Score FACES Pain Rating User   03/14/25 0801 -- -- -- -- -- 10 - Worst Possible Pain -- CAC   03/14/25 0730 -- 60 92 % -- -- -- -- MM   03/14/25 0614 -- -- -- -- 156/72 -- -- KK   03/14/25 0612 97.8 °F (36.6 °C) -- -- 20 -- -- -- KK            Physical Exam  Vitals and nursing note reviewed.   Constitutional:       Appearance: He is well-developed. He is ill-appearing (appears uncomfortable).   HENT:      Head: Normocephalic and atraumatic.   Cardiovascular:      Rate and Rhythm: Normal rate and regular rhythm.   Pulmonary:      Effort: Pulmonary effort is normal. No respiratory distress.      Breath sounds: Normal breath sounds. No wheezing or rales.   Chest:      Chest wall: Tenderness (lateral lower ribs) present. No mass, deformity, swelling, crepitus or edema.       Abdominal:      General: There is no distension.      Palpations: Abdomen is soft.      Tenderness: There is no abdominal tenderness. There is no guarding or rebound.   Musculoskeletal:      Thoracic back: Tenderness present.      Lumbar back: No tenderness or bony tenderness.   Skin:     General: Skin is warm.      Capillary Refill: Capillary refill takes less than 2 seconds.   Neurological:      Mental Status: He is alert and oriented to person, place, and time.      GCS: GCS eye subscore is 4. GCS verbal subscore is 5. GCS motor subscore is 6.      Sensory: Sensation is intact.      Motor: Motor function is intact. No weakness.      Comments:  5/5 strength b/l UE/LE.  Sensation grossly intact throughout.           Results Reviewed       Procedure Component Value Units Date/Time    RBC Morphology Reflex Test [[id number]] Collected: 03/14/25 0639    Lab Status: Final result Specimen: Blood from Arm, Right Updated: 03/14/25 0801    CBC and differential [[id number]]  (Abnormal) Collected: 03/14/25 0639    Lab Status: Final result Specimen: Blood from Arm, Right Updated: 03/14/25 0732     WBC 4.20 Thousand/uL      RBC 3.50 Million/uL      Hemoglobin 11.7 g/dL      Hematocrit 37.1 %       fL      MCH 33.4 pg      MCHC 31.5 g/dL      RDW 17.5 %      MPV 10.4 fL      Platelets 177 Thousands/uL     Narrative:      This is an appended report.  These results have been appended to a previously verified report.    Manual Differential(PHLEBS Do Not Order) [[id number]]  (Abnormal) Collected: 03/14/25 0639    Lab Status: Final result Specimen: Blood from Arm, Right Updated: 03/14/25 0732     Segmented % 61 %      Bands % 3 %      Lymphocytes % 19 %      Monocytes % 10 %      Eosinophils % 3 %      Basophils % 1 %      Metamyelocytes % 2 %      Myelocytes % 1 %      Absolute Neutrophils 2.69 Thousand/uL      Absolute Lymphocytes 0.80 Thousand/uL      Absolute Monocytes 0.42 Thousand/uL      Absolute Eosinophils 0.13 Thousand/uL      Absolute Basophils 0.04 Thousand/uL      Absolute Metamyelocytes 0.08 Thousand/uL      Absolute Myelocytes 0.04 Thousand/uL      Total Counted --     RBC Morphology Present     Platelet Estimate Adequate     Anisocytosis Present     Polychromasia Present    Comprehensive metabolic panel [[id number]]  (Abnormal) Collected: 03/14/25 0639    Lab Status: Final result Specimen: Blood from Arm, Right Updated: 03/14/25 0711     Sodium 127 mmol/L      Potassium 3.5 mmol/L      Chloride 96 mmol/L      CO2 26 mmol/L      ANION GAP 5 mmol/L      BUN 23 mg/dL      Creatinine 0.83 mg/dL      Glucose 98 mg/dL      Calcium 7.9 mg/dL      Corrected Calcium  8.6 mg/dL      AST 21 U/L      ALT 14 U/L      Alkaline Phosphatase 86 U/L      Total Protein 9.7 g/dL      Albumin 3.1 g/dL      Total Bilirubin 0.56 mg/dL      eGFR 80 ml/min/1.73sq m     Narrative:      National Kid[employer] Foundation guidelines for Chronic Kidney Disease (CKD):     Stage 1 with normal or high GFR (GFR > 90 mL/min/1.73 square meters)    Stage 2 Mild CKD (GFR = 60-89 mL/min/1.73 square meters)    Stage 3A Moderate CKD (GFR = 45-59 mL/min/1.73 square meters)    Stage 3B Moderate CKD (GFR = 30-44 mL/min/1.73 square meters)    Stage 4 Severe CKD (GFR = 15-29 mL/min/1.73 square meters)    Stage 5 End Stage CKD (GFR <15 mL/min/1.73 square meters)  Note: GFR calculation is accurate only with a steady state creatinine            XR ribs with pa chest min 3 views LEFT   ED Interpretation by Danial Pollard DO (03/14 9724)   No visible rib fractures. No PTX or opacification.      Final Interpretation by Peng Stratton MD (03/14 5373)      No evidence of a rib fracture in the visualized ribs.      No acute cardiopulmonary disease.      This report is in agreement with the preliminary interpretation.      Computerized Assisted Algorithm (CAA) may have been used to analyze all applicable images.         Workstation performed: [id number]             Procedures    ED Medication and Procedure Management   Prior to Admission Medications   Prescriptions Last Dose Informant Patient Reported? Taking?   Ativan 0.5 MG tablet   Yes No   Sig: Take 0.5 mg by mouth every 8 (eight) hours as needed   Patient not taking: Reported on 11/18/2024   Empagliflozin (Jardiance) 10 MG TABS tablet  Self Yes No   Sig: Take 10 mg by mouth every morning   Magnesium 300 MG CAPS  Self Yes No   Sig: Take by mouth   Multiple Vitamin (multivitamin) tablet  Self Yes No   Sig: Take 1 tablet by mouth daily   QUEtiapine (SEROquel) 50 mg tablet  Self Yes No   Sig: Take 100 mg by mouth daily at bedtime   acyclovir (ZOVIRAX) 400 MG tablet    No No   Sig: TAKE 1 TABLET (400 MG TOTAL) BY MOUTH 2 TIMES A DAY   amiodarone 200 mg tablet   No No   Sig: Take 1 tablet (200 mg total) by mouth daily   apixaban (ELIQUIS) 5 mg  Self No No   Sig: Take 1 tablet (5 mg total) by mouth 2 (two) times a day   dexamethasone (DECADRON) 4 mg tablet   No No   Sig: Take 1 tablet (4 mg total) by mouth once a week   docusate sodium (COLACE) 100 mg capsule  Self No No   Sig: Take 1 capsule (100 mg total) by mouth 2 (two) times a day   fluticasone (FLONASE) 50 mcg/act nasal spray  Self No No   Si spray into each nostril daily   furosemide (LASIX) 20 mg tablet  Self No No   Sig: Take 1 tablet (20 mg total) by mouth 2 (two) times a day   lenalidomide (REVLIMID) 15 MG CAPS   No No   Sig: Take 1 capsule (15 mg total) by mouth daily 21 days on, followed by 7 days off   melatonin 1 mg  Self Yes No   Sig: Take 10 mg by mouth daily at bedtime   metoprolol succinate (TOPROL-XL) 25 mg 24 hr tablet  Self No No   Sig: Take 2 tablets (50 mg total) by mouth every 24 hours   senna (SENOKOT) 8.6 mg  Self No No   Sig: Take 2 tablets (17.2 mg total) by mouth daily at bedtime as needed for constipation      Facility-Administered Medications: None     Discharge Medication List as of 3/14/2025  8:41 AM        CONTINUE these medications which have NOT CHANGED    Details   acyclovir (ZOVIRAX) 400 MG tablet TAKE 1 TABLET (400 MG TOTAL) BY MOUTH 2 TIMES A DAY, Normal      amiodarone 200 mg tablet Take 1 tablet (200 mg total) by mouth daily, Starting Tue 10/8/2024, Normal      apixaban (ELIQUIS) 5 mg Take 1 tablet (5 mg total) by mouth 2 (two) times a day, Starting 2024, No Print      Ativan 0.5 MG tablet Take 0.5 mg by mouth every 8 (eight) hours as needed, Starting Tue 10/1/2024, Historical Med      dexamethasone (DECADRON) 4 mg tablet Take 1 tablet (4 mg total) by mouth once a week, Starting 2024, Normal      docusate sodium (COLACE) 100 mg capsule Take 1 capsule (100 mg total)  by mouth 2 (two) times a day, Starting Thu 9/19/2024, No Print      Empagliflozin (Jardiance) 10 MG TABS tablet Take 10 mg by mouth every morning, Historical Med      fluticasone (FLONASE) 50 mcg/act nasal spray 1 spray into each nostril daily, Starting Fri 9/20/2024, No Print      furosemide (LASIX) 20 mg tablet Take 1 tablet (20 mg total) by mouth 2 (two) times a day, Starting Thu 9/19/2024, Until Wed 2/26/2025, No Print      lenalidomide (REVLIMID) 15 MG CAPS Take 1 capsule (15 mg total) by mouth daily 21 days on, followed by 7 days off, Starting Mon 1/13/2025, Normal      Magnesium 300 MG CAPS Take by mouth, Historical Med      melatonin 1 mg Take 10 mg by mouth daily at bedtime, Historical Med      metoprolol succinate (TOPROL-XL) 25 mg 24 hr tablet Take 2 tablets (50 mg total) by mouth every 24 hours, Starting Thu 9/19/2024, No Print      Multiple Vitamin (multivitamin) tablet Take 1 tablet by mouth daily, Historical Med      QUEtiapine (SEROquel) 50 mg tablet Take 100 mg by mouth daily at bedtime, Starting Tue 9/10/2024, Historical Med      senna (SENOKOT) 8.6 mg Take 2 tablets (17.2 mg total) by mouth daily at bedtime as needed for constipation, Starting Thu 9/19/2024, No Print           No discharge procedures on file.  ED SEPSIS DOCUMENTATION   Time reflects when diagnosis was documented in both MDM as applicable and the Disposition within this note       Time User Action Codes Description Comment    3/14/2025  7:31 AM Danial Pollard [R07.81] Rib pain on left side     3/14/2025  7:31 AM Danial Pollard [C90.00] Multiple myeloma not having achieved remission (HCC)     3/14/2025  7:32 AM Danial Pollard [E87.1] Hyponatremia                  Danail Pollard, DO  03/14/25 6857

## 2025-03-14 NOTE — DISCHARGE INSTRUCTIONS
You have been seen for rib pain. Please take tylenol and advil for pain. Return to the emergency department if you develop worsening pain, trouble breathing, fevers, weakness/numbness, incontinence, inability to urinate or any other symptoms of concern. Please follow up with your PCP and Hem/Onc by calling the number provided.

## 2025-03-15 LAB
ATRIAL RATE: 63 BPM
QRS AXIS: 79 DEGREES
QRSD INTERVAL: 120 MS
QT INTERVAL: 686 MS
QTC INTERVAL: 707 MS
T WAVE AXIS: 148 DEGREES
VENTRICULAR RATE: 64 BPM

## 2025-03-15 PROCEDURE — 93010 ELECTROCARDIOGRAM REPORT: CPT | Performed by: INTERNAL MEDICINE

## 2025-03-15 NOTE — ED CARE HANDOFF
Emergency Department Sign Out Note        Sign out and transfer of care from Dr. Pollard. See Separate Emergency Department note.     The patient, Noah Mendez, was evaluated by the previous provider for back pain.    Workup Completed:  See previous notes    ED Course / Workup Pending (followup):  Pain controlled. Feels comfortable with outpatient follow up.                                  ED Course as of 03/14/25 2104   Fri Mar 14, 2025   0710 Hx of multiple myeloma, recent MRI completed of lumbar spine w compression fx. No knew lower extremity neurologic findings. Awaiting labs and improvement in pain.    0736 Pending IVF for hyponatremia. Can follow up with PCP. Has heme/onc appt at 11AM today.      Procedures  Medical Decision Making  Amount and/or Complexity of Data Reviewed  Labs: ordered.  Radiology: ordered and independent interpretation performed.    Risk  Prescription drug management.            Disposition  Final diagnoses:   Rib pain on left side   Multiple myeloma not having achieved remission (HCC)   Hyponatremia     Time reflects when diagnosis was documented in both MDM as applicable and the Disposition within this note       Time User Action Codes Description Comment    3/14/2025  7:31 AM Danial Pollard [R07.81] Rib pain on left side     3/14/2025  7:31 AM Danial Pollard [C90.00] Multiple myeloma not having achieved remission (HCC)     3/14/2025  7:32 AM Danial Pollard [E87.1] Hyponatremia           ED Disposition       ED Disposition   Discharge    Condition   Stable    Date/Time   Fri Mar 14, 2025  8:41 AM    Comment   Noah Mendez discharge to home/self care.                   Follow-up Information       Follow up With Specialties Details Why Contact Info    Guanako Cortes MD Family Medicine Schedule an appointment as soon as possible for a visit  To make appointment for reevaluation in 3-5 days. [address], [address]  [facility] [address]  [phone]      Anabela  DO [facility], Oncology Schedule an appointment as soon as possible for a visit  To make appointment for reevaluation in 3-5 days. [address] [facility]  [address]  [facility] [address]  [phone]            Discharge Medication List as of 3/14/2025  8:41 AM        CONTINUE these medications which have NOT CHANGED    Details   acyclovir (ZOVIRAX) 400 MG tablet TAKE 1 TABLET (400 MG TOTAL) BY MOUTH 2 TIMES A DAY, Normal      amiodarone 200 mg tablet Take 1 tablet (200 mg total) by mouth daily, Starting Tue 10/8/2024, Normal      apixaban (ELIQUIS) 5 mg Take 1 tablet (5 mg total) by mouth 2 (two) times a day, Starting Thu 9/19/2024, No Print      Ativan 0.5 MG tablet Take 0.5 mg by mouth every 8 (eight) hours as needed, Starting Tue 10/1/2024, Historical Med      dexamethasone (DECADRON) 4 mg tablet Take 1 tablet (4 mg total) by mouth once a week, Starting Mon 11/18/2024, Normal      docusate sodium (COLACE) 100 mg capsule Take 1 capsule (100 mg total) by mouth 2 (two) times a day, Starting Thu 9/19/2024, No Print      Empagliflozin (Jardiance) 10 MG TABS tablet Take 10 mg by mouth every morning, Historical Med      fluticasone (FLONASE) 50 mcg/act nasal spray 1 spray into each nostril daily, Starting Fri 9/20/2024, No Print      furosemide (LASIX) 20 mg tablet Take 1 tablet (20 mg total) by mouth 2 (two) times a day, Starting Thu 9/19/2024, Until Wed 2/26/2025, No Print      lenalidomide (REVLIMID) 15 MG CAPS Take 1 capsule (15 mg total) by mouth daily 21 days on, followed by 7 days off, Starting Mon 1/13/2025, Normal      Magnesium 300 MG CAPS Take by mouth, Historical Med      melatonin 1 mg Take 10 mg by mouth daily at bedtime, Historical Med      metoprolol succinate (TOPROL-XL) 25 mg 24 hr tablet Take 2 tablets (50 mg total) by mouth every 24 hours, Starting Thu 9/19/2024, No Print      Multiple Vitamin (multivitamin) tablet Take 1 tablet by mouth daily, Historical Med      QUEtiapine (SEROquel)  50 mg tablet Take 100 mg by mouth daily at bedtime, Starting Tue 9/10/2024, Historical Med      senna (SENOKOT) 8.6 mg Take 2 tablets (17.2 mg total) by mouth daily at bedtime as needed for constipation, Starting Thu 9/19/2024, No Print           No discharge procedures on file.       ED Provider  Electronically Signed by     Geoffrey Cuenca DO  03/14/25 6226

## 2025-03-17 PROBLEM — M54.9 BACK PAIN: Status: ACTIVE | Noted: 2025-01-01

## 2025-03-17 NOTE — ASSESSMENT & PLAN NOTE
Wt Readings from Last 3 Encounters:   03/17/25 69.6 kg (153 lb 6.4 oz)   03/14/25 64.9 kg (143 lb)   02/26/25 64.9 kg (143 lb)     Per chart review, weight appears unchanged.  Continue furosemide 20 mg, daily.  Continue cardiac, sodium-restricted diet.  Continue accurate I/Os.   Continue daily weights.

## 2025-03-17 NOTE — ASSESSMENT & PLAN NOTE
"Patient presented with complaints of back pain, which is been worsening and causing inability to complete tasks and sleep.  Per patient, pain has been worsening over the past few months, as noted by outpatient hematology/oncology.  In fact, MRI thoracic spine completed last week.  MRI thoracic spine (3/11): \"Low T1 signal throughout the marrow which may reflect patient's underlying myeloma. Chronic appearing likely pathologic compression deformities of the T6, T7, and T10 vertebral bodies. No marrow edema. Degenerative changes as described without significant stenosis.\"  CT c/a/p (3/17): \"Stable appearing severe compression fracture deformity of the T6 vertebrae and mild to moderate compression fracture deformities of the T7, T9, and T10 vertebrae again noted. Mild superior plate L1 compression fracture deformity is also seen. Multilevel degenerative changes of the thoracolumbar spine. Sternotomy wires are again seen. No new/acute fracture or subluxation identified. Scattered areas of ill-defined lucency throughout the axial skeleton concerning for metastatic disease and/or multiple myeloma.\"   Will order pain regimen, as needed.  Will order TSLO brace  Will consult hematology/oncology.  Will consult neurosurgery.  Per neurosurgery, no further imaging needed at this time.  "

## 2025-03-17 NOTE — H&P
"H&P - Hospitalist   Name: Noah Mendez 84 y.o. male I MRN: [id number]  Unit/Bed#: -01 I Date of Admission: 3/17/2025   Date of Service: 3/17/2025 I Hospital Day: 0     Assessment & Plan  Back pain  Patient presented with complaints of back pain, which is been worsening and causing inability to complete tasks and sleep.  Per patient, pain has been worsening over the past few months, as noted by outpatient hematology/oncology.  In fact, MRI thoracic spine completed last week.  MRI thoracic spine (3/11): \"Low T1 signal throughout the marrow which may reflect patient's underlying myeloma. Chronic appearing likely pathologic compression deformities of the T6, T7, and T10 vertebral bodies. No marrow edema. Degenerative changes as described without significant stenosis.\"  CT c/a/p (3/17): \"Stable appearing severe compression fracture deformity of the T6 vertebrae and mild to moderate compression fracture deformities of the T7, T9, and T10 vertebrae again noted. Mild superior plate L1 compression fracture deformity is also seen. Multilevel degenerative changes of the thoracolumbar spine. Sternotomy wires are again seen. No new/acute fracture or subluxation identified. Scattered areas of ill-defined lucency throughout the axial skeleton concerning for metastatic disease and/or multiple myeloma.\"   Will order pain regimen, as needed.  Will order TSLO brace  Will consult hematology/oncology.  Will consult neurosurgery.  Per neurosurgery, no further imaging needed at this time.  Hyponatremia  Per chart review, patient continues to have hyponatremia, as noted worsening from prior admission.  Patient presented with Na+ of 125.   Will order FR 1500 mL.   Will consult nephrology.  Will check urine osmolality, urine sodium, urine chloride, serum uric acid.  Also, will re-check BMP this to determine serum sodium.   Multiple myeloma not having achieved remission (HCC)  Per chart review, patient with history of multiple " "myeloma and currently following the outpatient setting. Per chart review, the patient has been having severe back pain and received MRI on 3/11. The patient was to be referred to pain management.   Per review, recently completed a month of Revlimid.  Will order hematology/oncology consultation for further recommendations.  HFrEF (heart failure with reduced ejection fraction) ([facility])  Wt Readings from Last 3 Encounters:   03/17/25 69.6 kg (153 lb 6.4 oz)   03/14/25 64.9 kg (143 lb)   02/26/25 64.9 kg (143 lb)     Per chart review, weight appears unchanged.  Continue furosemide 20 mg, daily.  Continue cardiac, sodium-restricted diet.  Continue accurate I/Os.   Continue daily weights.  Constipation  Patient with longstanding history of constipation issues.  CT c/a/p: \"Evaluation of bowel is markedly limited on this exam due to lack of oral contrast and paucity of intra-abdominal fat. Terminal ileum is markedly distended with fecal matter which could be related to IC valve reflux or incompetence. Large amount of stool in the proximal to mid colon as well as the mid to distal sigmoid colon noted suggesting constipation/impaction. Wall thickening of the distal sigmoid colon and rectum could suggest mild focal colitis/proctitis. Consider nonemergent colonoscopy for further evaluation after resolution of acute illness. No evidence of bowel obstruction, free air, free fluid, or loculated fluid collections in the abdomen/pelvis.\"  Will continue bowel regimen.  Will encourage outpatient GI evaluation.  Coronary artery disease involving native coronary artery of native heart without angina pectoris  Continue metoprolol succinate 25 mg, daily.  Paroxysmal atrial fibrillation (HCC)  Continue amiodarone 200 mg, daily.  Continue Eliquis 5 mg, twice daily.  Continue rate-controlling medications.       VTE Pharmacologic Prophylaxis: VTE Score: 5 High Risk (Score >/= 5) - Pharmacological DVT Prophylaxis Ordered: apixaban (Eliquis). " "Sequential Compression Devices Ordered.  Code Status: Level 3 - DNAR and DNI   Discussion with family: Updated  (daughter) via phone.    Anticipated Length of Stay: Patient will be admitted on an inpatient basis with an anticipated length of stay of greater than 2 midnights secondary to worsening back pain and hyponatremia.    History of Present Illness   Chief Complaint: : \"I don't have any pain yet, but I haven't moved yet.\"    Noah Mendez is a 84 y.o. male with a PMH of anemia, atrial fibrillation, CHF, CAD, depression, HLD, HTN, hyponatremia, insomnia, MI, and multiple myeloma who presents with back pain. The patient presented with continued complaints of back pain. The patient expressed that his pain has been worsening for months and he is experiencing mid-to-low back pain. The patient recently explains that he just completed a month of Revlimid.  However, the patient is explaining that he is having worsening back pain and unable to sleep due to the pain.  The patient currently follows outpatient with hematology/oncology for multiple myeloma and recently had MRI completed for thoracic back pain.  On admission, the patient complains of pain, but denies any symptoms of increased numbness/tingling or incontinence.  Also, the patient's daughter via telephone that patient has been unable to remain still and find comfortable ways to sit/sleep.  In addition, the patient was noted to have worsening hyponatremia via lab review.  He will be admitted for further workup of back pain and hyponatremia.    Review of Systems   Constitutional:  Positive for activity change (d/t pain) and fatigue. Negative for chills and fever.   HENT:  Negative for congestion, postnasal drip, rhinorrhea, sinus pain and sore throat.    Eyes:  Negative for visual disturbance.   Respiratory:  Negative for cough, chest tightness, shortness of breath and wheezing.    Cardiovascular:  Negative for chest pain, palpitations and leg " swelling.   Gastrointestinal:  Negative for abdominal pain, diarrhea, nausea and vomiting.   Genitourinary:  Negative for difficulty urinating and dysuria.   Musculoskeletal:  Positive for back pain (mid-to-lower back). Negative for arthralgias, joint swelling and neck pain.   Skin:  Negative for color change and rash.   Neurological:  Positive for numbness (baseline numbness/tingling in bilat feet). Negative for dizziness, syncope, weakness, light-headedness and headaches.   All other systems reviewed and are negative.      Historical Information   Past Medical History:   Diagnosis Date    Anemia     Atrial fibrillation (HCC)     CHF (congestive heart failure) (HCC)     Coronary artery disease     Depression     Hyperlipidemia     Hypertension     Hyponatremia     Insomnia     Low blood pressure     MI (myocardial infarction) (HCC)     Multiple myeloma (HCC)     Thrombocytopenia (HCC)      Past Surgical History:   Procedure Laterality Date    CARDIAC PACEMAKER PLACEMENT      CARDIAC SURGERY      CORONARY ARTERY BYPASS GRAFT      FRACTURE SURGERY Left     Wrist    IR BIOPSY BONE MARROW  10/23/2024     Social History     Tobacco Use    Smoking status: Every Day     Current packs/day: 1.00     Types: Cigarettes    Smokeless tobacco: Never    Tobacco comments:     Smokes 2 cigarettes daily    Vaping Use    Vaping status: Never Used   Substance and Sexual Activity    Alcohol use: Never    Drug use: Never    Sexual activity: Not on file     E-Cigarette/Vaping    E-Cigarette Use Never User      E-Cigarette/Vaping Substances    Nicotine No     THC No     CBD No     Flavoring No     Other No     Unknown No          Meds/Allergies   I have reviewed home medications with patient family member.  Prior to Admission medications    Medication Sig Start Date End Date Taking? Authorizing Provider   acyclovir (ZOVIRAX) 400 MG tablet TAKE 1 TABLET (400 MG TOTAL) BY MOUTH 2 TIMES A DAY 12/23/24 12/23/25  Anabela Mathis DO    amiodarone 200 mg tablet Take 1 tablet (200 mg total) by mouth daily 10/8/24   Jhony Pierre PA-C   apixaban (ELIQUIS) 5 mg Take 1 tablet (5 mg total) by mouth 2 (two) times a day 9/19/24   Cande Richards MD   Ativan 0.5 MG tablet Take 0.5 mg by mouth every 8 (eight) hours as needed  Patient not taking: Reported on 11/18/2024 10/1/24   Historical Provider, MD   dexamethasone (DECADRON) 4 mg tablet Take 1 tablet (4 mg total) by mouth once a week 11/18/24   Anabela Mathis DO   docusate sodium (COLACE) 100 mg capsule Take 1 capsule (100 mg total) by mouth 2 (two) times a day 9/19/24   Cande Richards MD   Empagliflozin (Jardiance) 10 MG TABS tablet Take 10 mg by mouth every morning    Historical Provider, MD   fluticasone (FLONASE) 50 mcg/act nasal spray 1 spray into each nostril daily 9/20/24   Cande Richards MD   furosemide (LASIX) 20 mg tablet Take 1 tablet (20 mg total) by mouth 2 (two) times a day 9/19/24 2/26/25  Cande Richards MD   lenalidomide (REVLIMID) 15 MG CAPS Take 1 capsule (15 mg total) by mouth daily 21 days on, followed by 7 days off 1/13/25   Anabela Mathis DO   Magnesium 300 MG CAPS Take by mouth    Historical Provider, MD   melatonin 1 mg Take 10 mg by mouth daily at bedtime    Historical Provider, MD   metoprolol succinate (TOPROL-XL) 25 mg 24 hr tablet Take 2 tablets (50 mg total) by mouth every 24 hours 9/19/24   Cande Richards MD   Multiple Vitamin (multivitamin) tablet Take 1 tablet by mouth daily    Historical Provider, MD   QUEtiapine (SEROquel) 50 mg tablet Take 100 mg by mouth daily at bedtime 9/10/24   Historical Provider, MD   senna (SENOKOT) 8.6 mg Take 2 tablets (17.2 mg total) by mouth daily at bedtime as needed for constipation 9/19/24   Cande Richards MD     No Known Allergies    Objective :  Temp:  [97.3 °F (36.3 °C)-98 °F (36.7 °C)] 97.3 °F (36.3 °C)  HR:  [64-82] 82  BP: (120-142)/(60-76) 142/76  Resp:  [18] 18  SpO2:  [94 %-95 %] 94 %  O2 Device: Nasal  cannula  Nasal Cannula O2 Flow Rate (L/min):  [2 L/min] 2 L/min    Physical Exam  Vitals and nursing note reviewed.   Constitutional:       General: He is awake. He is not in acute distress.  Eyes:      General: No scleral icterus.     Conjunctiva/sclera: Conjunctivae normal.   Cardiovascular:      Rate and Rhythm: Normal rate and regular rhythm.      Heart sounds: Normal heart sounds, S1 normal and S2 normal. No murmur heard.  Pulmonary:      Effort: Pulmonary effort is normal.      Breath sounds: No decreased air movement. Examination of the left-lower field reveals wheezing. Wheezing (slight exp. wheeze) present. No rhonchi or rales.   Abdominal:      General: Bowel sounds are normal. There is no distension.      Palpations: Abdomen is soft.      Tenderness: There is no abdominal tenderness.   Musculoskeletal:      Right lower le+ Pitting Edema present.      Left lower le+ Pitting Edema present.   Skin:     General: Skin is warm and dry.      Comments: On exam, no evidence of open wounds on exposed skin.    Neurological:      Mental Status: He is alert and oriented to person, place, and time. Mental status is at baseline.      Cranial Nerves: No dysarthria or facial asymmetry.      Sensory: No sensory deficit.      Motor: Motor function is intact.   Psychiatric:         Attention and Perception: Attention normal.         Mood and Affect: Mood normal.         Speech: Speech normal.         Behavior: Behavior is cooperative.        Lines/Drains:      Lab Results: I have reviewed the following results:  Results from last 7 days   Lab Units 25  1116 25  0639   WBC Thousand/uL 5.71 4.20*   HEMOGLOBIN g/dL 9.9* 11.7*   HEMATOCRIT % 30.4* 37.1   PLATELETS Thousands/uL 164 177   BANDS PCT %  --  3   LYMPHO PCT % 13* 19   MONO PCT % 10 10   EOS PCT % 0 3     Results from last 7 days   Lab Units 25  1116 25  0639   SODIUM mmol/L 125* 127*   POTASSIUM mmol/L 4.0 3.5   CHLORIDE mmol/L 95* 96  "  CO2 mmol/L 27 26   BUN mg/dL 18 23   CREATININE mg/dL 0.75 0.83   ANION GAP mmol/L 3* 5   CALCIUM mg/dL 7.7* 7.9*   ALBUMIN g/dL  --  3.1*   TOTAL BILIRUBIN mg/dL  --  0.56   ALK PHOS U/L  --  86   ALT U/L  --  14   AST U/L  --  21   GLUCOSE RANDOM mg/dL 105 98             No results found for: \"HGBA1C\"        Imaging Results Review: I reviewed radiology reports from this admission including: CT chest and CT abdomen/pelvis.      Administrative Statements       ** Please Note: This note has been constructed using a voice recognition system. **    "

## 2025-03-17 NOTE — ASSESSMENT & PLAN NOTE
"Patient with longstanding history of constipation issues.  CT c/a/p: \"Evaluation of bowel is markedly limited on this exam due to lack of oral contrast and paucity of intra-abdominal fat. Terminal ileum is markedly distended with fecal matter which could be related to IC valve reflux or incompetence. Large amount of stool in the proximal to mid colon as well as the mid to distal sigmoid colon noted suggesting constipation/impaction. Wall thickening of the distal sigmoid colon and rectum could suggest mild focal colitis/proctitis. Consider nonemergent colonoscopy for further evaluation after resolution of acute illness. No evidence of bowel obstruction, free air, free fluid, or loculated fluid collections in the abdomen/pelvis.\"  Will continue bowel regimen.  Will encourage outpatient GI evaluation.  "

## 2025-03-17 NOTE — TELEMEDICINE
e-Consult ([facility])     Inpatient consult to Neurosurgery  Consult performed by: Walt Foster PA-C  Consult ordered by: KAYA Miller           Contacted by Teo Hernandez.    Noah Mendez 84 y.o. male MRN: [id number]  Unit/Bed#: LUISITO Encounter: [id number]    Reason for Consult  mh signifciant for   Per provider report, patient with history of multiple myeloma presents with worsening back pain in the setting of known multiple thoracic compression fractures. No new neurological deficits / weakness / sensory deficits. Available past medical history,social history, surgical history, medication list, drug allergies and review of systems were reviewed.    /68 (BP Location: Right arm)   Pulse 82   Temp 98 °F (36.7 °C)   Resp 18   SpO2 94%      Clinical exam per provider report, no focal deficits. TTP T8-10. No weakness or sensory deficits    Imaging personally reviewed.   CT CAP w/ contrast, 3/17/25: Stable appearing multilevel compression fracture deformities of the thoracolumbar spine as described above. Scattered areas of ill-defined lucency throughout the axial skeleton concerning for metastatic disease and/or multiple myeloma     Assessment and Recommendations    1. Can consider TLSO PRN pain control  2. Consider palliative consult for help with pain control  3. PT/OT evaluations  4. No need for additional imaging at this time (most recent MRI thoracic spine 3/11) given lack of neurological deficits  5. Follow up in our office as needed    All questions answered. Provider is in agreement with the course of action. 5-10 minutes, >50% of the total time devoted to medical consultative verbal/EMR discussion between providers. Written report will be generated in the EMR.

## 2025-03-17 NOTE — ASSESSMENT & PLAN NOTE
Continue amiodarone 200 mg, daily.  Continue Eliquis 5 mg, twice daily.  Continue rate-controlling medications.

## 2025-03-17 NOTE — ASSESSMENT & PLAN NOTE
Per chart review, patient with history of multiple myeloma and currently following the outpatient setting. Per chart review, the patient has been having severe back pain and received MRI on 3/11. The patient was to be referred to pain management.   Per review, recently completed a month of Revlimid.  Will order hematology/oncology consultation for further recommendations.

## 2025-03-17 NOTE — ED PROVIDER NOTES
Time reflects when diagnosis was documented in both MDM as applicable and the Disposition within this note       Time User Action Codes Description Comment    3/17/2025  2:28 PM Teo Hernandez Add [E87.1] Hyponatremia           ED Disposition       ED Disposition   Admit    Condition   Stable    Date/Time   Mon Mar 17, 2025  2:28 PM    Comment   Case was discussed with LATONYA and the patient's admission status was agreed to be Admission Status: inpatient status to the service of Dr. Haley .               Assessment & Plan       Medical Decision Making  84-year-old male presenting with left-sided back/rib pain.  Suspect musculoskeletal etiology.  Patient with a history of pathologic fractures.  Obtain labs, CT chest abdomen pelvis.    BMP reveals worsening hyponatremia.  Will admit for further evaluation.    Amount and/or Complexity of Data Reviewed  Labs: ordered.  Radiology: ordered.    Risk  Prescription drug management.  Decision regarding hospitalization.             Medications   lidocaine (LIDODERM) 5 % patch 1 patch (1 patch Topical Medication Applied 3/17/25 1118)   HYDROmorphone (DILAUDID) injection 0.5 mg (0.5 mg Intravenous Given 3/17/25 1117)   iohexol (OMNIPAQUE) 350 MG/ML injection (MULTI-DOSE) 100 mL (100 mL Intravenous Given 3/17/25 1229)   HYDROmorphone (DILAUDID) injection 1 mg (1 mg Intravenous Given 3/17/25 1245)       ED Risk Strat Scores                            SBIRT 22yo+      Flowsheet Row Most Recent Value   Initial Alcohol Screen: US AUDIT-C     1. How often do you have a drink containing alcohol? 0 Filed at: 03/17/2025 1121   2. How many drinks containing alcohol do you have on a typical day you are drinking?  0 Filed at: 03/17/2025 1121   3a. Male UNDER 65: How often do you have five or more drinks on one occasion? 0 Filed at: 03/17/2025 1121   3b. FEMALE Any Age, or MALE 65+: How often do you have 4 or more drinks on one occassion? 0 Filed at: 03/17/2025 1121   Audit-C Score 0 Filed  at: 03/17/2025 1121   EFREN: How many times in the past year have you...    Used an illegal drug or used a prescription medication for non-medical reasons? Never Filed at: 03/17/2025 1121                            History of Present Illness       Chief Complaint   Patient presents with    Back Pain     Pt to ED for back pain. Pain across entire flank.  Pt states he was here on Thursday for same thing. Unable to lay to sleep so not getting much sleep at all.  Pain is worsening since Thursday off and on. States sometimes its not too bad but then exacerbates and gets worse.  No issues with bowel or bladder, feels maybe his urine stream is getting weaker       Past Medical History:   Diagnosis Date    Anemia     Atrial fibrillation (HCC)     CHF (congestive heart failure) (HCC)     Coronary artery disease     Depression     Hyperlipidemia     Hypertension     Hyponatremia     Insomnia     Low blood pressure     MI (myocardial infarction) (HCC)     Multiple myeloma (HCC)     Thrombocytopenia (HCC)       Past Surgical History:   Procedure Laterality Date    CARDIAC PACEMAKER PLACEMENT      CARDIAC SURGERY      CORONARY ARTERY BYPASS GRAFT      FRACTURE SURGERY Left     Wrist    IR BIOPSY BONE MARROW  10/23/2024      History reviewed. No pertinent family history.   Social History     Tobacco Use    Smoking status: Every Day     Current packs/day: 1.00     Types: Cigarettes    Smokeless tobacco: Never    Tobacco comments:     Smokes 2 cigarettes daily    Vaping Use    Vaping status: Never Used   Substance Use Topics    Alcohol use: Never    Drug use: Never      E-Cigarette/Vaping    E-Cigarette Use Never User       E-Cigarette/Vaping Substances    Nicotine No     THC No     CBD No     Flavoring No     Other No     Unknown No       I have reviewed and agree with the history as documented.     84-year-old male presents for evaluation of left flank/posterior rib pain that started a few days ago.  Patient denies any associated  trauma but does have a history of known various vertebral fractures.  Pain is worse with palpation and movements.        Review of Systems   Constitutional:  Negative for fever.   Musculoskeletal:  Positive for back pain.           Objective       ED Triage Vitals   Temperature Pulse Blood Pressure Respirations SpO2 Patient Position - Orthostatic VS   03/17/25 1033 03/17/25 1033 03/17/25 1033 03/17/25 1033 03/17/25 1034 03/17/25 1235   98 °F (36.7 °C) 64 120/60 18 94 % Lying      Temp src Heart Rate Source BP Location FiO2 (%) Pain Score    -- 03/17/25 1033 03/17/25 1235 -- 03/17/25 1034     Monitor Right arm  6      Vitals      Date and Time Temp Pulse SpO2 Resp BP Pain Score FACES Pain Rating User   03/17/25 1510 -- 82 94 % 18 141/68 -- -- AM   03/17/25 1245 -- -- -- -- -- 10 - Worst Possible Pain -- AM   03/17/25 1235 -- 66 95 % 18 126/72 -- -- AM   03/17/25 1117 -- -- -- -- -- 10 - Worst Possible Pain -- AM   03/17/25 1034 -- -- 94 % -- -- 6 -- ML   03/17/25 1033 98 °F (36.7 °C) 64 -- 18 120/60 -- -- ML            Physical Exam  Vitals and nursing note reviewed.   Constitutional:       General: He is not in acute distress.     Appearance: He is well-developed.   HENT:      Head: Normocephalic and atraumatic.      Right Ear: External ear normal.      Left Ear: External ear normal.      Nose: Nose normal.   Eyes:      General: No scleral icterus.  Pulmonary:      Effort: Pulmonary effort is normal. No respiratory distress.   Abdominal:      General: There is no distension.      Palpations: Abdomen is soft.   Musculoskeletal:         General: Tenderness present. No deformity. Normal range of motion.      Cervical back: Normal range of motion and neck supple.      Comments: Point tenderness of left posterior lateral ribs around T8-10.  No midline CTL spine tenderness   Skin:     General: Skin is warm.      Findings: No rash.   Neurological:      General: No focal deficit present.      Mental Status: He is alert.       Gait: Gait normal.   Psychiatric:         Mood and Affect: Mood normal.         Results Reviewed       Procedure Component Value Units Date/Time    RBC Morphology Reflex Test [[id number]] Collected: 03/17/25 1116    Lab Status: Final result Specimen: Blood from Arm, Right Updated: 03/17/25 1301    CBC and differential [[id number]]  (Abnormal) Collected: 03/17/25 1116    Lab Status: Final result Specimen: Blood from Arm, Right Updated: 03/17/25 1215     WBC 5.71 Thousand/uL      RBC 2.92 Million/uL      Hemoglobin 9.9 g/dL      Hematocrit 30.4 %       fL      MCH 33.9 pg      MCHC 32.6 g/dL      RDW 17.3 %      MPV 9.9 fL      Platelets 164 Thousands/uL     Narrative:      This is an appended report.  These results have been appended to a previously verified report.    Manual Differential(PHLEBS Do Not Order) [[id number]]  (Abnormal) Collected: 03/17/25 1116    Lab Status: Final result Specimen: Blood from Arm, Right Updated: 03/17/25 1215     Segmented % 76 %      Lymphocytes % 13 %      Monocytes % 10 %      Eosinophils % 0 %      Basophils % 1 %      Absolute Neutrophils 4.34 Thousand/uL      Absolute Lymphocytes 0.74 Thousand/uL      Absolute Monocytes 0.57 Thousand/uL      Absolute Eosinophils 0.00 Thousand/uL      Absolute Basophils 0.06 Thousand/uL      Total Counted --     RBC Morphology Present     Platelet Estimate Adequate     Ovalocytes Present     Polychromasia Present     Tear Drop Cells Present    Basic metabolic panel [[id number]]  (Abnormal) Collected: 03/17/25 1116    Lab Status: Final result Specimen: Blood from Arm, Right Updated: 03/17/25 1140     Sodium 125 mmol/L      Potassium 4.0 mmol/L      Chloride 95 mmol/L      CO2 27 mmol/L      ANION GAP 3 mmol/L      BUN 18 mg/dL      Creatinine 0.75 mg/dL      Glucose 105 mg/dL      Calcium 7.7 mg/dL      eGFR 84 ml/min/1.73sq m     Narrative:      National Kidney Disease Foundation guidelines for Chronic Kidney Disease (CKD):     Stage 1 with  normal or high GFR (GFR > 90 mL/min/1.73 square meters)    Stage 2 Mild CKD (GFR = 60-89 mL/min/1.73 square meters)    Stage 3A Moderate CKD (GFR = 45-59 mL/min/1.73 square meters)    Stage 3B Moderate CKD (GFR = 30-44 mL/min/1.73 square meters)    Stage 4 Severe CKD (GFR = 15-29 mL/min/1.73 square meters)    Stage 5 End Stage CKD (GFR <15 mL/min/1.73 square meters)  Note: GFR calculation is accurate only with a steady state creatinine            CT chest abdomen pelvis w contrast   Final Interpretation by Christofer Bailey MD (03/17 1411)      1.  Layering debris within the posterior mid to lower trachea and proximal bilateral mainstem bronchi could represent secretions. Extensive left basilar atelectasis noted, can not exclude early airspace disease. Minimal right basilar atelectasis is also    seen. No lobar airspace consolidation. Moderate to severe centrilobular and paraseptal emphysematous changes are seen.   2.  Stable cardiomegaly and post sternotomy changes.   3.  Extensive calcific atherosclerosis of the thoracoabdominal aorta, proximal thoracic vessels, coronary arteries, and iliac arteries. 3.3 x 2.9 cm infrarenal abdominal aortic aneurysm is seen, not significantly changed compared to the prior study. No    evidence of acute dissection or intramural hematoma. No significant focal flow-limiting stenosis. Recommend follow-up imaging in 3 years per current guidelines.   4.  Evaluation of bowel is markedly limited on this exam due to lack of oral contrast and paucity of intra-abdominal fat. Terminal ileum is markedly distended with fecal matter which could be related to IC valve reflux or incompetence. Large amount of    stool in the proximal to mid colon as well as the mid to distal sigmoid colon noted suggesting constipation/impaction. Wall thickening of the distal sigmoid colon and rectum could suggest mild focal colitis/proctitis. Consider nonemergent colonoscopy for    further evaluation after resolution  of acute illness. No evidence of bowel obstruction, free air, free fluid, or loculated fluid collections in the abdomen/pelvis.   5.  Stable appearing multilevel compression fracture deformities of the thoracolumbar spine as described above. Scattered areas of ill-defined lucency throughout the axial skeleton concerning for metastatic disease and/or multiple myeloma.               Workstation performed: [id number]             Procedures    ED Medication and Procedure Management   Prior to Admission Medications   Prescriptions Last Dose Informant Patient Reported? Taking?   Ativan 0.5 MG tablet   Yes No   Sig: Take 0.5 mg by mouth every 8 (eight) hours as needed   Patient not taking: Reported on 2024   Empagliflozin (Jardiance) 10 MG TABS tablet  Self Yes No   Sig: Take 10 mg by mouth every morning   Magnesium 300 MG CAPS  Self Yes No   Sig: Take by mouth   Multiple Vitamin (multivitamin) tablet  Self Yes No   Sig: Take 1 tablet by mouth daily   QUEtiapine (SEROquel) 50 mg tablet  Self Yes No   Sig: Take 100 mg by mouth daily at bedtime   acyclovir (ZOVIRAX) 400 MG tablet   No No   Sig: TAKE 1 TABLET (400 MG TOTAL) BY MOUTH 2 TIMES A DAY   amiodarone 200 mg tablet   No No   Sig: Take 1 tablet (200 mg total) by mouth daily   apixaban (ELIQUIS) 5 mg  Self No No   Sig: Take 1 tablet (5 mg total) by mouth 2 (two) times a day   dexamethasone (DECADRON) 4 mg tablet   No No   Sig: Take 1 tablet (4 mg total) by mouth once a week   docusate sodium (COLACE) 100 mg capsule  Self No No   Sig: Take 1 capsule (100 mg total) by mouth 2 (two) times a day   fluticasone (FLONASE) 50 mcg/act nasal spray  Self No No   Si spray into each nostril daily   furosemide (LASIX) 20 mg tablet  Self No No   Sig: Take 1 tablet (20 mg total) by mouth 2 (two) times a day   lenalidomide (REVLIMID) 15 MG CAPS   No No   Sig: Take 1 capsule (15 mg total) by mouth daily 21 days on, followed by 7 days off   melatonin 1 mg  Self Yes No   Sig: Take  10 mg by mouth daily at bedtime   metoprolol succinate (TOPROL-XL) 25 mg 24 hr tablet  Self No No   Sig: Take 2 tablets (50 mg total) by mouth every 24 hours   senna (SENOKOT) 8.6 mg  Self No No   Sig: Take 2 tablets (17.2 mg total) by mouth daily at bedtime as needed for constipation      Facility-Administered Medications: None     Patient's Medications   Discharge Prescriptions    No medications on file     No discharge procedures on file.  ED SEPSIS DOCUMENTATION   Time reflects when diagnosis was documented in both MDM as applicable and the Disposition within this note       Time User Action Codes Description Comment    3/17/2025  2:28 PM Teo Hernandez Add [E87.1] Hyponatremia                  Teo Hernandez DO  03/17/25 1533

## 2025-03-17 NOTE — TELEPHONE ENCOUNTER
Caller: Patient    Doctor: Dr. GORDILLO    Reason for call: Patient is in serve pain and will be going to the ED  Is scheduled for a consult tomorrow    Call back#:

## 2025-03-17 NOTE — PLAN OF CARE

## 2025-03-17 NOTE — QUICK NOTE
Nephrology consulted for hyponatremia.   Recommend fluid restriction 1.5L/day and f/u evening and am BMP-ordered.  To restart home dose lasix in the morning.  If low oral intake/poor appetite, consider salt tab 1g TID.  F/u urine osm, urine Cl, serum uric acid and urine Na-ordered.    Formal nephrology consult to follow in the morning.

## 2025-03-17 NOTE — ASSESSMENT & PLAN NOTE
Per chart review, patient continues to have hyponatremia, as noted worsening from prior admission.  Patient presented with Na+ of 125.   Will order FR 1500 mL.   Will consult nephrology.  Will check urine osmolality, urine sodium, urine chloride, serum uric acid.  Also, will re-check BMP this to determine serum sodium.

## 2025-03-18 PROBLEM — E83.42 HYPOMAGNESEMIA: Status: ACTIVE | Noted: 2025-01-01

## 2025-03-18 PROBLEM — Z51.5 PALLIATIVE CARE ENCOUNTER: Status: ACTIVE | Noted: 2025-01-01

## 2025-03-18 NOTE — ASSESSMENT & PLAN NOTE
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

## 2025-03-18 NOTE — PROGRESS NOTES
"Intended visit with pt \"Noah\" who is resting comfortably at this time. Interfaith blessing offered at bedside. Available to follow upon request.     Thank you!        03/18/25 1100   Clinical Encounter Type   Visited With Patient not available   Routine Visit Introduction       "

## 2025-03-18 NOTE — ASSESSMENT & PLAN NOTE
Wt Readings from Last 3 Encounters:   03/18/25 67.4 kg (148 lb 11.2 oz)   03/14/25 64.9 kg (143 lb)   02/26/25 64.9 kg (143 lb)

## 2025-03-18 NOTE — CASE MANAGEMENT
Case Management Assessment & Discharge Planning Note    Patient name Noah KAMARA Stock  Location /-01 MRN [id number]  : 1940 Date 3/18/2025       Current Admission Date: 3/17/2025  Current Admission Diagnosis:Back pain   Patient Active Problem List    Diagnosis Date Noted Date Diagnosed    Back pain 2025     Hyponatremia 2024     Acute respiratory insufficiency 2024     Multiple myeloma not having achieved remission (HCC) 10/29/2024     Other thrombophilia (HCC) 10/28/2024     Peripheral vascular disease, unspecified (HCC) 10/08/2024     Elevated total protein 2024     Coronary artery disease involving native coronary artery of native heart without angina pectoris 2024     Cardiomyopathy (HCC) 2024     COVID-19 2024     Paroxysmal atrial fibrillation (HCC) 2024     Constipation 2024     HFrEF (heart failure with reduced ejection fraction) ([facility]) 2024     Hypertension 2024       LOS (days): 1  Geometric Mean LOS (GMLOS) (days): 2.9  Days to GMLOS:2.1     OBJECTIVE:    Risk of Unplanned Readmission Score: 31.61         Current admission status: Inpatient       Preferred Pharmacy:   [facility]/pharm[facility] #[id number] - [place] - [address]  [address]  [place] [address]  Phone: [phone] Fax: [phone]    Primary Care Provider: Guanako Cortes MD    Primary Insurance: BLUE CROSS [employer] REP  Secondary Insurance:     ASSESSMENT:  Active Health Care Proxies       Xuan Prakash Health Care Agent - Child   Primary Phone: [phone] (Mobile)                 Advance Directives  Does patient have a Health Care POA?: Yes  Does patient have Advance Directives?: Yes  Advance Directives: Living will, Power of  for health care  Primary Contact: Xuan Prakash: dtr: [phone]    Readmission Root Cause  30 Day Readmission: No    Patient Information  Admitted from:: Home  Mental Status: Alert  During Assessment patient was accompanied  by: Not accompanied during assessment  Assessment information provided by:: Patient  Primary Caregiver: Self  Support Systems: Self, Children, Family members  County of Residence: [place]  What [facility] do you live in?: [place]  Home entry access options. Select all that apply.: Stairs  Number of steps to enter home.: 1  Do the steps have railings?: Yes  Type of Current Residence: Merged with [facility]  Living Arrangements: Lives Alone  Is patient a ?: No    Activities of Daily Living Prior to Admission  Functional Status: Independent  Completes ADLs independently?: Yes  Ambulates independently?: Yes  Does patient use assisted devices?: Yes  Assisted Devices (DME) used: Straight Cane, Walker, Shower Chair, Bedside Commode  Does patient currently own DME?: Yes  What DME does the patient currently own?: Bedside Commode, Shower Chair, Straight Cane, Walker  Does patient have a history of Outpatient Therapy (PT/OT)?: No  Does the patient have a history of Short-Term Rehab?: No  Does patient have a history of HHC?: Yes  Does patient currently have HHC?: No    Patient Information Continued  Income Source: Pension/prison  Does patient have prescription coverage?: Yes  Can the patient afford their medications and any related supplies (such as glucometers or test strips)?: Yes  Does patient receive dialysis treatments?: No  Does patient have a history of substance abuse?: No  Does patient have a history of Mental Health Diagnosis?: No    Means of Transportation  Means of Transport to Appts:: Family transport    DISCHARGE DETAILS:    Discharge planning discussed with:: patient  Freedom of Choice: Yes  Comments - Freedom of Choice: Unsure of discharge needs at this time  CM contacted family/caregiver?: No- see comments (Pt declines CM to call family, reports he has his cell phone to call them)                  Requested Home Health Care         Is the patient interested in HHC at discharge?: No    DME Referral Provided  Referral  made for DME?: No    IMM Given (Date):: 03/18/25  IMM Given to:: Patient     Additional Comments: Met with Pt. Pt presents AA&Ox4. Discussed role of case management. Pt reports he lives alone in 1sh, 1 elias with railing. Owns/uses canes, walker, shower chair and commode. Dtrs provide transportation and grocery shopping. Uses [facility] in [place] and able to afford prescriptions. Hx of VNA. Denies hx of SNF/MH/D&A. Pt plans to return home and unsure of discharge needs. CM to follow.

## 2025-03-18 NOTE — ASSESSMENT & PLAN NOTE
"Patient presented with complaints of back pain, which is been worsening and causing inability to complete tasks and sleep.  Per patient, pain has been worsening over the past few months, as noted by outpatient hematology/oncology.  MRI thoracic spine completed last week and revealed.  MRI thoracic spine (3/11): \"Low T1 signal throughout the marrow which may reflect patient's underlying myeloma. Chronic appearing likely pathologic compression deformities of the T6, T7, and T10 vertebral bodies. No marrow edema. Degenerative changes as described without significant stenosis.\"  CT c/a/p (3/17): \"Stable appearing severe compression fracture deformity of the T6 vertebrae and mild to moderate compression fracture deformities of the T7, T9, and T10 vertebrae again noted. Mild superior plate L1 compression fracture deformity is also seen. Multilevel degenerative changes of the thoracolumbar spine. Sternotomy wires are again seen. No new/acute fracture or subluxation identified. Scattered areas of ill-defined lucency throughout the axial skeleton concerning for metastatic disease and/or multiple myeloma.\"   pain regimen adjusted to include dexamethasone  TSLO brace has been ordered  Will consult hematology/oncology.  Neurosurgery consulted.  Per neurosurgery, no further imaging needed at this time.  New pain regimen  Continue acetaminophen 975mg q8h hours  Start dexamethasone 2mg BID with breakfast and lunch  Continue oxycodone 5 mg however change frequency from every 6 hours to every 4 hours as needed for moderate pain  Continue oxycodone 7.5 mg however change frequency from every 6 hours to every 4 hours as needed for severe pain  Start dilaudid 0.2mg q3h PRN for breakthrough    Patient does have a bowel regimen in place. We will continue to monitor for OIC  Narcan orders in place.    "

## 2025-03-18 NOTE — ASSESSMENT & PLAN NOTE
Currently not on any treatment  No acute intervention at this time from an oncologic standpoint.

## 2025-03-18 NOTE — ASSESSMENT & PLAN NOTE
"Patient presented with complaints of back pain, which is been worsening and causing inability to complete tasks and sleep.  Per patient, pain has been worsening over the past few months, as noted by outpatient hematology/oncology.  In fact, MRI thoracic spine completed last week.  MRI thoracic spine (3/11): \"Low T1 signal throughout the marrow which may reflect patient's underlying myeloma. Chronic appearing likely pathologic compression deformities of the T6, T7, and T10 vertebral bodies. No marrow edema. Degenerative changes as described without significant stenosis.\"  CT c/a/p (3/17): \"Stable appearing severe compression fracture deformity of the T6 vertebrae and mild to moderate compression fracture deformities of the T7, T9, and T10 vertebrae again noted. Mild superior plate L1 compression fracture deformity is also seen. Multilevel degenerative changes of the thoracolumbar spine. Sternotomy wires are again seen. No new/acute fracture or subluxation identified. Scattered areas of ill-defined lucency throughout the axial skeleton concerning for metastatic disease and/or multiple myeloma.\"   Appreciate palliative care consultation and expertise.  Will initiate dexamethasone 2 mg, twice daily.  Appreciate hematology/oncology consultation.  Appreciate neurosurgery consultation.  Per neurosurgery, no further imaging needed at this time.  Will continue TSLO brace.   "

## 2025-03-18 NOTE — UTILIZATION REVIEW
"Initial Clinical Review    Admission: Date/Time/Statement:   Admission Orders (From admission, onward)       Ordered        03/17/25 1428  INPATIENT ADMISSION  Once                          Orders Placed This Encounter   Procedures    INPATIENT ADMISSION     Standing Status:   Standing     Number of Occurrences:   1     Level of Care:   Med Surg [16]     Estimated length of stay:   More than 2 Midnights     Certification:   I certify that inpatient services are medically necessary for this patient for a duration of greater than two midnights. See H&P and MD Progress Notes for additional information about the patient's course of treatment.     ED Arrival Information       Expected   -    Arrival   3/17/2025 10:16    Acuity   Urgent              Means of arrival   Walk-In    Escorted by   Family Member    Service   Hospitalist    Admission type   Emergency              Arrival complaint   back pain             Chief Complaint   Patient presents with    Back Pain     Pt to ED for back pain. Pain across entire flank.  Pt states he was here on Thursday for same thing. Unable to lay to sleep so not getting much sleep at all.  Pain is worsening since Thursday off and on. States sometimes its not too bad but then exacerbates and gets worse.  No issues with bowel or bladder, feels maybe his urine stream is getting weaker       Initial Presentation: 84 y.o. male  to ED via walk in from home.    Admitted to inpatient with Dx: Back Pain/Hyponatremia/Multiple Myeloma/Constipation.  Presented to ED with left flank/posterior rib pain that  worsened a few days ago and has had for months with poor sleep and inability to complete tasks.   Recently completed month of Revlimid.    PMHx: anemia, atrial fibrillation, CHF, CAD, depression, HLD, HTN, hyponatremia, insomnia, MI, and multiple myeloma .     On exam: Point tenderness of left posterior lateral ribs around T8-10.   H&H 9.9/30.4.  na 125.   Imaging shows \".Stable appearing " "multilevel compression fracture deformities of the thoracolumbar spine as described above. Scattered areas of ill-defined lucency throughout the axial skeleton concerning for metastatic disease and/or multiple myeloma\"   ED treatment: Lidoderm patch, Dilaudid IV x 2.    Plan includes: pain control,  TLSO brace.  Consult neuro surgery and heme onc.  Trend BMP, fluid restriction.  Consult nephrology.    Bowel regimen.  Continue home medications.     3/17/25 per nephrology - hyponatremia.  Recommend fluid restriction and check BMP this evening. Check urine osm, urine Cl, serum uric acid and urine Na.    Home lasix in am.  If low oral intake/poor appetite, consider salt tab 1g TID.     3/17/25 per Neuro surgery - Has history of multiple myeloma presenting  with worsening back pain in the setting of known multiple thoracic compression fractures.  Recommend TLSO brace.  Consider palliative care consult to assist with pain control.  PT/OT.  OP follow up     Anticipated Length of Stay/Certification Statement: Patient will be admitted on an inpatient basis with an anticipated length of stay of greater than 2 midnights secondary to worsening back pain and hyponatremia.     Date: 3/18/25   Day 2: Continued back pain.  Pain worse with movement.  Relief with oxycodone lasts abut 2 to 3 hours then pain intolerable.  .  Mg 1.7.  pain control- scheduled tylenol, given dose IV decadron and continued po, Lidoderm patch.  Replete electrolytes. Started on IVF with hypertonic saline.  Trend BMP.  Continue fluid restriction.     3/18/25 per heme/onc - Multiple Myeloma, not achieved remission, currently not on treatment.  Suspect back pain due to multiple fractures.   No acute intervention at this time from an oncologic standpoint.  OP follow up.     Patient has crossed 3 midnights and requires ongoing care    3/19/2025 .  Patient presents with cough.  Per Pulmonary = Doesn't feel much dyspnea but has not been OOB or exerting.   " "Earlier in am did complain of more shortness of breath.  Oxygen up to 5 liters.   Has continued back pain.   On exam decreased breath sounds, coarse.   No wheeze  Abnormal labs or imaging:  wbc 4.06.  H&H 10.4/32.1.  na 128.    Diagnosis/Plan    Back pain/Hyponatremia/Multiple Myeloma/Hypoxia/Constipation.  Pain management and palliative care has made recommendations.  Pulmonary consulted.  Oxygen as needed.   Speech.  Wean oxygen as able.   Increase mucinex and add nebs.  Check CxR.  Incentive spirometry.   BMP every 6 hours and home Lasix on hold.  Continue hypertonic saline.  Bowel regimen and increase Miralax.     3/19/25 per Pulmonary - hypoxia and suspect due to hypoventilation related to his back pain, atelectasis and secretions on imaging, underlying emphysema.   Plan is pulmonary hygiene - incentive spirometry, flutter and nebs.  No Vest due to back pain.   Optimize pain control.  Speech eval to rule out aspiration.   No need steroids at this time.      3/19/25 pr palliative care - \"did not physically see as patient frustrated about amount of providers and staff yesterday.    Will limit visits.   Plan: Back pain/Multiple myeloma:  -cont tylenol 975mg po q8h johnny  -cont decadron 2mg bid w/ breakfast and lunch  -cont oxyIR 5-7.5mg po q4h prn mod-severe pain  -cont dilaudid 0.2mg IV q3h prn bt pain  Bowel regimen  -t/c d/c colace  -t/c inc senna to tabs bid  -cont miralax daily, can consider increasing to BID until BM  -t/c suppository/enema today\"    ED Treatment-Medication Administration from 03/17/2025 1016 to 03/17/2025 1635         Date/Time Order Dose Route Action     03/17/2025 1117 HYDROmorphone (DILAUDID) injection 0.5 mg 0.5 mg Intravenous Given     03/17/2025 1118 lidocaine (LIDODERM) 5 % patch 1 patch 1 patch Topical Medication Applied     03/17/2025 1229 iohexol (OMNIPAQUE) 350 MG/ML injection (MULTI-DOSE) 100 mL 100 mL Intravenous Given     03/17/2025 1245 HYDROmorphone (DILAUDID) injection 1 mg 1 " mg Intravenous Given            Scheduled Medications:  acetaminophen, 975 mg, Oral, Q8H  amiodarone, 200 mg, Oral, Daily  apixaban, 5 mg, Oral, BID  dexamethasone, 4 mg, Intravenous, Once 1245 on 3/18/25  [START ON 3/19/2025] dexamethasone, 2 mg, Oral, BID before breakfast/lunch  docusate sodium, 100 mg, Oral, BID  Empagliflozin, 10 mg, Oral, QAM  guaiFENesin, 600 mg, Oral, Q12H ANGEL  lidocaine, 1 patch, Topical, Daily  magnesium sulfate, 2 g, Intravenous, Once 1102 3/18  metoprolol succinate, 25 mg, Oral, Q24H  nicotine, 1 patch, Transdermal, QPM  polyethylene glycol, 17 g, Oral, Daily  QUEtiapine, 100 mg, Oral, HS  senna, 17.2 mg, Oral, HS    magnesium sulfate 2 g/50 mL IVPB (premix) 2 g  Dose: 2 g  Freq: Once Route: IV  Last Dose: 2 g (03/18/25 1102)  Start: 03/18/25 1045 End: 03/18/25 1302       guaiFENesin (MUCINEX) 12 hr tablet 1,200 mg  Dose: 1,200 mg  Freq: Every 12 hours scheduled Route: PO  Start: 03/19/25 0930  ipratropium (ATROVENT) 0.02 % inhalation solution 0.5 mg  Dose: 0.5 mg  Freq: 3 times daily (RESP) Route: NEBULIZATION  Start: 03/19/25 1400   levalbuterol (XOPENEX) inhalation solution 1.25 mg  Dose: 1.25 mg  Freq: 3 times daily (RESP) Route: NEBULIZATION  Start: 03/19/25 1400  polyethylene glycol (MIRALAX) packet 17 g  Dose: 17 g  Freq: 2 times daily Route: PO  Start: 03/19/25 2100    Continuous IV Infusions:  sodium chloride (HYPERTONIC) infusion 1.8%, 60 mL/hr, Intravenous, Continuous    PRN Meds:  albuterol, 2.5 mg, Nebulization, Q4H PRN  naloxone, 0.04 mg, Intravenous, Q1MIN PRN  oxyCODONE, 5 mg, Oral, Q6H PRN x 2 3/18   Or  oxyCODONE, 7.5 mg, Oral, Q6H PRN x 1 3/19    ED Triage Vitals   Temperature Pulse Respirations Blood Pressure SpO2 Pain Score   03/17/25 1033 03/17/25 1033 03/17/25 1033 03/17/25 1033 03/17/25 1034 03/17/25 1034   98 °F (36.7 °C) 64 18 120/60 94 % 6     Weight (last 2 days)       Date/Time Weight    03/19/25 0300 68.3 (150.6)    03/18/25 0125 67.4 (148.7)    03/17/25 1735  69.6 (153.4)    03/17/25 1644 69.6 (153.4)          Vital Signs (last 3 days)       Date/Time Temp Pulse Resp BP MAP (mmHg) SpO2 Calculated FIO2 (%) - Nasal Cannula Nasal Cannula O2 Flow Rate (L/min) O2 Device Patient Position - Orthostatic VS Graeme Coma Scale Score Pain    03/19/25 1353 -- -- -- -- -- -- 36 4 L/min Nasal cannula -- -- --    03/19/25 0945 -- -- -- -- -- -- -- -- -- -- -- 4    03/19/25 0940 -- -- -- -- -- -- -- -- -- -- -- 4    03/19/25 09:19:37 98.2 °F (36.8 °C) 62 22 125/64 84 94 % 36 4 L/min Nasal cannula Lying -- --    03/19/25 0800 -- -- -- -- -- 95 % 36 4 L/min Nasal cannula -- 15 4    03/19/25 03:50:56 98.1 °F (36.7 °C) 66 21 123/63 83 97 % -- -- Nasal cannula Sitting -- --    03/19/25 0007 -- -- -- -- -- -- -- -- -- -- -- Med Not Given for Pain - for MAR use only    03/18/25 19:58:47 98.6 °F (37 °C) 62 22 129/64 86 91 % -- -- Nasal cannula Lying 15 4    03/18/25 1743 -- -- -- -- -- -- -- -- -- -- -- 4    03/18/25 15:17:50 98.1 °F (36.7 °C) 58 -- 129/63 85 94 % -- -- -- -- -- --    03/18/25 0804 -- -- -- -- -- -- -- -- -- -- -- 6    03/18/25 0800 -- -- -- -- -- 97 % 28 2 L/min Nasal cannula -- 15 --    03/18/25 07:40:40 97.4 °F (36.3 °C) 60 12 137/70 92 95 % 32 3 L/min Nasal cannula Lying -- --    03/18/25 0129 -- -- -- -- -- -- -- -- -- -- -- 9    03/18/25 01:23:17 98.6 °F (37 °C) 60 -- 131/71 91 97 % -- -- -- -- -- --    03/18/25 0000 -- -- -- -- -- -- -- -- -- -- -- 8    03/17/25 21:05:42 97.5 °F (36.4 °C) 60 18 124/62 83 97 % 24 1 L/min Nasal cannula Lying -- 3    03/17/25 21:04:57 97.5 °F (36.4 °C) 60 18 124/62 83 97 % -- -- -- -- -- --    03/17/25 2100 -- -- -- -- -- -- -- -- -- -- 15 --    03/17/25 1753 -- -- -- -- -- -- -- -- -- -- -- 6 03/17/25 16:47:06 97.3 °F (36.3 °C) -- -- 142/76 98 -- 28 2 L/min Nasal cannula -- 15 --    03/17/25 1647 -- -- -- -- -- -- -- -- -- -- -- 6 03/17/25 1644 98 °F (36.7 °C) 82 -- 142/76 -- 94 % -- -- -- -- -- --    03/17/25 1510 -- 82 18 141/68 --  94 % 28 2 L/min Nasal cannula Lying -- --    03/17/25 1245 -- -- -- -- -- -- -- -- -- -- -- 10 - Worst Possible Pain    03/17/25 1235 -- 66 18 126/72 -- 95 % -- -- None (Room air) Lying -- --    03/17/25 1122 -- -- -- -- -- -- -- -- None (Room air) -- -- --    03/17/25 1121 -- -- -- -- -- -- -- -- -- -- 15 --    03/17/25 1117 -- -- -- -- -- -- -- -- -- -- -- 10 - Worst Possible Pain    03/17/25 1034 -- -- -- -- -- 94 % -- -- None (Room air) -- -- 6    03/17/25 1033 98 °F (36.7 °C) 64 18 120/60 -- -- -- -- -- -- -- --          Pertinent Labs/Diagnostic Test Results:   Radiology:  XR chest portable   Final Interpretation by Frederick Shirley MD (03/19 1350)      New interstitial edema and/or pneumonia.            Workstation performed: [id number]         CT chest abdomen pelvis w contrast   Final Interpretation by Christofer Bailey MD (03/17 4181)      1.  Layering debris within the posterior mid to lower trachea and proximal bilateral mainstem bronchi could represent secretions. Extensive left basilar atelectasis noted, can not exclude early airspace disease. Minimal right basilar atelectasis is also    seen. No lobar airspace consolidation. Moderate to severe centrilobular and paraseptal emphysematous changes are seen.   2.  Stable cardiomegaly and post sternotomy changes.   3.  Extensive calcific atherosclerosis of the thoracoabdominal aorta, proximal thoracic vessels, coronary arteries, and iliac arteries. 3.3 x 2.9 cm infrarenal abdominal aortic aneurysm is seen, not significantly changed compared to the prior study. No    evidence of acute dissection or intramural hematoma. No significant focal flow-limiting stenosis. Recommend follow-up imaging in 3 years per current guidelines.   4.  Evaluation of bowel is markedly limited on this exam due to lack of oral contrast and paucity of intra-abdominal fat. Terminal ileum is markedly distended with fecal matter which could be related to IC valve reflux or incompetence.  "Large amount of    stool in the proximal to mid colon as well as the mid to distal sigmoid colon noted suggesting constipation/impaction. Wall thickening of the distal sigmoid colon and rectum could suggest mild focal colitis/proctitis. Consider nonemergent colonoscopy for    further evaluation after resolution of acute illness. No evidence of bowel obstruction, free air, free fluid, or loculated fluid collections in the abdomen/pelvis.   5.  Stable appearing multilevel compression fracture deformities of the thoracolumbar spine as described above. Scattered areas of ill-defined lucency throughout the axial skeleton concerning for metastatic disease and/or multiple myeloma.               Workstation performed: [id number]           Outpatient study  MRI thoracic spine (3/11): \"Low T1 signal throughout the marrow which may reflect patient's underlying myeloma. Chronic appearing likely pathologic compression deformities of the T6, T7, and T10 vertebral bodies. No marrow edema. Degenerative changes as described without significant stenosis.\"     Cardiology:  No orders to display     GI:  No orders to display     Results from last 7 days   Lab Units 03/18/25  0116   SARS-COV-2  Negative     Results from last 7 days   Lab Units 03/19/25  0517 03/18/25  0116 03/17/25  1116 03/14/25  0639   WBC Thousand/uL 4.06* 6.20 5.71 4.20*   HEMOGLOBIN g/dL 10.4* 10.7* 9.9* 11.7*   HEMATOCRIT % 32.1* 33.2* 30.4* 37.1   PLATELETS Thousands/uL 163 182 164 177   BANDS PCT %  --   --   --  3     Results from last 7 days   Lab Units 03/19/25  1306 03/19/25  0517 03/19/25  0001 03/18/25  1755 03/18/25  1431 03/18/25  0116   SODIUM mmol/L 129* 128* 128* 126* 126* 125*   POTASSIUM mmol/L 4.2 4.0 4.2 4.2 3.9 4.3   CHLORIDE mmol/L 99 98 98 96 96 96   CO2 mmol/L 27 25 25 25 24 26   ANION GAP mmol/L 3* 5 5 5 6 3*   BUN mg/dL 28* 24 23 19 19 16   CREATININE mg/dL 0.93 0.85 0.87 0.86 0.78 0.74   EGFR ml/min/1.73sq m 75 80 79 79 82 84   CALCIUM mg/dL " 8.4 8.1* 7.7* 8.2* 8.0* 8.2*   MAGNESIUM mg/dL  --   --   --   --   --  1.7*     Results from last 7 days   Lab Units 03/18/25  0116 03/14/25  0639   AST U/L 19 21   ALT U/L 13 14   ALK PHOS U/L 85 86   TOTAL PROTEIN g/dL 9.2* 9.7*   ALBUMIN g/dL 2.9* 3.1*   TOTAL BILIRUBIN mg/dL 0.56 0.56     Results from last 7 days   Lab Units 03/19/25  1306 03/19/25  0517 03/19/25  0001 03/18/25  1755 03/18/25  1431 03/18/25  0116 03/17/25  1847 03/17/25  1116 03/14/25  0639   GLUCOSE RANDOM mg/dL 168* 108 123 151* 110 94 129 105 98     Results from last 7 days   Lab Units 03/18/25  0002   OSMO UR mmol/     Results from last 7 days   Lab Units 03/18/25  0002   SODIUM UR mmol/L <10.0     Results from last 7 days   Lab Units 03/18/25  0116   INFLUENZA A PCR  Negative   INFLUENZA B PCR  Negative   RSV PCR  Negative       Past Medical History:   Diagnosis Date    Anemia     Atrial fibrillation (HCC)     CHF (congestive heart failure) (HCC)     Coronary artery disease     Depression     Hyperlipidemia     Hypertension     Hyponatremia     Insomnia     Low blood pressure     MI (myocardial infarction) (HCC)     Multiple myeloma (HCC)     Thrombocytopenia (HCC)      Present on Admission:   Coronary artery disease involving native coronary artery of native heart without angina pectoris   HFrEF (heart failure with reduced ejection fraction) (HCC)   Hyponatremia   Multiple myeloma not having achieved remission (HCC)   Paroxysmal atrial fibrillation (HCC)   Constipation      Admitting Diagnosis: Back pain [M54.9]  Hyponatremia [E87.1]  Multiple myeloma not having achieved remission (HCC) [C90.00]  Age/Sex: 84 y.o. male    Network Utilization Review Department  ATTENTION: Please call with any questions or concerns to [phone] and carefully listen to the prompts so that you are directed to the right person. All voicemails are confidential.   For Discharge needs, contact Care Management DC Support Team at [phone] opt. [phone]  Send  all requests for admission clinical reviews, approved or denied determinations and any other requests to dedicated fax number below belonging to the campus where the patient is receiving treatment. List of dedicated fax numbers for the Facilities:  FACILITY NAME UR FAX NUMBER   ADMISSION DENIALS (Administrative/Medical Necessity) [phone]   DISCHARGE SUPPORT TEAM (NETWORK) [phone]   PARENT CHILD HEALTH (Maternity/NICU/Pediatrics) [phone]   [facility] [phone]   [facility] [phone]   [facility] [phone]   [facility] [phone]   [facility] [phone]   [facility] [phone]   [facility] [phone]   [facility] [phone]   [facility] [phone]   [facility] [phone]   [facility] [phone]   [facility] [phone]

## 2025-03-18 NOTE — ASSESSMENT & PLAN NOTE
"Palliative diagnoses: Cancer related pain, multiple myeloma  Outpatient Palliative provider:  New to palliative medicine today    Goals of care  Level 3 code status  Disease focused care DNR/DNI  Concerns introduced today include:  Minimal conversation today as patient is bothered by the fact that another provider is involved. Says \"there are too many hands in the pot and someone else is already managing my pain.\" Told him I would place orders for steroid and then ask primary to manage pain so that he does not have so many people in and out.\"  Spoke to daughter and made her aware of med changes I made which she was agreeable to.  Will discuss with LATONYA Team to minimize the number of people in and out of his room.  Will continue discussions regarding GOC as patient's clinical presentation evolves.  Encouraged follow up with Palliative Medicine on an outpatient basis after discharge for continued symptom management.  Our office will contact patient to schedule a hospital follow up.    Social support for daughter Xuan:  Patient finds comfort in his daughters  Xuan describes a \"fiercly independent\" patient, struggling with the loss of independence  Daughter works in a personal care home, wondering if he could go there  Supportive listening provided  Normalized experience of patient/family  Provided anxiety containment  Provided anticipatory guidance  Encouraged self care  Discussed spiritual needs  Living situation - lives alone, across the street from [place]    Follow up  Palliative Care will continue to follow and goals of care discussions will be ongoing.    Please reach out via [employer] Secure Chat if questions or concerns arise.    Care Coordination  Reviewed case with LATONYA, Dr. Haley  Reviewed case with ALECIA Ponce    I have reviewed the patient's controlled substance dispensing history in the Prescription Drug Monitoring Program in compliance with the [employer] regulations before prescribing any controlled " substances.    Decisional apparatus:  Patient has capacity on exam today.  If capacity is lost, patient's substitute decision maker would default to 2 daughters by [place] Act 169.  Advance Directive/Living Will, POLST and POA Forms: None  ER contacts:  Name Relation Home Work Mobile   Xuan Prakash Child   [phone]   Charity Mendez   [phone]     We appreciate the invitation to be involved in this patient's care.  We will continue to follow throughout this hospitalization.  Please do not hesitate to reach our on call provider through our clinic answering service at [phone] should you have acute symptom control concerns.    Jennifer P. Bloch, MSN, CRNP, ACHPN  Palliative and Supportive Care  Clinic/Answering Service: [phone]  You can find me on Epic Secure Chat

## 2025-03-18 NOTE — CONSULTS
Consultation - Oncology-Medical   Name: Noah Mendez 84 y.o. male I MRN: [id number]  Unit/Bed#: -01 I Date of Admission: 3/17/2025   Date of Service: 3/18/2025 I Hospital Day: 1  Consults  Physician Requesting Evaluation: Kristyn Haley MD   Reason for Evaluation / Principal Problem: back pain      Assessment & Plan  Multiple myeloma not having achieved remission (HCC)  Currently not on any treatment  No acute intervention at this time from an oncologic standpoint.    Back pain  Most likely secondary to multiple fractures noted on the scans.   Pain not controlled on the current regimen.   Continue pain management per primary team.   Pt to follow up with oncology as scheduled outpatient.    Please contact the SecureChat role for the Oncology-Medical service with any questions/concerns.    History of Present Illness   Noah Mendez is a 84 y.o. male who presents with back pain. Pt has known multiple myeloma who is currently not on any active treatment. Back pain has been ongoing for a few weeks/months now, per patient. More so on movement. He denies any focal weakness. He has been ambulating at home.  CT chest abdomen and pelvis from yesterday confirmed multilevel compression fracture deformities in the thoracolumbar spine.  Multiple scattered areas of lucency throughout the axial skeleton c/w multiple myeloma.  Patient was evaluated by neurosurgery, and no surgical intervention was recommended.  He is currently on oxycodone as needed, and states that he gets some relief for 2 to 3 hours, then the pain becomes intolerable.       Review of Systems   Constitutional:  Positive for activity change. Negative for fever and unexpected weight change.   Respiratory:  Negative for shortness of breath.    Cardiovascular:  Negative for chest pain and leg swelling.   Musculoskeletal:  Positive for back pain.     Medical History Review: I have reviewed the patient's PMH, PSH, Social History, Family History, Meds, and  Allergies     Objective :  Temp:  [97.3 °F (36.3 °C)-98.6 °F (37 °C)] 97.4 °F (36.3 °C)  HR:  [60-82] 60  BP: (120-142)/(60-76) 137/70  Resp:  [12-18] 12  SpO2:  [94 %-97 %] 95 %  O2 Device: Nasal cannula  Nasal Cannula O2 Flow Rate (L/min):  [1 L/min-3 L/min] 3 L/min    Physical Exam  Vitals reviewed.   Constitutional:       Appearance: Normal appearance.   Cardiovascular:      Rate and Rhythm: Normal rate.   Abdominal:      General: Abdomen is flat.      Palpations: Abdomen is soft.   Musculoskeletal:         General: No swelling.   Neurological:      General: No focal deficit present.      Mental Status: He is alert.            Lab Results: I have reviewed the following results:CBC/BMP:   .     03/18/25  0116   WBC 6.20   HGB 10.7*   HCT 33.2*      SODIUM 125*   K 4.3   CL 96   CO2 26   BUN 16   CREATININE 0.74   GLUC 94   MG 1.7*    , LFTs:   .     03/18/25  0116   AST 19   ALT 13   ALB 2.9*   TBILI 0.56   ALKPHOS 85        Imaging Results Review: I personally reviewed the following image studies in PACS and associated radiology reports: CT chest and CT abdomen/pelvis. My interpretation of the radiology images/reports is: as noted above.  Other Study Results Review: Pathology reports reviewed.    Administrative Statements   I have spent a total time of 25 minutes in caring for this patient on the day of the visit/encounter including Diagnostic results, Prognosis, Risks and benefits of tx options, Documenting in the medical record, Reviewing/placing orders in the medical record (including tests, medications, and/or procedures), and Obtaining or reviewing history  .

## 2025-03-18 NOTE — PHYSICAL THERAPY NOTE
ORTHOTIC FITTING CANCELLATION NOTE      Patient Name: Noah Mendez  Today's Date: 3/18/2025       03/18/25 7218   Note Type   Additional Comments Noted order for TLSO, spoke w/ ALECIA Ponce. Attempted to fit w/ TLSO x 2 this PM, however spoke w/ ALECIA Ponce and reviewed palliative care note, patient expressing frustration with multiple providers in to see him today. Of note, TLSO is only PRN for pain, patient is OK/encouraged to mobilize OOB without brace if tolerated. Will continue to f/u for brace fitting as appropriate and patient agreeable       Bridget Tovar, PT, DPT

## 2025-03-18 NOTE — CONSULTS
NEPHROLOGY HOSPITAL CONSULTATION   Noah Mendez 84 y.o. 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 as well as multiple myeloma  Acute on chronic, baseline sodium level in the low 130s  Presented with sodium of 125  Repeat sodium level today 125  Monitoring on fluid restriction + nutritional supplements  Started on hypertonic 1.8% saline today  Holding home Lasix  BMP every 6 hours  COVID testing negative, urine sodium less than 10, urine osmolality 483, uric acid 3.0  History of IgG kappa gammopathy  Hypomagnesemia  Continue to monitor and replace as needed  Multiple myeloma not having achieved remission ([facility])  Following with hematology/oncology, deferring treatment until back pain is controlled  Back pain  Continue pain management  HFrEF (heart failure with reduced ejection fraction) ([facility])      Other: A-fib, constipation      HISTORY OF PRESENT ILLNESS:  Requesting Physician: Kristyn Haley MD  Reason for Consult: acute on chronic hyponatremia    Noah Mendez is a 84 y.o. male with history of chronic hyponatremia, hypertension, CHF, CAD, depression, multiple myeloma, chronic back pain, who was admitted to [facility] after presenting with worsening reports of mid to lower back pain. A renal consultation is requested today for assistance in the management of acute on chronic hyponatremia.  The patient is quite upset at this time and reports he is in a lot of pain.  He denies any chest discomfort.  He is visibly short of breath.  He denies nausea, vomiting, diarrhea.  He reports poor appetite.  He denies issues with urination.    PAST MEDICAL HISTORY:  Past Medical History:   Diagnosis Date    Anemia     Atrial fibrillation (HCC)     CHF (congestive heart failure) ([facility])     Coronary artery disease     Depression     Hyperlipidemia     Hypertension     Hyponatremia     Insomnia     Low blood pressure      MI (myocardial infarction) (HCC)     Multiple myeloma (HCC)     Thrombocytopenia (HCC)        PAST SURGICAL HISTORY:  Past Surgical History:   Procedure Laterality Date    CARDIAC PACEMAKER PLACEMENT      CARDIAC SURGERY      CORONARY ARTERY BYPASS GRAFT      FRACTURE SURGERY Left     Wrist    IR BIOPSY BONE MARROW  10/23/2024       ALLERGIES:  No Known Allergies    SOCIAL HISTORY:  Social History     Substance and Sexual Activity   Alcohol Use Never     Social History     Substance and Sexual Activity   Drug Use Never     Social History     Tobacco Use   Smoking Status Every Day    Current packs/day: 1.00    Types: Cigarettes   Smokeless Tobacco Never   Tobacco Comments    Smokes 2 cigarettes daily        FAMILY HISTORY:  History reviewed. No pertinent family history.    MEDICATIONS:    Current Facility-Administered Medications:     acetaminophen (TYLENOL) tablet 975 mg, 975 mg, Oral, Q8H, KAYA Miller, 975 mg at 03/18/25 0804    amiodarone tablet 200 mg, 200 mg, Oral, Daily, KAYA Miller, 200 mg at 03/18/25 0804    apixaban (ELIQUIS) tablet 5 mg, 5 mg, Oral, BID, KAYA Miller, 5 mg at 03/18/25 0804    docusate sodium (COLACE) capsule 100 mg, 100 mg, Oral, BID, KAYA Miller, 100 mg at 03/18/25 0804    Empagliflozin (JARDIANCE) tablet 10 mg, 10 mg, Oral, QAM, KAYA Miller, 10 mg at 03/18/25 0804    guaiFENesin (MUCINEX) 12 hr tablet 600 mg, 600 mg, Oral, Q12H Jaki ORTIZ PA-C, 600 mg at 03/18/25 0804    lidocaine (LIDODERM) 5 % patch 1 patch, 1 patch, Topical, Daily, KAYA Miller, 1 patch at 03/18/25 0803    magnesium sulfate 2 g/50 mL IVPB (premix) 2 g, 2 g, Intravenous, Once, KAYA Miller, Last Rate: 25 mL/hr at 03/18/25 1102, 2 g at 03/18/25 1102    metoprolol succinate (TOPROL-XL) 24 hr tablet 25 mg, 25 mg, Oral, Q24H, KAYA Miller, 25 mg at 03/17/25 2107    naloxone (NARCAN) 0.04 mg/mL syringe 0.04 mg, 0.04 mg,  Intravenous, Q1MIN PRN, KAYA Miller    nicotine (NICODERM CQ) 14 mg/24hr TD 24 hr patch 1 patch, 1 patch, Transdermal, QPM, KAYA Miller, 1 patch at 03/17/25 1753    oxyCODONE (ROXICODONE) IR tablet 5 mg, 5 mg, Oral, Q6H PRN **OR** oxyCODONE (ROXICODONE) split tablet 7.5 mg, 7.5 mg, Oral, Q6H PRN, KAYA Miller    polyethylene glycol (MIRALAX) packet 17 g, 17 g, Oral, Daily, KAYA Miller, 17 g at 03/18/25 0803    QUEtiapine (SEROquel) tablet 100 mg, 100 mg, Oral, HS, KAYA Miller, 100 mg at 03/17/25 2107    senna (SENOKOT) tablet 17.2 mg, 17.2 mg, Oral, HS, KAYA Miller, 17.2 mg at 03/17/25 2107    sodium chloride (HYPERTONIC) infusion 1.8%, 60 mL/hr, Intravenous, Continuous, Last Rate: 60 mL/hr at 03/18/25 1101, 60 mL/hr at 03/18/25 1101 **AND** Basic metabolic panel, , , Q4H, Renata Harmon,     REVIEW OF SYSTEMS:  Constitutional: Negative for fatigue, anorexia, fever, chills, diaphoresis  HENT: Negative for postnasal drip  Eyes: Negative for visual disturbance.   Respiratory: Negative for cough, shortness of breath and wheezing.   Cardiovascular: Negative for chest pain, palpitations and leg swelling.   Gastrointestinal: Negative for abdominal pain, constipation, diarrhea, nausea and vomiting.   Genitourinary: No dysuria, hematuria  Endocrine: Negative for polyuria.   Musculoskeletal: Negative for arthralgias, back pain and joint swelling.   Skin: Negative for rash.   Neurological: Negative for focal weakness, headaches, dizziness.  Hematological: Negative for easy bruising or bleeding.  Psychiatric/Behavioral: Negative for confusion and sleep disturbance.   All the systems were reviewed and were negative except as documented on the HPI.    PHYSICAL EXAM:  Current Weight: Weight - Scale: 67.4 kg (148 lb 11.2 oz)  First Weight: Weight - Scale: 69.6 kg (153 lb 6.4 oz)  Vitals:    03/18/25 0123 03/18/25 0125 03/18/25 0740 03/18/25 0800   BP: 131/71   "137/70    BP Location:   Right arm    Pulse: 60  60    Resp:   12    Temp: 98.6 °F (37 °C)  (!) 97.4 °F (36.3 °C)    TempSrc:   Oral    SpO2: 97%  95% 97%   Weight:  67.4 kg (148 lb 11.2 oz)     Height:           Intake/Output Summary (Last 24 hours) at 3/18/2025 1123  Last data filed at 3/18/2025 0838  Gross per 24 hour   Intake 305 ml   Output 550 ml   Net -245 ml     Physical Exam  Vitals reviewed.   Constitutional:       Comments: frail   HENT:      Head: Normocephalic.      Nose: Nose normal.      Mouth/Throat:      Mouth: Mucous membranes are moist.   Cardiovascular:      Heart sounds: Normal heart sounds.   Pulmonary:      Comments: On O2  Abdominal:      Palpations: Abdomen is soft.   Musculoskeletal:      Right lower leg: No edema.      Left lower leg: No edema.   Skin:     General: Skin is warm and dry.   Neurological:      Mental Status: He is alert. He is disoriented.   Psychiatric:      Comments: aggitated           Invasive Devices:      Lab Results:   Results from last 7 days   Lab Units 03/18/25  0116 03/17/25  1847 03/17/25  1116 03/14/25  0639   WBC Thousand/uL 6.20  --  5.71 4.20*   HEMOGLOBIN g/dL 10.7*  --  9.9* 11.7*   HEMATOCRIT % 33.2*  --  30.4* 37.1   PLATELETS Thousands/uL 182  --  164 177   POTASSIUM mmol/L 4.3 4.3 4.0 3.5   CHLORIDE mmol/L 96 96 95* 96   CO2 mmol/L 26 28 27 26   BUN mg/dL 16 16 18 23   CREATININE mg/dL 0.74 0.75 0.75 0.83   CALCIUM mg/dL 8.2* 8.2* 7.7* 7.9*   MAGNESIUM mg/dL 1.7*  --   --   --    ALK PHOS U/L 85  --   --  86   ALT U/L 13  --   --  14   AST U/L 19  --   --  21     Other Studies:     Portions of the record may have been created with voice recognition software. Occasional wrong word or \"sound a like\" substitutions may have occurred due to the inherent limitations of voice recognition software. Read the chart carefully and recognize, using context, where substitutions have occurred.If you have any questions, please contact the dictating provider.    "

## 2025-03-18 NOTE — RESPIRATORY THERAPY NOTE
RT Protocol Note  Noah Mendez 84 y.o. male MRN: [id number]  Unit/Bed#: -01 Encounter: [id number]    Assessment    Principal Problem:    Back pain  Active Problems:    Paroxysmal atrial fibrillation (HCC)    Constipation    HFrEF (heart failure with reduced ejection fraction) (HCC)    Coronary artery disease involving native coronary artery of native heart without angina pectoris    Multiple myeloma not having achieved remission (HCC)    Hyponatremia    Hypomagnesemia      Home Pulmonary Medications:  none   03/18/25 1205   Respiratory Protocol   Protocol Initiated? Yes   Protocol Selection Respiratory   Language Barrier? No   Medical & Social History Reviewed? Yes   Diagnostic Studies Reviewed? Yes   Physical Assessment Performed? Yes   Respiratory Plan Mild Distress pathway   Respiratory Assessment   Assessment Type Assess only   General Appearance Awake;Alert   Respiratory Pattern Normal   Chest Assessment Chest expansion symmetrical   Bilateral Breath Sounds Diminished   Cough Non-productive   Resp Comments instructed pt on flutter/needs further encouragement/plan to order neb tx'sprn/flutter   O2 Device NC            Past Medical History:   Diagnosis Date    Anemia     Atrial fibrillation (HCC)     CHF (congestive heart failure) (HCC)     Coronary artery disease     Depression     Hyperlipidemia     Hypertension     Hyponatremia     Insomnia     Low blood pressure     MI (myocardial infarction) (HCC)     Multiple myeloma (HCC)     Thrombocytopenia (HCC)      Social History     Socioeconomic History    Marital status:      Spouse name: None    Number of children: None    Years of education: None    Highest education level: None   Occupational History    None   Tobacco Use    Smoking status: Every Day     Current packs/day: 1.00     Types: Cigarettes    Smokeless tobacco: Never    Tobacco comments:     Smokes 2 cigarettes daily    Vaping Use    Vaping status: Never Used   Substance and Sexual  "Activity    Alcohol use: Never    Drug use: Never    Sexual activity: None   Other Topics Concern    None   Social History Narrative    None     Social Drivers of Health     Financial Resource Strain: Not on file   Food Insecurity: No Food Insecurity (3/17/2025)    Nursing - Inadequate Food Risk Classification     Worried About Running Out of Food in the Last Year: Never true     Ran Out of Food in the Last Year: Never true     Ran Out of Food in the Last Year: Never true   Transportation Needs: No Transportation Needs (3/17/2025)    Nursing - Transportation Risk Classification     Lack of Transportation: Not on file     Lack of Transportation: No   Physical Activity: Not on file   Stress: Not on file   Social Connections: Not on file   Intimate Partner Violence: Unknown (3/17/2025)    Nursing IPS     Feels Physically and Emotionally Safe: Not on file     Physically Hurt by Someone: Not on file     Humiliated or Emotionally Abused by Someone: Not on file     Physically Hurt by Someone: No     Hurt or Threatened by Someone: No   Housing Stability: Unknown (3/17/2025)    Nursing: Inadequate Housing Risk Classification     Has Housing: Not on file     Worried About Losing Housing: Not on file     Unable to Get Utilities: Not on file     Unable to Pay for Housing in the Last Year: No     Has Housin       Subjective         Objective    Physical Exam:   Assessment Type: Assess only  General Appearance: Awake, Alert  Respiratory Pattern: Normal  Chest Assessment: Chest expansion symmetrical  Bilateral Breath Sounds: Diminished  Cough: Non-productive  O2 Device: NC    Vitals:  Blood pressure 137/70, pulse 60, temperature (!) 97.4 °F (36.3 °C), temperature source Oral, resp. rate 12, height 5' 10\" (1.778 m), weight 67.4 kg (148 lb 11.2 oz), SpO2 97%.          Imaging and other studies:     O2 Device: NC     Plan    Respiratory Plan: Mild Distress pathway        Resp Comments: (P) instructed pt on flutter/needs further " encouragement/plan to order neb tx'sprn/flutter

## 2025-03-18 NOTE — ASSESSMENT & PLAN NOTE
Per chart review, patient with history of multiple myeloma and currently following the outpatient setting. Per chart review, the patient has been having severe back pain and received MRI on 3/11. The patient was to be referred to pain management.   Per review, recently completed a month of Revlimid.  Appreciate hematology/oncology consultation.  There is no acute intervention needed from an oncology standpoint.

## 2025-03-18 NOTE — ASSESSMENT & PLAN NOTE
Most likely secondary to multiple fractures noted on the scans.   Pain not controlled on the current regimen.   Continue pain management per primary team.   Pt to follow up with oncology as scheduled outpatient.

## 2025-03-18 NOTE — CONSULTS
"Consultation - Palliative Care   Name: Noah Mendez 84 y.o. male I MRN: [id number]  Unit/Bed#: -01 I Date of Admission: 3/17/2025   Date of Service: 3/18/2025 I Hospital Day: 1   Inpatient consult to Palliative Care  Consult performed by: Jennifer Paige Bloch, CRNP  Consult ordered by: KAYA Miller        Physician Requesting Evaluation: Kristyn Haley MD   Reason for Evaluation / Principal Problem: Cancer related pain, multiple myeloma    Assessment & Plan  Back pain  Patient presented with complaints of back pain, which is been worsening and causing inability to complete tasks and sleep.  Per patient, pain has been worsening over the past few months, as noted by outpatient hematology/oncology.  MRI thoracic spine completed last week and revealed.  MRI thoracic spine (3/11): \"Low T1 signal throughout the marrow which may reflect patient's underlying myeloma. Chronic appearing likely pathologic compression deformities of the T6, T7, and T10 vertebral bodies. No marrow edema. Degenerative changes as described without significant stenosis.\"  CT c/a/p (3/17): \"Stable appearing severe compression fracture deformity of the T6 vertebrae and mild to moderate compression fracture deformities of the T7, T9, and T10 vertebrae again noted. Mild superior plate L1 compression fracture deformity is also seen. Multilevel degenerative changes of the thoracolumbar spine. Sternotomy wires are again seen. No new/acute fracture or subluxation identified. Scattered areas of ill-defined lucency throughout the axial skeleton concerning for metastatic disease and/or multiple myeloma.\"   pain regimen adjusted to include dexamethasone  TSLO brace has been ordered  Will consult hematology/oncology.  Neurosurgery consulted.  Per neurosurgery, no further imaging needed at this time.  New pain regimen  Continue acetaminophen 975mg q8h hours  Start dexamethasone 2mg BID with breakfast and lunch  Continue oxycodone 5 mg " "however change frequency from every 6 hours to every 4 hours as needed for moderate pain  Continue oxycodone 7.5 mg however change frequency from every 6 hours to every 4 hours as needed for severe pain  Start dilaudid 0.2mg q3h PRN for breakthrough    Patient does have a bowel regimen in place. We will continue to monitor for OIC  Narcan orders in place.    Constipation  Continue senna @ hs  Continue miralax daily  HFrEF (heart failure with reduced ejection fraction) (HCC)  Wt Readings from Last 3 Encounters:   03/18/25 67.4 kg (148 lb 11.2 oz)   03/14/25 64.9 kg (143 lb)   02/26/25 64.9 kg (143 lb)             Multiple myeloma not having achieved remission (HCC)  Per chart review, patient with history of multiple myeloma and currently following the outpatient setting. Per chart review, the patient has been having severe back pain and received MRI on 3/11. The patient was to be referred to pain management.   Per review, recently completed a month of Revlimid.  Will order hematology/oncology consultation for further recommendations.  Palliative care encounter  Palliative diagnoses: Cancer related pain, multiple myeloma  Outpatient Palliative provider:  New to palliative medicine today    Goals of care  Level 3 code status  Disease focused care DNR/DNI  Concerns introduced today include:  Minimal conversation today as patient is bothered by the fact that another provider is involved. Says \"there are too many hands in the pot and someone else is already managing my pain.\" Told him I would place orders for steroid and then ask primary to manage pain so that he does not have so many people in and out.\"  Spoke to daughter and made her aware of med changes I made which she was agreeable to.  Will discuss with SLIM Team to minimize the number of people in and out of his room.  Will continue discussions regarding GOC as patient's clinical presentation evolves.  Encouraged follow up with Palliative Medicine on an outpatient " "basis after discharge for continued symptom management.  Our office will contact patient to schedule a hospital follow up.    Social support for daughter Xuan:  Patient finds comfort in his daughters  Xuan describes a \"fiercly independent\" patient, struggling with the loss of independence  Daughter works in a personal care home, wondering if he could go there  Supportive listening provided  Normalized experience of patient/family  Provided anxiety containment  Provided anticipatory guidance  Encouraged self care  Discussed spiritual needs  Living situation - lives alone, across the street from [place]    Follow up  Palliative Care will continue to follow and goals of care discussions will be ongoing.    Please reach out via [employer] Chat if questions or concerns arise.    Care Coordination  Reviewed case with LATONYA, Dr. Haley  Reviewed case with ALECIA Ponce    I have reviewed the patient's controlled substance dispensing history in the Prescription Drug Monitoring Program in compliance with the [facility] regulations before prescribing any controlled substances.    Decisional apparatus:  Patient has capacity on exam today.  If capacity is lost, patient's substitute decision maker would default to 2 daughters by [place] 169.  Advance Directive/Living Will, POLST and POA Forms: None  ER contacts:  Name Relation Home Work Mobile   Xuan Prakash Child   [phone]   Charity Mendez Child   [phone]     We appreciate the invitation to be involved in this patient's care.  We will continue to follow throughout this hospitalization.  Please do not hesitate to reach our on call provider through our clinic answering service at [phone] should you have acute symptom control concerns.    Jennifer P. Bloch, MSN, CRNP, ACHPN  Palliative and Supportive Care  Clinic/Answering Service: [phone]  You can find me on Epic Secure Chat       History of Present Illness   HPI: Noah Mendez is a 84 y.o. year old male " "who presents with multiple myeloma and severe back pain. He describes worsening pain for months and he is now experiencing mid-to-low back pain. Pain is keeping him awake at night. Recently completed a month of Revlimid.   Hematology/Oncology, Neurosurgery and Palliative Medicine all consulted to assist with pain control.    Patient seen resting in bed with no family present today.  He is very upset that another person is in the room to see him.  When I asked if I can help him with pain management he tells me there are too many people involved and someone is already doing pain management for him.  Allowed patient to vent his frustrations about the number of people involved in his care and attempted to explain the role of palliative medicine.  He reports he will not adjust his medications to take anything that involves more \"needle sticks\"  I explained I will order medications that can either go through his IV or oral which he agreed to however he also would prefer less visits so I will review pain med management with his attending and see him intermittently.    Called patient's daughter Xuan and explained situation to her.  She agrees with trying the steroids and increasing the frequency of his oxycodone.  She reports patient is fiercely independent.  Continues to work around his yard at home and is now not even allowed to walk around his hospital room.  This is a big change for him that is taking away his independence which is causing him to be very agitated.  This is understandably difficult for patient.  Explained to Xuan that I will try to follow intermittently to avoid extra people in and out of the room and I rafael ask the nurses to cluster care to see if that helps with patient's mood.    Review of Systems   Unable to perform ROS: Other (patient only minimally engaged today)     Medical History Review: I have reviewed the patient's PMH, PSH, Social History, Family History, Meds, and Allergies     Objective " :  Temp:  [97.3 °F (36.3 °C)-98.6 °F (37 °C)] 97.4 °F (36.3 °C)  HR:  [60-82] 60  BP: (124-142)/(62-76) 137/70  Resp:  [12-18] 12  SpO2:  [94 %-97 %] 97 %  O2 Device: Nasal cannula  Nasal Cannula O2 Flow Rate (L/min):  [1 L/min-3 L/min] 2 L/min    Physical Exam  Vitals and nursing note reviewed.   Constitutional:       General: He is awake.      Comments: Appears uncomfortable  Groaning with any movement   HENT:      Head: Normocephalic and atraumatic.   Eyes:      General: No scleral icterus.        Right eye: No discharge.         Left eye: No discharge.      Conjunctiva/sclera: Conjunctivae normal.   Cardiovascular:      Rate and Rhythm: Normal rate.   Pulmonary:      Effort: Pulmonary effort is normal. No respiratory distress.   Abdominal:      General: There is no distension.   Skin:     General: Skin is warm and dry.   Neurological:      Mental Status: He is alert and oriented to person, place, and time.   Psychiatric:         Mood and Affect: Mood is anxious. Affect is angry.         Behavior: Behavior is agitated.         Thought Content: Thought content normal.         Judgment: Judgment normal.          Lab Results: I have reviewed the following results:  Lab Results   Component Value Date/Time    SODIUM 125 (L) 03/18/2025 01:16 AM    SODIUM 136 02/24/2025 12:56 PM    K 4.3 03/18/2025 01:16 AM    K 4.2 02/24/2025 12:56 PM    BUN 16 03/18/2025 01:16 AM    BUN 15 02/24/2025 12:56 PM    CREATININE 0.74 03/18/2025 01:16 AM    GLUC 94 03/18/2025 01:16 AM    GLUC 149 (H) 02/24/2025 12:56 PM    CALCIUM 8.2 (L) 03/18/2025 01:16 AM    AST 19 03/18/2025 01:16 AM    AST 20 02/24/2025 12:56 PM    ALT 13 03/18/2025 01:16 AM    ALT 15 02/24/2025 12:56 PM    ALB 2.9 (L) 03/18/2025 01:16 AM    TP 9.2 (H) 03/18/2025 01:16 AM    TP 8.9 (H) 02/24/2025 12:56 PM    EGFR 84 03/18/2025 01:16 AM     Lab Results   Component Value Date/Time    HGB 10.7 (L) 03/18/2025 01:16 AM    WBC 6.20 03/18/2025 01:16 AM     03/18/2025  01:16 AM    INR 1.48 (H) 11/06/2024 08:56 PM    PTT 27 11/06/2024 08:56 PM     Lab Results   Component Value Date/Time    [id number] 12.135 (H) 09/16/2024 05:08 PM       Imaging Results Review: I reviewed radiology reports from this admission including: chest xray, CT abdomen/pelvis, and MRI spine.  Other Study Results Review: EKG was reviewed.       Administrative Statements   I have spent a total time of 70 minutes in caring for this patient on the day of the visit/encounter including Risks and benefits of tx options, Patient and family education, Importance of tx compliance, Impressions, Counseling / Coordination of care, Documenting in the medical record, Reviewing/placing orders in the medical record (including tests, medications, and/or procedures), Obtaining or reviewing history  , and Communicating with other healthcare professionals .

## 2025-03-18 NOTE — ASSESSMENT & PLAN NOTE
Per chart review, patient continues to have hyponatremia, as noted worsening from prior admission.  Per review, baseline around low 130s.  Patient presented with Na+ of 125.  However, unchanged sodium levels.  Will order FR 1500 mL.   Appreciate nephrology consultation and expertise.    Will check urine osmolality, urine sodium, urine chloride, serum uric acid.  HOLD home Lasix.  Continue hypertonic 1.8% saline at 60 mL/h.  Continue every 4 hours BMP monitoring.

## 2025-03-18 NOTE — PROGRESS NOTES
"Progress Note - Hospitalist   Name: Noah Mendez 84 y.o. male I MRN: [id number]  Unit/Bed#: -01 I Date of Admission: 3/17/2025   Date of Service: 3/18/2025 I Hospital Day: 1    Assessment & Plan  Back pain  Patient presented with complaints of back pain, which is been worsening and causing inability to complete tasks and sleep.  Per patient, pain has been worsening over the past few months, as noted by outpatient hematology/oncology.  In fact, MRI thoracic spine completed last week.  MRI thoracic spine (3/11): \"Low T1 signal throughout the marrow which may reflect patient's underlying myeloma. Chronic appearing likely pathologic compression deformities of the T6, T7, and T10 vertebral bodies. No marrow edema. Degenerative changes as described without significant stenosis.\"  CT c/a/p (3/17): \"Stable appearing severe compression fracture deformity of the T6 vertebrae and mild to moderate compression fracture deformities of the T7, T9, and T10 vertebrae again noted. Mild superior plate L1 compression fracture deformity is also seen. Multilevel degenerative changes of the thoracolumbar spine. Sternotomy wires are again seen. No new/acute fracture or subluxation identified. Scattered areas of ill-defined lucency throughout the axial skeleton concerning for metastatic disease and/or multiple myeloma.\"   Appreciate palliative care consultation and expertise.  Will initiate dexamethasone 2 mg, twice daily.  Appreciate hematology/oncology consultation.  Appreciate neurosurgery consultation.  Per neurosurgery, no further imaging needed at this time.  Will continue TSLO brace.   Hyponatremia  Per chart review, patient continues to have hyponatremia, as noted worsening from prior admission.  Per review, baseline around low 130s.  Patient presented with Na+ of 125.  However, unchanged sodium levels.  Will order FR 1500 mL.   Appreciate nephrology consultation and expertise.    Will check urine osmolality, urine sodium, " "urine chloride, serum uric acid.  HOLD home Lasix.  Continue hypertonic 1.8% saline at 60 mL/h.  Continue every 4 hours BMP monitoring.   Multiple myeloma not having achieved remission (HCC)  Per chart review, patient with history of multiple myeloma and currently following the outpatient setting. Per chart review, the patient has been having severe back pain and received MRI on 3/11. The patient was to be referred to pain management.   Per review, recently completed a month of Revlimid.  Appreciate hematology/oncology consultation.  There is no acute intervention needed from an oncology standpoint.  HFrEF (heart failure with reduced ejection fraction) ([facility])  Wt Readings from Last 3 Encounters:   03/18/25 67.4 kg (148 lb 11.2 oz)   03/14/25 64.9 kg (143 lb)   02/26/25 64.9 kg (143 lb)     Per chart review, weight appears unchanged.  Will hold furosemide 20 mg, daily.  Continue cardiac, sodium-restricted diet.  Continue accurate I/Os.   Continue daily weights.  Constipation  Patient with longstanding history of constipation issues.  CT c/a/p: \"Evaluation of bowel is markedly limited on this exam due to lack of oral contrast and paucity of intra-abdominal fat. Terminal ileum is markedly distended with fecal matter which could be related to IC valve reflux or incompetence. Large amount of stool in the proximal to mid colon as well as the mid to distal sigmoid colon noted suggesting constipation/impaction. Wall thickening of the distal sigmoid colon and rectum could suggest mild focal colitis/proctitis. Consider nonemergent colonoscopy for further evaluation after resolution of acute illness. No evidence of bowel obstruction, free air, free fluid, or loculated fluid collections in the abdomen/pelvis.\"  Will continue bowel regimen.  Will encourage outpatient GI evaluation.  Coronary artery disease involving native coronary artery of native heart without angina pectoris  Continue metoprolol succinate 25 mg, " daily.  Paroxysmal atrial fibrillation (HCC)  Continue amiodarone 200 mg, daily.  Continue Eliquis 5 mg, twice daily.  Continue rate-controlling medications.   Hypomagnesemia  Per chart review, magnesium level noted at 1.7.   Will replete with 2 g of magnesium sulfate.  Continue to monitor magnesium level.    VTE Pharmacologic Prophylaxis: VTE Score: 5 High Risk (Score >/= 5) - Pharmacological DVT Prophylaxis Ordered: apixaban (Eliquis). Sequential Compression Devices Ordered.    Mobility:   Basic Mobility Inpatient Raw Score: 19  JH-HLM Goal: 6: Walk 10 steps or more  JH-HLM Achieved: 6: Walk 10 steps or more  JH-HLM Goal achieved. Continue to encourage appropriate mobility.    Patient Centered Rounds: I performed bedside rounds with nursing staff today.   Discussions with Specialists or Other Care Team Provider: Case Management, Nephrology, Palliative Care     Education and Discussions with Family / Patient: Updated  (daughter) via phone.    Current Length of Stay: 1 day(s)  Current Patient Status: Inpatient   Certification Statement: The patient will continue to require additional inpatient hospital stay due to pain control and resolution of hyponatremia.   Discharge Plan: Anticipate discharge in 48-72 hrs to home.    Code Status: Level 3 - DNAR and DNI    Subjective   The patient was seen and examined at the bedside this morning. The patient expresses that he is feeling good this morning. The patient does not express any complaints overnight. The patient expresses that he ate breakfast this morning. The patient does have pain, so will adjust his pain regimen.     Per nursing, no acute events overnight.     Objective :  Temp:  [97.3 °F (36.3 °C)-98.6 °F (37 °C)] 97.4 °F (36.3 °C)  HR:  [60-82] 60  BP: (124-142)/(62-76) 137/70  Resp:  [12-18] 12  SpO2:  [94 %-97 %] 97 %  O2 Device: Nasal cannula  Nasal Cannula O2 Flow Rate (L/min):  [1 L/min-3 L/min] 2 L/min    Body mass index is 21.34 kg/m².      Input and Output Summary (last 24 hours):     Intake/Output Summary (Last 24 hours) at 3/18/2025 1457  Last data filed at 3/18/2025 1300  Gross per 24 hour   Intake 345 ml   Output 850 ml   Net -505 ml       Physical Exam  Vitals and nursing note reviewed.   Constitutional:       General: He is awake. He is not in acute distress.  Eyes:      General: No scleral icterus.     Conjunctiva/sclera: Conjunctivae normal.   Cardiovascular:      Rate and Rhythm: Normal rate and regular rhythm.      Heart sounds: Normal heart sounds, S1 normal and S2 normal. No murmur heard.  Pulmonary:      Effort: Pulmonary effort is normal.      Breath sounds: No decreased air movement. Examination of the right-lower field reveals wheezing. Wheezing (slight exp. wheeze) present. No rhonchi or rales.   Abdominal:      General: Bowel sounds are normal. There is no distension.      Palpations: Abdomen is soft.      Tenderness: There is no abdominal tenderness.   Musculoskeletal:      Right lower le+ Pitting Edema present.      Left lower le+ Pitting Edema present.   Skin:     General: Skin is warm and dry.      Comments: On exam, no evidence of open wounds on exposed skin.    Neurological:      Mental Status: He is alert and oriented to person, place, and time. Mental status is at baseline.      Cranial Nerves: No dysarthria or facial asymmetry.      Sensory: No sensory deficit.      Motor: Motor function is intact.   Psychiatric:         Attention and Perception: Attention normal.         Mood and Affect: Mood normal.         Speech: Speech normal.         Behavior: Behavior is cooperative.       Lines/Drains:        Lab Results: I have reviewed the following results:   Results from last 7 days   Lab Units 25  0116 25  1116 25  0639   WBC Thousand/uL 6.20 5.71 4.20*   HEMOGLOBIN g/dL 10.7* 9.9* 11.7*   HEMATOCRIT % 33.2* 30.4* 37.1   PLATELETS Thousands/uL 182 164 177   BANDS PCT %  --   --  3   LYMPHO PCT %  --   13* 19   MONO PCT %  --  10 10   EOS PCT %  --  0 3     Results from last 7 days   Lab Units 03/18/25  0116   SODIUM mmol/L 125*   POTASSIUM mmol/L 4.3   CHLORIDE mmol/L 96   CO2 mmol/L 26   BUN mg/dL 16   CREATININE mg/dL 0.74   ANION GAP mmol/L 3*   CALCIUM mg/dL 8.2*   ALBUMIN g/dL 2.9*   TOTAL BILIRUBIN mg/dL 0.56   ALK PHOS U/L 85   ALT U/L 13   AST U/L 19   GLUCOSE RANDOM mg/dL 94                       Recent Cultures (last 7 days):               Last 24 Hours Medication List:     Current Facility-Administered Medications:     acetaminophen (TYLENOL) tablet 975 mg, Q8H    albuterol inhalation solution 2.5 mg, Q4H PRN    amiodarone tablet 200 mg, Daily    apixaban (ELIQUIS) tablet 5 mg, BID    [START ON 3/19/2025] dexamethasone (DECADRON) tablet 2 mg, BID before breakfast/lunch    docusate sodium (COLACE) capsule 100 mg, BID    Empagliflozin (JARDIANCE) tablet 10 mg, QAM    guaiFENesin (MUCINEX) 12 hr tablet 600 mg, Q12H ANGEL    HYDROmorphone HCl (DILAUDID) injection 0.2 mg, Q3H PRN    lidocaine (LIDODERM) 5 % patch 1 patch, Daily    metoprolol succinate (TOPROL-XL) 24 hr tablet 25 mg, Q24H    naloxone (NARCAN) 0.04 mg/mL syringe 0.04 mg, Q1MIN PRN    nicotine (NICODERM CQ) 14 mg/24hr TD 24 hr patch 1 patch, QPM    oxyCODONE (ROXICODONE) IR tablet 5 mg, Q4H PRN **OR** oxyCODONE (ROXICODONE) split tablet 7.5 mg, Q4H PRN    polyethylene glycol (MIRALAX) packet 17 g, Daily    QUEtiapine (SEROquel) tablet 100 mg, HS    senna (SENOKOT) tablet 17.2 mg, HS    sodium chloride (HYPERTONIC) infusion 1.8%, Continuous, Last Rate: 60 mL/hr (03/18/25 1101) **AND** Basic metabolic panel, Q4H    Administrative Statements   Today, Patient Was Seen By: KAYA Miller      **Please Note: This note may have been constructed using a voice recognition system.**

## 2025-03-18 NOTE — ASSESSMENT & PLAN NOTE
Per chart review, magnesium level noted at 1.7.   Will replete with 2 g of magnesium sulfate.  Continue to monitor magnesium level.

## 2025-03-18 NOTE — ASSESSMENT & PLAN NOTE
Wt Readings from Last 3 Encounters:   03/18/25 67.4 kg (148 lb 11.2 oz)   03/14/25 64.9 kg (143 lb)   02/26/25 64.9 kg (143 lb)     Per chart review, weight appears unchanged.  Will hold furosemide 20 mg, daily.  Continue cardiac, sodium-restricted diet.  Continue accurate I/Os.   Continue daily weights.

## 2025-03-19 PROBLEM — J44.9 COPD, SEVERITY TO BE DETERMINED (HCC): Status: ACTIVE | Noted: 2025-01-01

## 2025-03-19 PROBLEM — Z72.0 TOBACCO ABUSE: Status: ACTIVE | Noted: 2025-01-01

## 2025-03-19 PROBLEM — R09.02 HYPOXIA: Status: ACTIVE | Noted: 2025-01-01

## 2025-03-19 NOTE — ASSESSMENT & PLAN NOTE
"Patient with longstanding history of constipation issues.  CT c/a/p: \"Evaluation of bowel is markedly limited on this exam due to lack of oral contrast and paucity of intra-abdominal fat. Terminal ileum is markedly distended with fecal matter which could be related to IC valve reflux or incompetence. Large amount of stool in the proximal to mid colon as well as the mid to distal sigmoid colon noted suggesting constipation/impaction. Wall thickening of the distal sigmoid colon and rectum could suggest mild focal colitis/proctitis. Consider nonemergent colonoscopy for further evaluation after resolution of acute illness. No evidence of bowel obstruction, free air, free fluid, or loculated fluid collections in the abdomen/pelvis.\"  Will continue bowel regimen but increase miralax to BID.  Add dulcolax suppository vs enema PRN later today if no BM on current regimen  May need to also consider enema   Will encourage outpatient GI evaluation.  "

## 2025-03-19 NOTE — ASSESSMENT & PLAN NOTE
Per chart review, patient with history of multiple myeloma and currently following the outpatient setting. Per chart review, the patient has been having severe back pain and received MRI on 3/11. The patient was to be referred to pain management.   Per review, recently completed a month of Revlimid.  Appreciate hematology/oncology consultation.  No acute intervention needed from an oncology standpoint.

## 2025-03-19 NOTE — ASSESSMENT & PLAN NOTE
No baseline O2 needs, currently on 4 L/min - attempted to titrate to 2 L however patient desaturated with minimal movement  Maintain pulse ox > 89%  Will need eventual home O2 eval  Likely multifactorial- suspect some hypoventilation related to his back pain. He also has atelectasis and secretions on imaging. Has underlying emphysema w likely COPD but not actively wheezing  Encourage good pulmonary hygiene with IS, flutter, nebs. Hold on vest given his back pain  Optimize pain control per primary/palliative  Also agree w speech eval to r/o aspiration

## 2025-03-19 NOTE — PROGRESS NOTES
"Progress Note - Hospitalist   Name: Noah Mendez 84 y.o. male I MRN: [id number]  Unit/Bed#: -01 I Date of Admission: 3/17/2025   Date of Service: 3/19/2025 I Hospital Day: 2    Assessment & Plan  Back pain  Patient presented with complaints of back pain, which is been worsening and causing inability to complete tasks and sleep.  Per patient, pain has been worsening over the past few months, as noted by outpatient hematology/oncology.  In fact, MRI thoracic spine completed last week.  MRI thoracic spine (3/11): \"Low T1 signal throughout the marrow which may reflect patient's underlying myeloma. Chronic appearing likely pathologic compression deformities of the T6, T7, and T10 vertebral bodies. No marrow edema. Degenerative changes as described without significant stenosis.\"  CT c/a/p (3/17): \"Stable appearing severe compression fracture deformity of the T6 vertebrae and mild to moderate compression fracture deformities of the T7, T9, and T10 vertebrae again noted. Mild superior plate L1 compression fracture deformity is also seen. Multilevel degenerative changes of the thoracolumbar spine. Sternotomy wires are again seen. No new/acute fracture or subluxation identified. Scattered areas of ill-defined lucency throughout the axial skeleton concerning for metastatic disease and/or multiple myeloma.\"   Appreciate palliative care consultation  Continue dexamethasone 2 mg twice daily.  Continue tylenol scheduled  Continue oxycodone/IV dilaudid PRN  Appreciate hematology/oncology consultation - no additional inpt recommendaitons  Appreciate neurosurgery consultation.  Per neurosurgery, no further imaging needed at this time.  PT consulted for TLSO brace fitting   Hyponatremia  Per chart review, patient continues to have hyponatremia, as noted worsening from prior admission.  Per review, baseline around low 130s.  Patient presented with sodium of 125  Continue fluid restriction  Nephrology following  HOLD home " "Lasix.  Continue hypertonic 1.8% saline at 60 mL/h - most recent sodium 128  Continue every 6 hour BMP  Hypoxia  Patient with increasing oxygen requirements since 3/17  Upon review of prior CT imaging, concern for secretions in the airways and atelectasis.  Suspect back pain contributing to poor inspiratory effort, ability to cough/expectorate sputum  Increase Mucinex to 1200 mg twice daily, add nebulizer treatments  Check CXR  Encourage incentive spirometer, add airway clearance protocol  Now requiring upwards of 5 L nasal cannula -consult pulmonology  Consult speech therapy  Aspiration precautions  Continue O2 supplementation to maintain SpO2 > 90%  Multiple myeloma not having achieved remission (HCC)  Per chart review, patient with history of multiple myeloma and currently following the outpatient setting. Per chart review, the patient has been having severe back pain and received MRI on 3/11. The patient was to be referred to pain management.   Per review, recently completed a month of Revlimid.  Appreciate hematology/oncology consultation.  No acute intervention needed from an oncology standpoint.  HFrEF (heart failure with reduced ejection fraction) ([facility])  Wt Readings from Last 3 Encounters:   03/19/25 68.3 kg (150 lb 9.6 oz)   03/14/25 64.9 kg (143 lb)   02/26/25 64.9 kg (143 lb)     Lasix currently on hold in setting of hyponatremia  Continue cardiac, sodium-restricted diet.  Continue accurate I/Os.   Continue daily weights.  Constipation  Patient with longstanding history of constipation issues.  CT c/a/p: \"Evaluation of bowel is markedly limited on this exam due to lack of oral contrast and paucity of intra-abdominal fat. Terminal ileum is markedly distended with fecal matter which could be related to IC valve reflux or incompetence. Large amount of stool in the proximal to mid colon as well as the mid to distal sigmoid colon noted suggesting constipation/impaction. Wall thickening of the distal sigmoid " "colon and rectum could suggest mild focal colitis/proctitis. Consider nonemergent colonoscopy for further evaluation after resolution of acute illness. No evidence of bowel obstruction, free air, free fluid, or loculated fluid collections in the abdomen/pelvis.\"  Will continue bowel regimen but increase miralax to BID.  Add dulcolax suppository vs enema PRN later today if no BM on current regimen  May need to also consider enema   Will encourage outpatient GI evaluation.  Coronary artery disease involving native coronary artery of native heart without angina pectoris  Continue metoprolol succinate 25 mg daily.  Paroxysmal atrial fibrillation (HCC)  Continue amiodarone 200 mg daily.  Continue Eliquis 5 mg twice daily.  Continue rate-controlling medications.   Hypomagnesemia  Per chart review, magnesium level noted at 1.7 yesterday  S/p repletion  Repeat tomorrow    VTE Pharmacologic Prophylaxis: VTE Score: 5 High Risk (Score >/= 5) - Pharmacological DVT Prophylaxis Ordered: apixaban (Eliquis). Sequential Compression Devices Ordered.    Mobility:   Basic Mobility Inpatient Raw Score: 19  JH-HLM Goal: 6: Walk 10 steps or more  JH-HLM Achieved: 3: Sit at edge of bed  JH-HLM Goal NOT achieved. Continue with multidisciplinary rounding and encourage appropriate mobility to improve upon JH-HLM goals.    Patient Centered Rounds: I performed bedside rounds with nursing staff today.   Discussions with Specialists or Other Care Team Provider: CM, pulm AP    Education and Discussions with Family / Patient: Updated  (daughter) via phone.    Current Length of Stay: 2 day(s)  Current Patient Status: Inpatient   Certification Statement: The patient will continue to require additional inpatient hospital stay due to hypoxia, hyponatremia  Discharge Plan: Anticipate discharge in 48-72 hrs to discharge location to be determined pending rehab evaluations.    Code Status: Level 3 - DNAR and DNI    Subjective   Patient " reports ongoing back pain, stable from prior.  Admits to feeling slightly more short of breath this morning.  Denies chest pain/palpitations, nausea/vomiting, abdominal pain.    Objective :  Temp:  [98.1 °F (36.7 °C)-98.6 °F (37 °C)] 98.2 °F (36.8 °C)  HR:  [58-66] 62  BP: (123-129)/(63-64) 125/64  Resp:  [21-22] 22  SpO2:  [91 %-97 %] 94 %  O2 Device: Nasal cannula  Nasal Cannula O2 Flow Rate (L/min):  [4 L/min] 4 L/min    Body mass index is 21.61 kg/m².     Input and Output Summary (last 24 hours):     Intake/Output Summary (Last 24 hours) at 3/19/2025 1149  Last data filed at 3/19/2025 0901  Gross per 24 hour   Intake 1617 ml   Output 1425 ml   Net 192 ml       Physical Exam  Vitals and nursing note reviewed.   Constitutional:       Appearance: He is ill-appearing.      Interventions: Nasal cannula in place.      Comments: No acute distress   HENT:      Head: Normocephalic.   Eyes:      General: No scleral icterus.  Cardiovascular:      Rate and Rhythm: Normal rate and regular rhythm.      Heart sounds: Normal heart sounds. No murmur heard.  Pulmonary:      Effort: Tachypnea present. No respiratory distress.      Breath sounds: Decreased breath sounds and rhonchi present.   Abdominal:      General: Bowel sounds are normal.      Palpations: Abdomen is soft.      Tenderness: There is no abdominal tenderness. There is no guarding or rebound.   Musculoskeletal:      Cervical back: Normal range of motion.      Comments: Able to move upper/lower ext bilaterally, no edema   Skin:     General: Skin is warm and dry.   Neurological:      General: No focal deficit present.      Mental Status: He is alert.   Psychiatric:         Cognition and Memory: Cognition is impaired.      Comments: Poor sleep since admission per nursing.  Patient admits to intermittent confusion, improves w/ reorientation           Lines/Drains:        Telemetry:  Telemetry Orders (From admission, onward)               24 Hour Telemetry Monitoring   Continuous x 24 Hours (Telem)        Expiring   Question:  Reason for 24 Hour Telemetry  Answer:  Metabolic/electrolyte disturbance with high probability of dysrhythmia. K level <3 or >6 OR KCL infusion >10mEq/hr                     Telemetry Reviewed: Normal Sinus Rhythm  Indication for Continued Telemetry Use: Metabolic/electrolyte disturbance with high probability of dysrhythmia               Lab Results: I have reviewed the following results:   Results from last 7 days   Lab Units 03/19/25  0517 03/18/25  0116 03/17/25  1116 03/14/25  0639   WBC Thousand/uL 4.06*   < > 5.71 4.20*   HEMOGLOBIN g/dL 10.4*   < > 9.9* 11.7*   HEMATOCRIT % 32.1*   < > 30.4* 37.1   PLATELETS Thousands/uL 163   < > 164 177   BANDS PCT %  --   --   --  3   LYMPHO PCT %  --   --  13* 19   MONO PCT %  --   --  10 10   EOS PCT %  --   --  0 3    < > = values in this interval not displayed.     Results from last 7 days   Lab Units 03/19/25  0517 03/18/25  1431 03/18/25  0116   SODIUM mmol/L 128*   < > 125*   POTASSIUM mmol/L 4.0   < > 4.3   CHLORIDE mmol/L 98   < > 96   CO2 mmol/L 25   < > 26   BUN mg/dL 24   < > 16   CREATININE mg/dL 0.85   < > 0.74   ANION GAP mmol/L 5   < > 3*   CALCIUM mg/dL 8.1*   < > 8.2*   ALBUMIN g/dL  --   --  2.9*   TOTAL BILIRUBIN mg/dL  --   --  0.56   ALK PHOS U/L  --   --  85   ALT U/L  --   --  13   AST U/L  --   --  19   GLUCOSE RANDOM mg/dL 108   < > 94    < > = values in this interval not displayed.                       Recent Cultures (last 7 days):         Imaging Results Review: I reviewed radiology reports from this admission including: CT chest and CT abdomen/pelvis.  Other Study Results Review: No additional pertinent studies reviewed.    Last 24 Hours Medication List:     Current Facility-Administered Medications:     acetaminophen (TYLENOL) tablet 975 mg, Q8H    amiodarone tablet 200 mg, Daily    apixaban (ELIQUIS) tablet 5 mg, BID    dexamethasone (DECADRON) tablet 2 mg, BID before  breakfast/lunch    docusate sodium (COLACE) capsule 100 mg, BID    Empagliflozin (JARDIANCE) tablet 10 mg, QAM    guaiFENesin (MUCINEX) 12 hr tablet 1,200 mg, Q12H ANGEL    HYDROmorphone HCl (DILAUDID) injection 0.2 mg, Q3H PRN    ipratropium (ATROVENT) 0.02 % inhalation solution 0.5 mg, Q8H PRN    levalbuterol (XOPENEX) inhalation solution 1.25 mg, Q8H PRN    lidocaine (LIDODERM) 5 % patch 1 patch, Daily    melatonin tablet 6 mg, HS    metoprolol succinate (TOPROL-XL) 24 hr tablet 25 mg, Q24H    naloxone (NARCAN) 0.04 mg/mL syringe 0.04 mg, Q1MIN PRN    nicotine (NICODERM CQ) 14 mg/24hr TD 24 hr patch 1 patch, QPM    oxyCODONE (ROXICODONE) IR tablet 5 mg, Q4H PRN **OR** oxyCODONE (ROXICODONE) split tablet 7.5 mg, Q4H PRN    polyethylene glycol (MIRALAX) packet 17 g, Daily    QUEtiapine (SEROquel) tablet 100 mg, HS    senna (SENOKOT) tablet 17.2 mg, HS    sodium chloride (HYPERTONIC) infusion 1.8%, Continuous, Last Rate: 60 mL/hr (03/19/25 0518) **AND** [CANCELED] Basic metabolic panel, Q4H    Administrative Statements   Today, Patient Was Seen By: Shira Keith PA-C  I have spent a total time of 40 minutes in caring for this patient on the day of the visit/encounter including Diagnostic results, Instructions for management, Patient and family education, Importance of tx compliance, Risk factor reductions, Impressions, Counseling / Coordination of care, Documenting in the medical record, Reviewing/placing orders in the medical record (including tests, medications, and/or procedures), Obtaining or reviewing history  , and Communicating with other healthcare professionals .    **Please Note: This note may have been constructed using a voice recognition system.**

## 2025-03-19 NOTE — SPEECH THERAPY NOTE
Speech Language/Pathology  Speech-Language Pathology Bedside Swallow Evaluation      Patient Name: Noah Mendez    Today's Date: 3/19/2025     Problem List  Principal Problem:    Back pain  Active Problems:    Paroxysmal atrial fibrillation (HCC)    Constipation    HFrEF (heart failure with reduced ejection fraction) (HCC)    Coronary artery disease involving native coronary artery of native heart without angina pectoris    Multiple myeloma not having achieved remission (HCC)    Hyponatremia    Hypomagnesemia    Palliative care encounter    Hypoxia      Past Medical History  Past Medical History:   Diagnosis Date    Anemia     Atrial fibrillation (HCC)     CHF (congestive heart failure) (HCC)     Coronary artery disease     Depression     Hyperlipidemia     Hypertension     Hyponatremia     Insomnia     Low blood pressure     MI (myocardial infarction) (HCC)     Multiple myeloma (HCC)     Thrombocytopenia (HCC)        Past Surgical History  Past Surgical History:   Procedure Laterality Date    CARDIAC PACEMAKER PLACEMENT      CARDIAC SURGERY      CORONARY ARTERY BYPASS GRAFT      FRACTURE SURGERY Left     Wrist    IR BIOPSY BONE MARROW  10/23/2024       Summary   Pt presents w/ s/s suggestive of min oropharyngeal dysphagia, ultimately functional.     Complete labial seal for retrieval and containment of all materials, no anterior bolus loss present. Timely rotary mastication of regular textures w/ disorganized bolus formation though functional bolus transfer. No oral residue noted. Suspected variable swallow initiation at times fairly prompt though suspected reduced hyolaryngeal elevation to palpation. No overt s/s of aspiration at this time, VSS, no change in vocal quality. Of note, cannot r/o sensory deficit at bedside.     Education initiated w/ pt regarding strategies to optimize swallow safety including frequent/thorough oral care and to notify medical staff if s/s of aspiration arise. Pt verbalized  understanding and agreement, denies questions or concerns at this time.     Pt w/ increased risk of aspiration 2/2 abn lung imaging, advanced age, and multiple medical co-morbidities. SLP to f/u for diet tolerance as able and appropriate. Will monitor lung imaging and pt's status, can consider completion of VFSS if ongoing concern for aspiration or if lung imaging worsens.       Recommended Diet: regular diet and thin liquids   Recommended Form of Meds: as tolerated    Aspiration precautions and swallowing strategies: upright posture, only feed when fully alert, slow rate of feeding, and small bites/sips  Other Recommendations: Continue frequent oral care        Current Medical Status  Pt is a 84 y.o. male with a PMH of anemia, atrial fibrillation, CHF, CAD, depression, HLD, HTN, hyponatremia, insomnia, MI, and multiple myeloma who presents with back pain. The patient presented with continued complaints of back pain. The patient expressed that his pain has been worsening for months and he is experiencing mid-to-low back pain. The patient recently explains that he just completed a month of Revlimid.  However, the patient is explaining that he is having worsening back pain and unable to sleep due to the pain.  The patient currently follows outpatient with hematology/oncology for multiple myeloma and recently had MRI completed for thoracic back pain.  On admission, the patient complains of pain, but denies any symptoms of increased numbness/tingling or incontinence.  Also, the patient's daughter via telephone that patient has been unable to remain still and find comfortable ways to sit/sleep.  In addition, the patient was noted to have worsening hyponatremia via lab review.  He will be admitted for further workup of back pain and hyponatremia.     Current Precautions:  Fall  Aspiration      Allergies:  No known food allergies    Past medical history:  Please see H&P for details    Special Studies:  3/17/25 CT chest  abdomen pelvis w contrast:  1.  Layering debris within the posterior mid to lower trachea and proximal bilateral mainstem bronchi could represent secretions. Extensive left basilar atelectasis noted, can not exclude early airspace disease. Minimal right basilar atelectasis is also   seen. No lobar airspace consolidation. Moderate to severe centrilobular and paraseptal emphysematous changes are seen.  2.  Stable cardiomegaly and post sternotomy changes.  3.  Extensive calcific atherosclerosis of the thoracoabdominal aorta, proximal thoracic vessels, coronary arteries, and iliac arteries. 3.3 x 2.9 cm infrarenal abdominal aortic aneurysm is seen, not significantly changed compared to the prior study. No   evidence of acute dissection or intramural hematoma. No significant focal flow-limiting stenosis. Recommend follow-up imaging in 3 years per current guidelines.  4.  Evaluation of bowel is markedly limited on this exam due to lack of oral contrast and paucity of intra-abdominal fat. Terminal ileum is markedly distended with fecal matter which could be related to IC valve reflux or incompetence. Large amount of   stool in the proximal to mid colon as well as the mid to distal sigmoid colon noted suggesting constipation/impaction. Wall thickening of the distal sigmoid colon and rectum could suggest mild focal colitis/proctitis. Consider nonemergent colonoscopy for   further evaluation after resolution of acute illness. No evidence of bowel obstruction, free air, free fluid, or loculated fluid collections in the abdomen/pelvis.  5.  Stable appearing multilevel compression fracture deformities of the thoracolumbar spine as described above. Scattered areas of ill-defined lucency throughout the axial skeleton concerning for metastatic disease and/or multiple myeloma.    3/14/25 XR ribs with pa chest min 3 views LEFT:  No evidence of a rib fracture in the visualized ribs.     No acute cardiopulmonary disease.     This  report is in agreement with the preliminary interpretation.       History of VFSS:  N/A     Social/Education/Vocational Hx:  Pt lives alone    Swallow Information   Current Diet: regular diet and thin liquids   Baseline Diet: regular diet and thin liquids per pt report       Baseline Assessment   Behavior/Cognition: alert  Speech/Language Status: able to participate in conversation and able to follow commands  Patient Positioning: upright in bed  Pain Status/Interventions/Response to Interventions: No report of or nonverbal indications of pain.       Oral Mechanism Exam  Facial: symmetrical  Labial: WFL  Lingual: WFL  Velum: symmetrical  Mandible: adequate ROM  Dentition: adequate  Vocal quality:clear/adequate   Volitional Cough: strong/productive   Respiratory Status: on 4L O2        Consistencies Assessed and Performance   Consistencies Administered: thin liquids and hard solids    Oral Stage:   Complete labial seal for retrieval and containment of all materials, no anterior bolus loss present. Timely rotary mastication of regular textures w/ disorganized bolus formation though functional bolus transfer. No oral residue noted.     Pharyngeal Stage:   Suspected variable swallow initiation at times fairly prompt though suspected reduced hyolaryngeal elevation to palpation. No overt s/s of aspiration at this time, VSS, no change in vocal quality. Of note, cannot r/o sensory deficit at bedside.     Esophageal Concerns: none reported      Summary and Recommendations (see above)    Results Reviewed with: patient, RN, and MD     Treatment Recommended: as able and appropriate for diet tolerance, will monitor need for VFSS completion if medically necessary    Dysphagia LTG  -Patient will demonstrate safe and effective oral intake (without overt s/s significant oral/pharyngeal dysphagia including s/s penetration or aspiration) for the highest appropriate diet level.     Short Term Goals:  -Pt will tolerate regular textures  and thin liquid with no significant s/s oral or pharyngeal dysphagia across 1-3 diagnostic session/s    -Patient will comply with a Video/Modified Barium Swallow study for more complete assessment of swallowing anatomy/physiology/aspiration risk and to assess efficacy of treatment techniques so as to best guide treatment plan if medically indicated     Speech Therapy Prognosis   Prognosis: good/fair     Prognosis Considerations: age, medical status, prior medical history, and cognitive status

## 2025-03-19 NOTE — ASSESSMENT & PLAN NOTE
Wt Readings from Last 3 Encounters:   03/19/25 68.3 kg (150 lb 9.6 oz)   03/14/25 64.9 kg (143 lb)   02/26/25 64.9 kg (143 lb)   Restart diuretics but change to torsemide 10mg daily

## 2025-03-19 NOTE — ASSESSMENT & PLAN NOTE
Continue amiodarone 200 mg daily.  Continue Eliquis 5 mg twice daily.  Continue rate-controlling medications.

## 2025-03-19 NOTE — ASSESSMENT & PLAN NOTE
Patient with increasing oxygen requirements since 3/17  Upon review of prior CT imaging, concern for secretions in the airways and atelectasis.  Suspect back pain contributing to poor inspiratory effort, ability to cough/expectorate sputum  Increase Mucinex to 1200 mg twice daily, add nebulizer treatments  Check CXR  Encourage incentive spirometer, add airway clearance protocol  Now requiring upwards of 5 L nasal cannula -consult pulmonology  Consult speech therapy  Aspiration precautions  Continue O2 supplementation to maintain SpO2 > 90%

## 2025-03-19 NOTE — ASSESSMENT & PLAN NOTE
Wt Readings from Last 3 Encounters:   03/19/25 68.3 kg (150 lb 9.6 oz)   03/14/25 64.9 kg (143 lb)   02/26/25 64.9 kg (143 lb)     Consider diuretics however defer to primary & nephrology

## 2025-03-19 NOTE — ASSESSMENT & PLAN NOTE
Wt Readings from Last 3 Encounters:   03/19/25 68.3 kg (150 lb 9.6 oz)   03/14/25 64.9 kg (143 lb)   02/26/25 64.9 kg (143 lb)     Lasix currently on hold in setting of hyponatremia  Continue cardiac, sodium-restricted diet.  Continue accurate I/Os.   Continue daily weights.

## 2025-03-19 NOTE — CONSULTS
Consultation - Pulmonology   Name: Noah Mendez 84 y.o. male I MRN: [id number]  Unit/Bed#: -01 I Date of Admission: 3/17/2025   Date of Service: 3/19/2025 I Hospital Day: 2   Inpatient consult to Pulmonology  Consult performed by: Gracia Younger PA-C  Consult ordered by: Shira Keith PA-C        Physician Requesting Evaluation: Kristyn Haley MD   Reason for Evaluation / Principal Problem: hypoxia    Assessment & Plan  Hypoxia  No baseline O2 needs, currently on 4 L/min - attempted to titrate to 2 L however patient desaturated with minimal movement  Maintain pulse ox > 89%  Will need eventual home O2 eval  Likely multifactorial- suspect some hypoventilation related to his back pain. He also has atelectasis and secretions on imaging. Has underlying emphysema w likely COPD but not actively wheezing  Encourage good pulmonary hygiene with IS, flutter, nebs. Hold on vest given his back pain  Optimize pain control per primary/palliative  Also agree w speech eval to r/o aspiration  COPD, severity to be determined (HCC)  Nebs, pulmonary hygiene as above  No need for prednisone or methylprednisolone  Back pain  Per palliative  Multiple myeloma not having achieved remission (HCC)  Seen by oncology  HFrEF (heart failure with reduced ejection fraction) ([facility])  Wt Readings from Last 3 Encounters:   03/19/25 68.3 kg (150 lb 9.6 oz)   03/14/25 64.9 kg (143 lb)   02/26/25 64.9 kg (143 lb)     Consider diuretics however defer to primary & nephrology        Hyponatremia  Nephrology following  Tobacco abuse  Encourage cessation  I have discussed the above management plan in detail with the primary service.   Pulmonology service will follow.    History of Present Illness   Noah Mendez is a 84 y.o. male who  initially presented on 3/17 due to back pain. Incidentally noted to be hypoxic here. Patient for the most part not experiencing much dyspnea although hasn't really been out of bed. Has a cough which is  sometimes productive, other times he has trouble bringing it up. Notes a hx of COPD diagnosis but not really in the chart, not on any inhalers. Still actively smoking.    Review of Systems   Respiratory:  Positive for cough and shortness of breath.    Musculoskeletal:  Positive for back pain.   All other systems reviewed and are negative.      Historical Information   Medical History Review: I have reviewed the patient's PMH, PSH, Social History, Family History, Meds, and Allergies   Tobacco History: smokes daily  Occupational History: not working  Family History:History reviewed. No pertinent family history.    Objective :  Temp:  [98.1 °F (36.7 °C)-98.6 °F (37 °C)] 98.2 °F (36.8 °C)  HR:  [58-66] 62  BP: (123-129)/(63-64) 125/64  Resp:  [21-22] 22  SpO2:  [91 %-97 %] 94 %  O2 Device: Nasal cannula  Nasal Cannula O2 Flow Rate (L/min):  [4 L/min] 4 L/min    Physical Exam  Vitals reviewed.   Constitutional:       General: He is not in acute distress.     Appearance: He is not toxic-appearing.   HENT:      Head: Normocephalic and atraumatic.   Eyes:      General: No scleral icterus.  Cardiovascular:      Rate and Rhythm: Normal rate and regular rhythm.   Pulmonary:      Effort: Pulmonary effort is normal.      Comments: Decreased breath sounds, coarse breath sounds. No wheezing  Musculoskeletal:      Right lower leg: No edema.      Left lower leg: No edema.   Skin:     General: Skin is warm and dry.   Neurological:      Mental Status: He is alert.   Psychiatric:         Behavior: Behavior is cooperative.           Lab Results: I have reviewed the following results:  .     03/19/25  0517   WBC 4.06*   HGB 10.4*   HCT 32.1*      SODIUM 128*   K 4.0   CL 98   CO2 25   BUN 24   CREATININE 0.85   GLUC 108     ABG: No new results in last 24 hours.    Imaging Results Review: I reviewed radiology reports from this admission including: chest xray and CT chest.  Other Study Results Review: No additional pertinent studies  reviewed.  PFT Results Reviewed: na    VTE Prophylaxis: VTE covered by:  apixaban, Oral, 5 mg at 03/19/25 0941

## 2025-03-19 NOTE — ASSESSMENT & PLAN NOTE
"Patient presented with complaints of back pain, which is been worsening and causing inability to complete tasks and sleep.  Per patient, pain has been worsening over the past few months, as noted by outpatient hematology/oncology.  In fact, MRI thoracic spine completed last week.  MRI thoracic spine (3/11): \"Low T1 signal throughout the marrow which may reflect patient's underlying myeloma. Chronic appearing likely pathologic compression deformities of the T6, T7, and T10 vertebral bodies. No marrow edema. Degenerative changes as described without significant stenosis.\"  CT c/a/p (3/17): \"Stable appearing severe compression fracture deformity of the T6 vertebrae and mild to moderate compression fracture deformities of the T7, T9, and T10 vertebrae again noted. Mild superior plate L1 compression fracture deformity is also seen. Multilevel degenerative changes of the thoracolumbar spine. Sternotomy wires are again seen. No new/acute fracture or subluxation identified. Scattered areas of ill-defined lucency throughout the axial skeleton concerning for metastatic disease and/or multiple myeloma.\"   Appreciate palliative care consultation  Continue dexamethasone 2 mg twice daily.  Continue tylenol scheduled  Continue oxycodone/IV dilaudid PRN  Appreciate hematology/oncology consultation - no additional inpt recommendaitons  Appreciate neurosurgery consultation.  Per neurosurgery, no further imaging needed at this time.  PT consulted for TLSO brace fitting   "

## 2025-03-19 NOTE — UTILIZATION REVIEW
NOTIFICATION OF INPATIENT ADMISSION   AUTHORIZATION REQUEST   SERVICING FACILITY:   [place] [address]  Tax ID: [id number]  NPI: [id number] ATTENDING PROVIDER:  Attending Name and NPI#: Kristyn Haley Md [[id number]]  Address: [address] [place] [address]  Phone: [phone]   ADMISSION INFORMATION:  Place of Service: Inpatient [facility]  Place of Service Code: 21  Inpatient Admission Date/Time: 3/17/25  2:28 PM  Discharge Date/Time: No discharge date for patient encounter.  Admitting Diagnosis Code/Description:  Back pain [M54.9]  Hyponatremia [E87.1]  Multiple myeloma not having achieved remission (HCC) [C90.00]     UTILIZATION REVIEW CONTACT:  Estee Birmingham, Utilization   Network Utilization Review Department  Phone: [phone]  Fax: [phone]  Email: [email or web address]  Contact for approvals/pending authorizations, clinical reviews, and discharge.     PHYSICIAN ADVISORY SERVICES:  Medical Necessity Denial & Dfag-jr-Scca Review  Phone: [phone]  Fax: [phone]  Email: [email or web address]     DISCHARGE SUPPORT TEAM:  For Patients Discharge Needs & Updates  Phone: [phone] opt. 2 Fax: [phone]  Email: [email or web address]

## 2025-03-19 NOTE — ASSESSMENT & PLAN NOTE
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

## 2025-03-19 NOTE — ASSESSMENT & PLAN NOTE
Per chart review, patient continues to have hyponatremia, as noted worsening from prior admission.  Per review, baseline around low 130s.  Patient presented with sodium of 125  Continue fluid restriction  Nephrology following  HOLD home Lasix.  Continue hypertonic 1.8% saline at 60 mL/h - most recent sodium 128  Continue every 6 hour BMP   Post-Care Instructions: I reviewed with the patient in detail post-care instructions. Patient is to wear sunprotection, and avoid picking at any of the treated lesions. Pt may apply Vaseline to crusted or scabbing areas. Show Applicator Variable?: Yes Render Note In Bullet Format When Appropriate: No Duration Of Freeze Thaw-Cycle (Seconds): 10 Number Of Freeze-Thaw Cycles: 1 freeze-thaw cycle Detail Level: Zone Consent: The patient's consent was obtained including but not limited to risks of crusting, scabbing, blistering, scarring, darker or lighter pigmentary change, recurrence, incomplete removal and infection.

## 2025-03-19 NOTE — QUICK NOTE
Chart reviewed. Did not see patient. Patient frustrated with amount of providers/staff yesterday. Will limit visits.    Received:  oxyIR 10mg po/last 24h = 15mg approx OME  Received 7.5mg dose this am    No BM documented.    Back pain/Multiple myeloma:  -cont tylenol 975mg po q8h johnny  -cont decadron 2mg bid w/ breakfast and lunch  -cont oxyIR 5-7.5mg po q4h prn mod-severe pain  -cont dilaudid 0.2mg IV q3h prn bt pain    Bowel regimen  -t/c d/c colace  -t/c inc senna to tabs bid  -cont miralax daily, can consider increasing to BID until BM  -t/c suppository/enema today    Palliative care will follow intermittently as needed. Please contact palliative care on-call with questions/concerns.

## 2025-03-19 NOTE — PROGRESS NOTES
Progress Note - Nephrology   Name: Noah Mendez 84 y.o. 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 as well as multiple myeloma  Acute on chronic, baseline sodium level in the low 130s  Presented with sodium of 125  Sodium improving to 129 today  Monitoring on fluid restriction + nutritional supplements  Holding home Lasix  Start torsemide 10mg daily  Status post 1.8% saline  Started on salt tablets 2g TID 3/19/25  urine sodium less than 10, urine osmolality 483, uric acid 3.0  History of IgG kappa gammopathy  Hypomagnesemia  Continue to monitor and replace as needed  Multiple myeloma not having achieved remission (HCC)  Following with hematology/oncology, deferring treatment until back pain is controlled  Back pain  Continue pain management  HFrEF (heart failure with reduced ejection fraction) ([facility])  Wt Readings from Last 3 Encounters:   03/19/25 68.3 kg (150 lb 9.6 oz)   03/14/25 64.9 kg (143 lb)   02/26/25 64.9 kg (143 lb)   Restart diuretics but change to torsemide 10mg daily    I have reviewed the nephrology recommendations including salt tabs and lasix, with slim ap, and we are in agreement with renal plan including the information outlined above. Nephrology service will follow.    Subjective   Patient feeling well overall.  Frustrated that staff wouldn't tell him why he needed a face mask and states he was told the 4th time he asked that there was medicine in it.      Objective :  Temp:  [98.1 °F (36.7 °C)-98.6 °F (37 °C)] 98.2 °F (36.8 °C)  HR:  [58-66] 62  BP: (123-129)/(63-64) 125/64  Resp:  [21-22] 22  SpO2:  [91 %-97 %] 94 %  O2 Device: Nasal cannula  Nasal Cannula O2 Flow Rate (L/min):  [4 L/min] 4 L/min    Current Weight: Weight - Scale: 68.3 kg (150 lb 9.6 oz)  First Weight: Weight - Scale: 69.6 kg (153 lb 6.4 oz)  I/O          03/17 0701  03/18 0700 03/18 0701  03/19 0700 03/19 0701  03/20 0700    P.O. 280 545 480    I.V. (mL/kg)  1097 (16.1)     Total Intake(mL/kg) 280 (4.2) 1642 (24) 480 (7)    Urine (mL/kg/hr) 450 1400 (0.9) 125 (0.2)    Total Output 450 1400 125    Net -170 +242 +355           Unmeasured Urine Occurrence 1 x      Unmeasured Stool Occurrence 300 x            Physical Exam  General: NAD  Neuro: alert awake  Psych: mood and affect appropriate  Skin: no rash  Eyes: anicteric  ENMT: mm moist  Neck: no masses  Respiratory: CTAB  Cardiovascular: RRR  Extremities: + bilateral LE edema  Gastrointestinal: soft nt nd    Medications:    Current Facility-Administered Medications:     acetaminophen (TYLENOL) tablet 975 mg, 975 mg, Oral, Q8H, KAYA Miller, 975 mg at 03/19/25 0945    albuterol (PROVENTIL HFA,VENTOLIN HFA) inhaler 2 puff, 2 puff, Inhalation, Q4H PRN, Gracia Younger PA-C    amiodarone tablet 200 mg, 200 mg, Oral, Daily, KAYA Miller, 200 mg at 03/19/25 0941    apixaban (ELIQUIS) tablet 5 mg, 5 mg, Oral, BID, KAYA Miller, 5 mg at 03/19/25 0941    bisacodyl (DULCOLAX) rectal suppository 10 mg, 10 mg, Rectal, Daily PRN, Shira Keith PA-C    dexamethasone (DECADRON) tablet 2 mg, 2 mg, Oral, BID before breakfast/lunch, Jennifer Paige Bloch, CRNP, 2 mg at 03/19/25 1228    docusate sodium (COLACE) capsule 100 mg, 100 mg, Oral, BID, KAYA Miller, 100 mg at 03/19/25 0941    Empagliflozin (JARDIANCE) tablet 10 mg, 10 mg, Oral, QAM, KAYA Miller, 10 mg at 03/19/25 0941    guaiFENesin (MUCINEX) 12 hr tablet 1,200 mg, 1,200 mg, Oral, Q12H ANGEL, Shira Keith PA-C, 1,200 mg at 03/19/25 0945    HYDROmorphone HCl (DILAUDID) injection 0.2 mg, 0.2 mg, Intravenous, Q3H PRN, Jennifer Paige Bloch, CRNP    ipratropium (ATROVENT) 0.02 % inhalation solution 0.5 mg, 0.5 mg, Nebulization, TID, Gracia Younger PA-C, 0.5 mg at 03/19/25 1352    levalbuterol (XOPENEX)  inhalation solution 1.25 mg, 1.25 mg, Nebulization, TID, Gracia Younger PA-C, 1.25 mg at 03/19/25 1352    lidocaine (LIDODERM) 5 % patch 1 patch, 1 patch, Topical, Daily, KAYA Miller, 1 patch at 03/19/25 0941    melatonin tablet 6 mg, 6 mg, Oral, HS, Shira Keith PA-C    metoprolol succinate (TOPROL-XL) 24 hr tablet 25 mg, 25 mg, Oral, Q24H, KAYA Miller, 25 mg at 03/18/25 2158    mineral oil enema 1 enema, 1 enema, Rectal, Once PRN, Shira Keith PA-C    naloxone (NARCAN) 0.04 mg/mL syringe 0.04 mg, 0.04 mg, Intravenous, Q1MIN PRN, KAYA Miller    nicotine (NICODERM CQ) 14 mg/24hr TD 24 hr patch 1 patch, 1 patch, Transdermal, QPM, KAYA Miller, 1 patch at 03/17/25 1753    oxyCODONE (ROXICODONE) IR tablet 5 mg, 5 mg, Oral, Q4H PRN, 5 mg at 03/18/25 1743 **OR** oxyCODONE (ROXICODONE) split tablet 7.5 mg, 7.5 mg, Oral, Q4H PRN, Jennifer Paige Bloch, CRNP, 7.5 mg at 03/19/25 0940    polyethylene glycol (MIRALAX) packet 17 g, 17 g, Oral, BID, Shira Keith PA-C    QUEtiapine (SEROquel) tablet 100 mg, 100 mg, Oral, HS, KAYA Miller, 100 mg at 03/18/25 2158    senna (SENOKOT) tablet 17.2 mg, 17.2 mg, Oral, HS, KAYA Miller, 17.2 mg at 03/18/25 2158    sodium chloride tablet 2 g, 2 g, Oral, TID With Meals, Michele Dill MD, 2 g at 03/19/25 1446      Lab Results: I have reviewed the following results:  Results from last 7 days   Lab Units 03/19/25  1306 03/19/25  0517 03/19/25  0001 03/18/25  1755 03/18/25  1431 03/18/25  0116 03/17/25  1847 03/17/25  1116 03/14/25  0639   WBC Thousand/uL  --  4.06*  --   --   --  6.20  --  5.71 4.20*   HEMOGLOBIN g/dL  --  10.4*  --   --   --  10.7*  --  9.9* 11.7*   HEMATOCRIT %  --  32.1*  --   --   --  33.2*  --  30.4* 37.1   PLATELETS Thousands/uL  --  163  --   --   --  182  --  164 177   POTASSIUM mmol/L 4.2 4.0 4.2 4.2 3.9 4.3 4.3 4.0 3.5   CHLORIDE mmol/L 99 98 98 96 96 96 96 95* 96   CO2  mmol/L 27 25 25 25 24 26 28 27 26   BUN mg/dL 28* 24 23 19 19 16 16 18 23   CREATININE mg/dL 0.93 0.85 0.87 0.86 0.78 0.74 0.75 0.75 0.83   CALCIUM mg/dL 8.4 8.1* 7.7* 8.2* 8.0* 8.2* 8.2* 7.7* 7.9*   MAGNESIUM mg/dL  --   --   --   --   --  1.7*  --   --   --    ALBUMIN g/dL  --   --   --   --   --  2.9*  --   --  3.1*

## 2025-03-20 PROBLEM — E44.0 MODERATE PROTEIN-CALORIE MALNUTRITION (HCC): Status: ACTIVE | Noted: 2025-01-01

## 2025-03-20 NOTE — ASSESSMENT & PLAN NOTE
"Patient presented with complaints of back pain, which is been worsening and causing inability to complete tasks and sleep.  Per patient, pain has been worsening over the past few months, as noted by outpatient hematology/oncology.  In fact, MRI thoracic spine completed last week.  MRI thoracic spine (3/11): \"Low T1 signal throughout the marrow which may reflect patient's underlying myeloma. Chronic appearing likely pathologic compression deformities of the T6, T7, and T10 vertebral bodies. No marrow edema. Degenerative changes as described without significant stenosis.\"  CT c/a/p (3/17): \"Stable appearing severe compression fracture deformity of the T6 vertebrae and mild to moderate compression fracture deformities of the T7, T9, and T10 vertebrae again noted. Mild superior plate L1 compression fracture deformity is also seen. Multilevel degenerative changes of the thoracolumbar spine. Sternotomy wires are again seen. No new/acute fracture or subluxation identified. Scattered areas of ill-defined lucency throughout the axial skeleton concerning for metastatic disease and/or multiple myeloma.\"   With rapid clinical decline overnight requiring upgrade to SD2, initially on HFNC but eventually transitioned to bipap  CC AP discussed with family - elected to transition to comfort measures  Patient passed away comfortably with daughters at bedside.  Pronounced by critical care team  "

## 2025-03-20 NOTE — PROGRESS NOTES
Progress Note - Critical Care/ICU   Name: Noah Mendez 84 y.o. male I MRN: [id number]  Unit/Bed#: -01 SDU I Date of Admission: 3/17/2025   Date of Service: 3/20/2025 I Hospital Day: 3       Interval Events:       Patient noted to be going in and out of VT very frequently while awaiting for family to come to bedside. He was given 2.5 mg IV lopressor as opposed to amiodarone due to prolonged Qtc. Initially with improvement in ectopy and VT but only transient improvement. He was then given 2 mg calcium gluconate, 2 mg magnesium sulfate, and 150 mg amiodarone bolus. With every episode of VT patient visibly uncomfortabke, grabbing his chest and yelling out. He was given 2 mg IV morphine. Daughter Xuan arrived at bedside and states she understands the patient is actively dying and does not want him to suffer. Code status changed to level 4, comfort care. Once patient's other daughter, Charity, arrives to the hospital, pt will be taken off bipap and given additional comfort medications. No further lab draws or escalation of care in the interim.    Pertinent New Data:   Vitals:    03/20/25 0632   BP:    Pulse: 65   Resp: (!) 28   Temp:    SpO2: (!) 87%         Assessment and Plan  Diagnosis: AHRF, Vtach, Sepsis, Acute on Chronic Heart Failure  Plan: Transition to comfort-focused care once patient's other daughter arrives to the hospital  PRN dilaudid, ativan, and haldol will be ordered  Once patient appears comfortable, remove BiPAP mask    Billing Level:  Critical Care Time Statement: Upon my evaluation, this patient had a high probability of imminent or life-threatening deterioration due to AHRF, sepsis, VTach, which required my direct attention, intervention, and personal management.  I spent a total of 45 minutes directly providing critical care services, including interpretation of complex medical databases, evaluating for the presence of life-threatening injuries or illnesses, management of organ system  failure(s) , complex medical decision making (to support/prevent further life-threatening deterioration)., interpretation of hemodynamic data, and titration of continuous IV medications (drips). This time is exclusive of procedures, teaching, family meetings, and any prior time recorded by providers other than myself.      Negar Goodman PA-C

## 2025-03-20 NOTE — QUICK NOTE
Called daughter Charity and discussed worsening hypoxia, application of HFNC, upgrade to SD2, initiation of abx and additional dose of lasix. She was appreciative of the update.     Charity inquired as to whether I had talked to her sister, Xuan. I had tried Xuan's number first, however call would not go through. Charity provided me with alternate number, which I have updated in the chart. I attempted call to Xuan, no answer. VM left with call back number.

## 2025-03-20 NOTE — ASSESSMENT & PLAN NOTE
Malnutrition Findings:   Adult Malnutrition type: Chronic illness  Adult Degree of Malnutrition: Malnutrition of moderate degree  Malnutrition Characteristics: Fat loss, Muscle loss                  360 Statement: Pt presents with moderate protein calorie malnutrition as evidenced by visble clavicle bone, somewhat hollowed orbitals and depressed temporals. Treat with diet and supplement TID (ordered by MD already).    BMI Findings:           Body mass index is 22.62 kg/m².

## 2025-03-20 NOTE — ASSESSMENT & PLAN NOTE
Patient with increasing oxygen requirements since 3/17  Upon review of prior CT imaging, concern for secretions in the airways and atelectasis.  Suspect back pain contributing to poor inspiratory effort, ability to cough/expectorate sputum  Received IV lasix x2 without improvement in hypoxia or SOB  Ultimately was escalated to SD2 on HFNC, then bipap  Family transitioned patient to comfort cares

## 2025-03-20 NOTE — CASE MANAGEMENT
Case Management Progress Note    Patient name Noha KAMARA Stock  Location  SDU/-01 S* MRN [id number]  : 1940 Date 3/20/2025       LOS (days): 3  Geometric Mean LOS (GMLOS) (days): 3.9  Days to GMLOS:1.1        OBJECTIVE:        Current admission status: Inpatient  Preferred Pharmacy:   [facility] #[id number] - [place] - [address]  [address]  [place] [address]  Phone: [phone] Fax: [phone]    Primary Care Provider: Guanako Cortes MD    Primary Insurance: [employer]  Secondary Insurance:     PROGRESS NOTE:  Acknowledge Pt transitioned to comfort care. CM will remain available.

## 2025-03-20 NOTE — RESPIRATORY THERAPY NOTE
RT Protocol Note  Noah Mendez 84 y.o. male MRN: [id number]  Unit/Bed#: -01 SDU Encounter: [id number]    Assessment    Principal Problem:    Back pain  Active Problems:    Paroxysmal atrial fibrillation (HCC)    Constipation    HFrEF (heart failure with reduced ejection fraction) (HCC)    Coronary artery disease involving native coronary artery of native heart without angina pectoris    Multiple myeloma not having achieved remission (HCC)    Hyponatremia    Hypomagnesemia    Palliative care encounter    Hypoxia    COPD, severity to be determined (HCC)    Tobacco abuse    Moderate protein-calorie malnutrition (HCC)      Home Pulmonary Medications:      Past Medical History:   Diagnosis Date    Anemia     Atrial fibrillation (HCC)     CHF (congestive heart failure) (HCC)     Coronary artery disease     Depression     Hyperlipidemia     Hypertension     Hyponatremia     Insomnia     Low blood pressure     MI (myocardial infarction) (HCC)     Multiple myeloma (HCC)     Thrombocytopenia (HCC)      Social History     Socioeconomic History    Marital status:      Spouse name: None    Number of children: None    Years of education: None    Highest education level: None   Occupational History    None   Tobacco Use    Smoking status: Every Day     Current packs/day: 1.00     Types: Cigarettes    Smokeless tobacco: Never    Tobacco comments:     Smokes 2 cigarettes daily    Vaping Use    Vaping status: Never Used   Substance and Sexual Activity    Alcohol use: Never    Drug use: Never    Sexual activity: None   Other Topics Concern    None   Social History Narrative    None     Social Drivers of Health     Financial Resource Strain: Not on file   Food Insecurity: No Food Insecurity (3/17/2025)    Nursing - Inadequate Food Risk Classification     Worried About Running Out of Food in the Last Year: Never true     Ran Out of Food in the Last Year: Never true     Ran Out of Food in the Last Year: Never true  "  Transportation Needs: No Transportation Needs (3/17/2025)    Nursing - Transportation Risk Classification     Lack of Transportation: Not on file     Lack of Transportation: No   Physical Activity: Not on file   Stress: Not on file   Social Connections: Not on file   Intimate Partner Violence: Unknown (3/17/2025)    Nursing IPS     Feels Physically and Emotionally Safe: Not on file     Physically Hurt by Someone: Not on file     Humiliated or Emotionally Abused by Someone: Not on file     Physically Hurt by Someone: No     Hurt or Threatened by Someone: No   Housing Stability: Unknown (3/17/2025)    Nursing: Inadequate Housing Risk Classification     Has Housing: Not on file     Worried About Losing Housing: Not on file     Unable to Get Utilities: Not on file     Unable to Pay for Housing in the Last Year: No     Has Housin       Subjective         Objective    Physical Exam:   Assessment Type: Assess only  General Appearance: Awake  Respiratory Pattern: Tachypneic, Labored, Grunting (grunting is baseline)  Chest Assessment: Chest expansion symmetrical  Bilateral Breath Sounds: Diminished, Coarse, Crackles  Cough: Non-productive  O2 Device: HFNC    Vitals:  Blood pressure 109/58, pulse 59, temperature 98.4 °F (36.9 °C), temperature source Oral, resp. rate (!) 24, height 5' 10\" (1.778 m), weight 68.3 kg (150 lb 9.6 oz), SpO2 90%.          Imaging and other studies:     O2 Device: HFNC     Plan    Respiratory Plan: (P) Moderate/Severe Distress pathway, Vent/NIV/HFNC  Airway Clearance Plan: Incentive Spirometer, Flutter     Resp Comments: Patient transitioned from MFNC and non-rebreather to HFNC and non-rebreather max settings     Plan to change current scheduled regimen from TID to Q6- Xop/Atro.   "

## 2025-03-20 NOTE — DISCHARGE SUMMARY
"Discharge Summary - Hospitalist   Name: Noah Mendez 84 y.o. male I MRN: [id number]  Unit/Bed#: -01 SDU I Date of Admission: 3/17/2025   Date of Service: 3/20/2025 I Hospital Day: 3     Assessment & Plan  Back pain  Patient presented with complaints of back pain, which is been worsening and causing inability to complete tasks and sleep.  Per patient, pain has been worsening over the past few months, as noted by outpatient hematology/oncology.  In fact, MRI thoracic spine completed last week.  MRI thoracic spine (3/11): \"Low T1 signal throughout the marrow which may reflect patient's underlying myeloma. Chronic appearing likely pathologic compression deformities of the T6, T7, and T10 vertebral bodies. No marrow edema. Degenerative changes as described without significant stenosis.\"  CT c/a/p (3/17): \"Stable appearing severe compression fracture deformity of the T6 vertebrae and mild to moderate compression fracture deformities of the T7, T9, and T10 vertebrae again noted. Mild superior plate L1 compression fracture deformity is also seen. Multilevel degenerative changes of the thoracolumbar spine. Sternotomy wires are again seen. No new/acute fracture or subluxation identified. Scattered areas of ill-defined lucency throughout the axial skeleton concerning for metastatic disease and/or multiple myeloma.\"   With rapid clinical decline overnight requiring upgrade to SD2, initially on HFNC but eventually transitioned to bipap  CC AP discussed with family - elected to transition to comfort measures  Patient passed away comfortably with daughters at bedside.  Pronounced by critical care team  Hyponatremia  Per chart review, patient continues to have hyponatremia, as noted worsening from prior admission.  Per review, baseline around low 130s.  Hypertonic saline previously discontinued and had been transitioned to salt tabs/torsemide  Hypoxia  Patient with increasing oxygen requirements since 3/17  Upon review " of prior CT imaging, concern for secretions in the airways and atelectasis.  Suspect back pain contributing to poor inspiratory effort, ability to cough/expectorate sputum  Received IV lasix x2 without improvement in hypoxia or SOB  Ultimately was escalated to SD2 on HFNC, then bipap  Family transitioned patient to comfort cares  Multiple myeloma not having achieved remission (HCC)  Per chart review, patient with history of multiple myeloma and currently following the outpatient setting. Per chart review, the patient has been having severe back pain and received MRI on 3/11. The patient was to be referred to pain management.   Per review, recently completed a month of Revlimid.  No acute intervention needed from an oncology standpoint.  HFrEF (heart failure with reduced ejection fraction) ([facility])  Wt Readings from Last 3 Encounters:   03/20/25 71.5 kg (157 lb 10.1 oz)   03/14/25 64.9 kg (143 lb)   02/26/25 64.9 kg (143 lb)     Lasix was held on admit due to hyponatremia  Received IV lasix overnight for hypoxia     Medical Problems       Resolved Problems  Date Reviewed: 3/4/2025   None       Discharging Physician / Practitioner: Shira Keith PA-C  PCP: Guanako Cortes MD  Admission Date:   Admission Orders (From admission, onward)       Ordered        03/17/25 1428  INPATIENT ADMISSION  Once                          Discharge Date: 03/20/25    Consultations During Hospital Stay:  Nephrology  Pulmonology  Heme/onc  Palliative care    Procedures Performed:   None    Significant Findings / Test Results:   CT chest abdomen pelvis: 1.  Layering debris within the posterior mid to lower trachea and proximal bilateral mainstem bronchi could represent secretions. Extensive left basilar atelectasis noted, can not exclude early airspace disease. Minimal right basilar atelectasis is also seen. No lobar airspace consolidation. Moderate to severe centrilobular and paraseptal emphysematous changes are seen  2.  Stable  cardiomegaly and post sternotomy changes.  3.  Extensive calcific atherosclerosis of the thoracoabdominal aorta, proximal thoracic vessels, coronary arteries, and iliac arteries. 3.3 x 2.9 cm infrarenal abdominal aortic aneurysm is seen, not significantly changed compared to the prior study. No evidence of acute dissection or intramural hematoma. No significant focal flow-limiting stenosis. Recommend follow-up imaging in 3 years per current guidelines.  4.  Evaluation of bowel is markedly limited on this exam due to lack of oral contrast and paucity of intra-abdominal fat. Terminal ileum is markedly distended with fecal matter which could be related to IC valve reflux or incompetence. Large amount of stool in the proximal to mid colon as well as the mid to distal sigmoid colon noted suggesting constipation/impaction. Wall thickening of the distal sigmoid colon and rectum could suggest mild focal colitis/proctitis. Consider nonemergent colonoscopy for further evaluation after resolution of acute illness. No evidence of bowel obstruction, free air, free fluid, or loculated fluid collections in the abdomen/pelvis.  5.  Stable appearing multilevel compression fracture deformities of the thoracolumbar spine as described above. Scattered areas of ill-defined lucency throughout the axial skeleton concerning for metastatic disease and/or multiple myeloma.  Sodium 125 on admission  COVID/influenza/RSV negative  BNP 1176    Incidental Findings:   None    Test Results Pending at Discharge (will require follow up):   None     Outpatient Tests Requested:  None    Complications:  Worsening respiratory failure    Reason for Admission: Back pain, Hyponatremia    Hospital Course:   Noah Mendez is a 84 y.o. male patient who originally presented to the hospital on 3/17/2025 due to back pain.    Past medical history significant for anemia, atrial fibrillation, CHF, coronary artery disease, depression, hyperlipidemia, hypertension,  hyponatremia, multiple myeloma.  Patient presented to the emergency department due to intractable back pain related to known vertebral fractures related to his known multiple myeloma.  Palliative care was consulted to assist with symptom management.  Nephrology was consulted due to acute on chronic hyponatremia and was initially on hypertonic saline.  Sodium was slowly improving.  Per chart review, appears patient was placed on oxygen supplementation on 3/17.  Escalating oxygen requirements upwards of 4 L were noted on 3/19 and pulmonology was consulted.  Concern for increased secretions, atelectasis started on airway clearance protocol, nebulizer treatments.  Overnight with further worsening oxygen requirements, received dose of IV Lasix with initial improvement weaned to 5 L nasal cannula.  Eventually patient again with significant hypoxia on upwards of 15 L, decision was made to transfer to stepdown to and patient was placed on high flow nasal cannula.  Patient became more somnolent, noted runs of V. tach and placed on BiPAP.  Critical care AP had evaluated patient at bedside.  Family had been notified regarding events overnight and presented to the hospital, ultimately making patient comfort care.  Patient passed away comfortably with daughters at bedside and was pronounced by critical care team.    Please see above list of diagnoses and related plan for additional information.     Condition at Discharge:      Discharge Day Visit / Exam:   * Please refer to separate progress note for these details *    Discussion with Family: Updated  (daughter) via phone.    Discharge instructions/Information to patient and family:   See after visit summary for information provided to patient and family.      Provisions for Follow-Up Care:  See after visit summary for information related to follow-up care and any pertinent home health orders.      Mobility at time of Discharge:   Basic Mobility Inpatient  Raw Score: 6  -HL Goal: 2: Bed activities/Dependent transfer  -HLM Achieved: 2: Bed activities/Dependent transfer       Disposition:   Other:     Planned Readmission: none    Discharge Medications:  See after visit summary for reconciled discharge medications provided to patient and/or family.      Administrative Statements   Discharge Statement:  I have spent a total time of 40 minutes in caring for this patient on the day of the visit/encounter. >30 minutes of time was spent on: Prognosis, Documenting in the medical record, Reviewing / ordering tests, medicine, procedures  , and Communicating with other healthcare professionals .    **Please Note: This note may have been constructed using a voice recognition system**

## 2025-03-20 NOTE — QUICK NOTE
Patient now level 4 comfort care. Nephrology will sign off. Please call with any additional questions or concerns.

## 2025-03-20 NOTE — PLAN OF CARE
Problem: PAIN - ADULT  Goal: Verbalizes/displays adequate comfort level or baseline comfort level  Description: Interventions:  - Encourage patient to monitor pain and request assistance  - Assess pain using appropriate pain scale  - Administer analgesics based on type and severity of pain and evaluate response  - Implement non-pharmacological measures as appropriate and evaluate response  - Consider cultural and social influences on pain and pain management  - Notify physician/advanced practitioner if interventions unsuccessful or patient reports new pain  Outcome: Progressing     Problem: INFECTION - ADULT  Goal: Absence or prevention of progression during hospitalization  Description: INTERVENTIONS:  - Assess and monitor for signs and symptoms of infection  - Monitor lab/diagnostic results  - Monitor all insertion sites, i.e. indwelling lines, tubes, and drains  - Monitor endotracheal if appropriate and nasal secretions for changes in amount and color  - [employer] appropriate cooling/warming therapies per order  - Administer medications as ordered  - Instruct and encourage patient and family to use good hand hygiene technique  - Identify and instruct in appropriate isolation precautions for identified infection/condition  Outcome: Progressing  Goal: Absence of fever/infection during neutropenic period  Description: INTERVENTIONS:  - Monitor WBC    Outcome: Progressing     Problem: Potential for Falls  Goal: Patient will remain free of falls  Description: INTERVENTIONS:  - Educate patient/family on patient safety including physical limitations  - Instruct patient to call for assistance with activity   - Consult OT/PT to assist with strengthening/mobility   - Keep Call bell within reach  - Keep bed low and locked with side rails adjusted as appropriate  - Keep care items and personal belongings within reach  - Initiate and maintain comfort rounds  - Make Fall Risk Sign visible to staff  - Offer Toileting every 2  Hours, in advance of need  - Initiate/Maintain bed alarm  - Obtain necessary fall risk management equipment: socks  - Apply yellow socks and bracelet for high fall risk patients  - Consider moving patient to room near nurses station  Outcome: Progressing

## 2025-03-20 NOTE — ASSESSMENT & PLAN NOTE
Per chart review, patient with history of multiple myeloma and currently following the outpatient setting. Per chart review, the patient has been having severe back pain and received MRI on 3/11. The patient was to be referred to pain management.   Per review, recently completed a month of Revlimid.  No acute intervention needed from an oncology standpoint.

## 2025-03-20 NOTE — DEATH NOTE
INPATIENT DEATH NOTE  Noah Mendez 84 y.o. male MRN: [id number]  Unit/Bed#: -01 SDU Encounter: [id number]           Date of death: 3/20/25  Time of death: 9:09am      PHYSICAL EXAM:  Unresponsive to noxious stimuli, Spontaneous respirations absent, Breath sounds absent, Carotid pulse absent, Heart sounds absent, Pupillary light reflex absent, and Corneal blink reflex absent    Medical Examiner notification criteria:  NONE APPLICABLE   Medical Examiner's office notified?:  Yes   Medical Examiner accepted case?:  No  Name of Medical Examiner:          Autopsy Options:  Post-mortem examination declined by next of kin    Primary Service Attending Physician notified?:  yes - Attending:  Kristyn Haley MD    Physician/Resident responsible for completing Discharge Summary:  Shira Soria

## 2025-03-20 NOTE — QUICK NOTE
Alerted by nursing that patient is becoming increasingly hypoxic on max HFNC + NRB and now with significant mottling of lower extremities + increased WOB. Chart reviewed and patient with elevated troponin, BNP, pulmonary edema on CXR, lactic acidosis, and likely PNA with elevated procal. Sepsis work-up initiated overnight and started on antibiotics. Received IV lasix overnight as well without robust response. Episode of NSVT on telemetry this AM. Pt evaluated and noted to be tachypneic with accessory muscle use, difficult to arouse, hypoxic to 86% on max HFNC + NRB, and mottled from the knees down.     Discussions were held overnight with patient's family due to worsening clinical status and family confirmed patient is a strict DNR/DNI.    Internal medicine provider called to bedside as patient appears to be rapidly decompensating. Pt started on BiPAP for increased work of breathing and worsening hypoxia. Serial troponin ordered. Internal medicine provider reports that patient was interactive overnight and patient now quite somnolent. Discussed that given the patient's rapidly declining clinical status, family should be contacted for further goals of care discussions. If family wishes to continue treatment-focused care, will upgrade to critical care service for further management though prognosis is guarded.

## 2025-03-20 NOTE — QUICK NOTE
Alerted by RN that patient's oxygen requirements had increased and he had become increasingly more dyspneic.  Patient evaluated at the bedside with decent urine output since the 20 mg IV Lasix given earlier  He does appear uncomfortable, however he chronically is uncomfortable because of his back pain. However he is now complaining of shortness of breath where before he had not been.  He will be transferred physicians to level 2 stepdown and moved to the ICU for high flow nasal cannula.  He had elevated procalcitonin, purulent sputum so he will be covered for possible pneumonia as well  Continue to monitor

## 2025-03-20 NOTE — DEATH NOTE
INPATIENT DEATH NOTE  Noah Mendez 84 y.o. male MRN: [id number]  Unit/Bed#: -01 SDU Encounter: [id number]    Date, Time and Cause of Death    Date of Death: 3/20/25  Time of Death:  9:09 AM  Preliminary Cause of Death: Respiratory failure (HCC)  Entered by: Negar Goodman PA-C[SB1.1]       Attribution       SB1.1 Negar Goodman PA-C 03/20/25 09:23                 PHYSICAL EXAM:  Unresponsive to noxious stimuli, Spontaneous respirations absent, Breath sounds absent, Carotid pulse absent, Heart sounds absent, Pupillary light reflex absent, and Corneal blink reflex absent    Medical Examiner notification criteria:  NONE APPLICABLE   Medical Examiner's office notified?:  No, does not meet ME notification criteria   Medical Examiner accepted case?:  No  Name of Medical Examiner: [facility]         Autopsy Options:  Post-mortem examination declined by next of kin    Primary Service Attending Physician notified?:  yes - Attending:  Kristyn Haley MD    Physician/Resident responsible for completing Discharge Summary:  Shira Keith PA-C

## 2025-03-20 NOTE — MALNUTRITION/BMI
This medical record reflects one or more clinical indicators suggestive of malnutrition and/or morbid obesity.    Malnutrition Findings:   Adult Malnutrition type: Chronic illness  Adult Degree of Malnutrition: Malnutrition of moderate degree  Malnutrition Characteristics: Fat loss, Muscle loss                360 Statement: Pt presents with moderate protein calorie malnutrition as evidenced by visble clavicle bone, somewhat hollowed orbitals and depressed temporals. Treat with diet and supplement TID (ordered by MD nix).    BMI Findings:           Body mass index is 21.61 kg/m².     See Nutrition note dated 3/19/25  for additional details.  Completed nutrition assessment is viewable in the nutrition documentation.

## 2025-03-20 NOTE — ASSESSMENT & PLAN NOTE
Per chart review, patient continues to have hyponatremia, as noted worsening from prior admission.  Per review, baseline around low 130s.  Hypertonic saline previously discontinued and had been transitioned to salt tabs/torsemide

## 2025-03-20 NOTE — QUICK NOTE
Resp status worsening and pt becoming less responsive. He is being put on Bipap.  I called his daughter Charity and updated, she will call her sister Xuan and come in.

## 2025-03-20 NOTE — QUICK NOTE
"Made aware of increasing O2 requirements by primary team. Patient is 83yo M with PMH HFrEF, MM not receiving treatment, COPD, tobacco use, PAF on eliquis, CAD who is currently HD 3 for hyponatremia. Nephrology has been following for sNa trends, and his 1.8% Saline was d/c'ed yesterday, salt tabs started, and he was ordered to resume his home torsemide later this morning.     Last evening, he was noted to be hypoxic with mild increased WOB and required 15L MFNC. Upon exam, he was AAO, conversational with some mild dyspnea and family at the bedside confirmed that while they believe his voice was mildly weak, at baseline he has some grunting with breathing and walking. At that time, his O2 was weaned rapidly back to 6L NC. He was administered lasix 20mg IV x1 with a UO 650mL via [place] catheter.     Currently, RN reports that the patient is hypoxic with moderate WOB. He is requiring 15L MFNC with NRB. He states he is having mild increased WOB and reports he has pain \"everywhere\"/ review of medication administration indicates he received seroquel 100mg and melatonin 6mg at 2034 last night. He then also received roxicodone 7.5mg at 2343, followed by dilaudid 0.2mg at 0020.       Suggestions:   Previous CXR concerning for pulm edema vs developing pna   Check repeat CXR now   Check vbg, BC x2, strep, legionella, sputum cx, bnp, procal  Start Ctx and Zithromax for r/o PNA given borderline leukopenia and risk of developing PNA with MM, age, tobacco use, hospitalization and use of narcotics   Check AM labs, consider additional lasix   Aggressive pulmonary toileting   Hold all narcotics and sedating medications   Consider HFNC if needed to maintain spo2 > 88%   Family confirmed L3 DNR/DNI earlier in the night   May consider CT chest once more appropriate     I discussed the above plan in detail with the LATONYA Gan.   "

## 2025-03-20 NOTE — RESPIRATORY THERAPY NOTE
RT Protocol Note  Noah Mendez 84 y.o. male MRN: [id number]  Unit/Bed#: -01 Encounter: [id number]    O2 Device: MFNC     Plan    Airway Clearance Plan: Incentive Spirometer, Paula

## 2025-03-20 NOTE — ASSESSMENT & PLAN NOTE
Wt Readings from Last 3 Encounters:   03/20/25 71.5 kg (157 lb 10.1 oz)   03/14/25 64.9 kg (143 lb)   02/26/25 64.9 kg (143 lb)     Lasix was held on admit due to hyponatremia  Received IV lasix overnight for hypoxia

## 2025-03-21 LAB
BACTERIA SPT RESP CULT: ABNORMAL
BACTERIA SPT RESP CULT: ABNORMAL
GRAM STN SPEC: ABNORMAL
MRSA NOSE QL CULT: NORMAL

## 2025-03-21 NOTE — UTILIZATION REVIEW
NOTIFICATION OF ADMISSION DISCHARGE   This is a Notification of Discharge from [facility]. Please be advised that this patient has been discharge from our facility. Below you will find the admission and discharge date and time including the patient’s disposition.   UTILIZATION REVIEW CONTACT:  Estee Birmingham  Utilization   Network Utilization Review Department  Phone: [phone] x carefully listen to the prompts. All voicemails are confidential.  Email: [email or web address]     ADMISSION INFORMATION  PRESENTATION DATE: 3/17/2025 10:37 AM  OBERVATION ADMISSION DATE: N/A  INPATIENT ADMISSION DATE: 3/17/25  2:28 PM   DISCHARGE DATE: 3/20/2025 11:35 AM   DISPOSITION:    Network Utilization Review Department  ATTENTION: Please call with any questions or concerns to [phone] and carefully listen to the prompts so that you are directed to the right person. All voicemails are confidential.   For Discharge needs, contact Care Management DC Support Team at [phone] opt. [phone]  Send all requests for admission clinical reviews, approved or denied determinations and any other requests to dedicated fax number below belonging to the campus where the patient is receiving treatment. List of dedicated fax numbers for the Facilities:  FACILITY NAME UR FAX NUMBER   ADMISSION DENIALS (Administrative/Medical Necessity) [phone]   DISCHARGE SUPPORT TEAM ([facility]) [phone]   PARENT CHILD HEALTH (Maternity/NICU/Pediatrics) [phone]   [facility] [phone]   [facility] [phone]   [facility] [phone]   [facility] [phone]   [facility] [phone]   [facility] [phone]   [facility] [phone]   [facility] [phone]   [facility]  [facility] [phone]   [facility] [phone]   [facility] [phone]   [facility] [phone]

## 2025-03-23 LAB
ATRIAL RATE: 60 BPM
ATRIAL RATE: 74 BPM
BACTERIA BLD CULT: ABNORMAL
BACTERIA BLD CULT: ABNORMAL
GRAM STN SPEC: ABNORMAL
GRAM STN SPEC: ABNORMAL
QRS AXIS: 86 DEGREES
QRS AXIS: 93 DEGREES
QRSD INTERVAL: 126 MS
QRSD INTERVAL: 132 MS
QT INTERVAL: 404 MS
QT INTERVAL: 468 MS
QTC INTERVAL: 420 MS
QTC INTERVAL: 468 MS
T WAVE AXIS: 136 DEGREES
T WAVE AXIS: 27 DEGREES
VENTRICULAR RATE: 60 BPM
VENTRICULAR RATE: 65 BPM